# Patient Record
Sex: FEMALE | Race: WHITE | NOT HISPANIC OR LATINO | Employment: OTHER | URBAN - METROPOLITAN AREA
[De-identification: names, ages, dates, MRNs, and addresses within clinical notes are randomized per-mention and may not be internally consistent; named-entity substitution may affect disease eponyms.]

---

## 2017-03-27 ENCOUNTER — OFFICE VISIT (OUTPATIENT)
Dept: FAMILY MEDICINE | Facility: CLINIC | Age: 82
End: 2017-03-27
Payer: MEDICARE

## 2017-03-27 VITALS
DIASTOLIC BLOOD PRESSURE: 72 MMHG | TEMPERATURE: 98 F | HEIGHT: 59 IN | BODY MASS INDEX: 25.6 KG/M2 | HEART RATE: 67 BPM | SYSTOLIC BLOOD PRESSURE: 138 MMHG | RESPIRATION RATE: 16 BRPM | WEIGHT: 127 LBS

## 2017-03-27 DIAGNOSIS — T14.8XXA SPLINTER: Primary | ICD-10-CM

## 2017-03-27 PROCEDURE — 99213 OFFICE O/P EST LOW 20 MIN: CPT | Mod: 25 | Performed by: NURSE PRACTITIONER

## 2017-03-27 PROCEDURE — 10120 INC&RMVL FB SUBQ TISS SMPL: CPT | Performed by: NURSE PRACTITIONER

## 2017-03-27 RX ORDER — CEPHALEXIN 500 MG/1
500 CAPSULE ORAL 3 TIMES DAILY
Qty: 21 CAPSULE | Refills: 0 | Status: SHIPPED | OUTPATIENT
Start: 2017-03-27 | End: 2017-04-03

## 2017-03-27 NOTE — MR AVS SNAPSHOT
After Visit Summary   3/27/2017    Pam Pierce    MRN: [id number]           Patient Information     Date Of Birth          1/6/1930        Visit Information        Provider Department      3/27/2017 2:00 PM Merissa Miller APRN CNP [facility]        Today's Diagnoses     Splinter    -  1      Care Instructions    Take some Tylenol or ibuprofen today for the pain.  Ice might help as well.  Keep bandage on for 24 hours and then may leave open to air, band aid if going out of the house.  Watch for signs of infection, swelling, redness, unusual pain or discharge.  Take the antibiotic as prescribed.  Follow up if symptoms persist or worsen and as needed.        Thank you for choosing [facility].  You may be receiving a survey in the mail from [employer] regarding your visit today.  Please take a few minutes to complete and return the survey to let us know how we are doing.      Our Clinic hours are:  Mondays    7:20 am - 7 pm  Tues -  Fri  7:20 am - 5 pm    Clinic Phone: [phone]    The clinic lab opens at 7:30 am Mon - Fri and appointments are required.    [facility]  Ph. [phone]  Monday-Thursday 8 am - 7pm  Tues/Wed/Fri 8 am - 5:30 pm               Follow-ups after your visit        Who to contact     If you have questions or need follow up information about today's clinic visit or your schedule please contact [facility] directly at [phone].  Normal or non-critical lab and imaging results will be communicated to you by MyChart, letter or phone within 4 business days after the clinic has received the results. If you do not hear from us within 7 days, please contact the clinic through MyChart or phone. If you have a critical or abnormal lab result, we will notify you by phone as soon as possible.  Submit refill requests through [employer] or call your pharmacy and they will forward the refill request to us. Please allow 3  "business days for your refill to be completed.          Additional Information About Your Visit        MyChart Information     [employer] lets you send messages to your doctor, view your test results, renew your prescriptions, schedule appointments and more. To sign up, go to [email or web address] . Click on \"Log in\" on the left side of the screen, which will take you to the Welcome page. Then click on \"Sign up Now\" on the right side of the page.     You will be asked to enter the access code listed below, as well as some personal information. Please follow the directions to create your username and password.     Your access code is: [email or web address]  Expires: 2017  2:50 PM     Your access code will  in 90 days. If you need help or a new code, please call your [place] clinic or [phone].        Care EveryWhere ID     This is your Care EveryWhere ID. This could be used by other organizations to access your [place] medical records  [id number]        Your Vitals Were     Pulse Temperature Respirations Height BMI (Body Mass Index)       67 98  F (36.7  C) (Oral) 16 4' 11\" (1.499 m) 25.65 kg/m2        Blood Pressure from Last 3 Encounters:   17 138/72   16 121/65   16 101/56    Weight from Last 3 Encounters:   17 127 lb (57.6 kg)   16 135 lb (61.2 kg)   16 134 lb (60.8 kg)              We Performed the Following     REMOVAL ARM/ELBOW F.B.,SUPERFICIAL          Today's Medication Changes          These changes are accurate as of: 3/27/17  2:50 PM.  If you have any questions, ask your nurse or doctor.               Start taking these medicines.        Dose/Directions    cephALEXin 500 MG capsule   Commonly known as:  KEFLEX   Used for:  Splinter   Started by:  Merissa Miller APRN CNP        Dose:  500 mg   Take 1 capsule (500 mg) by mouth 3 times daily for 7 days   Quantity:  21 capsule   Refills:  0            Where to get your medicines      These medications were " sent to [facility] - [place], [place] - [address]  [address], [facility] [address]     Phone:  [phone]     cephALEXin 500 MG capsule                Primary Care Provider Office Phone # Fax #    Reagan Thakkar [phone]       [facility] [address] [address]        Thank you!     Thank you for choosing [facility]  for your care. Our goal is always to provide you with excellent care. Hearing back from our patients is one way we can continue to improve our services. Please take a few minutes to complete the written survey that you may receive in the mail after your visit with us. Thank you!             Your Updated Medication List - Protect others around you: Learn how to safely use, store and throw away your medicines at www.disposemymeds.org.          This list is accurate as of: 3/27/17  2:50 PM.  Always use your most recent med list.                   Brand Name Dispense Instructions for use    albuterol 108 (90 BASE) MCG/ACT Inhaler    albuterol    1 Inhaler    Inhale 2 puffs into the lungs every 4 hours as needed       atenolol-chlorthalidone 50-25 MG per tablet    TENORETIC    90 tablet    Take 1 tablet by mouth daily       CALCIUM + D PO      1 TABLET DAILY       cephALEXin 500 MG capsule    KEFLEX    21 capsule    Take 1 capsule (500 mg) by mouth 3 times daily for 7 days       enalapril 20 MG tablet    VASOTEC    180 tablet    Take 2 tablets (40 mg) by mouth daily       fluticasone 110 MCG/ACT Inhaler    FLOVENT HFA    3 Inhaler    Inhale 2 puffs into the lungs 2 times daily 2 puffs       MULTI-DAY VITAMINS PO      1 tablet daily       simvastatin 40 MG tablet    ZOCOR    90 tablet    Take 1 tablet (40 mg) by mouth At Bedtime

## 2017-03-27 NOTE — NURSING NOTE
"Chief Complaint   Patient presents with     Foreign Body in Skin       Initial /72 (BP Location: Right arm, Patient Position: Chair, Cuff Size: Adult Regular)  Pulse 67  Temp 98  F (36.7  C) (Oral)  Resp 16  Ht 4' 11\" (1.499 m)  Wt 127 lb (57.6 kg)  BMI 25.65 kg/m2 Estimated body mass index is 25.65 kg/(m^2) as calculated from the following:    Height as of this encounter: 4' 11\" (1.499 m).    Weight as of this encounter: 127 lb (57.6 kg).  Medication Reconciliation: complete  "

## 2017-03-27 NOTE — PROGRESS NOTES
"  SUBJECTIVE:                                                    Pam Pierce is a 87 year old female who presents to clinic today for the following health issues:      Pt is here for a sliver in her left hand that she got helping her  stack wood for the fireplace 9 days ago.         Problem list and histories reviewed & adjusted, as indicated.  Additional history: palmar base of left hand with pain. She states 9 days ago she got a sliver in it while helping to load up firewood. She reports removing a \"large chunk\" but since then it's been painful, some drainage and she feels there's still something in there.  Tetanus booster in 2012.      Patient Active Problem List   Diagnosis     Urethral stricture due to infection     Hyperlipidemia     Essential hypertension     IMPAIRED FASTING GLUCOSE     HYPERLIPIDEMIA LDL GOAL <100     CKD (chronic kidney disease) stage 3, GFR 30-59 ml/min     Advanced directives, counseling/discussion     GERD (gastroesophageal reflux disease)     Past Surgical History:   Procedure Laterality Date     SURGICAL HISTORY OF -       open reduction of second metatarsal neck fx with internal fixation  cna d closed treatment of metatarsal to 2 and 4 fx     SURGICAL HISTORY OF -       hysterectomy and oophorectomy     SURGICAL HISTORY OF -       lithotripsy       Social History   Substance Use Topics     Smoking status: Never Smoker     Smokeless tobacco: Never Used     Alcohol use Yes      Comment: wine ocass     Family History   Problem Relation Age of Onset     Cancer - colorectal Mother      Breast Cancer Mother      C.A.D. Father      Cardiovascular Father      Alcohol/Drug Father      Chronic Obstructive Pulmonary Disease Brother      CANCER Son      chest cancer/myesthenia gravis     Musculoskeletal Disorder Son      myesthenia gravis     CEREBROVASCULAR DISEASE Sister      Chronic Obstructive Pulmonary Disease Brother          Current Outpatient Prescriptions   Medication Sig " "Dispense Refill     cephALEXin (KEFLEX) 500 MG capsule Take 1 capsule (500 mg) by mouth 3 times daily for 7 days 21 capsule 0     atenolol-chlorthalidone (TENORETIC) 50-25 MG per tablet Take 1 tablet by mouth daily 90 tablet 3     enalapril (VASOTEC) 20 MG tablet Take 2 tablets (40 mg) by mouth daily 180 tablet 3     fluticasone (FLOVENT HFA) 110 MCG/ACT inhaler Inhale 2 puffs into the lungs 2 times daily 2 puffs 3 Inhaler 3     albuterol (ALBUTEROL) 108 (90 BASE) MCG/ACT inhaler Inhale 2 puffs into the lungs every 4 hours as needed 1 Inhaler 11     simvastatin (ZOCOR) 40 MG tablet Take 1 tablet (40 mg) by mouth At Bedtime 90 tablet 3     CALCIUM + D OR 1 TABLET DAILY       MULTI-DAY VITAMINS OR 1 tablet daily       Allergies   Allergen Reactions     Flu Virus Vaccine      Gabapentin Nausea and Vomiting       Reviewed and updated as needed this visit by clinical staff  Tobacco  Allergies  Med Hx  Surg Hx  Fam Hx  Soc Hx      Reviewed and updated as needed this visit by Provider          ROS: 10 point ROS neg other than the symptoms noted above in the HPI.    OBJECTIVE:                                                    /72 (BP Location: Right arm, Patient Position: Chair, Cuff Size: Adult Regular)  Pulse 67  Temp 98  F (36.7  C) (Oral)  Resp 16  Ht 4' 11\" (1.499 m)  Wt 127 lb (57.6 kg)  BMI 25.65 kg/m2  Body mass index is 25.65 kg/(m^2).  GENERAL: healthy, alert and no distress  NECK: no adenopathy, no asymmetry  RESP: lungs clear to auscultation   CV: regular rate and rhythm  MS: no gross musculoskeletal defects noted of left hand, fingers or wrist, palmar base of left thumb with two small incisions, the larger one with scant purulent drainage, area was cleansed with alcohol and anesthestized with 1 cc of lidocaine. Splinter forceps used to remove a larger splinter and then a small piece, irrigated and I could not feel or see any other foreign bodies, topical antibiotic ointment and bandage applied " to left hand      Diagnostic Test Results:  none      ASSESSMENT/PLAN:                                                            1. Splinter    - REMOVAL ARM/ELBOW F.B.,SUPERFICIAL  - cephALEXin (KEFLEX) 500 MG capsule; Take 1 capsule (500 mg) by mouth 3 times daily for 7 days  Dispense: 21 capsule; Refill: 0  Discussed how to take the medication(s), expected outcomes, potential side effects.      See Patient Instructions  Patient Instructions   Take some Tylenol or ibuprofen today for the pain.  Ice might help as well.  Keep bandage on for 24 hours and then may leave open to air, band aid if going out of the house.  Watch for signs of infection, swelling, redness, unusual pain or discharge.  Take the antibiotic as prescribed.  Follow up if symptoms persist or worsen and as needed.        Thank you for choosing [facility].  You may be receiving a survey in the mail from [employer] regarding your visit today.  Please take a few minutes to complete and return the survey to let us know how we are doing.      Our Clinic hours are:  Mondays    7:20 am - 7 pm  Tues -  Fri  7:20 am - 5 pm    Clinic Phone: [phone]    The clinic lab opens at 7:30 am Mon - Fri and appointments are required.    [facility]  Ph. [phone]  Monday-Thursday 8 am - 7pm  Tues/Wed/Fri 8 am - 5:30 pm             WILEY Kc CNP  [facility]

## 2017-03-27 NOTE — PATIENT INSTRUCTIONS
Take some Tylenol or ibuprofen today for the pain.  Ice might help as well.  Keep bandage on for 24 hours and then may leave open to air, band aid if going out of the house.  Watch for signs of infection, swelling, redness, unusual pain or discharge.  Take the antibiotic as prescribed.  Follow up if symptoms persist or worsen and as needed.        Thank you for choosing [facility].  You may be receiving a survey in the mail from [employer] regarding your visit today.  Please take a few minutes to complete and return the survey to let us know how we are doing.      Our Clinic hours are:  Mondays    7:20 am - 7 pm  Tues -  Fri  7:20 am - 5 pm    Clinic Phone: [phone]    The clinic lab opens at 7:30 am Mon - Fri and appointments are required.    [facility]. [phone]  Monday-Thursday 8 am - 7pm  Tues/Wed/Fri 8 am - 5:30 pm

## 2017-04-14 DIAGNOSIS — J45.20 MILD INTERMITTENT ASTHMA WITHOUT COMPLICATION: ICD-10-CM

## 2017-04-14 RX ORDER — FLUTICASONE PROPIONATE 110 UG/1
2 AEROSOL, METERED RESPIRATORY (INHALATION) 2 TIMES DAILY
Qty: 1 INHALER | Refills: 0 | Status: SHIPPED | OUTPATIENT
Start: 2017-04-14 | End: 2017-05-09

## 2017-04-14 NOTE — TELEPHONE ENCOUNTER
Flovent       Last Written Prescription Date: 03/21/2016  Last Fill Quantity: 3 inhalers, # refills: 3    Last Office Visit with G, P or [facility] prescribing provider:  03/21/2016   Future Office Visit:       Date of Last Asthma Action Plan Letter:   There are no preventive care reminders to display for this patient.   Asthma Control Test:   ACT Total Scores 3/21/2016   ACT TOTAL SCORE (Goal Greater than or Equal to 20) 20   In the past 12 months, how many times did you visit the emergency room for your asthma without being admitted to the hospital? 0   In the past 12 months, how many times were you hospitalized overnight because of your asthma? 0       Date of Last Spirometry Test:   No results found for this or any previous visit.

## 2017-05-09 ENCOUNTER — OFFICE VISIT (OUTPATIENT)
Dept: FAMILY MEDICINE | Facility: CLINIC | Age: 82
End: 2017-05-09
Payer: MEDICARE

## 2017-05-09 VITALS
DIASTOLIC BLOOD PRESSURE: 71 MMHG | SYSTOLIC BLOOD PRESSURE: 129 MMHG | HEART RATE: 68 BPM | TEMPERATURE: 97.1 F | OXYGEN SATURATION: 97 % | BODY MASS INDEX: 25.4 KG/M2 | WEIGHT: 126 LBS | HEIGHT: 59 IN

## 2017-05-09 DIAGNOSIS — Z23 ENCOUNTER FOR IMMUNIZATION: ICD-10-CM

## 2017-05-09 DIAGNOSIS — E78.5 HYPERLIPIDEMIA LDL GOAL <100: Primary | ICD-10-CM

## 2017-05-09 DIAGNOSIS — J45.20 MILD INTERMITTENT ASTHMA WITHOUT COMPLICATION: ICD-10-CM

## 2017-05-09 DIAGNOSIS — I10 BENIGN ESSENTIAL HYPERTENSION: ICD-10-CM

## 2017-05-09 PROCEDURE — 90670 PCV13 VACCINE IM: CPT | Performed by: FAMILY MEDICINE

## 2017-05-09 PROCEDURE — 99214 OFFICE O/P EST MOD 30 MIN: CPT | Mod: 25 | Performed by: FAMILY MEDICINE

## 2017-05-09 PROCEDURE — G0009 ADMIN PNEUMOCOCCAL VACCINE: HCPCS | Performed by: FAMILY MEDICINE

## 2017-05-09 RX ORDER — ALBUTEROL SULFATE 90 UG/1
2 AEROSOL, METERED RESPIRATORY (INHALATION) EVERY 4 HOURS PRN
Qty: 1 INHALER | Refills: 11 | Status: SHIPPED | OUTPATIENT
Start: 2017-05-09 | End: 2019-07-15

## 2017-05-09 RX ORDER — ATENOLOL AND CHLORTHALIDONE TABLET 50; 25 MG/1; MG/1
1 TABLET ORAL DAILY
Qty: 90 TABLET | Refills: 3 | Status: SHIPPED | OUTPATIENT
Start: 2017-05-09 | End: 2018-04-12

## 2017-05-09 RX ORDER — SIMVASTATIN 40 MG
40 TABLET ORAL AT BEDTIME
Qty: 90 TABLET | Refills: 3 | Status: SHIPPED | OUTPATIENT
Start: 2017-05-09 | End: 2018-04-12

## 2017-05-09 RX ORDER — ENALAPRIL MALEATE 20 MG/1
40 TABLET ORAL DAILY
Qty: 180 TABLET | Refills: 3 | Status: SHIPPED | OUTPATIENT
Start: 2017-05-09 | End: 2018-04-12

## 2017-05-09 RX ORDER — FLUTICASONE PROPIONATE 110 UG/1
2 AEROSOL, METERED RESPIRATORY (INHALATION) 2 TIMES DAILY
Qty: 1 INHALER | Refills: 11 | Status: SHIPPED | OUTPATIENT
Start: 2017-05-09 | End: 2018-05-23

## 2017-05-09 NOTE — NURSING NOTE
"Chief Complaint   Patient presents with     Lipids     Hypertension     Asthma       Initial /71 (BP Location: Left arm, Patient Position: Chair, Cuff Size: Adult Small)  Pulse 68  Temp 97.1  F (36.2  C) (Tympanic)  Ht 4' 11\" (1.499 m)  Wt 126 lb (57.2 kg)  SpO2 97%  BMI 25.45 kg/m2 Estimated body mass index is 25.45 kg/(m^2) as calculated from the following:    Height as of this encounter: 4' 11\" (1.499 m).    Weight as of this encounter: 126 lb (57.2 kg).  Medication Reconciliation: complete    "

## 2017-05-09 NOTE — PATIENT INSTRUCTIONS
Thank you for choosing [facility].  You may be receiving a survey in the mail from [employer] regarding your visit today.  Please take a few minutes to complete and return the survey to let us know how we are doing.      If you have questions or concerns, please contact us via [employer] or you can contact your care team at [phone].    Our Clinic hours are:  Monday 6:40 am  to 7:00 pm  Tuesday -Friday 6:40 am to 5:00 pm    The [place] outpatient lab hours are:  Monday - Friday 6:10 am to 4:45 pm  Saturdays 7:00 am to 11:00 am  Appointments are required, call [phone]    If you have clinical questions after hours or would like to schedule an appointment,  call the clinic at [phone].

## 2017-05-09 NOTE — NURSING NOTE
Screening Questionnaire for Adult Immunization    Are you sick today?   No   Do you have allergies to medications, food, a vaccine component or latex?   No   Have you ever had a serious reaction after receiving a vaccination?   No   Do you have a long-term health problem with heart disease, lung disease, asthma, kidney disease, metabolic disease (e.g. diabetes), anemia, or other blood disorder?   No   Do you have cancer, leukemia, HIV/AIDS, or any other immune system problem?   No   In the past 3 months, have you taken medications that affect  your immune system, such as prednisone, other steroids, or anticancer drugs; drugs for the treatment of rheumatoid arthritis, Crohn s disease, or psoriasis; or have you had radiation treatments?   No   Have you had a seizure, or a brain or other nervous system problem?   No   During the past year, have you received a transfusion of blood or blood     products, or been given immune (gamma) globulin or antiviral drug?   No   For women: Are you pregnant or is there a chance you could become        pregnant during the next month?   No   Have you received any vaccinations in the past 4 weeks?   No     Immunization questionnaire answers were all negative.      MNVFC doesn't apply on this patient    Per orders of Dr. Thakkar, injection of Prevnar 13 given by Tiara Field. Patient instructed to remain in clinic for 20 minutes afterwards, and to report any adverse reaction to me immediately.       Screening performed by Tiara Field on 5/9/2017 at 10:52 AM.

## 2017-05-09 NOTE — PROGRESS NOTES
SUBJECTIVE:                                                    Pam Pierce is a 87 year old female who presents to clinic today for the following health issues:    Asthma Follow-Up    Was ACT completed today?    Yes    ACT Total Scores 3/21/2016   ACT TOTAL SCORE (Goal Greater than or Equal to 20) 20   In the past 12 months, how many times did you visit the emergency room for your asthma without being admitted to the hospital? 0   In the past 12 months, how many times were you hospitalized overnight because of your asthma? 0       Recent asthma triggers that patient is dealing with: smoke, dust mites and pollens          Hyperlipidemia Follow-Up      Rate your low fat/cholesterol diet?: poor    Taking statin?  Yes, no muscle aches from statin    Other lipid medications/supplements?:  none     Hypertension Follow-up      Outpatient blood pressures are not being checked.    Low Salt Diet: not monitoring salt       Amount of exercise or physical activity: 1 day/week for an average of less than 15 minutes    Problems taking medications regularly: No    Medication side effects: none    Diet: regular (no restrictions)      Patient here for medication refills on her hyperlipidemia, hypertension, and asthma. She has been doing well from these stand points. Patient reports no acute symptoms. Medication appears to be helping.     Problem list and histories reviewed & adjusted, as indicated.  Additional history: as documented    Patient Active Problem List   Diagnosis     Urethral stricture due to infection     Hyperlipidemia     Essential hypertension     IMPAIRED FASTING GLUCOSE     HYPERLIPIDEMIA LDL GOAL <100     CKD (chronic kidney disease) stage 3, GFR 30-59 ml/min     Advanced directives, counseling/discussion     GERD (gastroesophageal reflux disease)     Past Surgical History:   Procedure Laterality Date     SURGICAL HISTORY OF -       open reduction of second metatarsal neck fx with internal fixation  cna d  closed treatment of metatarsal to 2 and 4 fx     SURGICAL HISTORY OF -       hysterectomy and oophorectomy     SURGICAL HISTORY OF -       lithotripsy       Social History   Substance Use Topics     Smoking status: Never Smoker     Smokeless tobacco: Never Used     Alcohol use Yes      Comment: wine ocass     Family History   Problem Relation Age of Onset     Cancer - colorectal Mother      Breast Cancer Mother      C.A.D. Father      Cardiovascular Father      Alcohol/Drug Father      Chronic Obstructive Pulmonary Disease Brother      CANCER Son      chest cancer/myesthenia gravis     Musculoskeletal Disorder Son      myesthenia gravis     CEREBROVASCULAR DISEASE Sister      Chronic Obstructive Pulmonary Disease Brother          Current Outpatient Prescriptions   Medication Sig Dispense Refill     fluticasone (FLOVENT HFA) 110 MCG/ACT Inhaler Inhale 2 puffs into the lungs 2 times daily 1 Inhaler 11     atenolol-chlorthalidone (TENORETIC) 50-25 MG per tablet Take 1 tablet by mouth daily 90 tablet 3     enalapril (VASOTEC) 20 MG tablet Take 2 tablets (40 mg) by mouth daily 180 tablet 3     albuterol (ALBUTEROL) 108 (90 BASE) MCG/ACT Inhaler Inhale 2 puffs into the lungs every 4 hours as needed 1 Inhaler 11     simvastatin (ZOCOR) 40 MG tablet Take 1 tablet (40 mg) by mouth At Bedtime 90 tablet 3     CALCIUM + D OR 1 TABLET DAILY       MULTI-DAY VITAMINS OR 1 tablet daily       Allergies   Allergen Reactions     Flu Virus Vaccine      Gabapentin Nausea and Vomiting     BP Readings from Last 3 Encounters:   05/09/17 129/71   03/27/17 138/72   03/21/16 121/65    Wt Readings from Last 3 Encounters:   05/09/17 126 lb (57.2 kg)   03/27/17 127 lb (57.6 kg)   03/21/16 135 lb (61.2 kg)                  Labs reviewed in EPIC    Reviewed and updated as needed this visit by clinical staff       Reviewed and updated as needed this visit by Provider         ROS:  Constitutional, HEENT, cardiovascular, pulmonary, gi and gu systems  "are negative, except as otherwise noted.    OBJECTIVE:                                                    /71 (BP Location: Left arm, Patient Position: Chair, Cuff Size: Adult Small)  Pulse 68  Temp 97.1  F (36.2  C) (Tympanic)  Ht 4' 11\" (1.499 m)  Wt 126 lb (57.2 kg)  SpO2 97%  BMI 25.45 kg/m2  Body mass index is 25.45 kg/(m^2).  GENERAL: healthy, alert and no distress  NECK: no adenopathy, no asymmetry, masses, or scars and thyroid normal to palpation  RESP: lungs clear to auscultation - no rales, rhonchi or wheezes  CV: regular rate and rhythm, normal S1 S2, no S3 or S4, no murmur, click or rub, no peripheral edema and peripheral pulses strong  ABDOMEN: soft, nontender, no hepatosplenomegaly, no masses and bowel sounds normal  MS: no gross musculoskeletal defects noted, no edema  SKIN: no suspicious lesions or rashes  PSYCH: mentation appears normal, affect normal/bright    Diagnostic Test Results:  none      ASSESSMENT/PLAN:                                                    (E78.5) Hyperlipidemia LDL goal <100  (primary encounter diagnosis)  Comment: medication refilled.  Plan: simvastatin (ZOCOR) 40 MG tablet            (J45.20) Mild intermittent asthma without complication  Comment: Medication refilled  Plan: fluticasone (FLOVENT HFA) 110 MCG/ACT Inhaler,         albuterol (ALBUTEROL) 108 (90 BASE) MCG/ACT         Inhaler            (I10) Benign essential hypertension  Comment: BP is within normal limits   Plan: atenolol-chlorthalidone (TENORETIC) 50-25 MG         per tablet, enalapril (VASOTEC) 20 MG tablet            (Z23) Encounter for immunization  Comment: Pneumonia vaccine given  Plan: PNEUMOCOCCAL CONJ VACCINE 13 VALENT IM              FUTURE APPOINTMENTS:       - Follow-up visit as needed.    eRagan Thakkar MD  [facility]    "

## 2017-05-09 NOTE — MR AVS SNAPSHOT
After Visit Summary   5/9/2017    Pam Pierce    MRN: [id number]           Patient Information     Date Of Birth          1/6/1930        Visit Information        Provider Department      5/9/2017 10:20 AM Reagan Thakkar MD [facility]        Today's Diagnoses     Hyperlipidemia LDL goal <100    -  1    Mild intermittent asthma without complication        Benign essential hypertension          Care Instructions          Thank you for choosing [facility].  You may be receiving a survey in the mail from [facility]"[employer] regarding your visit today.  Please take a few minutes to complete and return the survey to let us know how we are doing.      If you have questions or concerns, please contact us via Sentisis or you can contact your care team at [phone].    Our Clinic hours are:  Monday 6:40 am  to 7:00 pm  Tuesday -Friday 6:40 am to 5:00 pm    The [place] outpatient lab hours are:  Monday - Friday 6:10 am to 4:45 pm  Saturdays 7:00 am to 11:00 am  Appointments are required, call [phone]    If you have clinical questions after hours or would like to schedule an appointment,  call the clinic at [phone].          Follow-ups after your visit        Who to contact     If you have questions or need follow up information about today's clinic visit or your schedule please contact [facility] directly at [phone].  Normal or non-critical lab and imaging results will be communicated to you by MyChart, letter or phone within 4 business days after the clinic has received the results. If you do not hear from us within 7 days, please contact the clinic through Profilepassert or phone. If you have a critical or abnormal lab result, we will notify you by phone as soon as possible.  Submit refill requests through Sentisis or call your pharmacy and they will forward the refill request to us. Please allow 3 business days for your refill to be completed.           "Additional Information About Your Visit        MyChart Information     [employer] lets you send messages to your doctor, view your test results, renew your prescriptions, schedule appointments and more. To sign up, go to [email or web address] . Click on \"Log in\" on the left side of the screen, which will take you to the Welcome page. Then click on \"Sign up Now\" on the right side of the page.     You will be asked to enter the access code listed below, as well as some personal information. Please follow the directions to create your username and password.     Your access code is: [email or web address]  Expires: 2017  2:50 PM     Your access code will  in 90 days. If you need help or a new code, please call your [place] clinic or [phone].        Care EveryWhere ID     This is your Care EveryWhere ID. This could be used by other organizations to access your [place] medical records  [id number]        Your Vitals Were     Pulse Temperature Height Pulse Oximetry BMI (Body Mass Index)       68 97.1  F (36.2  C) (Tympanic) 4' 11\" (1.499 m) 97% 25.45 kg/m2        Blood Pressure from Last 3 Encounters:   17 129/71   17 138/72   16 121/65    Weight from Last 3 Encounters:   17 126 lb (57.2 kg)   17 127 lb (57.6 kg)   16 135 lb (61.2 kg)              Today, you had the following     No orders found for display         Today's Medication Changes          These changes are accurate as of: 17 10:46 AM.  If you have any questions, ask your nurse or doctor.               These medicines have changed or have updated prescriptions.        Dose/Directions    fluticasone 110 MCG/ACT Inhaler   Commonly known as:  FLOVENT HFA   This may have changed:  additional instructions   Used for:  Mild intermittent asthma without complication        Dose:  2 puff   Inhale 2 puffs into the lungs 2 times daily   Quantity:  1 Inhaler   Refills:  11            Where to get your medicines      These " medications were sent to [facility] - [place], [place] - [address]  [address], [facility] [address]     Phone:  [phone]     albuterol 108 (90 BASE) MCG/ACT Inhaler    atenolol-chlorthalidone 50-25 MG per tablet    enalapril 20 MG tablet    fluticasone 110 MCG/ACT Inhaler    simvastatin 40 MG tablet                Primary Care Provider Office Phone # Fax #    Reagan Thakkar [phone]       [facility] [address] [facility] [address]        Thank you!     Thank you for choosing [facility]  for your care. Our goal is always to provide you with excellent care. Hearing back from our patients is one way we can continue to improve our services. Please take a few minutes to complete the written survey that you may receive in the mail after your visit with us. Thank you!             Your Updated Medication List - Protect others around you: Learn how to safely use, store and throw away your medicines at www.disposemymeds.org.          This list is accurate as of: 5/9/17 10:46 AM.  Always use your most recent med list.                   Brand Name Dispense Instructions for use    albuterol 108 (90 BASE) MCG/ACT Inhaler    albuterol    1 Inhaler    Inhale 2 puffs into the lungs every 4 hours as needed       atenolol-chlorthalidone 50-25 MG per tablet    TENORETIC    90 tablet    Take 1 tablet by mouth daily       CALCIUM + D PO      1 TABLET DAILY       enalapril 20 MG tablet    VASOTEC    180 tablet    Take 2 tablets (40 mg) by mouth daily       fluticasone 110 MCG/ACT Inhaler    FLOVENT HFA    1 Inhaler    Inhale 2 puffs into the lungs 2 times daily       MULTI-DAY VITAMINS PO      1 tablet daily       simvastatin 40 MG tablet    ZOCOR    90 tablet    Take 1 tablet (40 mg) by mouth At Bedtime

## 2017-05-10 ASSESSMENT — ASTHMA QUESTIONNAIRES: ACT_TOTALSCORE: 25

## 2018-04-12 DIAGNOSIS — E78.5 HYPERLIPIDEMIA LDL GOAL <100: ICD-10-CM

## 2018-04-12 DIAGNOSIS — I10 BENIGN ESSENTIAL HYPERTENSION: ICD-10-CM

## 2018-04-12 NOTE — TELEPHONE ENCOUNTER
"Requested Prescriptions   Pending Prescriptions Disp Refills     atenolol-chlorthalidone (TENORETIC) 50-25 MG per tablet  Last Written Prescription Date:  05/09/2017  Last Fill Quantity: 90,  # refills: 3   Last office visit:05/09/2017 with prescribing provider:  Ariane Ch Office Visit:     90 tablet 3     Sig: Take 1 tablet by mouth daily    Beta-Blockers Protocol Passed    4/12/2018  3:34 PM       Passed - Blood pressure under 140/90 in past 12 months    BP Readings from Last 3 Encounters:   05/09/17 129/71   03/27/17 138/72   03/21/16 121/65                Passed - Patient is age 6 or older       Passed - Recent (12 mo) or future (30 days) visit within the authorizing provider's specialty    Patient had office visit in the last 12 months or has a visit in the next 30 days with authorizing provider or within the authorizing provider's specialty.  See \"Patient Info\" tab in inbasket, or \"Choose Columns\" in Meds & Orders section of the refill encounter.            enalapril (VASOTEC) 20 MG tablet  Last Written Prescription Date:  05/09/2017  Last Fill Quantity: 180,  # refills: 3   Last office visit: 05/09/2017 with prescribing provider:  Ariane Ch Office Visit:     180 tablet 3     Sig: Take 2 tablets (40 mg) by mouth daily    ACE Inhibitors (Including Combos) Protocol Failed    4/12/2018  3:34 PM       Failed - Normal serum creatinine on file in past 12 months    Recent Labs   Lab Test  03/21/16   1104   CR  0.90            Failed - Normal serum potassium on file in past 12 months    Recent Labs   Lab Test  03/21/16   1104   POTASSIUM  3.8            Passed - Blood pressure under 140/90 in past 12 months    BP Readings from Last 3 Encounters:   05/09/17 129/71   03/27/17 138/72   03/21/16 121/65                Passed - Recent (12 mo) or future (30 days) visit within the authorizing provider's specialty    Patient had office visit in the last 12 months or has a visit in the next 30 days with " "authorizing provider or within the authorizing provider's specialty.  See \"Patient Info\" tab in inbasket, or \"Choose Columns\" in Meds & Orders section of the refill encounter.           Passed - Patient is age 18 or older       Passed - No active pregnancy on record       Passed - No positive pregnancy test in past 12 months        simvastatin (ZOCOR) 40 MG tablet  Last Written Prescription Date:  05/09/2017  Last Fill Quantity: 90,  # refills: 3   Last office visit:05/09/2017 with prescribing provider:  Ariane   Future Office Visit:     90 tablet 3     Sig: Take 1 tablet (40 mg) by mouth At Bedtime    Statins Protocol Failed    4/12/2018  3:34 PM       Failed - LDL on file in past 12 months    Recent Labs   Lab Test  03/21/16   1104   LDL  77            Passed - No abnormal creatine kinase in past 12 months    No lab results found.            Passed - Recent (12 mo) or future (30 days) visit within the authorizing provider's specialty    Patient had office visit in the last 12 months or has a visit in the next 30 days with authorizing provider or within the authorizing provider's specialty.  See \"Patient Info\" tab in inbasket, or \"Choose Columns\" in Meds & Orders section of the refill encounter.           Passed - Patient is age 18 or older       Passed - No active pregnancy on record       Passed - No positive pregnancy test in past 12 months        Torres Coppola RT (R)    "

## 2018-04-13 RX ORDER — ATENOLOL AND CHLORTHALIDONE TABLET 50; 25 MG/1; MG/1
1 TABLET ORAL DAILY
Qty: 90 TABLET | Refills: 0 | Status: SHIPPED | OUTPATIENT
Start: 2018-04-13 | End: 2018-07-18

## 2018-04-13 RX ORDER — ENALAPRIL MALEATE 20 MG/1
40 TABLET ORAL DAILY
Qty: 180 TABLET | Refills: 0 | Status: SHIPPED | OUTPATIENT
Start: 2018-04-13 | End: 2018-07-18 | Stop reason: SINTOL

## 2018-04-13 RX ORDER — SIMVASTATIN 40 MG
40 TABLET ORAL AT BEDTIME
Qty: 90 TABLET | Refills: 0 | Status: SHIPPED | OUTPATIENT
Start: 2018-04-13 | End: 2018-07-18

## 2018-05-23 DIAGNOSIS — J45.20 MILD INTERMITTENT ASTHMA WITHOUT COMPLICATION: ICD-10-CM

## 2018-05-23 RX ORDER — FLUTICASONE PROPIONATE 110 UG/1
2 AEROSOL, METERED RESPIRATORY (INHALATION) 2 TIMES DAILY
Qty: 1 INHALER | Refills: 0 | Status: SHIPPED | OUTPATIENT
Start: 2018-05-23 | End: 2018-07-18

## 2018-05-23 NOTE — TELEPHONE ENCOUNTER
Medication is being filled for 1 time refill only due to:  Patient needs to be seen because it has been more than one year since last visit.   Maryann August RNC

## 2018-05-23 NOTE — TELEPHONE ENCOUNTER
"Requested Prescriptions   Pending Prescriptions Disp Refills     fluticasone (FLOVENT HFA) 110 MCG/ACT Inhaler  Last Written Prescription Date:  05/09/2017  Last Fill Quantity: 1,  # refills: 11   Last office visit: 5/9/2017 with prescribing provider:  rocky   Future Office Visit:     1 Inhaler 11     Sig: Inhale 2 puffs into the lungs 2 times daily    Inhaled Steroids Protocol Failed    5/23/2018  8:15 AM       Failed - Asthma control assessment score within normal limits in last 6 months    Please review ACT score.          Failed - Recent (6 mo) or future (30 days) visit within the authorizing provider's specialty    Patient had office visit in the last 6 months or has a visit in the next 30 days with authorizing provider or within the authorizing provider's specialty.  See \"Patient Info\" tab in inbasket, or \"Choose Columns\" in Meds & Orders section of the refill encounter.           Passed - Patient is age 12 or older        Torres Coppola RT (R)    "

## 2018-07-18 ENCOUNTER — OFFICE VISIT (OUTPATIENT)
Dept: FAMILY MEDICINE | Facility: CLINIC | Age: 83
End: 2018-07-18
Payer: MEDICARE

## 2018-07-18 VITALS
OXYGEN SATURATION: 99 % | TEMPERATURE: 97 F | SYSTOLIC BLOOD PRESSURE: 96 MMHG | BODY MASS INDEX: 20.03 KG/M2 | HEART RATE: 56 BPM | HEIGHT: 60 IN | DIASTOLIC BLOOD PRESSURE: 54 MMHG | WEIGHT: 102 LBS

## 2018-07-18 DIAGNOSIS — E78.5 HYPERLIPIDEMIA LDL GOAL <100: ICD-10-CM

## 2018-07-18 DIAGNOSIS — J45.20 MILD INTERMITTENT ASTHMA WITHOUT COMPLICATION: ICD-10-CM

## 2018-07-18 DIAGNOSIS — I10 BENIGN ESSENTIAL HYPERTENSION: ICD-10-CM

## 2018-07-18 LAB
ANION GAP SERPL CALCULATED.3IONS-SCNC: 7 MMOL/L (ref 3–14)
BUN SERPL-MCNC: 20 MG/DL (ref 7–30)
CALCIUM SERPL-MCNC: 9.3 MG/DL (ref 8.5–10.1)
CHLORIDE SERPL-SCNC: 101 MMOL/L (ref 94–109)
CHOLEST SERPL-MCNC: 135 MG/DL
CO2 SERPL-SCNC: 31 MMOL/L (ref 20–32)
CREAT SERPL-MCNC: 0.88 MG/DL (ref 0.52–1.04)
GFR SERPL CREATININE-BSD FRML MDRD: 61 ML/MIN/1.7M2
GLUCOSE SERPL-MCNC: 86 MG/DL (ref 70–99)
HDLC SERPL-MCNC: 50 MG/DL
LDLC SERPL CALC-MCNC: 62 MG/DL
NONHDLC SERPL-MCNC: 85 MG/DL
POTASSIUM SERPL-SCNC: 3.6 MMOL/L (ref 3.4–5.3)
SODIUM SERPL-SCNC: 139 MMOL/L (ref 133–144)
TRIGL SERPL-MCNC: 114 MG/DL

## 2018-07-18 PROCEDURE — 80048 BASIC METABOLIC PNL TOTAL CA: CPT | Performed by: FAMILY MEDICINE

## 2018-07-18 PROCEDURE — 99214 OFFICE O/P EST MOD 30 MIN: CPT | Performed by: FAMILY MEDICINE

## 2018-07-18 PROCEDURE — 80061 LIPID PANEL: CPT | Performed by: FAMILY MEDICINE

## 2018-07-18 PROCEDURE — 36415 COLL VENOUS BLD VENIPUNCTURE: CPT | Performed by: FAMILY MEDICINE

## 2018-07-18 RX ORDER — ATENOLOL AND CHLORTHALIDONE TABLET 50; 25 MG/1; MG/1
1 TABLET ORAL DAILY
Qty: 90 TABLET | Refills: 3 | Status: SHIPPED | OUTPATIENT
Start: 2018-07-18 | End: 2019-11-19

## 2018-07-18 RX ORDER — FLUTICASONE PROPIONATE 110 UG/1
2 AEROSOL, METERED RESPIRATORY (INHALATION) 2 TIMES DAILY
Qty: 1 INHALER | Refills: 11 | Status: SHIPPED | OUTPATIENT
Start: 2018-07-18 | End: 2019-07-15

## 2018-07-18 RX ORDER — ENALAPRIL MALEATE 20 MG/1
40 TABLET ORAL DAILY
Qty: 180 TABLET | Refills: 3 | Status: CANCELLED | OUTPATIENT
Start: 2018-07-18

## 2018-07-18 RX ORDER — SIMVASTATIN 40 MG
40 TABLET ORAL AT BEDTIME
Qty: 90 TABLET | Refills: 3 | Status: SHIPPED | OUTPATIENT
Start: 2018-07-18 | End: 2019-07-15

## 2018-07-18 NOTE — PROGRESS NOTES
SUBJECTIVE:   Pam Pierce is a 88 year old female who presents to clinic today for the following health issues:      Hyperlipidemia Follow-Up      Rate your low fat/cholesterol diet?: poor    Taking statin?  Yes, no muscle aches from statin    Other lipid medications/supplements?:  none    Hypertension Follow-up      Outpatient blood pressures are not being checked.    Low Salt Diet: no added salt    Asthma Follow-Up    Was ACT completed today?    Yes    ACT Total Scores 7/18/2018   ACT TOTAL SCORE (Goal Greater than or Equal to 20) 22   In the past 12 months, how many times did you visit the emergency room for your asthma without being admitted to the hospital? 0   In the past 12 months, how many times were you hospitalized overnight because of your asthma? 0       Recent asthma triggers that patient is dealing with: Patient is unaware of triggers        Amount of exercise or physical activity: 2-3 days/week for an average of less than 15 minutes    Problems taking medications regularly: No    Medication side effects: none    Diet: low salt      Medication Followup of refill a medication listed in      Taking Medication as prescribed: yes    Side Effects:  None    Medication Helping Symptoms:  yes       Patient is an 88 yr old female with hypertension and asthma. She comes in for refills. Says her asthma has been under good control. Her blood pressure is a bit on the low end . She has lost a lot of weight.She has been taking care of her  that was recently diagnosed with dementia. This has taken a toll on her health. We talked about tweaking her blood pressure medication. She is opened to this.    Problem list and histories reviewed & adjusted, as indicated.  Additional history: as documented    Patient Active Problem List   Diagnosis     Urethral stricture due to infection     Hyperlipidemia     Essential hypertension     IMPAIRED FASTING GLUCOSE     HYPERLIPIDEMIA LDL GOAL <100     CKD  (chronic kidney disease) stage 3, GFR 30-59 ml/min     Advanced directives, counseling/discussion     GERD (gastroesophageal reflux disease)     Past Surgical History:   Procedure Laterality Date     SURGICAL HISTORY OF -       open reduction of second metatarsal neck fx with internal fixation  cna d closed treatment of metatarsal to 2 and 4 fx     SURGICAL HISTORY OF -       hysterectomy and oophorectomy     SURGICAL HISTORY OF -       lithotripsy       Social History   Substance Use Topics     Smoking status: Never Smoker     Smokeless tobacco: Never Used     Alcohol use Yes      Comment: wine ocass     Family History   Problem Relation Age of Onset     Cancer - colorectal Mother      Breast Cancer Mother      C.A.D. Father      Cardiovascular Father      Alcohol/Drug Father      Chronic Obstructive Pulmonary Disease Brother      Cancer Son      chest cancer/myesthenia gravis     Musculoskeletal Disorder Son      myesthenia gravis     Cerebrovascular Disease Sister      Chronic Obstructive Pulmonary Disease Brother          Current Outpatient Prescriptions   Medication Sig Dispense Refill     atenolol-chlorthalidone (TENORETIC) 50-25 MG per tablet Take 1 tablet by mouth daily 90 tablet 3     CALCIUM + D OR 1 TABLET DAILY       fluticasone (FLOVENT HFA) 110 MCG/ACT Inhaler Inhale 2 puffs into the lungs 2 times daily 1 Inhaler 11     MULTI-DAY VITAMINS OR 1 tablet daily       simvastatin (ZOCOR) 40 MG tablet Take 1 tablet (40 mg) by mouth At Bedtime 90 tablet 3     albuterol (ALBUTEROL) 108 (90 BASE) MCG/ACT Inhaler Inhale 2 puffs into the lungs every 4 hours as needed 1 Inhaler 11     [DISCONTINUED] atenolol-chlorthalidone (TENORETIC) 50-25 MG per tablet Take 1 tablet by mouth daily Needs labs 90 tablet 0     [DISCONTINUED] fluticasone (FLOVENT HFA) 110 MCG/ACT Inhaler Inhale 2 puffs into the lungs 2 times daily Needs recheck, please advise to call and schedule an appt. [phone]. 8 Inhaler 0     [DISCONTINUED]  "simvastatin (ZOCOR) 40 MG tablet Take 1 tablet (40 mg) by mouth At Bedtime 90 tablet 0     Allergies   Allergen Reactions     Flu Virus Vaccine      Gabapentin Nausea and Vomiting     BP Readings from Last 3 Encounters:   07/18/18 96/54   05/09/17 129/71   03/27/17 138/72    Wt Readings from Last 3 Encounters:   07/18/18 102 lb (46.3 kg)   05/09/17 126 lb (57.2 kg)   03/27/17 127 lb (57.6 kg)                  Labs reviewed in EPIC    Reviewed and updated as needed this visit by clinical staff  Tobacco  Allergies  Meds  Med Hx  Surg Hx  Fam Hx  Soc Hx      Reviewed and updated as needed this visit by Provider         ROS:  Constitutional, HEENT, cardiovascular, pulmonary, gi and gu systems are negative, except as otherwise noted.    OBJECTIVE:     BP 96/54 (BP Location: Right arm, Cuff Size: Adult Regular)  Pulse 56  Temp 97  F (36.1  C) (Tympanic)  Ht 4' 11.75\" (1.518 m)  Wt 102 lb (46.3 kg)  SpO2 99%  BMI 20.09 kg/m2  Body mass index is 20.09 kg/(m^2).  GENERAL: healthy, alert and no distress  EYES: Eyes grossly normal to inspection, PERRL and conjunctivae and sclerae normal  HENT: ear canals and TM's normal, nose and mouth without ulcers or lesions  NECK: no adenopathy, no asymmetry, masses, or scars and thyroid normal to palpation  RESP: lungs clear to auscultation - no rales, rhonchi or wheezes  CV: regular rate and rhythm, normal S1 S2, no S3 or S4, no murmur, click or rub, no peripheral edema and peripheral pulses strong  MS: no gross musculoskeletal defects noted, no edema    Diagnostic Test Results:  none     ASSESSMENT/PLAN:   (I10) Benign essential hypertension  Comment: d/c the lisinopril  Plan: atenolol-chlorthalidone (TENORETIC) 50-25 MG         per tablet, Lipid panel reflex to direct LDL         Fasting, Basic metabolic panel      (J45.20) Mild intermittent asthma without complication  Comment: medication refilled   Plan: fluticasone (FLOVENT HFA) 110 MCG/ACT Inhaler,         CANCELED: " ASTHMA EDUCATION REFERRAL            (E78.5) Hyperlipidemia LDL goal <100  Comment: Labs ordered   Plan: simvastatin (ZOCOR) 40 MG tablet              FUTURE APPOINTMENTS:       - Follow-up visit as needed    Reagan Thakkar MD  [facility]

## 2018-07-18 NOTE — MR AVS SNAPSHOT
After Visit Summary   7/18/2018    Pam Pierce    MRN: [id number]           Patient Information     Date Of Birth          1/6/1930        Visit Information        Provider Department      7/18/2018 11:00 AM Reagan Thakkar MD [facility]        Today's Diagnoses     Benign essential hypertension        Mild intermittent asthma without complication        Hyperlipidemia LDL goal <100          Care Instructions          Thank you for choosing [facility].  You may be receiving a survey in the mail from [facility] regarding your visit today.  Please take a few minutes to complete and return the survey to let us know how we are doing.      If you have questions or concerns, please contact us via eduPad or you can contact your care team at [phone].    Our Clinic hours are:  Monday 6:40 am  to 7:00 pm  Tuesday -Friday 6:40 am to 5:00 pm    The [place] outpatient lab hours are:  Monday - Friday 6:10 am to 4:45 pm  Saturdays 7:00 am to 11:00 am  Appointments are required, call [phone]    If you have clinical questions after hours or would like to schedule an appointment,  call the clinic at [phone].          Follow-ups after your visit        Additional Services     ASTHMA EDUCATION REFERRAL         Please be aware that coverage of these services is subject to the terms and limitations of your health insurance plan.  Call member services at your health plan with any benefit or coverage questions.      Please bring the following to your appointment:     >>   List of current medications  >>   This referral request   >>   Any documents/labs given to you for this referral  Your spacer device and/or peak meter if you have one                  Who to contact     If you have questions or need follow up information about today's clinic visit or your schedule please contact [facility] directly at [phone].  Normal or non-critical lab and imaging results  "will be communicated to you by MyChart, letter or phone within 4 business days after the clinic has received the results. If you do not hear from us within 7 days, please contact the clinic through MyChart or phone. If you have a critical or abnormal lab result, we will notify you by phone as soon as possible.  Submit refill requests through [email or web address] or call your pharmacy and they will forward the refill request to us. Please allow 3 business days for your refill to be completed.          Additional Information About Your Visit        Care EveryWhere ID     This is your Care EveryWhere ID. This could be used by other organizations to access your [place] medical records  [id number]        Your Vitals Were     Pulse Temperature Height Pulse Oximetry BMI (Body Mass Index)       56 97  F (36.1  C) (Tympanic) 4' 11.75\" (1.518 m) 99% 20.09 kg/m2        Blood Pressure from Last 3 Encounters:   07/18/18 96/54   05/09/17 129/71   03/27/17 138/72    Weight from Last 3 Encounters:   07/18/18 102 lb (46.3 kg)   05/09/17 126 lb (57.2 kg)   03/27/17 127 lb (57.6 kg)              We Performed the Following     ASTHMA EDUCATION REFERRAL     Basic metabolic panel     Lipid panel reflex to direct LDL Fasting          Today's Medication Changes          These changes are accurate as of 7/18/18 11:20 AM.  If you have any questions, ask your nurse or doctor.               These medicines have changed or have updated prescriptions.        Dose/Directions    atenolol-chlorthalidone 50-25 MG per tablet   Commonly known as:  TENORETIC   This may have changed:  additional instructions   Used for:  Benign essential hypertension   Changed by:  Reagan Thakkar MD        Dose:  1 tablet   Take 1 tablet by mouth daily   Quantity:  90 tablet   Refills:  3       fluticasone 110 MCG/ACT Inhaler   Commonly known as:  FLOVENT HFA   This may have changed:  additional instructions   Used for:  Mild intermittent asthma without complication "   Changed by:  Reagan Thakkar MD        Dose:  2 puff   Inhale 2 puffs into the lungs 2 times daily   Quantity:  1 Inhaler   Refills:  11         Stop taking these medicines if you haven't already. Please contact your care team if you have questions.     enalapril 20 MG tablet   Commonly known as:  VASOTEC   Stopped by:  Reagan Thakkar MD                Where to get your medicines      These medications were sent to [facility] [address]  [address] [facility] [address]     Phone:  [phone]     atenolol-chlorthalidone 50-25 MG per tablet    fluticasone 110 MCG/ACT Inhaler    simvastatin 40 MG tablet                Primary Care Provider Office Phone # Fax #    Reagan Thakkar [phone] [address] [facility] [address]        Equal Access to Services     DIEGO HINSON AH: Hadii aad ku hadasho Soomaali, waaxda luqadaha, qaybta kaalmada adeegyada, waxay idiin haymollyn berenice gray . So [facility] [phone].    ATENCIÓN: Si habla español, tiene a blackwell disposición servicios gratuitos de asistencia lingüística. [facility] [phone].    We comply with applicable federal civil rights laws and [place] laws. We do not discriminate on the basis of race, color, national origin, age, disability, sex, sexual orientation, or gender identity.            Thank you!     Thank you for choosing [facility]  for your care. Our goal is always to provide you with excellent care. Hearing back from our patients is one way we can continue to improve our services. Please take a few minutes to complete the written survey that you may receive in the mail after your visit with us. Thank you!             Your Updated Medication List - Protect others around you: Learn how to safely use, store and throw away your medicines at www.disposemymeds.org.          This list is accurate as of 7/18/18 11:20  AM.  Always use your most recent med list.                   Brand Name Dispense Instructions for use Diagnosis    albuterol 108 (90 Base) MCG/ACT Inhaler    PROAIR HFA    1 Inhaler    Inhale 2 puffs into the lungs every 4 hours as needed    Mild intermittent asthma without complication       atenolol-chlorthalidone 50-25 MG per tablet    TENORETIC    90 tablet    Take 1 tablet by mouth daily    Benign essential hypertension       CALCIUM + D PO      1 TABLET DAILY        fluticasone 110 MCG/ACT Inhaler    FLOVENT HFA    1 Inhaler    Inhale 2 puffs into the lungs 2 times daily    Mild intermittent asthma without complication       MULTI-DAY VITAMINS PO      1 tablet daily        simvastatin 40 MG tablet    ZOCOR    90 tablet    Take 1 tablet (40 mg) by mouth At Bedtime    Hyperlipidemia LDL goal <100

## 2018-07-18 NOTE — PATIENT INSTRUCTIONS
Thank you for choosing [facility].  You may be receiving a survey in the mail from Shima Read regarding your visit today.  Please take a few minutes to complete and return the survey to let us know how we are doing.      If you have questions or concerns, please contact us via [employer] or you can contact your care team at [phone].    Our Clinic hours are:  Monday 6:40 am  to 7:00 pm  Tuesday -Friday 6:40 am to 5:00 pm    The [place] outpatient lab hours are:  Monday - Friday 6:10 am to 4:45 pm  Saturdays 7:00 am to 11:00 am  Appointments are required, call [phone]    If you have clinical questions after hours or would like to schedule an appointment,  call the clinic at [phone].

## 2018-07-18 NOTE — LETTER
[facility]  [address] [place] [address]  Phone: [phone]    07/20/18    Pam Pierce  [address] [place] [address] [place] [address]      Pam,        We are writing to inform you of the results from your recent lab work, included is a copy of those results.    Component      Latest Ref Rng & Units 7/18/2018   Sodium      133 - 144 mmol/L 139   Potassium      3.4 - 5.3 mmol/L 3.6   Chloride      94 - 109 mmol/L 101   Carbon Dioxide      20 - 32 mmol/L 31   Anion Gap      3 - 14 mmol/L 7   Glucose      70 - 99 mg/dL 86   Urea Nitrogen      7 - 30 mg/dL 20   Creatinine      0.52 - 1.04 mg/dL 0.88   GFR Estimate      >60 mL/min/1.7m2 61   GFR Estimate If Black      >60 mL/min/1.7m2 73   Calcium      8.5 - 10.1 mg/dL 9.3   Cholesterol      <200 mg/dL 135   Triglycerides      <150 mg/dL 114   HDL Cholesterol      >49 mg/dL 50   LDL Cholesterol Calculated      <100 mg/dL 62   Non HDL Cholesterol      <130 mg/dL 85     These test results are within normal parameters.     If you have any questions, please do not hesitate to call our office.      Sincerely,      Reagan Thakkar MD

## 2018-07-19 ENCOUNTER — TELEPHONE (OUTPATIENT)
Dept: FAMILY MEDICINE | Facility: CLINIC | Age: 83
End: 2018-07-19

## 2018-07-19 ASSESSMENT — ASTHMA QUESTIONNAIRES: ACT_TOTALSCORE: 22

## 2018-07-20 ASSESSMENT — ASTHMA QUESTIONNAIRES: ACT_TOTALSCORE: 24

## 2018-07-20 NOTE — PROGRESS NOTES
Please inform patient that test result was within normal parameters.   Thank you.     Reagan Thakkar M.D.

## 2018-09-13 ENCOUNTER — ALLIED HEALTH/NURSE VISIT (OUTPATIENT)
Dept: FAMILY MEDICINE | Facility: CLINIC | Age: 83
End: 2018-09-13
Payer: MEDICARE

## 2018-09-13 DIAGNOSIS — Z23 NEED FOR PROPHYLACTIC VACCINATION AND INOCULATION AGAINST INFLUENZA: Primary | ICD-10-CM

## 2018-09-13 PROCEDURE — 90682 RIV4 VACC RECOMBINANT DNA IM: CPT

## 2018-09-13 PROCEDURE — G0008 ADMIN INFLUENZA VIRUS VAC: HCPCS

## 2018-09-13 NOTE — MR AVS SNAPSHOT
After Visit Summary   9/13/2018    Pam Pierce    MRN: [id number]           Patient Information     Date Of Birth          1/6/1930        Visit Information        Provider Department      9/13/2018 4:10 PM Critical access hospital [facility]        Today's Diagnoses     Need for prophylactic vaccination and inoculation against influenza    -  1       Follow-ups after your visit        Who to contact     If you have questions or need follow up information about today's clinic visit or your schedule please contact [facility] directly at [phone].  Normal or non-critical lab and imaging results will be communicated to you by MyChart, letter or phone within 4 business days after the clinic has received the results. If you do not hear from us within 7 days, please contact the clinic through MyChart or phone. If you have a critical or abnormal lab result, we will notify you by phone as soon as possible.  Submit refill requests through [employer] or call your pharmacy and they will forward the refill request to us. Please allow 3 business days for your refill to be completed.          Additional Information About Your Visit        Care EveryWhere ID     This is your Care EveryWhere ID. This could be used by other organizations to access your [place] medical records  [id number]         Blood Pressure from Last 3 Encounters:   07/18/18 96/54   05/09/17 129/71   03/27/17 138/72    Weight from Last 3 Encounters:   07/18/18 102 lb (46.3 kg)   05/09/17 126 lb (57.2 kg)   03/27/17 127 lb (57.6 kg)              We Performed the Following     FLU VAC, QUADRIVALENT (RIV4) RECOMBINANT DNA, IM     Vaccine Administration, Initial [[id number]]        Primary Care Provider Office Phone # Fax #    Reagan Thakkar [phone] [address] [facility] [address] [address]        Equal Access to Services     DIEGO HINSON : kathi Wilkerson qaybta  geo alexanderjamilah gary ah. [facility] [phone].    ATENCIÓN: Si shane valles, tiene a blackwell disposición servicios gratuitos de asistencia lingüística. Ekaterina al [phone].    We comply with applicable federal civil rights laws and [place] laws. We do not discriminate on the basis of race, color, national origin, age, disability, sex, sexual orientation, or gender identity.            Thank you!     Thank you for choosing [facility]  for your care. Our goal is always to provide you with excellent care. Hearing back from our patients is one way we can continue to improve our services. Please take a few minutes to complete the written survey that you may receive in the mail after your visit with us. Thank you!             Your Updated Medication List - Protect others around you: Learn how to safely use, store and throw away your medicines at www.disposemymeds.org.          This list is accurate as of 9/13/18  4:29 PM.  Always use your most recent med list.                   Brand Name Dispense Instructions for use Diagnosis    albuterol 108 (90 Base) MCG/ACT inhaler    PROAIR HFA    1 Inhaler    Inhale 2 puffs into the lungs every 4 hours as needed    Mild intermittent asthma without complication       atenolol-chlorthalidone 50-25 MG per tablet    TENORETIC    90 tablet    Take 1 tablet by mouth daily    Benign essential hypertension       CALCIUM + D PO      1 TABLET DAILY        fluticasone 110 MCG/ACT Inhaler    FLOVENT HFA    1 Inhaler    Inhale 2 puffs into the lungs 2 times daily    Mild intermittent asthma without complication       MULTI-DAY VITAMINS PO      1 tablet daily        simvastatin 40 MG tablet    ZOCOR    90 tablet    Take 1 tablet (40 mg) by mouth At Bedtime    Hyperlipidemia LDL goal <100

## 2018-09-13 NOTE — PROGRESS NOTES
Injectable Influenza Immunization Documentation    1.  Is the person to be vaccinated sick today?   No    2. Does the person to be vaccinated have an allergy to a component   of the vaccine?   No  Egg Allergy Algorithm Link    3. Has the person to be vaccinated ever had a serious reaction   to influenza vaccine in the past?   No    4. Has the person to be vaccinated ever had Guillain-Barré syndrome?   No    Due to injection administration, patient instructed to remain in clinic for 15 minutes  afterwards, and to report any adverse reaction to me immediately. FLUBLOCK ok per Dr. Wong pervious to administration. Patient states she had regular flu shot and fainted in the past.     Form completed by Cynthia Andrade MA

## 2019-07-15 ENCOUNTER — OFFICE VISIT (OUTPATIENT)
Dept: FAMILY MEDICINE | Facility: CLINIC | Age: 84
End: 2019-07-15
Payer: MEDICARE

## 2019-07-15 VITALS
RESPIRATION RATE: 14 BRPM | OXYGEN SATURATION: 98 % | TEMPERATURE: 97.7 F | BODY MASS INDEX: 20.03 KG/M2 | DIASTOLIC BLOOD PRESSURE: 56 MMHG | WEIGHT: 102 LBS | HEIGHT: 60 IN | SYSTOLIC BLOOD PRESSURE: 92 MMHG | HEART RATE: 57 BPM

## 2019-07-15 DIAGNOSIS — E78.5 HYPERLIPIDEMIA LDL GOAL <100: ICD-10-CM

## 2019-07-15 DIAGNOSIS — J45.20 MILD INTERMITTENT ASTHMA WITHOUT COMPLICATION: ICD-10-CM

## 2019-07-15 DIAGNOSIS — I10 BENIGN ESSENTIAL HYPERTENSION: ICD-10-CM

## 2019-07-15 LAB
ANION GAP SERPL CALCULATED.3IONS-SCNC: 6 MMOL/L (ref 3–14)
BUN SERPL-MCNC: 19 MG/DL (ref 7–30)
CALCIUM SERPL-MCNC: 9.2 MG/DL (ref 8.5–10.1)
CHLORIDE SERPL-SCNC: 101 MMOL/L (ref 94–109)
CHOLEST SERPL-MCNC: 152 MG/DL
CO2 SERPL-SCNC: 32 MMOL/L (ref 20–32)
CREAT SERPL-MCNC: 0.78 MG/DL (ref 0.52–1.04)
GFR SERPL CREATININE-BSD FRML MDRD: 67 ML/MIN/{1.73_M2}
GLUCOSE SERPL-MCNC: 94 MG/DL (ref 70–99)
HDLC SERPL-MCNC: 59 MG/DL
LDLC SERPL CALC-MCNC: 73 MG/DL
NONHDLC SERPL-MCNC: 93 MG/DL
POTASSIUM SERPL-SCNC: 3.1 MMOL/L (ref 3.4–5.3)
SODIUM SERPL-SCNC: 139 MMOL/L (ref 133–144)
TRIGL SERPL-MCNC: 98 MG/DL

## 2019-07-15 PROCEDURE — 36415 COLL VENOUS BLD VENIPUNCTURE: CPT | Performed by: FAMILY MEDICINE

## 2019-07-15 PROCEDURE — 80048 BASIC METABOLIC PNL TOTAL CA: CPT | Performed by: FAMILY MEDICINE

## 2019-07-15 PROCEDURE — 99214 OFFICE O/P EST MOD 30 MIN: CPT | Performed by: FAMILY MEDICINE

## 2019-07-15 PROCEDURE — 80061 LIPID PANEL: CPT | Performed by: FAMILY MEDICINE

## 2019-07-15 RX ORDER — ALBUTEROL SULFATE 90 UG/1
2 AEROSOL, METERED RESPIRATORY (INHALATION) EVERY 4 HOURS PRN
Qty: 1 INHALER | Refills: 11 | Status: SHIPPED | OUTPATIENT
Start: 2019-07-15

## 2019-07-15 RX ORDER — ATENOLOL AND CHLORTHALIDONE TABLET 50; 25 MG/1; MG/1
1 TABLET ORAL DAILY
Qty: 90 TABLET | Refills: 3 | Status: CANCELLED | OUTPATIENT
Start: 2019-07-15

## 2019-07-15 RX ORDER — FLUTICASONE PROPIONATE 110 UG/1
2 AEROSOL, METERED RESPIRATORY (INHALATION) 2 TIMES DAILY
Qty: 1 INHALER | Refills: 11 | Status: SHIPPED | OUTPATIENT
Start: 2019-07-15 | End: 2020-08-21

## 2019-07-15 RX ORDER — ATENOLOL 25 MG/1
25 TABLET ORAL DAILY
Qty: 90 TABLET | Refills: 3 | Status: SHIPPED | OUTPATIENT
Start: 2019-07-15 | End: 2020-10-14

## 2019-07-15 RX ORDER — SIMVASTATIN 40 MG
40 TABLET ORAL AT BEDTIME
Qty: 90 TABLET | Refills: 3 | Status: SHIPPED | OUTPATIENT
Start: 2019-07-15 | End: 2020-08-14

## 2019-07-15 RX ORDER — CHLORTHALIDONE 25 MG/1
25 TABLET ORAL DAILY
Qty: 90 TABLET | Refills: 3 | Status: SHIPPED | OUTPATIENT
Start: 2019-07-15 | End: 2020-08-11

## 2019-07-15 ASSESSMENT — MIFFLIN-ST. JEOR: SCORE: 805.2

## 2019-07-15 NOTE — LETTER
July 16, 2019      Pam Pierce  [address] [place] [id number]        Dear ,    We are writing to inform you of your test results.    Test results are within normal parameters except potassium was a bit low. Please increase potassium rich foods and recheck in a few weeks ( 1 month ).    Resulted Orders   Lipid panel reflex to direct LDL Fasting   Result Value Ref Range    Cholesterol 152 <200 mg/dL    Triglycerides 98 <150 mg/dL      Comment:      Fasting specimen    HDL Cholesterol 59 >49 mg/dL    LDL Cholesterol Calculated 73 <100 mg/dL      Comment:      Desirable:       <100 mg/dl    Non HDL Cholesterol 93 <130 mg/dL   Basic metabolic panel   Result Value Ref Range    Sodium 139 133 - 144 mmol/L    Potassium 3.1 (L) 3.4 - 5.3 mmol/L    Chloride 101 94 - 109 mmol/L    Carbon Dioxide 32 20 - 32 mmol/L    Anion Gap 6 3 - 14 mmol/L    Glucose 94 70 - 99 mg/dL      Comment:      Fasting specimen    Urea Nitrogen 19 7 - 30 mg/dL    Creatinine 0.78 0.52 - 1.04 mg/dL    GFR Estimate 67 >60 mL/min/[1.73_m2]      Comment:      Non  GFR Calc  Starting 12/18/2018, serum creatinine based estimated GFR (eGFR) will be   calculated using the Chronic Kidney Disease Epidemiology Collaboration   (CKD-EPI) equation.      GFR Estimate If Black 77 >60 mL/min/[1.73_m2]      Comment:       GFR Calc  Starting 12/18/2018, serum creatinine based estimated GFR (eGFR) will be   calculated using the Chronic Kidney Disease Epidemiology Collaboration   (CKD-EPI) equation.      Calcium 9.2 8.5 - 10.1 mg/dL       If you have any questions or concerns, please call the clinic at the number listed above.       Sincerely,        Reagan Thakkar MD

## 2019-07-16 ASSESSMENT — ASTHMA QUESTIONNAIRES: ACT_TOTALSCORE: 22

## 2019-07-16 NOTE — RESULT ENCOUNTER NOTE
Please inform patient that test result was within normal parameters except potassium was a bit low. Please increase potassium rich foods and recheck in a few weeks ( 1 month )   Thank you.     Reagan Thakkar M.D.

## 2019-10-14 ENCOUNTER — IMMUNIZATION (OUTPATIENT)
Dept: FAMILY MEDICINE | Facility: CLINIC | Age: 84
End: 2019-10-14
Payer: MEDICARE

## 2019-10-14 PROCEDURE — 90682 RIV4 VACC RECOMBINANT DNA IM: CPT

## 2019-10-14 PROCEDURE — G0008 ADMIN INFLUENZA VIRUS VAC: HCPCS

## 2019-11-19 ENCOUNTER — APPOINTMENT (OUTPATIENT)
Dept: CT IMAGING | Facility: CLINIC | Age: 84
End: 2019-11-19
Attending: FAMILY MEDICINE
Payer: MEDICARE

## 2019-11-19 ENCOUNTER — HOSPITAL ENCOUNTER (EMERGENCY)
Facility: CLINIC | Age: 84
Discharge: ANOTHER HOSPITAL | End: 2019-11-19
Attending: FAMILY MEDICINE | Admitting: FAMILY MEDICINE
Payer: MEDICARE

## 2019-11-19 ENCOUNTER — TRANSFERRED RECORDS (OUTPATIENT)
Dept: HEALTH INFORMATION MANAGEMENT | Facility: CLINIC | Age: 84
End: 2019-11-19

## 2019-11-19 VITALS
BODY MASS INDEX: 19.63 KG/M2 | DIASTOLIC BLOOD PRESSURE: 81 MMHG | OXYGEN SATURATION: 98 % | TEMPERATURE: 97.3 F | WEIGHT: 100 LBS | RESPIRATION RATE: 20 BRPM | HEIGHT: 60 IN | SYSTOLIC BLOOD PRESSURE: 133 MMHG | HEART RATE: 76 BPM

## 2019-11-19 DIAGNOSIS — K62.89 RECTAL MASS: ICD-10-CM

## 2019-11-19 DIAGNOSIS — K56.609 COLONIC OBSTRUCTION (H): ICD-10-CM

## 2019-11-19 LAB
ALBUMIN SERPL-MCNC: 3 G/DL (ref 3.4–5)
ALP SERPL-CCNC: 84 U/L (ref 40–150)
ALT SERPL W P-5'-P-CCNC: 25 U/L (ref 0–50)
ANION GAP SERPL CALCULATED.3IONS-SCNC: 5 MMOL/L (ref 3–14)
AST SERPL W P-5'-P-CCNC: 21 U/L (ref 0–45)
BASOPHILS # BLD AUTO: 0.1 10E9/L (ref 0–0.2)
BASOPHILS NFR BLD AUTO: 0.8 %
BILIRUB SERPL-MCNC: 0.4 MG/DL (ref 0.2–1.3)
BUN SERPL-MCNC: 22 MG/DL (ref 7–30)
CALCIUM SERPL-MCNC: 9.7 MG/DL (ref 8.5–10.1)
CHLORIDE SERPL-SCNC: 102 MMOL/L (ref 94–109)
CO2 SERPL-SCNC: 32 MMOL/L (ref 20–32)
CREAT SERPL-MCNC: 0.72 MG/DL (ref 0.52–1.04)
DIFFERENTIAL METHOD BLD: NORMAL
EOSINOPHIL # BLD AUTO: 0.7 10E9/L (ref 0–0.7)
EOSINOPHIL NFR BLD AUTO: 7.4 %
ERYTHROCYTE [DISTWIDTH] IN BLOOD BY AUTOMATED COUNT: 12.8 % (ref 10–15)
GFR SERPL CREATININE-BSD FRML MDRD: 74 ML/MIN/{1.73_M2}
GLUCOSE SERPL-MCNC: 119 MG/DL (ref 70–99)
HCT VFR BLD AUTO: 39.2 % (ref 35–47)
HGB BLD-MCNC: 12.9 G/DL (ref 11.7–15.7)
IMM GRANULOCYTES # BLD: 0 10E9/L (ref 0–0.4)
IMM GRANULOCYTES NFR BLD: 0.5 %
LYMPHOCYTES # BLD AUTO: 2.5 10E9/L (ref 0.8–5.3)
LYMPHOCYTES NFR BLD AUTO: 27.8 %
MCH RBC QN AUTO: 31.5 PG (ref 26.5–33)
MCHC RBC AUTO-ENTMCNC: 32.9 G/DL (ref 31.5–36.5)
MCV RBC AUTO: 96 FL (ref 78–100)
MONOCYTES # BLD AUTO: 0.7 10E9/L (ref 0–1.3)
MONOCYTES NFR BLD AUTO: 7.3 %
NEUTROPHILS # BLD AUTO: 5 10E9/L (ref 1.6–8.3)
NEUTROPHILS NFR BLD AUTO: 56.2 %
NRBC # BLD AUTO: 0 10*3/UL
NRBC BLD AUTO-RTO: 0 /100
PLATELET # BLD AUTO: 305 10E9/L (ref 150–450)
POTASSIUM SERPL-SCNC: 2.8 MMOL/L (ref 3.4–5.3)
PROT SERPL-MCNC: 6.8 G/DL (ref 6.8–8.8)
RBC # BLD AUTO: 4.1 10E12/L (ref 3.8–5.2)
SODIUM SERPL-SCNC: 139 MMOL/L (ref 133–144)
WBC # BLD AUTO: 8.9 10E9/L (ref 4–11)

## 2019-11-19 PROCEDURE — 85025 COMPLETE CBC W/AUTO DIFF WBC: CPT | Performed by: FAMILY MEDICINE

## 2019-11-19 PROCEDURE — 99285 EMERGENCY DEPT VISIT HI MDM: CPT | Mod: Z6 | Performed by: FAMILY MEDICINE

## 2019-11-19 PROCEDURE — 25800030 ZZH RX IP 258 OP 636: Performed by: FAMILY MEDICINE

## 2019-11-19 PROCEDURE — 74177 CT ABD & PELVIS W/CONTRAST: CPT

## 2019-11-19 PROCEDURE — 80053 COMPREHEN METABOLIC PANEL: CPT | Performed by: FAMILY MEDICINE

## 2019-11-19 PROCEDURE — 25000128 H RX IP 250 OP 636: Performed by: FAMILY MEDICINE

## 2019-11-19 PROCEDURE — 96374 THER/PROPH/DIAG INJ IV PUSH: CPT | Mod: 59 | Performed by: FAMILY MEDICINE

## 2019-11-19 PROCEDURE — 99285 EMERGENCY DEPT VISIT HI MDM: CPT | Mod: 25 | Performed by: FAMILY MEDICINE

## 2019-11-19 PROCEDURE — 25000132 ZZH RX MED GY IP 250 OP 250 PS 637: Mod: GY | Performed by: FAMILY MEDICINE

## 2019-11-19 PROCEDURE — 25000125 ZZHC RX 250: Performed by: FAMILY MEDICINE

## 2019-11-19 PROCEDURE — 96375 TX/PRO/DX INJ NEW DRUG ADDON: CPT | Performed by: FAMILY MEDICINE

## 2019-11-19 RX ORDER — POTASSIUM CHLORIDE 1500 MG/1
20 TABLET, EXTENDED RELEASE ORAL ONCE
Status: COMPLETED | OUTPATIENT
Start: 2019-11-19 | End: 2019-11-19

## 2019-11-19 RX ORDER — KETOROLAC TROMETHAMINE 15 MG/ML
15 INJECTION, SOLUTION INTRAMUSCULAR; INTRAVENOUS ONCE
Status: COMPLETED | OUTPATIENT
Start: 2019-11-19 | End: 2019-11-19

## 2019-11-19 RX ORDER — ONDANSETRON 2 MG/ML
4 INJECTION INTRAMUSCULAR; INTRAVENOUS EVERY 30 MIN PRN
Status: DISCONTINUED | OUTPATIENT
Start: 2019-11-19 | End: 2019-11-19 | Stop reason: HOSPADM

## 2019-11-19 RX ORDER — POTASSIUM CL/LIDO/0.9 % NACL 10MEQ/0.1L
10 INTRAVENOUS SOLUTION, PIGGYBACK (ML) INTRAVENOUS ONCE
Status: DISCONTINUED | OUTPATIENT
Start: 2019-11-19 | End: 2019-11-19

## 2019-11-19 RX ORDER — IOPAMIDOL 755 MG/ML
49 INJECTION, SOLUTION INTRAVASCULAR ONCE
Status: COMPLETED | OUTPATIENT
Start: 2019-11-19 | End: 2019-11-19

## 2019-11-19 RX ADMIN — SODIUM CHLORIDE 500 ML: 9 INJECTION, SOLUTION INTRAVENOUS at 11:35

## 2019-11-19 RX ADMIN — POTASSIUM CHLORIDE 20 MEQ: 20 TABLET, EXTENDED RELEASE ORAL at 14:22

## 2019-11-19 RX ADMIN — ONDANSETRON 4 MG: 2 INJECTION INTRAMUSCULAR; INTRAVENOUS at 11:00

## 2019-11-19 RX ADMIN — IOPAMIDOL 49 ML: 755 INJECTION, SOLUTION INTRAVENOUS at 12:24

## 2019-11-19 RX ADMIN — SODIUM CHLORIDE 52 ML: 9 INJECTION, SOLUTION INTRAVENOUS at 12:24

## 2019-11-19 RX ADMIN — KETOROLAC TROMETHAMINE 15 MG: 15 INJECTION, SOLUTION INTRAMUSCULAR; INTRAVENOUS at 11:35

## 2019-11-19 ASSESSMENT — MIFFLIN-ST. JEOR
SCORE: 807.79
SCORE: 800.1

## 2019-11-19 NOTE — ED PROVIDER NOTES
"  HPI   The patient is an 89-year-old female presenting with abdominal pain and concern for bowel changes.  She describes a distant history of colonoscopy, but nothing recently.  She tells me she has \"bowel problems\" on a chronic basis, at least over the past 5 years.  She will take Gas-X and Imodium occasionally for abdominal discomfort.  She does report occasional soft, loose stool but this is alternating with constipation.  She reports about a 35 pound weight loss over the past year.    Patient describes recent abdominal pain that is worsened.  This is typically felt in the midline pelvis.  The pain is described as cramping.  It can be severe when present.  It occurred twice while I was in the room and she reports the pain is severe at that moment but then the pain is resolved shortly following.  She reports a normal appetite.  She does have occasional nausea and she threw up last night for the first time.  She denies nausea currently.  She denies obvious bloating or distention of her abdomen.  She does have chronic urinary incontinence.  She denies dysuria or urgency of urine.  No vaginal discharge or bleeding.  No trauma or injury.        Allergies:  Allergies   Allergen Reactions     Flu Virus Vaccine      Gabapentin Nausea and Vomiting     Problem List:    Patient Active Problem List    Diagnosis Date Noted     GERD (gastroesophageal reflux disease) 09/03/2014     Priority: Medium     Advanced directives, counseling/discussion 07/26/2013     Priority: Medium     7/26/2013  Has paper work at home for advanced directives, will bring in copy when complete.  MK Harris CMA         CKD (chronic kidney disease) stage 3, GFR 30-59 ml/min (H) 04/04/2012     Priority: Medium     HYPERLIPIDEMIA LDL GOAL <100 10/31/2010     Priority: Medium     IMPAIRED FASTING GLUCOSE 11/25/2007     Priority: Medium     Urethral stricture due to infection 10/03/2005     Priority: Medium     ureteral stone  Problem list name updated " by automated process. Provider to review       Hyperlipidemia 10/03/2005     Priority: Medium     Problem list name updated by automated process. Provider to review       Essential hypertension 10/03/2005     Priority: Medium     Problem list name updated by automated process. Provider to review        Past Medical History:    Past Medical History:   Diagnosis Date     Actinic keratosis      Basal cell carcinoma      Other and unspecified hyperlipidemia      Unspecified asthma(493.90)      Unspecified essential hypertension      Urethral stricture due to unspecified infection      Past Surgical History:    Past Surgical History:   Procedure Laterality Date     SURGICAL HISTORY OF -       open reduction of second metatarsal neck fx with internal fixation  cna d closed treatment of metatarsal to 2 and 4 fx     SURGICAL HISTORY OF -       hysterectomy and oophorectomy     SURGICAL HISTORY OF -       lithotripsy     Family History:    Family History   Problem Relation Age of Onset     Cancer - colorectal Mother      Breast Cancer Mother      C.A.D. Father      Cardiovascular Father      Alcohol/Drug Father      Chronic Obstructive Pulmonary Disease Brother      Cancer Son         chest cancer/myesthenia gravis     Musculoskeletal Disorder Son         myesthenia gravis     Cerebrovascular Disease Sister      Chronic Obstructive Pulmonary Disease Brother      Social History:  Marital Status:   [2]  Social History     Tobacco Use     Smoking status: Never Smoker     Smokeless tobacco: Never Used   Substance Use Topics     Alcohol use: Yes     Comment: wine ocass     Drug use: No      Medications:    albuterol (PROAIR HFA) 108 (90 Base) MCG/ACT inhaler  atenolol (TENORMIN) 25 MG tablet  atenolol-chlorthalidone (TENORETIC) 50-25 MG per tablet  CALCIUM + D OR  chlorthalidone (HYGROTON) 25 MG tablet  fluticasone (FLOVENT HFA) 110 MCG/ACT inhaler  MULTI-DAY VITAMINS OR  Probiotic Product (PROBIOTIC DAILY PO)  Simethicone  (GAS-X PO)  simvastatin (ZOCOR) 40 MG tablet      Review of Systems   All other systems reviewed and are negative.      PE   BP: (!) 143/75  Pulse: 58  Heart Rate: 76  Temp: 97.3  F (36.3  C)  Resp: 18  Height: 152.4 cm (5')  Weight: 45.4 kg (100 lb)  SpO2: 99 %  Physical Exam  Vitals signs reviewed.   Constitutional:       General: She is in acute distress.      Comments: She is pleasant and cooperative.  She is thin.  She has 2 episodes of cramping pain while I am in the room.  She looks very uncomfortable during this time and is grimacing.  It quickly passes and she is able to converse normally again following.   HENT:      Head: Normocephalic and atraumatic.   Eyes:      Conjunctiva/sclera: Conjunctivae normal.   Neck:      Musculoskeletal: Normal range of motion.   Cardiovascular:      Rate and Rhythm: Normal rate and regular rhythm.   Pulmonary:      Effort: Pulmonary effort is normal.   Abdominal:      Palpations: Abdomen is soft.      Tenderness: There is abdominal tenderness.      Comments: She is thin.  Her abdomen is soft throughout.  No distention.  She is tender in the midline pelvis.  No guarding.   Musculoskeletal: Normal range of motion.   Skin:     General: Skin is warm and dry.   Neurological:      Mental Status: She is alert and oriented to person, place, and time.   Psychiatric:         Behavior: Behavior normal.         ED COURSE and MDM   1132.  The patient presents with abdominal pain and bowel changes.  She has no nausea with vomiting, as above.  CT scan pending.  Lab values pending.  Toradol ordered.  Fluid bolus ordered.  She is not currently nauseous, no Zofran ordered.    1412.  The patient's pain is improved with Toradol given.  CT scan shows a rectal mass that is causing obstruction.  This has been obvious clinically as her pain and nausea have progressively worsened.  She has very minimal stool output.  She threw up last night for the first time.  She has not thrown up here.  I will  provide potassium by mouth.  She will be admitted to [facility].  Dr. Vargas is accepting.  The patient agrees with plan and would like to travel by private car.  Her son is here and will drive.    LABS  Labs Ordered and Resulted from Time of ED Arrival Up to the Time of Departure from the ED   COMPREHENSIVE METABOLIC PANEL - Abnormal; Notable for the following components:       Result Value    Potassium 2.8 (*)     Glucose 119 (*)     Albumin 3.0 (*)     All other components within normal limits   CBC WITH PLATELETS DIFFERENTIAL   PERIPHERAL IV CATHETER       IMAGING  Images reviewed by me.  Radiology report also reviewed.  Abd/pelvis CT, IV contrast only TRAUMA  / AAA   Final Result   IMPRESSION:   1. Circumferential rectosigmoid mass causing relative colonic   obstruction with the more proximal colon distended with fecal debris.   Follow-up with Colorectal Surgery recommended as soon as possible.   2. Colorectal mass extends beyond the serosa of the bowel wall with   multiple small probable metastatic mesorectal lymph nodes. No enlarged   pelvic sidewall or retroperitoneal lymph nodes. No evidence of   metastatic liver disease on the current exam. Follow-up PET/CT   recommended for further assessment.   3. Cholelithiasis.   4. Subcentimeter low-attenuation splenic lesion likely a cyst.      STU REYNOLDS MD          Procedures    Medications   ondansetron (ZOFRAN) injection 4 mg (4 mg Intravenous Given 11/19/19 1100)   potassium chloride ER (K-DUR/KLOR-CON M) CR tablet 20 mEq (has no administration in time range)   ketorolac (TORADOL) injection 15 mg (15 mg Intravenous Given 11/19/19 1135)   0.9% sodium chloride BOLUS (500 mLs Intravenous New Bag 11/19/19 1135)   iopamidol (ISOVUE-370) solution 49 mL (49 mLs Intravenous Given 11/19/19 1224)   sodium chloride 0.9 % bag 500mL for CT scan flush use (52 mLs Intravenous Given 11/19/19 1224)         IMPRESSION       ICD-10-CM    1. Rectal mass K62.89    2.  Colonic obstruction (H) K56.609             Medication List      There are no discharge medications for this visit.                       Fernie Adler MD  11/19/19 0120

## 2019-11-19 NOTE — ED NOTES
Patient is having abdominal pain. Patient feels constipated. Patient says these symptoms started two weeks ago. Patient says today the abdominal cramping became worse. Patient says I have had bowel problems my whole life. Patient's pain is in the lower abdomen. Patient had some nausea and vomiting last night.

## 2019-11-19 NOTE — ED NOTES
Patient is going to [facility] by private vehicle. Patient has IV still in place. Records sent with patient and PCS form.

## 2019-11-20 ENCOUNTER — TRANSFERRED RECORDS (OUTPATIENT)
Dept: HEALTH INFORMATION MANAGEMENT | Facility: CLINIC | Age: 84
End: 2019-11-20

## 2019-11-20 ENCOUNTER — ANESTHESIA - HEALTHEAST (OUTPATIENT)
Dept: SURGERY | Facility: HOSPITAL | Age: 84
End: 2019-11-20

## 2019-11-20 ENCOUNTER — SURGERY - HEALTHEAST (OUTPATIENT)
Dept: SURGERY | Facility: HOSPITAL | Age: 84
End: 2019-11-20

## 2019-11-21 ENCOUNTER — TRANSFERRED RECORDS (OUTPATIENT)
Dept: HEALTH INFORMATION MANAGEMENT | Facility: CLINIC | Age: 84
End: 2019-11-21

## 2019-11-22 ASSESSMENT — MIFFLIN-ST. JEOR: SCORE: 820.5

## 2019-11-23 ASSESSMENT — MIFFLIN-ST. JEOR: SCORE: 810.06

## 2019-11-24 ASSESSMENT — MIFFLIN-ST. JEOR: SCORE: 811.42

## 2019-11-25 ENCOUNTER — HOME CARE/HOSPICE - HEALTHEAST (OUTPATIENT)
Dept: HOME HEALTH SERVICES | Facility: HOME HEALTH | Age: 84
End: 2019-11-25

## 2019-11-27 ASSESSMENT — MIFFLIN-ST. JEOR: SCORE: 814.14

## 2019-11-28 ASSESSMENT — MIFFLIN-ST. JEOR: SCORE: 808.24

## 2019-11-29 ASSESSMENT — MIFFLIN-ST. JEOR: SCORE: 809.6

## 2019-11-30 ASSESSMENT — MIFFLIN-ST. JEOR: SCORE: 806.43

## 2019-12-03 ASSESSMENT — MIFFLIN-ST. JEOR: SCORE: 800.08

## 2019-12-05 ENCOUNTER — RECORDS - HEALTHEAST (OUTPATIENT)
Dept: LAB | Facility: CLINIC | Age: 84
End: 2019-12-05

## 2019-12-05 ENCOUNTER — AMBULATORY - HEALTHEAST (OUTPATIENT)
Dept: SCHEDULING | Facility: CLINIC | Age: 84
End: 2019-12-05

## 2019-12-05 DIAGNOSIS — Z71.89 OSTOMY NURSE CONSULTATION: ICD-10-CM

## 2019-12-05 LAB
ANION GAP SERPL CALCULATED.3IONS-SCNC: 5 MMOL/L (ref 5–18)
BUN SERPL-MCNC: 23 MG/DL (ref 8–28)
CALCIUM SERPL-MCNC: 8.6 MG/DL (ref 8.5–10.5)
CHLORIDE BLD-SCNC: 106 MMOL/L (ref 98–107)
CO2 SERPL-SCNC: 29 MMOL/L (ref 22–31)
CREAT SERPL-MCNC: 0.67 MG/DL (ref 0.6–1.1)
CREAT SERPL-MCNC: 0.67 MG/DL (ref 0.6–1.1)
GFR SERPL CREATININE-BSD FRML MDRD: >60 ML/MIN/1.73M2
GFR SERPL CREATININE-BSD FRML MDRD: >60 ML/MIN/1.73M2
GLUCOSE BLD-MCNC: 91 MG/DL (ref 70–125)
GLUCOSE SERPL-MCNC: 91 MG/DL (ref 70–125)
MAGNESIUM SERPL-MCNC: 1.9 MG/DL (ref 1.8–2.6)
POTASSIUM BLD-SCNC: 4.1 MMOL/L (ref 3.5–5)
POTASSIUM SERPL-SCNC: 4.1 MMOL/L (ref 3.5–5)
SODIUM SERPL-SCNC: 140 MMOL/L (ref 136–145)

## 2019-12-06 ENCOUNTER — AMBULATORY - HEALTHEAST (OUTPATIENT)
Dept: OTHER | Facility: CLINIC | Age: 84
End: 2019-12-06

## 2019-12-06 ENCOUNTER — DOCUMENTATION ONLY (OUTPATIENT)
Dept: OTHER | Facility: CLINIC | Age: 84
End: 2019-12-06

## 2019-12-06 NOTE — TELEPHONE ENCOUNTER
ONCOLOGY INTAKE: Records Information      APPT INFORMATION: 1/9/20 - [place]  Referring provider:  self referred  Referring provider s clinic:  none  Reason for visit/diagnosis:  rectal cancer  Has patient been notified of appointment date and time?: Yes    RECORDS INFORMATION:  Were the records received with the referral (via [employer])? In [facility]/    Has patient been seen for any external appt for this diagnosis? Yes    If yes, where? [facility]    Has patient had any imaging or procedures outside of [place] for this condition? Yes       If Yes, where? [facility]    ADDITIONAL INFORMATION:  Scheduled via call from pt's daughter  Patient chose date (waitlist not needed)

## 2019-12-12 ENCOUNTER — OFFICE VISIT - HEALTHEAST (OUTPATIENT)
Dept: WOUND CARE | Facility: HOSPITAL | Age: 84
End: 2019-12-12

## 2019-12-12 DIAGNOSIS — Z93.3 S/P COLOSTOMY (H): ICD-10-CM

## 2019-12-16 ENCOUNTER — TRANSFERRED RECORDS (OUTPATIENT)
Dept: HEALTH INFORMATION MANAGEMENT | Facility: CLINIC | Age: 84
End: 2019-12-16

## 2019-12-18 ENCOUNTER — OFFICE VISIT (OUTPATIENT)
Dept: FAMILY MEDICINE | Facility: CLINIC | Age: 84
End: 2019-12-18
Payer: MEDICARE

## 2019-12-18 ENCOUNTER — TELEPHONE (OUTPATIENT)
Dept: FAMILY MEDICINE | Facility: CLINIC | Age: 84
End: 2019-12-18

## 2019-12-18 VITALS
BODY MASS INDEX: 20.73 KG/M2 | OXYGEN SATURATION: 99 % | DIASTOLIC BLOOD PRESSURE: 62 MMHG | WEIGHT: 105.6 LBS | HEIGHT: 60 IN | RESPIRATION RATE: 20 BRPM | TEMPERATURE: 98.9 F | HEART RATE: 91 BPM | SYSTOLIC BLOOD PRESSURE: 90 MMHG

## 2019-12-18 DIAGNOSIS — J45.20 MILD INTERMITTENT ASTHMA IN ADULT WITHOUT COMPLICATION: ICD-10-CM

## 2019-12-18 DIAGNOSIS — I10 ESSENTIAL HYPERTENSION: ICD-10-CM

## 2019-12-18 DIAGNOSIS — C19: ICD-10-CM

## 2019-12-18 DIAGNOSIS — Z09 HOSPITAL DISCHARGE FOLLOW-UP: Primary | ICD-10-CM

## 2019-12-18 PROCEDURE — 00000346 ZZHCL STATISTIC REVIEW OUTSIDE SLIDES TC 88321: Performed by: INTERNAL MEDICINE

## 2019-12-18 PROCEDURE — 99214 OFFICE O/P EST MOD 30 MIN: CPT | Performed by: FAMILY MEDICINE

## 2019-12-18 RX ORDER — OXYCODONE HYDROCHLORIDE 5 MG/1
2.5 TABLET ORAL DAILY PRN
Status: ON HOLD | COMMUNITY
End: 2020-02-18

## 2019-12-18 RX ORDER — ASPIRIN 81 MG/1
81 TABLET ORAL DAILY
COMMUNITY

## 2019-12-18 RX ORDER — POLYETHYLENE GLYCOL 3350 17 G/17G
1 POWDER, FOR SOLUTION ORAL 2 TIMES DAILY
Status: ON HOLD | COMMUNITY
End: 2020-11-11

## 2019-12-18 RX ORDER — ACETAMINOPHEN 500 MG
500-1000 TABLET ORAL DAILY PRN
Status: ON HOLD | COMMUNITY
End: 2020-09-14

## 2019-12-18 ASSESSMENT — MIFFLIN-ST. JEOR: SCORE: 825.5

## 2019-12-18 NOTE — PROGRESS NOTES
Subjective     Pam Pierce is a 89 year old female who presents to clinic today for the following health issues:  P patient is an 89-year-old female who comes in today for hospital follow-up.  She was seen in the emergency room on 11/19/2019 was found to have have an abdominal mass, she was taken to the OR on 11/20 and had a diverting loop transverse colostomy, this was complicated with an intraoperative cardiac arrest likely from vagal asystole.  She was resuscitated successfully.    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  Presently patient is considering radiation and chemotherapy.  She said she has not felt this good in the last 2 years and she is free of pain she is seriously considering not doing radiation and chemotherapy.    She is accompanied today with her daughter Karine.  They had some questions about restarting her atenolol which was discontinued in the hospital because she had hypotension.  Her blood pressure is noted to still be quite low at this visit so I recommended that the medication be held.  Other concerns were lower extremity pain and swelling.  Her daughter said they had restarted her chlorthalidone and this seems to be helping with the swelling.  They are wondering about the nighttime leg cramps.  I recommended that she takes 2 Tylenol before bedtime that this may help alleviate the pain.    Overall patient's mood and affect appears to be normal she seems to be taking diagnosis of the cancer pretty well. She is  scheduled to see oncology the beginning part of next year to decide whether or not she is going to do chemo/radiotherapy.  Chief Complaint   Patient presents with     Transitional Care Discharge Follow Up     Follow up from [facility] 12/3-12/11     Hospital F/U     Follow up from [facility]19-12/3 [facility] [place] Ivonne Rectal Mass     Medication Question     patient is wondering if she should re start the  Atenolol, was not taking this in Hospital or Transitional care due to low BP , Would also like to know if she should continue the Chlorothiadone              Hospital Follow-up Visit:    Hospital/Nursing Home/ Rehab Facility: [facility] and after that was in [facility]   Date of Admission: [place] 11/19, Transitional Care 12/3  Date of Discharge: [place] 12/3, [facility] Care 12/11   Reason(s) for Admission: Ivonne Rectal mass Surgery, patient now has a loop colostomy              Problems taking medications regularly:  None       Medication changes since discharge: None       Problems adhering to non-medication therapy:  None    Medication Question     patient is wondering if she should re start the Atenolol, was not taking this in Hospital or Transitional care due to low BP , Would also like to know if she should continue the Chlorothiadone        Summary of hospitalization:  [facility] discharge summary reviewed  Diagnostic Tests/Treatments reviewed.  Follow up needed: none  Other Healthcare Providers Involved in Patient s Care:         None  Update since discharge: improved.     Post Discharge Medication Reconciliation: discharge medications reconciled, continue medications without change.  Plan of care communicated with patient and family     Coding guidelines for this visit:  Type of Medical   Decision Making Face-to-Face Visit       within 7 Days of discharge Face-to-Face Visit        within 14 days of discharge   Moderate Complexity 93395 [id number]   High Complexity 98191 [id number]              Patient Active Problem List   Diagnosis     Urethral stricture due to infection     Hyperlipidemia     Essential hypertension     IMPAIRED FASTING GLUCOSE     HYPERLIPIDEMIA LDL GOAL <100     CKD (chronic kidney disease) stage 3, GFR 30-59 ml/min (H)     GERD (gastroesophageal reflux disease)     Mild intermittent asthma in adult without complication     Past Surgical History:    Procedure Laterality Date     SURGICAL HISTORY OF -       open reduction of second metatarsal neck fx with internal fixation  cna d closed treatment of metatarsal to 2 and 4 fx     SURGICAL HISTORY OF -       hysterectomy and oophorectomy     SURGICAL HISTORY OF -       lithotripsy       Social History     Tobacco Use     Smoking status: Never Smoker     Smokeless tobacco: Never Used   Substance Use Topics     Alcohol use: Yes     Comment: wine ocass     Family History   Problem Relation Age of Onset     Cancer - colorectal Mother      Breast Cancer Mother      C.A.D. Father      Cardiovascular Father      Alcohol/Drug Father      Chronic Obstructive Pulmonary Disease Brother      Cancer Son         chest cancer/myesthenia gravis     Musculoskeletal Disorder Son         myesthenia gravis     Cerebrovascular Disease Sister      Chronic Obstructive Pulmonary Disease Brother          Current Outpatient Medications   Medication Sig Dispense Refill     acetaminophen (TYLENOL) 500 MG tablet Take 500-1,000 mg by mouth daily as needed for mild pain       albuterol (PROAIR HFA) 108 (90 Base) MCG/ACT inhaler Inhale 2 puffs into the lungs every 4 hours as needed 1 Inhaler 11     aspirin 81 MG EC tablet Take 81 mg by mouth daily       CALCIUM + D OR Take 1 tablet by mouth daily        chlorthalidone (HYGROTON) 25 MG tablet Take 1 tablet (25 mg) by mouth daily 90 tablet 3     enoxaparin ANTICOAGULANT (LOVENOX) 30 MG/0.3ML syringe Inject 1 mg/kg Subcutaneous daily Daily until 12/25/19       fluticasone (FLOVENT HFA) 110 MCG/ACT inhaler Inhale 2 puffs into the lungs 2 times daily 1 Inhaler 11     omeprazole (PRILOSEC) 20 MG DR capsule Take 20 mg by mouth daily       oxyCODONE (ROXICODONE) 5 MG tablet Take 2.5 mg by mouth daily as needed for severe pain       polyethylene glycol (MIRALAX) powder Take by mouth daily 8.5 grams daily in the morning       simvastatin (ZOCOR) 40 MG tablet Take 1 tablet (40 mg) by mouth At Bedtime 90  tablet 3     atenolol (TENORMIN) 25 MG tablet Take 1 tablet (25 mg) by mouth daily (Patient not taking: Reported on 12/18/2019) 90 tablet 3     MULTI-DAY VITAMINS OR 1 tablet daily       Probiotic Product (PROBIOTIC DAILY PO) Take 1 capsule by mouth daily        Allergies   Allergen Reactions     Flu Virus Vaccine      Gabapentin Nausea and Vomiting     BP Readings from Last 3 Encounters:   12/18/19 90/62   11/19/19 133/81   07/15/19 92/56    Wt Readings from Last 3 Encounters:   12/18/19 47.9 kg (105 lb 9.6 oz)   11/19/19 45.4 kg (100 lb)   07/15/19 46.3 kg (102 lb)                        Reviewed and updated as needed this visit by Provider  Tobacco  Allergies  Meds  Problems  Med Hx  Surg Hx  Fam Hx         Review of Systems   ROS COMP: Constitutional, HEENT, cardiovascular, pulmonary, gi and gu systems are negative, except as otherwise noted.      Objective    BP 90/62 (BP Location: Right arm, Patient Position: Chair, Cuff Size: Adult Regular)   Pulse 91   Temp 98.9  F (37.2  C) (Tympanic)   Resp 20   Ht 1.524 m (5')   Wt 47.9 kg (105 lb 9.6 oz)   SpO2 99%   Breastfeeding No   BMI 20.62 kg/m    Body mass index is 20.62 kg/m .  Physical Exam   GENERAL: healthy, alert and no distress  NECK: no adenopathy, no asymmetry, masses, or scars and thyroid normal to palpation  RESP: lungs clear to auscultation - no rales, rhonchi or wheezes  CV: regular rate and rhythm, normal S1 S2, no S3 or S4, no murmur, click or rub, no peripheral edema and peripheral pulses strong  ABDOMEN: soft, nontender, without hepatosplenomegaly or masses, bowel sounds normal, well-healed incision colostomy bag with stool in the bag observed.   MS: no gross musculoskeletal defects noted, no edema    Diagnostic Test Results:  Labs reviewed in Epic  none         Assessment & Plan     1. Hospital discharge follow-up  Patient is a 89-year-old female who comes in today for a hospital follow-up where she was diagnosed with invasive  carcinoma of the rectosigmoid.  Appears to be doing better had no acute complaints today.  Pain appears to be much controlled.    2. Carcinoma of rectosigmoid (colon) (H)  Patient considering chemo/radiation or palliative care .    3. Mild intermittent asthma in adult without complication  Stable no acute symptoms.    4. Essential hypertension   Blood pressure still low- recommend holding the atenolol for now. I asked that she continue to monitor her blood pressure.          FUTURE APPOINTMENTS:       - Follow-up visit in one month or sooner as needed.  Patient Instructions         Thank you for choosing [facility].  You may be receiving an email and/or telephone survey request from [facility] [employer] regarding your visit today.  Please take a few minutes to respond to the survey to let us know how we are doing.      If you have questions or concerns, please contact us via [employer] or you can contact your care team at [phone].    Our Clinic hours are:  Monday 6:40 am  to 7:00 pm  Tuesday -Friday 6:40 am to 5:00 pm    The [place] outpatient lab hours are:  Monday - Friday 6:10 am to 4:45 pm  Saturdays 7:00 am to 11:00 am  Appointments are required, call [phone]    If you have clinical questions after hours or would like to schedule an appointment,  call the clinic at [phone].        Return in about 4 weeks (around 1/15/2020).    Reagan Thakkar MD  [facility]

## 2019-12-18 NOTE — NURSING NOTE
Patient has signed authorization given to daughter for health information.  Authorization was given to Karine in forms

## 2019-12-18 NOTE — TELEPHONE ENCOUNTER
[facility] now requests orders and shares plan of care/discharge summaries for some patients through [employer].  Please REPLY TO THIS MESSAGE OR ROUTE BACK TO THE AUTHOR in order to give authorization for orders when needed.  This is considered a verbal order, you will still receive a faxed copy of orders for signature.  Thank you for your assistance in improving collaboration for our patients.    ORDER    Skilled nursing 2wk1, 1wk4, 3 as needed visits for assessment and evaluation r/t nutrition, new colostomy, home safety    PT eval for strengthening and home exercise program    Declined OT and HHA.    Sincerely  Shannon Hwang, RN Case Manager  [phone]

## 2019-12-18 NOTE — PATIENT INSTRUCTIONS
Thank you for choosing [facility].  You may be receiving an email and/or telephone survey request from [facility] [employer] regarding your visit today.  Please take a few minutes to respond to the survey to let us know how we are doing.      If you have questions or concerns, please contact us via [employer] or you can contact your care team at [phone].    Our Clinic hours are:  Monday 6:40 am  to 7:00 pm  Tuesday -Friday 6:40 am to 5:00 pm    The [place] outpatient lab hours are:  Monday - Friday 6:10 am to 4:45 pm  Saturdays 7:00 am to 11:00 am  Appointments are required, call [phone]    If you have clinical questions after hours or would like to schedule an appointment,  call the clinic at [phone].

## 2019-12-19 ENCOUNTER — TELEPHONE (OUTPATIENT)
Dept: FAMILY MEDICINE | Facility: CLINIC | Age: 84
End: 2019-12-19

## 2019-12-19 LAB — COPATH REPORT: NORMAL

## 2019-12-19 ASSESSMENT — ASTHMA QUESTIONNAIRES: ACT_TOTALSCORE: 25

## 2019-12-19 NOTE — TELEPHONE ENCOUNTER
Please tell patient's daughter that if her mom is in pain colostomy bag may be plugged , she needs to be seen in UC /ER

## 2019-12-19 NOTE — TELEPHONE ENCOUNTER
Reason for Call:  Other call back    Detailed comments: Daughter states patient's stool is too thick to go through the colostomy and her mom is in pain.    Phone Number Patient can be reached at: Cell number on file:  [phone]  No relevant phone numbers on file.       Best Time: any    Can we leave a detailed message on this number? YES    Call taken on 12/19/2019 at 9:29 AM by Crystal Oconnor

## 2019-12-19 NOTE — TELEPHONE ENCOUNTER
Gave miralax yesterday and today.  Patient is feeling better.  New colostomy bag has been replaced.  Patient is sleeping - did not sleep well last night so daughter states that good sign sleeping now.  Daughter will evaluate further and bring to ER if concerns.  Marysol EWING RN

## 2019-12-20 ENCOUNTER — TELEPHONE (OUTPATIENT)
Dept: FAMILY MEDICINE | Facility: CLINIC | Age: 84
End: 2019-12-20

## 2019-12-20 NOTE — TELEPHONE ENCOUNTER
[facility] now requests orders and shares plan of care/discharge summaries for some patients through [employer].  Please REPLY TO THIS MESSAGE OR ROUTE BACK TO THE AUTHOR in order to give authorization for orders when needed.  This is considered a verbal order, you will still receive a faxed copy of orders for signature.  Thank you for your assistance in improving collaboration for our patients.    ORDER  PT 1x/wk then 2x/wk x2    Strength, balance, endurance and mobility

## 2019-12-24 ENCOUNTER — TELEPHONE (OUTPATIENT)
Dept: CARE COORDINATION | Facility: CLINIC | Age: 84
End: 2019-12-24

## 2019-12-24 NOTE — TELEPHONE ENCOUNTER
[facility] now requests orders and shares plan of care/discharge summaries for some patients through [employer].  Please REPLY TO THIS MESSAGE OR ROUTE BACK TO THE AUTHOR in order to give authorization for orders when needed.  This is considered a verbal order, you will still receive a faxed copy of orders for signature.  Thank you for your assistance in improving collaboration for our patients.    Tamy Ortiz has been having swelling in BLE after her recent hospitalization. Today it appears decreased 2+ in ankles. She's started wearing compression socks. She's waking up during the night because of leg discomfort.    Her daughter brought her diphenydramine HCl 25 mg to see if it would help with sleep. She took it last night but stated it didn't help.    Do you have other recommendations for sleeping? Melantonin?    Thank you,  Addis Duran RN, CWOCN  [facility] Certified Wound, Ostomy, Continence Nurse  [phone]  [email or web address]

## 2019-12-24 NOTE — TELEPHONE ENCOUNTER
[facility] now requests orders and shares plan of care/discharge summaries for some patients through [email or web address].  Please REPLY TO THIS MESSAGE OR ROUTE BACK TO THE AUTHOR in order to give authorization for orders when needed.  This is considered a verbal order, you will still receive a faxed copy of orders for signature.  Thank you for your assistance in improving collaboration for our patients.    Thank you!   What dosage do you recommend that she start with for the melatonin and magnesium?    Addis Duran RN, CWOCN  [facility] Certified Wound, Ostomy, Continence Nurse  [phone]  [email or web address]

## 2019-12-26 NOTE — TELEPHONE ENCOUNTER
Patients daughter Karine is now calling to see what dose of Melatonin or Magnesium to give her Mother for her sleeping. She is not sleeping at all, and states her legs hurt.  Can we get an answer today?  Opal Hallman  [facility] [place] [facility]

## 2020-01-09 ENCOUNTER — PRE VISIT (OUTPATIENT)
Dept: ONCOLOGY | Facility: CLINIC | Age: 85
End: 2020-01-09

## 2020-01-09 ENCOUNTER — ONCOLOGY VISIT (OUTPATIENT)
Dept: ONCOLOGY | Facility: CLINIC | Age: 85
End: 2020-01-09
Attending: INTERNAL MEDICINE
Payer: MEDICARE

## 2020-01-09 VITALS
DIASTOLIC BLOOD PRESSURE: 79 MMHG | RESPIRATION RATE: 20 BRPM | OXYGEN SATURATION: 96 % | SYSTOLIC BLOOD PRESSURE: 144 MMHG | HEIGHT: 60 IN | WEIGHT: 105 LBS | HEART RATE: 98 BPM | TEMPERATURE: 98.9 F | BODY MASS INDEX: 20.62 KG/M2

## 2020-01-09 DIAGNOSIS — K21.9 GASTROESOPHAGEAL REFLUX DISEASE WITHOUT ESOPHAGITIS: Primary | ICD-10-CM

## 2020-01-09 DIAGNOSIS — C20 RECTAL CANCER (H): Primary | ICD-10-CM

## 2020-01-09 PROCEDURE — 99204 OFFICE O/P NEW MOD 45 MIN: CPT | Performed by: INTERNAL MEDICINE

## 2020-01-09 PROCEDURE — G0463 HOSPITAL OUTPT CLINIC VISIT: HCPCS

## 2020-01-09 RX ORDER — MAGNESIUM OXIDE 400 MG/1
400 TABLET ORAL 2 TIMES DAILY
COMMUNITY

## 2020-01-09 RX ORDER — LANOLIN ALCOHOL/MO/W.PET/CERES
3 CREAM (GRAM) TOPICAL AT BEDTIME
COMMUNITY

## 2020-01-09 ASSESSMENT — MIFFLIN-ST. JEOR: SCORE: 809.84

## 2020-01-09 ASSESSMENT — PAIN SCALES - GENERAL: PAINLEVEL: NO PAIN (0)

## 2020-01-09 NOTE — TELEPHONE ENCOUNTER
Routing refill request to provider for review/approval because:  Medication is reported/historical  Was previously in transitional care.    Marysol EWING RN

## 2020-01-09 NOTE — PROGRESS NOTES
NEW PATIENT HEMATOLOGY/ONCOLOGY CONSULT    REQUESTING PROVIDER/SERVICE: Dr. Moreno, from colon rectal surgical group.    PATIENT NAME: Pam Pierce   MRN# [id number]     Date of Visit: Jan 9, 2020   YOB: 1930       REASON FOR CONSULTATION/CC:    At age 89, dx in 11/2019 rectal cancer        HISTORY OF ONCOLOGY ILLNESS:    At age 89, she presented with years duration of abdominal pain.  To the point she is not able to handle 10 out of 10 significantly intensified November 2019 she presented to the emergency room.    CT 11/2019 found Circumferential rectosigmoid mass causing relative colonic obstruction with the more proximal colon distended with fecal debris. Colorectal mass extends beyond the serosa of the bowel wall with  multiple small probable metastatic mesorectal lymph nodes. No enlarged  pelvic sidewall or retroperitoneal lymph nodes. No evidence of  metastatic liver disease on the current exam.    Was transferred to [facility], had emergency diverting colostomy bag, and biopsy of the colorectal mass 11/2019.    Rectal biopsy pathology found Adenocarcinoma arising in a tubular adenoma, focal signet ring cell features identified.    Today is the first time she is seen oncology.      INTERVAL HISTORY        PAST MEDICAL HISTORY  Intermittent asthma  Hyperlipidemia, hypertension  GERD  Chronic kidney disease, history of urethral stricture due to infection      MEDICATIONS/ALLERY:  Reviewed in Epic system.        SOCIAL HISTORY:    She lives independently even at age 89 fully functional her kids are close by.  Smoking denies alcohol abuse.    FAMILY HISTORY:    FH + for breast, colon, ovarian, thymic, melanoma.       REVIEW OF SYSTEMS:   A 10-point review of systems was performed. Pertinent findings are noted in the HPI.    GENERAL: pt is in usual state of health.  No B symptoms  NEURO:   No headache, double vision, or focal weakness.  No neuropathy.   SKIN:  No chronic skin rash or  "skin infection.   CARDIOVASCULAR:  + High blood pressure, + hyperlipidemia. NO RAMIREZ  PULMONARY:  No shortness of breath, no pleurisy, no cough, no hemoptysis.   GI:   The above history of present illness.  :  Chronic kidney disease, history of urethral stricture due to infection  RHEUMATOLOGY/MUSCULOSKELETAL SYSTEM:  no arthritic pain, or muscle pain. No muscle ache.   ENDOCRINE:  No history of diabetes or thyroid problem.  No complaints of hot flashes.   HEMATOLOGY:  No history of bleeding or thrombosis episode.   Oncology: no hx of cancers  IMMUNOLOGY:  No recurrent fever or chills episode.  No recurrent infectious episode.   BREASTS/GYN: No breast complains or vaginal dryness  PSYCHIATRY:  No anxiety or depression.          PHYSICAL EXAMINATION:   VITAL SIGNS: Blood pressure (!) 144/79, pulse 98, temperature 98.9  F (37.2  C), temperature source Tympanic, resp. rate 20, height 1.511 m (4' 11.5\"), weight 47.6 kg (105 lb), SpO2 96 %, not currently breastfeeding.       GENERAL APPEARANCE:  looks like stated age, very pleasant, not in acute distress.     ECO    ENT, MOUTH: Pupils are equally reactive to light.  Sclerae are anicteric.  Moist oral mucosa without lesion or ulcer.   Negative pharynx.  No oral thrush.   NECK:  Supple.  No jugular venous distention.  Thyroid is not palpable.   LYMPH NODES:  Superficial lymphadenopathy is not appreciable in the bilateral cervical, supraclavicular, axillary or inguinal areas.   CARDIOVASCULAR:  S1, S2 regular with no murmurs or gallops.  No carotid or abdominal bruits.   PULMONARY:  Lungs are clear to auscultation and percussion bilaterally.  There is no wheezing or rhonchi.   GASTROINTESTINAL:  Abdomen is soft, nontender.  Plus mid back probably in placed in the left lower quadrant. o hepatosplenomegaly.  No signs of ascites.  No mass appreciable.   MUSCULOSKELETAL/EXTREMITIES:  No edema.  No cyanotic changes.  No signs of joint deformity.  No lymphedema.   NEUROLOGIC: "  Cranial nerves II-XII are grossly intact.  Sensation intact.  Muscle strength and muscle tone symmetrical, 5/5 throughout.   BACK  No spinal or paraspinal tenderness.  No CVA tenderness.   SKIN:  No petechiae.  No rash.  No signs of cellulitis.   PSYCHIATRIC: Oriented to time, person, and places. Normal mood and affect. Good memory. Proper insight and judgement.       CURRENT LAB DATA REVIEWED TODAY  11/2019 baseline nl LFT, nl Cr, and cbc diff    CEA at 4.6 in 11/2019          CURRENT IMAGING REVIEWED TODAY  CT 11/2019 found Circumferential rectosigmoid mass causing relative colonic obstruction with the more proximal colon distended with fecal debris. Colorectal mass extends beyond the serosa of the bowel wall with  multiple small probable metastatic mesorectal lymph nodes. No enlarged  pelvic sidewall or retroperitoneal lymph nodes. No evidence of  metastatic liver disease on the current exam.         OLD DATA REVIEWED TODAY WITH SUMMARY  Baseline CT in the system.  X-ray of the pelvic 2014 found no fracture or pathology.        ASSESSMENT AND PLAN:    Newly diagnosed rectal cancer November 2019 at age 89, with presenting symptoms of years duration of deep abdominal pain worsening to the point near obstruction.  Had emergent: colostomy bag put in, Rectal biopsy pathology found Adenocarcinoma arising in a tubular adenoma, focal signet ring cell features identified.    Due to her advanced age, patient expressed the wishes of not being sick if she has to be treated.    Recommend her to consider further staging work-up with pelvic MRI and PET scan.    Depends on the above work-up result, different scenarios could happen, occluding not limiting to: Palliative oral Xeloda, palliative pelvic RT only, reduced intensity pelvic RT with oral Xeloda, etc.    Patient finally at the end of the consultation with encouragement of family members willing to proceed with the work-up.    Addendum  PET and MRI found advanced rectal  cancer. She has decided to do radiation therapy alone at this time, no xeloda.

## 2020-01-09 NOTE — LETTER
"    1/9/2020         RE: Pam Pierce  [address] [place] [id number]        Dear Colleague,    Thank you for referring your patient, Pam Pierce, to the [facility]. Please see a copy of my visit note below.    Oncology Rooming Note    January 9, 2020 1:13 PM   Pam Pierce is a 89 year old female who presents for:    Chief Complaint   Patient presents with     Oncology Clinic Visit     New Patient - Rectal CA coming from [facility]     Initial Vitals: BP (!) 144/79 (BP Location: Right arm, Patient Position: Sitting, Cuff Size: Adult Regular)   Pulse 98   Temp 98.9  F (37.2  C) (Tympanic)   Resp 20   Ht 1.511 m (4' 11.5\")   Wt 47.6 kg (105 lb)   SpO2 96%   BMI 20.85 kg/m    Estimated body mass index is 20.85 kg/m  as calculated from the following:    Height as of this encounter: 1.511 m (4' 11.5\").    Weight as of this encounter: 47.6 kg (105 lb). Body surface area is 1.41 meters squared.  No Pain (0) Comment: Data Unavailable   No LMP recorded. Patient has had a hysterectomy.  Allergies reviewed: Yes  Medications reviewed: Yes    Medications: Medication refills not needed today.  Pharmacy name entered into [employer]:       [facility] [place], [place] - [address]    Clinical concerns: New Patient - Rectal CA coming from [facility].       Johanny Hunter CMA                NEW PATIENT HEMATOLOGY/ONCOLOGY CONSULT    REQUESTING PROVIDER/SERVICE: Dr. Moreno, from [facility].    PATIENT NAME: Pam Pierce   MRN# [id number]     Date of Visit: Jan 9, 2020   YOB: 1930       REASON FOR CONSULTATION/CC:    At age 89, dx in 11/2019 rectal cancer        HISTORY OF ONCOLOGY ILLNESS:    At age 89, she presented with years duration of abdominal pain.  To the point she is not able to handle 10 out of 10 significantly intensified November 2019 she presented to the emergency room.    CT 11/2019 found Circumferential rectosigmoid mass causing " relative colonic obstruction with the more proximal colon distended with fecal debris. Colorectal mass extends beyond the serosa of the bowel wall with  multiple small probable metastatic mesorectal lymph nodes. No enlarged  pelvic sidewall or retroperitoneal lymph nodes. No evidence of  metastatic liver disease on the current exam.    Was transferred to [facility], had emergency diverting colostomy bag, and biopsy of the colorectal mass 11/2019.    Rectal biopsy pathology found Adenocarcinoma arising in a tubular adenoma, focal signet ring cell features identified.    Today is the first time she is seen oncology.      INTERVAL HISTORY        PAST MEDICAL HISTORY  Intermittent asthma  Hyperlipidemia, hypertension  GERD  Chronic kidney disease, history of urethral stricture due to infection      MEDICATIONS/ALLERY:  Reviewed in Epic system.        SOCIAL HISTORY:    She lives independently even at age 89 fully functional her kids are close by.  Smoking denies alcohol abuse.    FAMILY HISTORY:    FH + for breast, colon, ovarian, thymic, melanoma.       REVIEW OF SYSTEMS:   A 10-point review of systems was performed. Pertinent findings are noted in the HPI.    GENERAL: pt is in usual state of health.  No B symptoms  NEURO:   No headache, double vision, or focal weakness.  No neuropathy.   SKIN:  No chronic skin rash or skin infection.   CARDIOVASCULAR:   + High blood pressure, + hyperlipidemia. NO RAMIREZ  PULMONARY:  No shortness of breath, no pleurisy, no cough, no hemoptysis.   GI:    The above history of present illness.  :   Chronic kidney disease, history of urethral stricture due to infection  RHEUMATOLOGY/MUSCULOSKELETAL SYSTEM:  no arthritic pain, or muscle pain. No muscle ache.   ENDOCRINE:  No history of diabetes or thyroid problem.  No complaints of hot flashes.   HEMATOLOGY:  No history of bleeding or thrombosis episode.   Oncology: no hx of cancers  IMMUNOLOGY:  No recurrent fever or  "chills episode.  No recurrent infectious episode.   BREASTS/GYN: No breast complains or vaginal dryness  PSYCHIATRY:  No anxiety or depression.          PHYSICAL EXAMINATION:   VITAL SIGNS: Blood pressure (!) 144/79, pulse 98, temperature 98.9  F (37.2  C), temperature source Tympanic, resp. rate 20, height 1.511 m (4' 11.5\"), weight 47.6 kg (105 lb), SpO2 96 %, not currently breastfeeding.       GENERAL APPEARANCE:  looks like stated age, very pleasant, not in acute distress.     ECO    ENT, MOUTH: Pupils are equally reactive to light.  Sclerae are anicteric.  Moist oral mucosa without lesion or ulcer.   Negative pharynx.  No oral thrush.   NECK:  Supple.  No jugular venous distention.  Thyroid is not palpable.   LYMPH NODES:  Superficial lymphadenopathy is not appreciable in the bilateral cervical, supraclavicular, axillary or inguinal areas.   CARDIOVASCULAR:  S1, S2 regular with no murmurs or gallops.  No carotid or abdominal bruits.   PULMONARY:  Lungs are clear to auscultation and percussion bilaterally.  There is no wheezing or rhonchi.   GASTROINTESTINAL:  Abdomen is soft, nontender.  Plus mid back probably in placed in the left lower quadrant. o hepatosplenomegaly.  No signs of ascites.  No mass appreciable.   MUSCULOSKELETAL/EXTREMITIES:  No edema.  No cyanotic changes.  No signs of joint deformity.  No lymphedema.   NEUROLOGIC:  Cranial nerves II-XII are grossly intact.  Sensation intact.  Muscle strength and muscle tone symmetrical, 5/5 throughout.   BACK  No spinal or paraspinal tenderness.  No CVA tenderness.   SKIN:  No petechiae.  No rash.  No signs of cellulitis.   PSYCHIATRIC: Oriented to time, person, and places. Normal mood and affect. Good memory. Proper insight and judgement.       CURRENT LAB DATA REVIEWED TODAY  2019 baseline nl LFT, nl Cr, and cbc diff    CEA at 4.6 in 2019          CURRENT IMAGING REVIEWED TODAY  CT 2019 found Circumferential rectosigmoid mass causing relative " colonic obstruction with the more proximal colon distended with fecal debris. Colorectal mass extends beyond the serosa of the bowel wall with  multiple small probable metastatic mesorectal lymph nodes. No enlarged  pelvic sidewall or retroperitoneal lymph nodes. No evidence of  metastatic liver disease on the current exam.         OLD DATA REVIEWED TODAY WITH SUMMARY  Baseline CT in the system.  X-ray of the pelvic 2014 found no fracture or pathology.        ASSESSMENT AND PLAN:    Newly diagnosed rectal cancer November 2019 at age 89, with presenting symptoms of years duration of deep abdominal pain worsening to the point near obstruction.  Had emergent: colostomy bag put in, Rectal biopsy pathology found Adenocarcinoma arising in a tubular adenoma, focal signet ring cell features identified.    Due to her advanced age, patient expressed the wishes of not being sick if she has to be treated.    Recommend her to consider further staging work-up with pelvic MRI and PET scan.    Depends on the above work-up result, different scenarios could happen, occluding not limiting to: Palliative oral Xeloda, palliative pelvic RT only, reduced intensity pelvic RT with oral Xeloda, etc.    Patient finally at the end of the consultation with encouragement of family members willing to proceed with the work-up.        Again, thank you for allowing me to participate in the care of your patient.        Sincerely,        Tomasa Diggs MD, MD

## 2020-01-09 NOTE — PROGRESS NOTES
"Oncology Rooming Note    January 9, 2020 1:13 PM   Pam Pierce is a 89 year old female who presents for:    Chief Complaint   Patient presents with     Oncology Clinic Visit     New Patient - Rectal CA coming from [facility]     Initial Vitals: BP (!) 144/79 (BP Location: Right arm, Patient Position: Sitting, Cuff Size: Adult Regular)   Pulse 98   Temp 98.9  F (37.2  C) (Tympanic)   Resp 20   Ht 1.511 m (4' 11.5\")   Wt 47.6 kg (105 lb)   SpO2 96%   BMI 20.85 kg/m   Estimated body mass index is 20.85 kg/m  as calculated from the following:    Height as of this encounter: 1.511 m (4' 11.5\").    Weight as of this encounter: 47.6 kg (105 lb). Body surface area is 1.41 meters squared.  No Pain (0) Comment: Data Unavailable   No LMP recorded. Patient has had a hysterectomy.  Allergies reviewed: Yes  Medications reviewed: Yes    Medications: Medication refills not needed today.  Pharmacy name entered into [employer]:       [facility] [place], [place] - [address]    Clinical concerns: New Patient - Rectal CA coming from [facility].       Johanny Hunter CMA              "

## 2020-01-09 NOTE — TELEPHONE ENCOUNTER
Reason for Call:  Medication or medication refill:    Do you use a [facility]?  Name of the pharmacy and phone number for the current request:  [facility] [phone]    Name of the medication requested: Omeprazole -   Pt's daughter calling for refill - Pt only has 2 pills left.  Please send Rx for 90-day supply to pharmacy.   No need to call patient back, unless there are questions or problems.      Other request:     Can we leave a detailed message on this number? YES    Phone number patient can be reached at: Home number on file [phone] (home)    Best Time: any    Call taken on 1/9/2020 at 4:27 PM by Lashay Morocho       - - -

## 2020-01-10 NOTE — TELEPHONE ENCOUNTER
Patient contacted [facility] pharmacy and will be out of medication this weekend. Wants refill today, if possible. Opal Jeffers on 1/10/2020 at 11:03 AM

## 2020-01-14 ENCOUNTER — TELEPHONE (OUTPATIENT)
Dept: CARE COORDINATION | Facility: CLINIC | Age: 85
End: 2020-01-14

## 2020-01-14 ENCOUNTER — PATIENT OUTREACH (OUTPATIENT)
Dept: ONCOLOGY | Facility: CLINIC | Age: 85
End: 2020-01-14

## 2020-01-14 DIAGNOSIS — C20 RECTAL CANCER (H): Primary | ICD-10-CM

## 2020-01-14 NOTE — PROGRESS NOTES
Daughter called back she will call to schedule.       Doris Ballesteros RN on 1/14/2020 at 2:10 PM

## 2020-01-14 NOTE — PROGRESS NOTES
"Daughter Karine called to report pt has been having \"vision problems\" , starting last night while reading with one eye and she is inquiring if we are scanning her head.     We were not planning to with PET and Pelvic MRI but reviewed with Dr. Diggs and ok to proceed with this. Called to update daughter and LM for a return call.     Doris Ballesteros RN on 1/14/2020 at 1:15 PM    "

## 2020-01-15 ENCOUNTER — MEDICAL CORRESPONDENCE (OUTPATIENT)
Dept: HEALTH INFORMATION MANAGEMENT | Facility: CLINIC | Age: 85
End: 2020-01-15

## 2020-01-16 ENCOUNTER — HOSPITAL ENCOUNTER (OUTPATIENT)
Dept: MRI IMAGING | Facility: CLINIC | Age: 85
Discharge: HOME | End: 2020-01-16
Attending: INTERNAL MEDICINE | Admitting: INTERNAL MEDICINE
Payer: MEDICARE

## 2020-01-16 ENCOUNTER — HOSPITAL ENCOUNTER (OUTPATIENT)
Dept: PET IMAGING | Facility: CLINIC | Age: 85
End: 2020-01-16
Attending: INTERNAL MEDICINE
Payer: MEDICARE

## 2020-01-16 DIAGNOSIS — C20 RECTAL CANCER (H): ICD-10-CM

## 2020-01-16 LAB
CREAT BLD-MCNC: 0.6 MG/DL (ref 0.52–1.04)
GFR SERPL CREATININE-BSD FRML MDRD: >90 ML/MIN/{1.73_M2}

## 2020-01-16 PROCEDURE — 25500064 ZZH RX 255 OP 636: Performed by: INTERNAL MEDICINE

## 2020-01-16 PROCEDURE — 72197 MRI PELVIS W/O & W/DYE: CPT

## 2020-01-16 PROCEDURE — A9552 F18 FDG: HCPCS | Performed by: INTERNAL MEDICINE

## 2020-01-16 PROCEDURE — 82565 ASSAY OF CREATININE: CPT

## 2020-01-16 PROCEDURE — 78816 PET IMAGE W/CT FULL BODY: CPT | Mod: PI

## 2020-01-16 PROCEDURE — 34300033 ZZH RX 343: Performed by: INTERNAL MEDICINE

## 2020-01-16 PROCEDURE — A9585 GADOBUTROL INJECTION: HCPCS | Performed by: INTERNAL MEDICINE

## 2020-01-16 RX ORDER — GADOBUTROL 604.72 MG/ML
4 INJECTION INTRAVENOUS ONCE
Status: COMPLETED | OUTPATIENT
Start: 2020-01-16 | End: 2020-01-16

## 2020-01-16 RX ADMIN — GADOBUTROL 4 ML: 604.72 INJECTION INTRAVENOUS at 15:26

## 2020-01-16 RX ADMIN — FLUDEOXYGLUCOSE F-18 11.19 MCI.: 500 INJECTION, SOLUTION INTRAVENOUS at 14:18

## 2020-01-17 ENCOUNTER — HOSPITAL ENCOUNTER (OUTPATIENT)
Dept: MRI IMAGING | Facility: CLINIC | Age: 85
Discharge: HOME | End: 2020-01-17
Attending: INTERNAL MEDICINE | Admitting: INTERNAL MEDICINE
Payer: MEDICARE

## 2020-01-17 DIAGNOSIS — C20 RECTAL CANCER (H): ICD-10-CM

## 2020-01-17 PROCEDURE — 25500064 ZZH RX 255 OP 636: Performed by: INTERNAL MEDICINE

## 2020-01-17 PROCEDURE — A9585 GADOBUTROL INJECTION: HCPCS | Performed by: INTERNAL MEDICINE

## 2020-01-17 PROCEDURE — 70553 MRI BRAIN STEM W/O & W/DYE: CPT

## 2020-01-17 RX ORDER — GADOBUTROL 604.72 MG/ML
4.5 INJECTION INTRAVENOUS ONCE
Status: COMPLETED | OUTPATIENT
Start: 2020-01-17 | End: 2020-01-17

## 2020-01-17 RX ADMIN — GADOBUTROL 4.5 ML: 604.72 INJECTION INTRAVENOUS at 17:15

## 2020-01-20 DIAGNOSIS — C20 RECTAL CANCER (H): Primary | ICD-10-CM

## 2020-01-20 NOTE — RESULT ENCOUNTER NOTE
Called and reviewed results with the patient per Dr. Diggs. Patient had no further questions or concerns at this time and also verbalized understanding. Direct line provided if any further questions/concerns arise.    Referral placed.    Doris Ballesteros RN on 1/20/2020 at 4:33 PM

## 2020-01-27 ENCOUNTER — OFFICE VISIT (OUTPATIENT)
Dept: RADIATION THERAPY | Facility: OUTPATIENT CENTER | Age: 85
End: 2020-01-27
Payer: MEDICARE

## 2020-01-27 VITALS
RESPIRATION RATE: 16 BRPM | HEART RATE: 85 BPM | SYSTOLIC BLOOD PRESSURE: 131 MMHG | OXYGEN SATURATION: 97 % | BODY MASS INDEX: 21.13 KG/M2 | DIASTOLIC BLOOD PRESSURE: 77 MMHG | WEIGHT: 106.4 LBS

## 2020-01-27 DIAGNOSIS — C20 RECTAL CANCER (H): Primary | ICD-10-CM

## 2020-01-27 NOTE — NURSING NOTE
REASON FOR APPOINTMENT   Newly diagnosed rectal cancer, already had staging and seen by Dr. Diggs.   Here to discuss role of radiation therapy. Has had diverting colostomy and biopsy.    PERSONAL HISTORY OF CANCER   Previous Cancer ? no   Prior Radiation ? no   Prior Chemotherapy ? no   Prior Hormonal Therapy ? no     REFERRALS NEEDED  Not at this time    VITALS  /77 (Patient Position: Left side, Cuff Size: Adult Regular)   Pulse 85   Resp 16   Wt 48.3 kg (106 lb 6.4 oz)   SpO2 97%   BMI 21.13 kg/m      PACEMAKER/IMPLANTED CARDIAC DEVICE : No    PAIN  Not currently having any pain    PSYCHOSOCIAL  Marital Status:   Patient lives in [place], [place] with her .  Number of children: 3  Working status: retired X-ray tech  Do you feel safe in your home? Yes    REVIEW OF SYSTEMS  Skin: negative  Eyes: glasses  Ears/Nose/Throat: negative  Respiratory: No shortness of breath, dyspnea on exertion, cough, or hemoptysis  Cardiovascular: negative  Gastrointestinal: has diverting colostomy, some rectal bleeding  Genitourinary: nocturia, incontinence and daytime frequency  Musculoskeletal: joint pain, muscular weakness  Neurologic: negative  Psychiatric: negative  Hematologic/Lymphatic/Immunologic: negative  Endocrine: negative    WOMEN ONLY  Any chance you may be pregnant: No  Radiation Oncology Patient Teaching    Person involved with teaching: Patient and 3 children  Patient asked Questions: Yes  Patient was cooperative: Yes  Patient was receptive (willing to accept information given): Yes    Education Assessment  Comprehension ability: High  Knowledge level: Medium  Factors affecting teaching: None    Education Materials Given  Caring for Your Skin During Radiation ...  Disease Specific Booklet  Eating Hints  Diet and Nutrition Information    Educational Topics Discussed  Side effects, Medications, Pain management, Activity, Nutrition and Community resources    Response To Teaching  Verbalizes  understanding    GYN Only  Vaginal Dilator-given and educated: N/A    Do you have an advanced directive or living will? yes  Are you DNR/DNI? no

## 2020-01-27 NOTE — LETTER
2020      RE: Pam Pierce  [address] [place] [id number]          Department of Radiation Oncology  Radiation Therapy Center  [facility]  [address], [address]  [place], [place] [address] [phone]       Consultation Note    Name: Pam Pierce MRN: [id number]   : 1930   Date of Service: 2020  Referring: Dr. Diggs     Reason for consultation: Locally advanced rectal adenocarcinoma, clinical T4N2. Evaluate potential role of radiation therapy.    History of Present Illness   Ms. Pierce is a 89 year old female a diagnosis of locally advanced rectal adenocarcinoma, clinical T4N2.  She presents today to discuss potential role of radiation therapy as a part of her treatment strategy.     The patient initially presented with symptoms of abdominal pain in 2019, prompting further evaluation.  The patient underwent a CT scan of the abdomen pelvis at the time which demonstrated a circumferential rectal mass, causing relative colonic obstruction.  The patient subsequently underwent emergent diverting colostomy.  On 2019 she underwent biopsy of the rectal mass, which confirmed adenocarcinoma arising in a tubular adenoma, focal signet ring cell features identified. On 2020 the patient met with Dr. Diggs of medical oncology who discussed different treatment options.  Overall the patient described her goals of treatment to be largely palliative in nature, focusing on quality of life.  Different treatment options were discussed  including palliative radiation therapy to the pelvis +/-  systemic therapy or even systemic therapy alone.  The patient underwent a staging PET scan on 2020.  Imaging demonstrated a hypermetabolic rectal mass that appeared to directly invade the vagina.  There was evidence of small perirectal lymph nodes.  No evidence of distant disease was noted.  On 2020 the patient underwent MRI of the pelvis.  Imaging demonstrated a 7.8  cm mass in the low and mid rectum, 4.4 cm above the anal verge.  The mass was noted to invade through the superior aspect of the vagina, and possible posterior margin of the urinary bladder.  There were also noted at least 4 abnormal perirectal lymph nodes.  The patient subsequent presents today to discuss potential role of radiation therapy as a part of her treatment strategy.    Patient states that she is overall feeling fairly well.  She does have intermittent bouts of hematochezia.  No pelvic pain.  No diarrhea.  No constipation.  No hematuria.  No vaginal bleeding.  No prior radiation.  No pacemaker.    Past Medical History:   Past Medical History:   Diagnosis Date     Actinic keratosis      Basal cell carcinoma      Other and unspecified hyperlipidemia      Unspecified asthma(493.90)      Unspecified essential hypertension      Urethral stricture due to unspecified infection     ureteral stone       Past Surgical History:   Past Surgical History:   Procedure Laterality Date     SURGICAL HISTORY OF -       open reduction of second metatarsal neck fx with internal fixation  cna d closed treatment of metatarsal to 2 and 4 fx     SURGICAL HISTORY OF -       hysterectomy and oophorectomy     SURGICAL HISTORY OF -       lithotripsy       Chemotherapy History:  None    Radiation History:  None    Pregnant: Not Applicable  Implanted Cardiac Devices: No    Medications:  Current Outpatient Medications   Medication     acetaminophen (TYLENOL) 500 MG tablet     albuterol (PROAIR HFA) 108 (90 Base) MCG/ACT inhaler     aspirin 81 MG EC tablet     atenolol (TENORMIN) 25 MG tablet     CALCIUM + D OR     chlorthalidone (HYGROTON) 25 MG tablet     fluticasone (FLOVENT HFA) 110 MCG/ACT inhaler     magnesium oxide (MAG-OX) 400 MG tablet     melatonin 3 MG tablet     MULTI-DAY VITAMINS OR     omeprazole (PRILOSEC) 20 MG DR capsule     oxyCODONE (ROXICODONE) 5 MG tablet     polyethylene glycol (MIRALAX) powder     Probiotic Product  (PROBIOTIC DAILY PO)     simvastatin (ZOCOR) 40 MG tablet     No current facility-administered medications for this visit.          Allergies:     Allergies   Allergen Reactions     Flu Virus Vaccine      Gabapentin Nausea and Vomiting         Family History:  Family History   Problem Relation Age of Onset     Cancer - colorectal Mother      Breast Cancer Mother      C.A.D. Father      Cardiovascular Father      Alcohol/Drug Father      Chronic Obstructive Pulmonary Disease Brother      Cancer Son         chest cancer/myesthenia gravis     Musculoskeletal Disorder Son         myesthenia gravis     Cerebrovascular Disease Sister      Chronic Obstructive Pulmonary Disease Brother        Review of Systems   A 10-point review of systems was performed. Pertinent findings are noted in the HPI.    Physical Exam   ECOG Status: 1    Vitals:  /77 (Patient Position: Left side, Cuff Size: Adult Regular)   Pulse 85   Resp 16   Wt 48.3 kg (106 lb 6.4 oz)   SpO2 97%   BMI 21.13 kg/m       Gen: Alert, in NAD  Head: NC/AT  Eyes: PERRL, EOMI, sclera anicteric  Ears: No external auricular lesions  Nose/sinus: No rhinorrhea or epistaxis  Oral cavity/oropharynx: MMM, no visible oral cavity lesions, FOM and BOT are soft to palpation  Neck: Full ROM, supple, no palpable adenopathy  Pulm: No wheezing, stridor or respiratory distress  CV: Extremities are warm and well-perfused, no cyanosis, no pedal edema  Abdominal: Normal bowel sounds, soft, nontender, no masses. S/p colostomy.  Musculoskeletal: Normal bulk and tone  Skin: Normal color and turgor  Neuro: A/Ox3, CN II-XII intact, normal gait    Imaging/Path/Labs   Imagin20  MRI Pelvis  FINDINGS: There is a large, circumferential rectal mass that begins  approximately 4.4 cm above the anal verge and extends over a length of  7.8 cm (series 11, image 19). This tumor involves the entirety of the  low and mid rectum. This mass invades the superior portion of the  vagina  and in the axial plane, the conglomerate mass measures up to  5.8 x 6.1 cm (series 10, image 14). There are at least 4 abnormal  perirectal lymph nodes, largest of which measures 8 mm (series 10,  image 10). The anterior margin of the tumor also appears to invade the  posterior aspect of the urinary bladder (series 10, image 11 and  series 11, image 20). There is an abnormal-appearing lymph node that  appears to be outside of the mesorectal fascia on the right (series  10, image 5). No obstruction demonstrated. There is sigmoid  diverticulosis. No ascites.                                                                      IMPRESSION: T4b-N2 tumor of the low and mid rectum with invasion  through the superior vagina and to the posterior margin of the urinary  bladder.            1/16/20  PET    IMPRESSION:  1. Hypermetabolic rectal mass that appears to directly invade into the  vagina which is diffusely hypermetabolic. Small perirectal lymph nodes  are noted.  2. No evidence of metastatic disease in the head, neck, chest, or  abdomen. No evidence of metastatic disease in the extremities.  3. Significant coronary and aortic atherosclerosis. Ascending thoracic  aortic caliber is ectatic at 4.1 cm.  4. Small area of hypermetabolism associated with a nodule in the  isthmus of the thyroid. Consider further evaluation with ultrasound            Path:   11/20/19  FINAL DIAGNOSIS:   RECTUM, BIOPSY:  - ADENOCARCINOMA ARISING IN A TUBULAR ADENOMA, FOCAL SIGNET RING CELL FEATURES IDENTIFIED     See scanned Pathology report (case #[id number]).      Addendum COMMENT:    Immunohistochemical stains have been performed on paraffin sections of adenocarcinoma involving the rectum for mismatch repair protein analysis. The results are as follows:      Immunohistochemistry (IHC) Testing for Mismatch Repair (MMR) Proteins:   MLH1:     - Intact nuclear expression     MSH2:     - Intact nuclear expression     MSH6:     - Intact nuclear  expression     PMS2:     - Intact nuclear expression     Background non-neoplastic tissue/internal control with intact nuclear expression      IHC Interpretation:   - No loss of nuclear expression of MMR proteins: low probability of microsatellite instability-high (MSI-H)             Assessment    Ms. Pierce is a 89 year old female a diagnosis of locally advanced rectal adenocarcinoma, clinical T4N2.  She presents today to discuss potential role of radiation therapy as a part of her treatment strategy.     Plan     1. Today, I discussed the natural history of locally advanced rectal cancer.      2.  I discussed with the patient hat the standard of care for locally advanced rectal cancer is trimodality therapy consisting of pre-operative chemotherapy and radiation followed by surgery approximately 4-8 weeksafter completion of radiation.  Radiation therapy is utilized in this setting to optimize local control and downstage the tumor prior to surgery. The diagnosis, stage, prognosis, rationale for treatment, and details and expectations of radiation treatment were reviewed.      3. The patient, however, wishes to prioritize quality of life and wishes to avoid any aggressive surgical intervention or even extended course of radiation therapy. I, therefore, also discussed consideration of various palliative RT regimens with goal of local control including but not limited to the 5 Gy x 5 fraction regimen, which has been describe in the literature as a potential treatment regimen for symptomatic non-surgical patients (Irene, et al. IJROBP 2016). At the end our our discussion, the patient has elected to proceed with palliative RT.     4. I plan to treat to a total dose of 25 Gy in 5 fractions targeting the primary tumor and regional nodes. I discussed consideration of holding current xeloda during RT and for one week after RT to minimize increase in toxicity. I think that maintenance xeloda thereafter would be a very  reasonable treatment option, but would defer to our medical oncology colleagues.      5. I discussed expected adverse effects with the patient. During radiation therapy there can be loose stool or diarrhea that is treatable with dietary modifications or Imodium. Lower urinary tract symptoms such as urinary frequency or dysuria are possible. Some patients experience skin reaction such as redness or dryness that is typically mild. There will be some degree of fatigue. After radiation therapy there is an uncommon risk of chronic late damage to bowel or bladder that could lead to a change in quality of life or require surgical intervention. There is a rare of damage to the hip joints.       6. I will schedule the patient for CT simulation with plan to begin RT approximately 7-10 days thereafter. Consent obtained today.    7. Patient agrees with current plan in place.     Filippo Robbins MD  Department of Radiation Oncology  [facility]

## 2020-01-27 NOTE — PROGRESS NOTES
Department of Radiation Oncology  Radiation Therapy Center  [facility]  [address], [address]  [place] [address]  [phone]       Consultation Note    Name: Pam Pierce MRN: [id number]   : 1930   Date of Service: 2020  Referring: Dr. Diggs     Reason for consultation: Locally advanced rectal adenocarcinoma, clinical T4N2. Evaluate potential role of radiation therapy.    History of Present Illness   Ms. Pierce is a 89 year old female a diagnosis of locally advanced rectal adenocarcinoma, clinical T4N2.  She presents today to discuss potential role of radiation therapy as a part of her treatment strategy.     The patient initially presented with symptoms of abdominal pain in 2019, prompting further evaluation.  The patient underwent a CT scan of the abdomen pelvis at the time which demonstrated a circumferential rectal mass, causing relative colonic obstruction.  The patient subsequently underwent emergent diverting colostomy.  On 2019 she underwent biopsy of the rectal mass, which confirmed adenocarcinoma arising in a tubular adenoma, focal signet ring cell features identified. On 2020 the patient met with Dr. Diggs of medical oncology who discussed different treatment options.  Overall the patient described her goals of treatment to be largely palliative in nature, focusing on quality of life.  Different treatment options were discussed  including palliative radiation therapy to the pelvis +/-  systemic therapy or even systemic therapy alone.  The patient underwent a staging PET scan on 2020.  Imaging demonstrated a hypermetabolic rectal mass that appeared to directly invade the vagina.  There was evidence of small perirectal lymph nodes.  No evidence of distant disease was noted.  On 2020 the patient underwent MRI of the pelvis.  Imaging demonstrated a 7.8 cm mass in the low and mid rectum, 4.4 cm above the anal verge.  The mass was noted  to invade through the superior aspect of the vagina, and possible posterior margin of the urinary bladder.  There were also noted at least 4 abnormal perirectal lymph nodes.  The patient subsequent presents today to discuss potential role of radiation therapy as a part of her treatment strategy.    Patient states that she is overall feeling fairly well.  She does have intermittent bouts of hematochezia.  No pelvic pain.  No diarrhea.  No constipation.  No hematuria.  No vaginal bleeding.  No prior radiation.  No pacemaker.    Past Medical History:   Past Medical History:   Diagnosis Date     Actinic keratosis      Basal cell carcinoma      Other and unspecified hyperlipidemia      Unspecified asthma(493.90)      Unspecified essential hypertension      Urethral stricture due to unspecified infection     ureteral stone       Past Surgical History:   Past Surgical History:   Procedure Laterality Date     SURGICAL HISTORY OF -       open reduction of second metatarsal neck fx with internal fixation  cna d closed treatment of metatarsal to 2 and 4 fx     SURGICAL HISTORY OF -       hysterectomy and oophorectomy     SURGICAL HISTORY OF -       lithotripsy       Chemotherapy History:  None    Radiation History:  None    Pregnant: Not Applicable  Implanted Cardiac Devices: No    Medications:  Current Outpatient Medications   Medication     acetaminophen (TYLENOL) 500 MG tablet     albuterol (PROAIR HFA) 108 (90 Base) MCG/ACT inhaler     aspirin 81 MG EC tablet     atenolol (TENORMIN) 25 MG tablet     CALCIUM + D OR     chlorthalidone (HYGROTON) 25 MG tablet     fluticasone (FLOVENT HFA) 110 MCG/ACT inhaler     magnesium oxide (MAG-OX) 400 MG tablet     melatonin 3 MG tablet     MULTI-DAY VITAMINS OR     omeprazole (PRILOSEC) 20 MG DR capsule     oxyCODONE (ROXICODONE) 5 MG tablet     polyethylene glycol (MIRALAX) powder     Probiotic Product (PROBIOTIC DAILY PO)     simvastatin (ZOCOR) 40 MG tablet     No current  facility-administered medications for this visit.          Allergies:     Allergies   Allergen Reactions     Flu Virus Vaccine      Gabapentin Nausea and Vomiting         Family History:  Family History   Problem Relation Age of Onset     Cancer - colorectal Mother      Breast Cancer Mother      C.A.D. Father      Cardiovascular Father      Alcohol/Drug Father      Chronic Obstructive Pulmonary Disease Brother      Cancer Son         chest cancer/myesthenia gravis     Musculoskeletal Disorder Son         myesthenia gravis     Cerebrovascular Disease Sister      Chronic Obstructive Pulmonary Disease Brother        Review of Systems   A 10-point review of systems was performed. Pertinent findings are noted in the HPI.    Physical Exam   ECOG Status: 1    Vitals:  /77 (Patient Position: Left side, Cuff Size: Adult Regular)   Pulse 85   Resp 16   Wt 48.3 kg (106 lb 6.4 oz)   SpO2 97%   BMI 21.13 kg/m      Gen: Alert, in NAD  Head: NC/AT  Eyes: PERRL, EOMI, sclera anicteric  Ears: No external auricular lesions  Nose/sinus: No rhinorrhea or epistaxis  Oral cavity/oropharynx: MMM, no visible oral cavity lesions, FOM and BOT are soft to palpation  Neck: Full ROM, supple, no palpable adenopathy  Pulm: No wheezing, stridor or respiratory distress  CV: Extremities are warm and well-perfused, no cyanosis, no pedal edema  Abdominal: Normal bowel sounds, soft, nontender, no masses. S/p colostomy.  Musculoskeletal: Normal bulk and tone  Skin: Normal color and turgor  Neuro: A/Ox3, CN II-XII intact, normal gait    Imaging/Path/Labs   Imagin20  MRI Pelvis  FINDINGS: There is a large, circumferential rectal mass that begins  approximately 4.4 cm above the anal verge and extends over a length of  7.8 cm (series 11, image 19). This tumor involves the entirety of the  low and mid rectum. This mass invades the superior portion of the  vagina and in the axial plane, the conglomerate mass measures up to  5.8 x 6.1 cm  (series 10, image 14). There are at least 4 abnormal  perirectal lymph nodes, largest of which measures 8 mm (series 10,  image 10). The anterior margin of the tumor also appears to invade the  posterior aspect of the urinary bladder (series 10, image 11 and  series 11, image 20). There is an abnormal-appearing lymph node that  appears to be outside of the mesorectal fascia on the right (series  10, image 5). No obstruction demonstrated. There is sigmoid  diverticulosis. No ascites.                                                                      IMPRESSION: T4b-N2 tumor of the low and mid rectum with invasion  through the superior vagina and to the posterior margin of the urinary  bladder.            1/16/20  PET    IMPRESSION:  1. Hypermetabolic rectal mass that appears to directly invade into the  vagina which is diffusely hypermetabolic. Small perirectal lymph nodes  are noted.  2. No evidence of metastatic disease in the head, neck, chest, or  abdomen. No evidence of metastatic disease in the extremities.  3. Significant coronary and aortic atherosclerosis. Ascending thoracic  aortic caliber is ectatic at 4.1 cm.  4. Small area of hypermetabolism associated with a nodule in the  isthmus of the thyroid. Consider further evaluation with ultrasound            Path:   11/20/19  FINAL DIAGNOSIS:   RECTUM, BIOPSY:  - ADENOCARCINOMA ARISING IN A TUBULAR ADENOMA, FOCAL SIGNET RING CELL FEATURES IDENTIFIED     See scanned Pathology report (case #[id number]).      Addendum COMMENT:    Immunohistochemical stains have been performed on paraffin sections of adenocarcinoma involving the rectum for mismatch repair protein analysis. The results are as follows:      Immunohistochemistry (IHC) Testing for Mismatch Repair (MMR) Proteins:   MLH1:     - Intact nuclear expression     MSH2:     - Intact nuclear expression     MSH6:     - Intact nuclear expression     PMS2:     - Intact nuclear expression     Background  non-neoplastic tissue/internal control with intact nuclear expression      IHC Interpretation:   - No loss of nuclear expression of MMR proteins: low probability of microsatellite instability-high (MSI-H)             Assessment    Ms. Pierce is a 89 year old female a diagnosis of locally advanced rectal adenocarcinoma, clinical T4N2.  She presents today to discuss potential role of radiation therapy as a part of her treatment strategy.     Plan     1. Today, I discussed the natural history of locally advanced rectal cancer.      2.  I discussed with the patient hat the standard of care for locally advanced rectal cancer is trimodality therapy consisting of pre-operative chemotherapy and radiation followed by surgery approximately 4-8 weeksafter completion of radiation.  Radiation therapy is utilized in this setting to optimize local control and downstage the tumor prior to surgery. The diagnosis, stage, prognosis, rationale for treatment, and details and expectations of radiation treatment were reviewed.      3. The patient, however, wishes to prioritize quality of life and wishes to avoid any aggressive surgical intervention or even extended course of radiation therapy. I, therefore, also discussed consideration of various palliative RT regimens with goal of local control including but not limited to the 5 Gy x 5 fraction regimen, which has been describe in the literature as a potential treatment regimen for symptomatic non-surgical patients (Irene, et al. IJROBP 2016). At the end our our discussion, the patient has elected to proceed with palliative RT.     4. I plan to treat to a total dose of 25 Gy in 5 fractions targeting the primary tumor and regional nodes. I discussed consideration of holding current xeloda during RT and for one week after RT to minimize increase in toxicity. I think that maintenance xeloda thereafter would be a very reasonable treatment option, but would defer to our medical oncology  colleagues.      5. I discussed expected adverse effects with the patient. During radiation therapy there can be loose stool or diarrhea that is treatable with dietary modifications or Imodium. Lower urinary tract symptoms such as urinary frequency or dysuria are possible. Some patients experience skin reaction such as redness or dryness that is typically mild. There will be some degree of fatigue. After radiation therapy there is an uncommon risk of chronic late damage to bowel or bladder that could lead to a change in quality of life or require surgical intervention. There is a rare of damage to the hip joints.       6. I will schedule the patient for CT simulation with plan to begin RT approximately 7-10 days thereafter. Consent obtained today.    7. Patient agrees with current plan in place.     Filippo Robbins MD  Department of Radiation Oncology  [facility]

## 2020-01-28 ENCOUNTER — OFFICE VISIT (OUTPATIENT)
Dept: RADIATION THERAPY | Facility: OUTPATIENT CENTER | Age: 85
End: 2020-01-28
Payer: MEDICARE

## 2020-01-28 DIAGNOSIS — C20 RECTAL CANCER (H): ICD-10-CM

## 2020-01-28 NOTE — LETTER
1/28/2020    RE: Pam Pierce  [address] [place] [id number]       Patient underwent CT simulation.     Filippo Robbins M.D.  Department of Radiation Oncology  [facility]       Filippo Robbins MD

## 2020-01-28 NOTE — PROGRESS NOTES
Patient underwent CT simulation.     Filippo Robbins M.D.  Department of Radiation Oncology  [facility]

## 2020-02-03 ENCOUNTER — HOSPITAL ENCOUNTER (OUTPATIENT)
Dept: WOUND CARE | Facility: CLINIC | Age: 85
Discharge: HOME | End: 2020-02-03
Attending: FAMILY MEDICINE | Admitting: FAMILY MEDICINE
Payer: MEDICARE

## 2020-02-03 ENCOUNTER — APPOINTMENT (OUTPATIENT)
Dept: RADIATION THERAPY | Facility: OUTPATIENT CENTER | Age: 85
End: 2020-02-03
Payer: MEDICARE

## 2020-02-03 DIAGNOSIS — Z53.9 DIAGNOSIS NOT YET DEFINED: Primary | ICD-10-CM

## 2020-02-03 DIAGNOSIS — Z43.3 COLOSTOMY CARE (H): Primary | ICD-10-CM

## 2020-02-03 PROCEDURE — G0180 MD CERTIFICATION HHA PATIENT: HCPCS | Performed by: FAMILY MEDICINE

## 2020-02-03 PROCEDURE — G0463 HOSPITAL OUTPT CLINIC VISIT: HCPCS

## 2020-02-04 ENCOUNTER — APPOINTMENT (OUTPATIENT)
Dept: RADIATION THERAPY | Facility: OUTPATIENT CENTER | Age: 85
End: 2020-02-04
Payer: MEDICARE

## 2020-02-05 ENCOUNTER — APPOINTMENT (OUTPATIENT)
Dept: RADIATION THERAPY | Facility: OUTPATIENT CENTER | Age: 85
End: 2020-02-05
Payer: MEDICARE

## 2020-02-06 ENCOUNTER — OFFICE VISIT (OUTPATIENT)
Dept: RADIATION THERAPY | Facility: OUTPATIENT CENTER | Age: 85
End: 2020-02-06
Payer: MEDICARE

## 2020-02-06 ENCOUNTER — APPOINTMENT (OUTPATIENT)
Dept: RADIATION THERAPY | Facility: OUTPATIENT CENTER | Age: 85
End: 2020-02-06
Payer: MEDICARE

## 2020-02-06 VITALS
SYSTOLIC BLOOD PRESSURE: 127 MMHG | DIASTOLIC BLOOD PRESSURE: 61 MMHG | RESPIRATION RATE: 18 BRPM | OXYGEN SATURATION: 98 % | BODY MASS INDEX: 21.13 KG/M2 | HEART RATE: 84 BPM | WEIGHT: 106.4 LBS

## 2020-02-06 DIAGNOSIS — C20 RECTAL CANCER (H): Primary | ICD-10-CM

## 2020-02-06 NOTE — LETTER
Date:February 7, 2020      Provider requested that no letter be sent. Do not send.       [facility] Health Information

## 2020-02-06 NOTE — PROGRESS NOTES
"[facility]  SPECIALIZING IN BREAKTHROUGHS  Radiation Oncology    On Treatment Visit Note      Pam Pierce      Date: 2020   MRN: [id number]   : 1930  Diagnosis: Rectal Ca      Reason for Visit:  On Radiation Treatment Visit     Treatment Summary to Date  Treatment Site: pelvis/rectal Current Dose: 2000/2500 cGy Fractions: 4/5      Chemotherapy  Chemo concurrent with radx?: No    Subjective:   No acute trouble. No diarrhea. Minimal bleeding per rectum, ? Possibly improved since RT start. No  bother.    Nursing ROS:   Nutrition Alteration  Diet Type: Patient's Preference  Nutrition Note: pt reports great appetitie  Skin  Skin Reaction: 0 - No changes  Skin Note: pt states \"i'm keeping my skin clean\". - reviewed creams and  tips for skin care        Cardiovascular  Respiratory effort: 1 - Normal - without distress  Gastrointestinal  GI Note: taking miralax twice daily to keep stools soft/loose        Pain Assessment  0-10 Pain Scale: 0  Pain Note: \"I had pain before, but it's been better\"      Objective:   /61   Pulse 84   Resp 18   Wt 48.3 kg (106 lb 6.4 oz)   SpO2 98%   BMI 21.13 kg/m    NAD.     Labs:  CBC RESULTS:   Recent Labs   Lab Test 19  1057   WBC 8.9   RBC 4.10   HGB 12.9   HCT 39.2   MCV 96   MCH 31.5   MCHC 32.9   RDW 12.8        ELECTROLYTES:  Recent Labs   Lab Test 19  1057      POTASSIUM 2.8*   CHLORIDE 102   DANIEL 9.7   CO2 32   BUN 22   CR 0.72   *       Assessment:  Ms. Pierce is a 89 year old female a diagnosis of locally advanced rectal adenocarcinoma, clinical T4N2.  The patient is undergoing palliative hypofractionated RT as she wishes to prioritize quality of life and wishes to avoid any aggressive surgical intervention or even extended course of radiation therapy    Tolerating radiation therapy well.  All questions and concerns addressed.    Plan:   1. Continue current therapy.    2. EOT tomorrow. RTC in 1 month. "     Mosaiq chart and setup information reviewed  Ports checked    Medication Review  Med list reviewed with patient?: Yes    Educational Topic Discussed  Education Instructions: reviewed skin care and follow up plan      Filippo Robbins MD

## 2020-02-06 NOTE — LETTER
"2020      RE: Pam Pierce  [address] [place] [address]       [facility]  SPECIALIZING IN BREAKTHROUGHS  Radiation Oncology    On Treatment Visit Note      Pam Pierce      Date: 2020   MRN: [id number]   : 1930  Diagnosis: Rectal Ca      Reason for Visit:  On Radiation Treatment Visit     Treatment Summary to Date  Treatment Site: pelvis/rectal Current Dose: 2000/2500 cGy Fractions: 4/5      Chemotherapy  Chemo concurrent with radx?: No    Subjective:   No acute trouble. No diarrhea. Minimal bleeding per rectum, ? Possibly improved since RT start. No  bother.    Nursing ROS:   Nutrition Alteration  Diet Type: Patient's Preference  Nutrition Note: pt reports great appetitie  Skin  Skin Reaction: 0 - No changes  Skin Note: pt states \"i'm keeping my skin clean\". - reviewed creams and  tips for skin care        Cardiovascular  Respiratory effort: 1 - Normal - without distress  Gastrointestinal  GI Note: taking miralax twice daily to keep stools soft/loose        Pain Assessment  0-10 Pain Scale: 0  Pain Note: \"I had pain before, but it's been better\"      Objective:   /61   Pulse 84   Resp 18   Wt 48.3 kg (106 lb 6.4 oz)   SpO2 98%   BMI 21.13 kg/m     NAD.     Labs:  CBC RESULTS:   Recent Labs   Lab Test 19  1057   WBC 8.9   RBC 4.10   HGB 12.9   HCT 39.2   MCV 96   MCH 31.5   MCHC 32.9   RDW 12.8        ELECTROLYTES:  Recent Labs   Lab Test 19  1057      POTASSIUM 2.8*   CHLORIDE 102   DANIEL 9.7   CO2 32   BUN 22   CR 0.72   *       Assessment:  Ms. Pierce is a 89 year old female a diagnosis of locally advanced rectal adenocarcinoma, clinical T4N2.   The patient is undergoing palliative hypofractionated RT as she wishes to prioritize quality of life and wishes to avoid any aggressive surgical intervention or even extended course of radiation therapy    Tolerating radiation therapy well.  All questions and concerns " addressed.    Plan:   1. Continue current therapy.    2. EOT tomorrow. RTC in 1 month.     Mosaiq chart and setup information reviewed  Ports checked    Medication Review  Med list reviewed with patient?: Yes    Educational Topic Discussed  Education Instructions: reviewed skin care and follow up plan      MD Filippo Sheriff MD

## 2020-02-07 ENCOUNTER — APPOINTMENT (OUTPATIENT)
Dept: RADIATION THERAPY | Facility: OUTPATIENT CENTER | Age: 85
End: 2020-02-07
Payer: MEDICARE

## 2020-02-07 NOTE — PROGRESS NOTES
Subjective: Pam Pierce, 89 year old female, presents to the clinic today for evaluation and treatment of colostomy. Patient was referred by [facility] at discontinuation of homecare services. Daughter is also present with patient today.  Patient is A&Ox4, pleasant upon interaction.     Patient reports she changes the pouch herself.  Reports daughter has been a great source of support for her.  Also has 2 sons. States she just started a 5 day course of palliative radiation today. She will also be starting a chemo pill.  Thinks her current pouching system is working well.  Changes her pouch on Tuesdays and Fridays, no leaks.  Needs to know how to order supplies.        Pertinent information from chart review:  HPI:  Pt had surgery for creation of a transverse loop colostomy on 11/20/19 with Dr. Moreno for rectal CA which is stage T4N2 with invasion into vagina and possibly posterior margin of bladder.  Colostomy is permanent due to location of cancer and cares are palliative.      Objective: Patient's colostomt is located in the mid abd. Patient is wearing a Coloplast Sensura Ari flat one piece pouch #[id number] with [place]en oval convex barrier ring #[id number] and is also using lubricating deoderant #[id number]. With removal this has been in place 3 days and there is a small amount of undermining of stool along superior most edge.  Output: soft non-formed stool Color: brown.  She does take laxative to keep loose so easier to empty pouch/no caking  Stoma color: red/pink  Viability: good  Os/Lumen: both at apex side to side  Tone: good  Profile: good while laying/sitting/standing  Shape: oval  MCJ: attached  Stoma Size: 1 1/4 x 1 3/4  Peristomal Skin: intact but abd protrudes some around stoma primarily over superior aspect, ?hernia - is soft and reduced when laying.  This does create some slightly concave area over superior aspect of peristomal skin   Pain: no  Wound/Incisions: no        Vision/Hearing/Dexterity:    Patient  participated in:  Teaching done:      Assessment:  New colostomy, pt has adjusted to cares and is able to be independent    And protrusion, (hernia?) over superior side of stoma may be causing some undermining of stool here but states has used a flat ring which didn't work as well    Plan:  Continue same plan of care.  Discussed cares including when to see ostomy nurse, (leaking, skin breakdown,) and when hernia is a concern, (not passing stool, pain, increasing and size and pouch not fitting well).  Discussed ordering supplies.      Discussed plan of care with patient and daughter.  The following discharge instructions were reviewed with and sent home with the patient:  No new    Patient to follow up PRN issues    The following supplies were sent home with the patient:   None - will send supply order to [facility] and pt's daughter will assist with monthly supply ordering      Verbal, written education provided.    Face to face time approximately 45 minutes.    Lily Carranza RN, CWOCN     [phone]

## 2020-02-09 NOTE — PROGRESS NOTES
NEW PATIENT ONCOLOGY FOLLOW UP    REQUESTING PROVIDER/SERVICE: Dr. Moreno, from [facility].    PATIENT NAME: Pam Pierce   MRN# [id number]     Date of Visit: Feb 11, 2020     YOB: 1930   Due to unforeseeable special situation (Corona Virus Infectious Precaution), direct face to face patient care is not able to be carrier out for this visit.   All the other elements (history, data review, medical decision making process) are intact.       REASON FOR VISIT/CC:    At age 89, dx in 11/2019 rectal cancer  Palliative RT        HISTORY OF ONCOLOGY ILLNESS:    At age 89, she presented with years duration of abdominal pain.      CT 11/2019 found Circumferential rectosigmoid mass causing relative colonic obstruction with the more proximal colon distended with fecal debris. Colorectal mass extends beyond the serosa of the bowel wall with multiple small probable metastatic mesorectal lymph nodes. No enlarged pelvic sidewall or retroperitoneal lymph nodes. No evidence of metastatic liver disease on the current exam.    She was transferred to [facility], had emergency diverting colostomy bag, and biopsy of the colorectal mass 11/2019.    Rectal biopsy pathology found Adenocarcinoma arising in a tubular adenoma, focal signet ring cell features identified.      INTERVAL HISTORY  Further staging with PET, MRI found advanced T4b-N2 tumor of the low and mid rectum with invasion through the superior vagina and to the posterior margin of the urinary bladder.  Likely enhancing meningioma versus mets on brain MRI.   She made informed decision to proceed with palliative RT for palliation done in 2/2020.         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        SOCIAL HISTORY:    She lives independently even at age 89 fully functional  "her kids are close by.  Smoking denies alcohol abuse.    FAMILY HISTORY:    FH + for breast, colon, ovarian, thymic, melanoma.       REVIEW OF SYSTEMS:   She tolerated palliative RT fine.  She has baseline 3 times empty of her colostomy bag on Metamucil.       PHYSICAL EXAMINATION:   VITAL SIGNS: Blood pressure 128/68, pulse 64, resp. rate 16, height 1.511 m (4' 11.49\"), weight 47.6 kg (105 lb), SpO2 96 %, not currently breastfeeding.       CURRENT LAB DATA REVIEWED TODAY  11/2019 baseline nl LFT, nl Cr, and cbc diff    CEA at 4.6 in 11/2019          CURRENT IMAGING REVIEWED TODAY  Pelvic MRI 1/2020 - T4b-N2 tumor of the low and mid rectum with invasion through the superior vagina and to the posterior margin of the urinary bladder.      PET 1/2020  1. Hypermetabolic rectal mass that appears to directly invade into the vagina which is diffusely hypermetabolic. Small perirectal lymph nodes are noted.  2. No evidence of metastatic disease in the head, neck, chest, or  abdomen. No evidence of metastatic disease in the extremities.  3. Significant coronary and aortic atherosclerosis. Ascending thoracic  aortic caliber is ectatic at 4.1 cm.  4. Small area of hypermetabolism associated with a nodule in the  isthmus of the thyroid. Consider further evaluation with ultrasound.    Brain MRI 1/2020 - 1.8 cm uniformly enhancing extra-axial mass positioned along the olfactory groove, most likely representing a meningioma. Metastatic disease is thought to be less likely         OLD DATA REVIEWED TODAY WITH SUMMARY  CT 11/2019 found Circumferential rectosigmoid mass causing relative colonic obstruction with the more proximal colon distended with fecal debris. Colorectal mass extends beyond the serosa of the bowel wall with multiple small probable metastatic mesorectal lymph nodes. No enlarged pelvic sidewall or retroperitoneal lymph nodes. No evidence of metastatic liver disease on the current exam.        ASSESSMENT AND PLAN:  "   Diagnosed rectal cancer November 2019 at age 89, with presenting symptoms of years duration of deep abdominal pain worsening to the point near obstruction.  Had emergent colostomy bag put in, Rectal biopsy pathology found Adenocarcinoma arising in a tubular adenoma, focal signet ring cell features identified.    Further staging with PET, MRI found advanced T4b-N2 tumor of the low and mid rectum with invasion through the superior vagina and to the posterior margin of the urinary bladder.  Likely enhancing meningioma versus mets on brain MRI.    Due to her advanced age, patient expressed the wishes of not being sick if she has to be treated.    She made informed decision to proceed with palliative RT for palliation done in 2/2020.     We discussed the option of systemic chemotherapy IV or oral etc. she is only open for oral palliative chemotherapy.    Recommend her to consider oral Xeloda.  We discussed the side effect profile of oral Xeloda.  She made informed decision to proceed oral Xeloda for palliation purposes.    Her BSA is around 1.4, her target dose about 1.5 g p.o. twice daily.  We will start out with 1 g p.o. twice daily 7 days on 7 days off.    I recommend patient to creat a Health Care Directive/advanced Directive.    Total time is about 25 minutes all spent in counseling regarding her disease status, further treatment options in terms of chemotherapy, side effect, treatment goal, follow-up plan.

## 2020-02-11 ENCOUNTER — DOCUMENTATION ONLY (OUTPATIENT)
Dept: RADIATION THERAPY | Facility: OUTPATIENT CENTER | Age: 85
End: 2020-02-11

## 2020-02-11 ENCOUNTER — HOSPITAL ENCOUNTER (OUTPATIENT)
Dept: LAB | Facility: CLINIC | Age: 85
Discharge: HOME | End: 2020-02-11
Attending: PHYSICIAN ASSISTANT | Admitting: INTERNAL MEDICINE
Payer: MEDICARE

## 2020-02-11 ENCOUNTER — ONCOLOGY VISIT (OUTPATIENT)
Dept: ONCOLOGY | Facility: CLINIC | Age: 85
End: 2020-02-11
Attending: INTERNAL MEDICINE
Payer: MEDICARE

## 2020-02-11 VITALS
BODY MASS INDEX: 21.17 KG/M2 | HEIGHT: 59 IN | HEART RATE: 64 BPM | OXYGEN SATURATION: 96 % | DIASTOLIC BLOOD PRESSURE: 68 MMHG | SYSTOLIC BLOOD PRESSURE: 128 MMHG | RESPIRATION RATE: 16 BRPM | WEIGHT: 105 LBS

## 2020-02-11 DIAGNOSIS — C20 RECTAL CANCER (H): Primary | ICD-10-CM

## 2020-02-11 LAB
ALBUMIN SERPL-MCNC: 2.8 G/DL (ref 3.4–5)
ALP SERPL-CCNC: 81 U/L (ref 40–150)
ALT SERPL W P-5'-P-CCNC: 24 U/L (ref 0–50)
ANION GAP SERPL CALCULATED.3IONS-SCNC: 3 MMOL/L (ref 3–14)
AST SERPL W P-5'-P-CCNC: 20 U/L (ref 0–45)
BASOPHILS # BLD AUTO: 0 10E9/L (ref 0–0.2)
BASOPHILS NFR BLD AUTO: 0.3 %
BILIRUB SERPL-MCNC: 0.3 MG/DL (ref 0.2–1.3)
BUN SERPL-MCNC: 20 MG/DL (ref 7–30)
CALCIUM SERPL-MCNC: 9.3 MG/DL (ref 8.5–10.1)
CEA SERPL-MCNC: 3.5 UG/L (ref 0–2.5)
CHLORIDE SERPL-SCNC: 104 MMOL/L (ref 94–109)
CO2 SERPL-SCNC: 35 MMOL/L (ref 20–32)
CREAT SERPL-MCNC: 0.73 MG/DL (ref 0.52–1.04)
DIFFERENTIAL METHOD BLD: NORMAL
EOSINOPHIL # BLD AUTO: 0.6 10E9/L (ref 0–0.7)
EOSINOPHIL NFR BLD AUTO: 9.5 %
ERYTHROCYTE [DISTWIDTH] IN BLOOD BY AUTOMATED COUNT: 12.9 % (ref 10–15)
GFR SERPL CREATININE-BSD FRML MDRD: 72 ML/MIN/{1.73_M2}
GLUCOSE SERPL-MCNC: 91 MG/DL (ref 70–99)
HCT VFR BLD AUTO: 41 % (ref 35–47)
HGB BLD-MCNC: 13.1 G/DL (ref 11.7–15.7)
IMM GRANULOCYTES # BLD: 0 10E9/L (ref 0–0.4)
IMM GRANULOCYTES NFR BLD: 0.7 %
LYMPHOCYTES # BLD AUTO: 1 10E9/L (ref 0.8–5.3)
LYMPHOCYTES NFR BLD AUTO: 16.4 %
MCH RBC QN AUTO: 30.7 PG (ref 26.5–33)
MCHC RBC AUTO-ENTMCNC: 32 G/DL (ref 31.5–36.5)
MCV RBC AUTO: 96 FL (ref 78–100)
MONOCYTES # BLD AUTO: 0.5 10E9/L (ref 0–1.3)
MONOCYTES NFR BLD AUTO: 9 %
NEUTROPHILS # BLD AUTO: 3.7 10E9/L (ref 1.6–8.3)
NEUTROPHILS NFR BLD AUTO: 64.1 %
NRBC # BLD AUTO: 0 10*3/UL
NRBC BLD AUTO-RTO: 0 /100
PLATELET # BLD AUTO: 210 10E9/L (ref 150–450)
POTASSIUM SERPL-SCNC: 3.1 MMOL/L (ref 3.4–5.3)
PROT SERPL-MCNC: 6.3 G/DL (ref 6.8–8.8)
RBC # BLD AUTO: 4.27 10E12/L (ref 3.8–5.2)
SODIUM SERPL-SCNC: 142 MMOL/L (ref 133–144)
WBC # BLD AUTO: 5.8 10E9/L (ref 4–11)

## 2020-02-11 PROCEDURE — G0463 HOSPITAL OUTPT CLINIC VISIT: HCPCS

## 2020-02-11 PROCEDURE — 99213 OFFICE O/P EST LOW 20 MIN: CPT | Performed by: INTERNAL MEDICINE

## 2020-02-11 PROCEDURE — 80053 COMPREHEN METABOLIC PANEL: CPT | Performed by: INTERNAL MEDICINE

## 2020-02-11 PROCEDURE — 36415 COLL VENOUS BLD VENIPUNCTURE: CPT | Performed by: INTERNAL MEDICINE

## 2020-02-11 PROCEDURE — 82378 CARCINOEMBRYONIC ANTIGEN: CPT | Performed by: INTERNAL MEDICINE

## 2020-02-11 PROCEDURE — 85025 COMPLETE CBC W/AUTO DIFF WBC: CPT | Performed by: INTERNAL MEDICINE

## 2020-02-11 RX ORDER — CAPECITABINE 500 MG/1
TABLET, FILM COATED ORAL
Qty: 84 TABLET | Refills: 0 | Status: SHIPPED | OUTPATIENT
Start: 2020-02-11 | End: 2020-02-14

## 2020-02-11 ASSESSMENT — MIFFLIN-ST. JEOR: SCORE: 809.65

## 2020-02-11 NOTE — RESULT ENCOUNTER NOTE
Attempted to call pt daughter with results, left a VM and direct line to return call.     Doris Ballesteros RN on 2/11/2020 at 2:13 PM

## 2020-02-11 NOTE — PROGRESS NOTES
Department of Radiation Oncology  Radiation Therapy Center  [facility]  [place] [address]  [phone]       Radiotherapy Treatment Summary          Treatment dates: 20-20    PATIENT: Pam Pierce  MEDICAL RECORD NO: [id number]   : 1930    DIAGNOSIS:  locally advanced rectal adenocarcinoma  PATHOLOGY:  adenocarcinoma arising in a tubular adenoma, focal signet ring cell features identified          STAGE: clinical T4N2  INTENT OF RADIOTHERAPY: palliative hypofractionated RT as she wishes to prioritize quality of life and wishes to avoid any aggressive surgical intervention or even extended course of radiation therapy  CONCURRENT SYSTEMIC THERAPY:  No    ONCOLOGIC HISTORY:    Ms. Pierce is a 89 year old female a diagnosis of locally advanced rectal adenocarcinoma, clinical T4N2.        The patient initially presented with symptoms of abdominal pain in 2019, prompting further evaluation.  The patient underwent a CT scan of the abdomen pelvis at the time which demonstrated a circumferential rectal mass, causing relative colonic obstruction.  The patient subsequently underwent emergent diverting colostomy.  On 2019 she underwent biopsy of the rectal mass, which confirmed adenocarcinoma arising in a tubular adenoma, focal signet ring cell features identified. On 2020 the patient met with Dr. Diggs of medical oncology who discussed different treatment options.  Overall the patient described her goals of treatment to be largely palliative in nature, focusing on quality of life.  Different treatment options were discussed  including palliative radiation therapy to the pelvis +/-  systemic therapy or even systemic therapy alone.  The patient underwent a staging PET scan on 2020.  Imaging demonstrated a hypermetabolic rectal mass that appeared to directly invade the vagina.  There was evidence of small perirectal lymph nodes.  No evidence of distant disease  was noted.  On 1/16/2020 the patient underwent MRI of the pelvis.  Imaging demonstrated a 7.8 cm mass in the low and mid rectum, 4.4 cm above the anal verge.  The mass was noted to invade through the superior aspect of the vagina, and possible posterior margin of the urinary bladder.  There were also noted at least 4 abnormal perirectal lymph nodes.  The patient subsequently presented in radiation oncology 1/27/2020 to discuss potential role of radiation therapy as a part of her treatment strategy.       SITE OF TREATMENT: pelvis/rectal    DATES  OF TREATMENT: 2/03/20-2/07/20    TOTAL DOSE OF TREATMENT: 2500 cGy    DOSE PER FRACTION OF TREATMENT: 500 cGy x 5 fractions       COMMENT/TOXICITY:   No diarrhea. Minimal bleeding per rectum, ? Possibly improved since RT start. No  bother.            PAIN MANAGEMENT:   Pain Assessment  0-10 Pain Scale: 0    Per medication list:  tylenol prn for mild pain and oxycodone for severe pain.           FOLLOW UP PLAN:  1.   RTC in 1 month.     Radiation Oncologist: Filippo Robbins M.D.  Department of Radiation Oncology  [facility]

## 2020-02-11 NOTE — PATIENT INSTRUCTIONS
Start Vitamin B6 100mg daily, Labs today. Chemo teach today.  Start oral xeloda 1 wk on, 1 wk off. C1D1 2/17/2020.  Follow-up in 1st wk of March.

## 2020-02-11 NOTE — PROGRESS NOTES
"Oncology Rooming Note    February 11, 2020 11:21 AM   Pam Pierce is a 89 year old female who presents for:    Chief Complaint   Patient presents with     Oncology Clinic Visit     Recheck Rectal cancer, review PET & MRI     Initial Vitals: /68 (BP Location: Right arm, Patient Position: Sitting, Cuff Size: Adult Small)   Pulse 64   Resp 16   Ht 1.511 m (4' 11.49\")   Wt 47.6 kg (105 lb)   SpO2 96%   BMI 20.86 kg/m   Estimated body mass index is 20.86 kg/m  as calculated from the following:    Height as of this encounter: 1.511 m (4' 11.49\").    Weight as of this encounter: 47.6 kg (105 lb). Body surface area is 1.41 meters squared.  Data Unavailable Comment: Data Unavailable   No LMP recorded. Patient has had a hysterectomy.  Allergies reviewed: Yes  Medications reviewed: Yes    Medications: Medication refills not needed today.  Pharmacy name entered into [employer]:      [place], [place] - [address]    Clinical concerns: Recheck Rectal cancer, review PET & MRI.   Questions about starting an oral Chemo ?  Concerned about rectal discharge bloody mucus at times.       Johanny Hunter CMA              "

## 2020-02-11 NOTE — LETTER
2/11/2020         RE: Pam Pierce  [address] [place] [id number]        Dear Colleague,    Thank you for referring your patient, Pam Pierce, to the [facility]. Please see a copy of my visit note below.    NEW PATIENT ONCOLOGY FOLLOW UP    REQUESTING PROVIDER/SERVICE: Dr. Moreno, from [facility].    PATIENT NAME: Pam Pierce   MRN# [id number]     Date of Visit: Feb 11, 2020     YOB: 1930   Due to unforeseeable special situation (Corona Virus Infectious Precaution), direct face to face patient care is not able to be carrier out for this visit.   All the other elements (history, data review, medical decision making process) are intact.       REASON FOR VISIT/CC:    At age 89, dx in 11/2019 rectal cancer  Palliative RT        HISTORY OF ONCOLOGY ILLNESS:    At age 89, she presented with years duration of abdominal pain.      CT 11/2019 found Circumferential rectosigmoid mass causing relative colonic obstruction with the more proximal colon distended with fecal debris. Colorectal mass extends beyond the serosa of the bowel wall with multiple small probable metastatic mesorectal lymph nodes. No enlarged pelvic sidewall or retroperitoneal lymph nodes. No evidence of metastatic liver disease on the current exam.    She was transferred to [facility], had emergency diverting colostomy bag, and biopsy of the colorectal mass 11/2019.    Rectal biopsy pathology found Adenocarcinoma arising in a tubular adenoma, focal signet ring cell features identified.      INTERVAL HISTORY  Further staging with PET, MRI found advanced T4b-N2 tumor of the low and mid rectum with invasion through the superior vagina and to the posterior margin of the urinary bladder.  Likely enhancing meningioma versus mets on brain MRI.   She made informed decision to proceed with palliative RT for palliation done in 2/2020.         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        SOCIAL HISTORY:    She lives independently even at age 89 fully functional her kids are close by.  Smoking denies alcohol abuse.    FAMILY HISTORY:    FH + for breast, colon, ovarian, thymic, melanoma.       REVIEW OF SYSTEMS:   She tolerated palliative RT fine.  She has baseline 3 times empty of her colostomy bag on Metamucil.       PHYSICAL EXAMINATION:   VITAL SIGNS: Blood pressure 128/68, pulse 64, resp. rate 16, height 1.511 m (4' 11.49\"), weight 47.6 kg (105 lb), SpO2 96 %, not currently breastfeeding.       CURRENT LAB DATA REVIEWED TODAY  11/2019 baseline nl LFT, nl Cr, and cbc diff    CEA at 4.6 in 11/2019          CURRENT IMAGING REVIEWED TODAY  Pelvic MRI 1/2020 - T4b-N2 tumor of the low and mid rectum with invasion through the superior vagina and to the posterior margin of the urinary bladder.      PET 1/2020  1. Hypermetabolic rectal mass that appears to directly invade into the vagina which is diffusely hypermetabolic. Small perirectal lymph nodes are noted.  2. No evidence of metastatic disease in the head, neck, chest, or  abdomen. No evidence of metastatic disease in the extremities.  3. Significant coronary and aortic atherosclerosis. Ascending thoracic  aortic caliber is ectatic at 4.1 cm.  4. Small area of hypermetabolism associated with a nodule in the  isthmus of the thyroid. Consider further evaluation with ultrasound.    Brain MRI 1/2020 - 1.8 cm uniformly enhancing extra-axial mass positioned along the olfactory groove, most likely representing a meningioma. Metastatic disease is thought to be less likely         OLD DATA REVIEWED TODAY WITH SUMMARY  CT 11/2019 found Circumferential rectosigmoid mass causing relative colonic obstruction with the more proximal colon distended with fecal debris. Colorectal mass extends " beyond the serosa of the bowel wall with multiple small probable metastatic mesorectal lymph nodes. No enlarged pelvic sidewall or retroperitoneal lymph nodes. No evidence of metastatic liver disease on the current exam.        ASSESSMENT AND PLAN:    Diagnosed rectal cancer November 2019 at age 89, with presenting symptoms of years duration of deep abdominal pain worsening to the point near obstruction.  Had emergent colostomy bag put in, Rectal biopsy pathology found Adenocarcinoma arising in a tubular adenoma, focal signet ring cell features identified.    Further staging with PET, MRI found advanced T4b-N2 tumor of the low and mid rectum with invasion through the superior vagina and to the posterior margin of the urinary bladder.  Likely enhancing meningioma versus mets on brain MRI.    Due to her advanced age, patient expressed the wishes of not being sick if she has to be treated.    She made informed decision to proceed with palliative RT for palliation done in 2/2020.     We discussed the option of systemic chemotherapy IV or oral etc. she is only open for oral palliative chemotherapy.    Recommend her to consider oral Xeloda.  We discussed the side effect profile of oral Xeloda.  She made informed decision to proceed oral Xeloda for palliation purposes.    Her BSA is around 1.4, her target dose about 1.5 g p.o. twice daily.  We will start out with 1 g p.o. twice daily 7 days on 7 days off.    I recommend patient to creat a Health Care Directive/advanced Directive.    Total time is about 25 minutes all spent in counseling regarding her disease status, further treatment options in terms of chemotherapy, side effect, treatment goal, follow-up plan.          Oncology Rooming Note    February 11, 2020 11:21 AM   Pam Pierce is a 89 year old female who presents for:    Chief Complaint   Patient presents with     Oncology Clinic Visit     Recheck Rectal cancer, review PET & MRI     Initial Vitals: /68 (BP  "Location: Right arm, Patient Position: Sitting, Cuff Size: Adult Small)   Pulse 64   Resp 16   Ht 1.511 m (4' 11.49\")   Wt 47.6 kg (105 lb)   SpO2 96%   BMI 20.86 kg/m    Estimated body mass index is 20.86 kg/m  as calculated from the following:    Height as of this encounter: 1.511 m (4' 11.49\").    Weight as of this encounter: 47.6 kg (105 lb). Body surface area is 1.41 meters squared.  Data Unavailable Comment: Data Unavailable   No LMP recorded. Patient has had a hysterectomy.  Allergies reviewed: Yes  Medications reviewed: Yes    Medications: Medication refills not needed today.  Pharmacy name entered into [facility]:      [facility] [address]    Clinical concerns: Recheck Rectal cancer, review PET & MRI.   Questions about starting an oral Chemo ?  Concerned about rectal discharge bloody mucus at times.       Johanny Hunter [facility]                Again, thank you for allowing me to participate in the care of your patient.        Sincerely,        Tomasa Diggs MD, MD    "

## 2020-02-12 DIAGNOSIS — C20 RECTAL CANCER (H): Primary | ICD-10-CM

## 2020-02-12 NOTE — RESULT ENCOUNTER NOTE
Called and reviewed results with the patient per Dr. Diggs. Patient had no further questions or concerns at this time and also verbalized understanding. Direct line provided if any further questions/concerns arise.    Doris Ballesteros RN on 2/12/2020 at 11:47 AM

## 2020-02-14 ENCOUNTER — PATIENT OUTREACH (OUTPATIENT)
Dept: ONCOLOGY | Facility: CLINIC | Age: 85
End: 2020-02-14

## 2020-02-14 RX ORDER — CAPECITABINE 500 MG/1
TABLET, FILM COATED ORAL
Qty: 70 TABLET | Refills: 0 | Status: SHIPPED | OUTPATIENT
Start: 2020-02-14 | End: 2020-04-06

## 2020-02-14 NOTE — PROGRESS NOTES
Patients daughter Karine called on behalf of patient and states she just completed radiation 1 week ago and is now having very watery stools in her colostomy. Gave patient instructions on immodium protocol to take 2 initially and if after a few hours watery stool continues to take another one and to do this until her watery stools slow down or become formed- up to 8 tablets immodium a day, however to start off slow. Also encouraged patient to push oral fluids such as clear liquids and gatorade. Karine verbalized understanding and agreement to plan. Asked her to call if symptoms worsen or do not resolve.

## 2020-02-16 ENCOUNTER — HOSPITAL ENCOUNTER (INPATIENT)
Facility: CLINIC | Age: 85
LOS: 5 days | Discharge: HOME | DRG: 394 | End: 2020-02-21
Attending: FAMILY MEDICINE
Payer: MEDICARE

## 2020-02-16 ENCOUNTER — NURSE TRIAGE (OUTPATIENT)
Dept: NURSING | Facility: CLINIC | Age: 85
End: 2020-02-16

## 2020-02-16 ENCOUNTER — APPOINTMENT (OUTPATIENT)
Dept: CT IMAGING | Facility: CLINIC | Age: 85
DRG: 394 | End: 2020-02-16
Attending: FAMILY MEDICINE
Payer: MEDICARE

## 2020-02-16 DIAGNOSIS — R11.0 NAUSEA: Primary | ICD-10-CM

## 2020-02-16 DIAGNOSIS — E87.6 HYPOKALEMIA: ICD-10-CM

## 2020-02-16 DIAGNOSIS — R10.30 LOWER ABDOMINAL PAIN: ICD-10-CM

## 2020-02-16 DIAGNOSIS — C20 RECTAL CANCER (H): ICD-10-CM

## 2020-02-16 DIAGNOSIS — R10.13 EPIGASTRIC PAIN: ICD-10-CM

## 2020-02-16 DIAGNOSIS — C18.9 ADENOCARCINOMA, COLON (H): ICD-10-CM

## 2020-02-16 PROBLEM — R10.9 ABDOMINAL PAIN: Status: ACTIVE | Noted: 2020-02-16

## 2020-02-16 PROBLEM — R82.71 ASYMPTOMATIC BACTERIURIA: Status: ACTIVE | Noted: 2020-02-16

## 2020-02-16 PROBLEM — J45.20 MILD INTERMITTENT ASTHMA IN ADULT WITHOUT COMPLICATION: Status: ACTIVE | Noted: 2019-12-18

## 2020-02-16 LAB
ALBUMIN SERPL-MCNC: 2.6 G/DL (ref 3.4–5)
ALBUMIN UR-MCNC: 100 MG/DL
ALP SERPL-CCNC: 94 U/L (ref 40–150)
ALT SERPL W P-5'-P-CCNC: 26 U/L (ref 0–50)
ANION GAP SERPL CALCULATED.3IONS-SCNC: 7 MMOL/L (ref 3–14)
APPEARANCE UR: ABNORMAL
AST SERPL W P-5'-P-CCNC: 27 U/L (ref 0–45)
BACTERIA #/AREA URNS HPF: ABNORMAL /HPF
BASOPHILS # BLD AUTO: 0 10E9/L (ref 0–0.2)
BASOPHILS NFR BLD AUTO: 0.5 %
BILIRUB SERPL-MCNC: 0.4 MG/DL (ref 0.2–1.3)
BILIRUB UR QL STRIP: NEGATIVE
BUN SERPL-MCNC: 18 MG/DL (ref 7–30)
CALCIUM SERPL-MCNC: 9.1 MG/DL (ref 8.5–10.1)
CHLORIDE SERPL-SCNC: 102 MMOL/L (ref 94–109)
CO2 SERPL-SCNC: 27 MMOL/L (ref 20–32)
COLOR UR AUTO: ABNORMAL
CREAT SERPL-MCNC: 0.67 MG/DL (ref 0.52–1.04)
DIFFERENTIAL METHOD BLD: NORMAL
EOSINOPHIL # BLD AUTO: 0.3 10E9/L (ref 0–0.7)
EOSINOPHIL NFR BLD AUTO: 5 %
ERYTHROCYTE [DISTWIDTH] IN BLOOD BY AUTOMATED COUNT: 12.8 % (ref 10–15)
GFR SERPL CREATININE-BSD FRML MDRD: 77 ML/MIN/{1.73_M2}
GLUCOSE SERPL-MCNC: 98 MG/DL (ref 70–99)
GLUCOSE UR STRIP-MCNC: NEGATIVE MG/DL
HCT VFR BLD AUTO: 44.2 % (ref 35–47)
HGB BLD-MCNC: 14.6 G/DL (ref 11.7–15.7)
HGB UR QL STRIP: ABNORMAL
IMM GRANULOCYTES # BLD: 0 10E9/L (ref 0–0.4)
IMM GRANULOCYTES NFR BLD: 0.3 %
KETONES UR STRIP-MCNC: NEGATIVE MG/DL
LEUKOCYTE ESTERASE UR QL STRIP: ABNORMAL
LYMPHOCYTES # BLD AUTO: 1.2 10E9/L (ref 0.8–5.3)
LYMPHOCYTES NFR BLD AUTO: 20.4 %
MCH RBC QN AUTO: 31.3 PG (ref 26.5–33)
MCHC RBC AUTO-ENTMCNC: 33 G/DL (ref 31.5–36.5)
MCV RBC AUTO: 95 FL (ref 78–100)
MONOCYTES # BLD AUTO: 0.8 10E9/L (ref 0–1.3)
MONOCYTES NFR BLD AUTO: 13.7 %
NEUTROPHILS # BLD AUTO: 3.5 10E9/L (ref 1.6–8.3)
NEUTROPHILS NFR BLD AUTO: 60.1 %
NITRATE UR QL: NEGATIVE
NRBC # BLD AUTO: 0 10*3/UL
NRBC BLD AUTO-RTO: 0 /100
PH UR STRIP: 5 PH (ref 5–7)
PLATELET # BLD AUTO: 191 10E9/L (ref 150–450)
POTASSIUM SERPL-SCNC: 3.3 MMOL/L (ref 3.4–5.3)
PROT SERPL-MCNC: 6.6 G/DL (ref 6.8–8.8)
RBC # BLD AUTO: 4.66 10E12/L (ref 3.8–5.2)
RBC #/AREA URNS AUTO: >182 /HPF (ref 0–2)
SODIUM SERPL-SCNC: 136 MMOL/L (ref 133–144)
SOURCE: ABNORMAL
SP GR UR STRIP: 1.02 (ref 1–1.03)
SQUAMOUS #/AREA URNS AUTO: 6 /HPF (ref 0–1)
UROBILINOGEN UR STRIP-MCNC: 0 MG/DL (ref 0–2)
WBC # BLD AUTO: 5.8 10E9/L (ref 4–11)
WBC #/AREA URNS AUTO: >182 /HPF (ref 0–5)
WBC CLUMPS #/AREA URNS HPF: PRESENT /HPF

## 2020-02-16 PROCEDURE — 99223 1ST HOSP IP/OBS HIGH 75: CPT | Mod: AI | Performed by: PHYSICIAN ASSISTANT

## 2020-02-16 PROCEDURE — 87506 IADNA-DNA/RNA PROBE TQ 6-11: CPT | Performed by: PHYSICIAN ASSISTANT

## 2020-02-16 PROCEDURE — 99285 EMERGENCY DEPT VISIT HI MDM: CPT | Mod: Z6 | Performed by: FAMILY MEDICINE

## 2020-02-16 PROCEDURE — 74177 CT ABD & PELVIS W/CONTRAST: CPT

## 2020-02-16 PROCEDURE — 87086 URINE CULTURE/COLONY COUNT: CPT | Performed by: FAMILY MEDICINE

## 2020-02-16 PROCEDURE — 85025 COMPLETE CBC W/AUTO DIFF WBC: CPT | Performed by: FAMILY MEDICINE

## 2020-02-16 PROCEDURE — 12000000 ZZH R&B MED SURG/OB

## 2020-02-16 PROCEDURE — 0DB58ZX EXCISION OF ESOPHAGUS, VIA NATURAL OR ARTIFICIAL OPENING ENDOSCOPIC, DIAGNOSTIC: ICD-10-PCS | Performed by: SURGERY

## 2020-02-16 PROCEDURE — 0DB68ZX EXCISION OF STOMACH, VIA NATURAL OR ARTIFICIAL OPENING ENDOSCOPIC, DIAGNOSTIC: ICD-10-PCS | Performed by: SURGERY

## 2020-02-16 PROCEDURE — 81001 URINALYSIS AUTO W/SCOPE: CPT | Performed by: FAMILY MEDICINE

## 2020-02-16 PROCEDURE — 25000125 ZZHC RX 250: Performed by: FAMILY MEDICINE

## 2020-02-16 PROCEDURE — 25000132 ZZH RX MED GY IP 250 OP 250 PS 637: Mod: GY | Performed by: PHYSICIAN ASSISTANT

## 2020-02-16 PROCEDURE — 87493 C DIFF AMPLIFIED PROBE: CPT | Performed by: PHYSICIAN ASSISTANT

## 2020-02-16 PROCEDURE — 96365 THER/PROPH/DIAG IV INF INIT: CPT | Mod: 59 | Performed by: FAMILY MEDICINE

## 2020-02-16 PROCEDURE — 99285 EMERGENCY DEPT VISIT HI MDM: CPT | Mod: 25 | Performed by: FAMILY MEDICINE

## 2020-02-16 PROCEDURE — 96361 HYDRATE IV INFUSION ADD-ON: CPT | Performed by: FAMILY MEDICINE

## 2020-02-16 PROCEDURE — 25000128 H RX IP 250 OP 636: Performed by: FAMILY MEDICINE

## 2020-02-16 PROCEDURE — 80053 COMPREHEN METABOLIC PANEL: CPT | Performed by: FAMILY MEDICINE

## 2020-02-16 PROCEDURE — 25800030 ZZH RX IP 258 OP 636: Performed by: FAMILY MEDICINE

## 2020-02-16 PROCEDURE — 96375 TX/PRO/DX INJ NEW DRUG ADDON: CPT | Performed by: FAMILY MEDICINE

## 2020-02-16 PROCEDURE — 25000128 H RX IP 250 OP 636: Performed by: PHYSICIAN ASSISTANT

## 2020-02-16 PROCEDURE — 25800030 ZZH RX IP 258 OP 636: Performed by: PHYSICIAN ASSISTANT

## 2020-02-16 RX ORDER — POTASSIUM CHLORIDE 1.5 G/1.58G
20-40 POWDER, FOR SOLUTION ORAL
Status: DISCONTINUED | OUTPATIENT
Start: 2020-02-16 | End: 2020-02-21 | Stop reason: HOSPADM

## 2020-02-16 RX ORDER — KETOROLAC TROMETHAMINE 15 MG/ML
15 INJECTION, SOLUTION INTRAMUSCULAR; INTRAVENOUS ONCE
Status: COMPLETED | OUTPATIENT
Start: 2020-02-16 | End: 2020-02-16

## 2020-02-16 RX ORDER — ACETAMINOPHEN 500 MG
500-1000 TABLET ORAL DAILY PRN
Status: DISCONTINUED | OUTPATIENT
Start: 2020-02-16 | End: 2020-02-21 | Stop reason: HOSPADM

## 2020-02-16 RX ORDER — ATENOLOL 25 MG/1
25 TABLET ORAL DAILY
Status: DISCONTINUED | OUTPATIENT
Start: 2020-02-17 | End: 2020-02-21 | Stop reason: HOSPADM

## 2020-02-16 RX ORDER — ACETAMINOPHEN 325 MG/1
650 TABLET ORAL EVERY 4 HOURS PRN
Status: DISCONTINUED | OUTPATIENT
Start: 2020-02-16 | End: 2020-02-21 | Stop reason: HOSPADM

## 2020-02-16 RX ORDER — ONDANSETRON 4 MG/1
4 TABLET, ORALLY DISINTEGRATING ORAL EVERY 6 HOURS PRN
Status: DISCONTINUED | OUTPATIENT
Start: 2020-02-16 | End: 2020-02-21 | Stop reason: HOSPADM

## 2020-02-16 RX ORDER — ASPIRIN 81 MG/1
81 TABLET ORAL DAILY
Status: DISCONTINUED | OUTPATIENT
Start: 2020-02-17 | End: 2020-02-21 | Stop reason: HOSPADM

## 2020-02-16 RX ORDER — LIDOCAINE 40 MG/G
CREAM TOPICAL
Status: DISCONTINUED | OUTPATIENT
Start: 2020-02-16 | End: 2020-02-21 | Stop reason: HOSPADM

## 2020-02-16 RX ORDER — PROCHLORPERAZINE MALEATE 5 MG
5 TABLET ORAL EVERY 6 HOURS PRN
Status: DISCONTINUED | OUTPATIENT
Start: 2020-02-16 | End: 2020-02-21 | Stop reason: HOSPADM

## 2020-02-16 RX ORDER — ACETAMINOPHEN 650 MG/1
650 SUPPOSITORY RECTAL EVERY 4 HOURS PRN
Status: DISCONTINUED | OUTPATIENT
Start: 2020-02-16 | End: 2020-02-21 | Stop reason: HOSPADM

## 2020-02-16 RX ORDER — CHLORTHALIDONE 25 MG/1
25 TABLET ORAL DAILY
Status: DISCONTINUED | OUTPATIENT
Start: 2020-02-17 | End: 2020-02-21 | Stop reason: HOSPADM

## 2020-02-16 RX ORDER — ONDANSETRON 2 MG/ML
4 INJECTION INTRAMUSCULAR; INTRAVENOUS
Status: DISCONTINUED | OUTPATIENT
Start: 2020-02-16 | End: 2020-02-16

## 2020-02-16 RX ORDER — HYDROMORPHONE HYDROCHLORIDE 1 MG/ML
0.2 INJECTION, SOLUTION INTRAMUSCULAR; INTRAVENOUS; SUBCUTANEOUS
Status: DISCONTINUED | OUTPATIENT
Start: 2020-02-16 | End: 2020-02-16

## 2020-02-16 RX ORDER — SODIUM CHLORIDE 9 MG/ML
INJECTION, SOLUTION INTRAVENOUS CONTINUOUS
Status: DISCONTINUED | OUTPATIENT
Start: 2020-02-16 | End: 2020-02-18

## 2020-02-16 RX ORDER — OXYCODONE HYDROCHLORIDE 5 MG/1
5-10 TABLET ORAL
Status: DISCONTINUED | OUTPATIENT
Start: 2020-02-16 | End: 2020-02-20

## 2020-02-16 RX ORDER — MAGNESIUM OXIDE 400 MG/1
400 TABLET ORAL EVERY EVENING
Status: DISCONTINUED | OUTPATIENT
Start: 2020-02-16 | End: 2020-02-21 | Stop reason: HOSPADM

## 2020-02-16 RX ORDER — POLYETHYLENE GLYCOL 3350 17 G/17G
8.5 POWDER, FOR SOLUTION ORAL DAILY PRN
Status: DISCONTINUED | OUTPATIENT
Start: 2020-02-16 | End: 2020-02-21 | Stop reason: HOSPADM

## 2020-02-16 RX ORDER — POTASSIUM CHLORIDE 1500 MG/1
20-40 TABLET, EXTENDED RELEASE ORAL
Status: DISCONTINUED | OUTPATIENT
Start: 2020-02-16 | End: 2020-02-21 | Stop reason: HOSPADM

## 2020-02-16 RX ORDER — POTASSIUM CHLORIDE 7.45 MG/ML
10 INJECTION INTRAVENOUS
Status: DISCONTINUED | OUTPATIENT
Start: 2020-02-16 | End: 2020-02-21 | Stop reason: HOSPADM

## 2020-02-16 RX ORDER — POTASSIUM CHLORIDE 29.8 MG/ML
20 INJECTION INTRAVENOUS
Status: DISCONTINUED | OUTPATIENT
Start: 2020-02-16 | End: 2020-02-21 | Stop reason: HOSPADM

## 2020-02-16 RX ORDER — IOPAMIDOL 755 MG/ML
49 INJECTION, SOLUTION INTRAVASCULAR ONCE
Status: COMPLETED | OUTPATIENT
Start: 2020-02-16 | End: 2020-02-16

## 2020-02-16 RX ORDER — PROCHLORPERAZINE 25 MG
12.5 SUPPOSITORY, RECTAL RECTAL EVERY 12 HOURS PRN
Status: DISCONTINUED | OUTPATIENT
Start: 2020-02-16 | End: 2020-02-21 | Stop reason: HOSPADM

## 2020-02-16 RX ORDER — POTASSIUM CL/LIDO/0.9 % NACL 10MEQ/0.1L
10 INTRAVENOUS SOLUTION, PIGGYBACK (ML) INTRAVENOUS
Status: DISCONTINUED | OUTPATIENT
Start: 2020-02-16 | End: 2020-02-21 | Stop reason: HOSPADM

## 2020-02-16 RX ORDER — HYDROMORPHONE HYDROCHLORIDE 1 MG/ML
.3-.5 INJECTION, SOLUTION INTRAMUSCULAR; INTRAVENOUS; SUBCUTANEOUS
Status: DISCONTINUED | OUTPATIENT
Start: 2020-02-16 | End: 2020-02-18

## 2020-02-16 RX ORDER — AMPICILLIN AND SULBACTAM 2; 1 G/1; G/1
3 INJECTION, POWDER, FOR SOLUTION INTRAMUSCULAR; INTRAVENOUS ONCE
Status: COMPLETED | OUTPATIENT
Start: 2020-02-16 | End: 2020-02-16

## 2020-02-16 RX ORDER — SIMVASTATIN 40 MG
40 TABLET ORAL AT BEDTIME
Status: DISCONTINUED | OUTPATIENT
Start: 2020-02-16 | End: 2020-02-21 | Stop reason: HOSPADM

## 2020-02-16 RX ORDER — CAPECITABINE 500 MG/1
1000 TABLET, FILM COATED ORAL 2 TIMES DAILY
Status: DISCONTINUED | OUTPATIENT
Start: 2020-02-16 | End: 2020-02-17 | Stop reason: DRUGHIGH

## 2020-02-16 RX ORDER — ONDANSETRON 2 MG/ML
4 INJECTION INTRAMUSCULAR; INTRAVENOUS EVERY 6 HOURS PRN
Status: DISCONTINUED | OUTPATIENT
Start: 2020-02-16 | End: 2020-02-21 | Stop reason: HOSPADM

## 2020-02-16 RX ORDER — AMPICILLIN AND SULBACTAM 2; 1 G/1; G/1
3 INJECTION, POWDER, FOR SOLUTION INTRAMUSCULAR; INTRAVENOUS EVERY 6 HOURS
Status: DISCONTINUED | OUTPATIENT
Start: 2020-02-16 | End: 2020-02-18

## 2020-02-16 RX ORDER — NALOXONE HYDROCHLORIDE 0.4 MG/ML
.1-.4 INJECTION, SOLUTION INTRAMUSCULAR; INTRAVENOUS; SUBCUTANEOUS
Status: DISCONTINUED | OUTPATIENT
Start: 2020-02-16 | End: 2020-02-21 | Stop reason: HOSPADM

## 2020-02-16 RX ORDER — NALOXONE HYDROCHLORIDE 0.4 MG/ML
.1-.4 INJECTION, SOLUTION INTRAMUSCULAR; INTRAVENOUS; SUBCUTANEOUS
Status: DISCONTINUED | OUTPATIENT
Start: 2020-02-16 | End: 2020-02-16

## 2020-02-16 RX ADMIN — POTASSIUM CHLORIDE 20 MEQ: 20 TABLET, EXTENDED RELEASE ORAL at 21:15

## 2020-02-16 RX ADMIN — SODIUM CHLORIDE 1000 ML: 9 INJECTION, SOLUTION INTRAVENOUS at 11:49

## 2020-02-16 RX ADMIN — AMPICILLIN SODIUM AND SULBACTAM SODIUM 3 G: 2; 1 INJECTION, POWDER, FOR SOLUTION INTRAMUSCULAR; INTRAVENOUS at 13:45

## 2020-02-16 RX ADMIN — HYDROMORPHONE HYDROCHLORIDE 0.5 MG: 1 INJECTION, SOLUTION INTRAMUSCULAR; INTRAVENOUS; SUBCUTANEOUS at 17:51

## 2020-02-16 RX ADMIN — SIMVASTATIN 40 MG: 40 TABLET, FILM COATED ORAL at 21:15

## 2020-02-16 RX ADMIN — IOPAMIDOL 49 ML: 755 INJECTION, SOLUTION INTRAVENOUS at 12:05

## 2020-02-16 RX ADMIN — Medication 1 MG: at 23:39

## 2020-02-16 RX ADMIN — ONDANSETRON 4 MG: 2 INJECTION INTRAMUSCULAR; INTRAVENOUS at 17:45

## 2020-02-16 RX ADMIN — HYDROMORPHONE HYDROCHLORIDE 0.5 MG: 1 INJECTION, SOLUTION INTRAMUSCULAR; INTRAVENOUS; SUBCUTANEOUS at 23:46

## 2020-02-16 RX ADMIN — HYDROMORPHONE HYDROCHLORIDE 0.2 MG: 1 INJECTION, SOLUTION INTRAMUSCULAR; INTRAVENOUS; SUBCUTANEOUS at 11:52

## 2020-02-16 RX ADMIN — ONDANSETRON 4 MG: 2 INJECTION INTRAMUSCULAR; INTRAVENOUS at 11:50

## 2020-02-16 RX ADMIN — AMPICILLIN SODIUM AND SULBACTAM SODIUM 3 G: 2; 1 INJECTION, POWDER, FOR SOLUTION INTRAMUSCULAR; INTRAVENOUS at 18:58

## 2020-02-16 RX ADMIN — Medication 400 MG: at 19:06

## 2020-02-16 RX ADMIN — POTASSIUM CHLORIDE 40 MEQ: 20 TABLET, EXTENDED RELEASE ORAL at 18:58

## 2020-02-16 RX ADMIN — KETOROLAC TROMETHAMINE 15 MG: 15 INJECTION, SOLUTION INTRAMUSCULAR; INTRAVENOUS at 11:52

## 2020-02-16 RX ADMIN — OXYCODONE HYDROCHLORIDE 5 MG: 5 TABLET ORAL at 20:58

## 2020-02-16 RX ADMIN — SODIUM CHLORIDE: 900 INJECTION INTRAVENOUS at 17:10

## 2020-02-16 RX ADMIN — HYDROMORPHONE HYDROCHLORIDE 0.2 MG: 1 INJECTION, SOLUTION INTRAMUSCULAR; INTRAVENOUS; SUBCUTANEOUS at 15:57

## 2020-02-16 RX ADMIN — SODIUM CHLORIDE 92 ML: 9 INJECTION, SOLUTION INTRAVENOUS at 12:05

## 2020-02-16 ASSESSMENT — ACTIVITIES OF DAILY LIVING (ADL)
DRESS: 0-->INDEPENDENT
AMBULATION: 0-->INDEPENDENT
RETIRED_EATING: 0-->INDEPENDENT
TOILETING: 0-->INDEPENDENT
ADLS_ACUITY_SCORE: 13
FALL_HISTORY_WITHIN_LAST_SIX_MONTHS: NO
TRANSFERRING: 0-->INDEPENDENT
SWALLOWING: 0-->SWALLOWS FOODS/LIQUIDS WITHOUT DIFFICULTY
ADLS_ACUITY_SCORE: 17
RETIRED_COMMUNICATION: 0-->UNDERSTANDS/COMMUNICATES WITHOUT DIFFICULTY
BATHING: 0-->INDEPENDENT
COGNITION: 0 - NO COGNITION ISSUES REPORTED

## 2020-02-16 ASSESSMENT — MIFFLIN-ST. JEOR
SCORE: 793.63
SCORE: 779.23

## 2020-02-16 NOTE — TELEPHONE ENCOUNTER
"Daughter Karine calling stating patient completed radiation 1 week ago and has been having watery stools, patient started oral chemo today. Karine is concerned that patient is getting dehydrated. Call placed to patient directly to complete triage. Patient stating, \"I have had cramps and dizziness.\" Symptoms starting in past 2 days. Reporting she continues to have \"thin stool\" watery in colostomy bag \"not much.\" Decreased intake \"I get full.\" Patient is taking Gatorade. Reporting voiding x 2 this morning since 8 a.m.. Denies fever. Reporting emesis is \"just phlegmy.\" x 1 vomiting episode this morning. Stating she is not getting out of bed this morning \"not feeling well.\"   Rating pain with abdominal cramping a \"5.\" Patient reporting symptoms started prior to new chemo pill this morning.   With patient's permission conferenced daughter Karine into call to notify of Emergency Room recommendation. Karine agrees to contact patient's son to transport to ED.  Reviewed 911 option if unable to transport by car.      Frannie Loomis RN  [facility]            Reason for Disposition    [1] MODERATE vomiting (e.g., 3 - 5 times/day) AND [2] age > 60    Additional Information    Negative: Shock suspected (e.g., cold/pale/clammy skin, too weak to stand, low BP, rapid pulse)    Negative: Difficult to awaken or acting confused (e.g., disoriented, slurred speech)    Negative: Sounds like a life-threatening emergency to the triager    Negative: [1] Vomiting AND [2] contains red blood or black (\"coffee ground\") material  (Exception: few red streaks in vomit that only happened once)    Negative: [1] Vomiting AND [2] recent head injury (within last 3 days)    Negative: [1] Vomiting AND [2] recent abdominal injury (within last 3 days)    Negative: [1] Vomiting AND [2] insulin-dependent diabetes (Type I) AND [3] glucose > 400 mg/dl (22 mmol/l)    Negative: [1] Neutropenia known or suspected (e.g., recent cancer chemotherapy) AND [2] " fever > 100.4 F (38.0 C)    Negative: [1] Neutropenia known or suspected (e.g., recent cancer chemotherapy) AND [2] vomiting    Negative: SEVERE vomiting (e.g., 6 or more times / day)    Negative: Chest pain    Negative: Vomiting occurs only while coughing    Negative: Constipation is main symptom    Negative: Diarrhea is main symptom    Protocols used: CANCER - NAUSEA AND VOMITING-A-AH

## 2020-02-16 NOTE — PROGRESS NOTES
"[facility] ADMISSION NOTE    Patient admitted to room 2402 at approximately 1505 via ambulation from emergency room. Patient was accompanied by transport tech.     Verbal SBAR report received from Nancy   prior to patient arrival.     Patient ambulated to bed independently. Patient alert and oriented X 3. The patient is not having any pain.  . Admission vital signs: Blood pressure 123/60, pulse 75, temperature 98.5  F (36.9  C), temperature source Temporal, resp. rate 16, height 1.499 m (4' 11\"), weight 45.4 kg (100 lb), SpO2 96 %, not currently breastfeeding. Patient was oriented to plan of care, call light, bed controls, tv, telephone, bathroom and visiting hours.     Risk Assessment    The following safety risks were identified during admission: none. Yellow risk band applied: YES.     Skin Initial Assessment    This writer admitted this patient and completed a full skin assessment and Desean score in the Adult PCS flowsheet. Appropriate interventions initiated as needed.     Secondary skin check completed by MABLE Lane RN.         Education    Patient has a [place] to Observation order: No  Observation education completed and documented: N/A      Lashay Friedman RN    "

## 2020-02-16 NOTE — ED NOTES
N/v/d x 3-4 days. Hx rectal ca. Finished radiation 10 days ago. Started chemo pill this am. Taking immodium for diarrhea. Daughter feels that pt is dehydrated. Pt has abdominal cramping. When pt has cramps feels as though she will pass out. Not eating much as gets full quickly

## 2020-02-16 NOTE — ED PROVIDER NOTES
"  HPI   The patient is a 89-year-old female presenting with pelvic/abdominal pain.  She has a known history of colon cancer with near obstruction which required partial colectomy.  She has an existing colostomy.  She had radiation therapy for palliation starting in November, 2019 and ending recently.  She was told to use Xeloda for chemotherapy palliation.  She took her first 2 pills this morning.  She takes Tylenol for pain control.    The patient has occasional cramping felt in the pelvis on a regular basis.  However, over the past 2 to 3 days she has had severely worsened pain.  The severity of her pain is much higher with the cramping and the frequency of her cramping has also increased.  She tells me the cramping pain is \"almost constant.\"  She currently rates the pain as moderate to severe.  When the cramping is present it is severe.  She has nausea that is constant with retching.  She tells me her stool in the colostomy bag is \"a little more loose than usual.\"  No reported fever.  No chest pain, coughing, congestion, or shortness of breath.  No back or flank pain.  No dysuria, urgency, frequency of urination.  No trauma or injury.  No reported hematochezia or melena.        Allergies:  Allergies   Allergen Reactions     Flu Virus Vaccine      Gabapentin Nausea and Vomiting     Problem List:    Patient Active Problem List    Diagnosis Date Noted     Rectal cancer (H) 01/28/2020     Priority: Medium     Mild intermittent asthma in adult without complication 12/18/2019     Priority: Medium     GERD (gastroesophageal reflux disease) 09/03/2014     Priority: Medium     CKD (chronic kidney disease) stage 3, GFR 30-59 ml/min (H) 04/04/2012     Priority: Medium     HYPERLIPIDEMIA LDL GOAL <100 10/31/2010     Priority: Medium     IMPAIRED FASTING GLUCOSE 11/25/2007     Priority: Medium     Urethral stricture due to infection 10/03/2005     Priority: Medium     ureteral stone  Problem list name updated by automated " process. Provider to review       Hyperlipidemia 10/03/2005     Priority: Medium     Problem list name updated by automated process. Provider to review       Essential hypertension 10/03/2005     Priority: Medium     Problem list name updated by automated process. Provider to review        Past Medical History:    Past Medical History:   Diagnosis Date     Actinic keratosis      Basal cell carcinoma      Other and unspecified hyperlipidemia      Unspecified asthma(493.90)      Unspecified essential hypertension      Urethral stricture due to unspecified infection      Past Surgical History:    Past Surgical History:   Procedure Laterality Date     SURGICAL HISTORY OF -       open reduction of second metatarsal neck fx with internal fixation  cna d closed treatment of metatarsal to 2 and 4 fx     SURGICAL HISTORY OF -       hysterectomy and oophorectomy     SURGICAL HISTORY OF -       lithotripsy     Family History:    Family History   Problem Relation Age of Onset     Cancer - colorectal Mother      Breast Cancer Mother      C.A.D. Father      Cardiovascular Father      Alcohol/Drug Father      Chronic Obstructive Pulmonary Disease Brother      Cancer Son         chest cancer/myesthenia gravis     Musculoskeletal Disorder Son         myesthenia gravis     Cerebrovascular Disease Sister      Chronic Obstructive Pulmonary Disease Brother      Social History:  Marital Status:   [2]  Social History     Tobacco Use     Smoking status: Never Smoker     Smokeless tobacco: Never Used   Substance Use Topics     Alcohol use: Yes     Comment: wine ocass     Drug use: No      Medications:    acetaminophen (TYLENOL) 500 MG tablet  albuterol (PROAIR HFA) 108 (90 Base) MCG/ACT inhaler  aspirin 81 MG EC tablet  atenolol (TENORMIN) 25 MG tablet  CALCIUM + D OR  capecitabine (XELODA) 500 MG tablet  chlorthalidone (HYGROTON) 25 MG tablet  fluticasone (FLOVENT HFA) 110 MCG/ACT inhaler  magnesium oxide (MAG-OX) 400 MG  "tablet  melatonin 3 MG tablet  MULTI-DAY VITAMINS OR  omeprazole (PRILOSEC) 20 MG DR capsule  oxyCODONE (ROXICODONE) 5 MG tablet  polyethylene glycol (MIRALAX) powder  Probiotic Product (PROBIOTIC DAILY PO)  simvastatin (ZOCOR) 40 MG tablet      Review of Systems   All other systems reviewed and are negative.      PE   BP: 128/74  Pulse: 79  Heart Rate: 99  Temp: 98.5  F (36.9  C)  Resp: 16  Height: 149.9 cm (4' 11\")  Weight: 45.4 kg (100 lb)  SpO2: 98 %  Physical Exam  Vitals signs reviewed.   Constitutional:       General: She is in acute distress.      Appearance: She is well-developed.      Comments: The patient is uncomfortable when I am talking to her and then she experiences episodes of cramping pain or she will grimace and have worsened pain.   HENT:      Head: Normocephalic and atraumatic.      Nose: Nose normal.      Mouth/Throat:      Mouth: Mucous membranes are dry.   Eyes:      Extraocular Movements: Extraocular movements intact.      Conjunctiva/sclera: Conjunctivae normal.      Pupils: Pupils are equal, round, and reactive to light.   Neck:      Musculoskeletal: Normal range of motion.   Cardiovascular:      Rate and Rhythm: Normal rate and regular rhythm.   Pulmonary:      Effort: Pulmonary effort is normal.      Breath sounds: Normal breath sounds.   Abdominal:      Palpations: Abdomen is soft.      Tenderness: There is abdominal tenderness.      Comments: She has tenderness especially in the lower leg.  No distention.   Musculoskeletal: Normal range of motion.   Skin:     General: Skin is warm and dry.   Neurological:      Mental Status: She is alert and oriented to person, place, and time.   Psychiatric:         Behavior: Behavior normal.         ED COURSE and MDM   1146.  The patient has abdominal/pelvic pain and cramping that is associated with nausea and retching.  Her stool may be more loose than usual.  Lab values pending.  Urine analysis ordered.  CT scan ordered.  Toradol, Dilaudid, Zofran, " fluid bolus.    1318.  The CT scan shows evidence of nonspecific inflammation of the colon wall just proximal to where her known mass is.  The radiologist is concerned about cancer extension versus an infectious etiology.  Given the multiple symptoms she presents with an acutely worsened pain and I suspect this is infectious in etiology.  I will provide Unasyn for possible diverticulitis.  The patient will be hospitalized for further cares.    1401.  Dr. Macias accepting.  No additional orders requested.  All results return.  Holding orders placed by me.    LABS  Labs Ordered and Resulted from Time of ED Arrival Up to the Time of Departure from the ED   COMPREHENSIVE METABOLIC PANEL - Abnormal; Notable for the following components:       Result Value    Potassium 3.3 (*)     Albumin 2.6 (*)     Protein Total 6.6 (*)     All other components within normal limits   CBC WITH PLATELETS DIFFERENTIAL   ROUTINE UA WITH MICROSCOPIC REFLEX TO CULTURE       IMAGING  Images reviewed by me.  Radiology report also reviewed.  Abd/pelvis CT, IV contrast only TRAUMA  / AAA   Final Result   IMPRESSION:    1.  Above the known low rectal mass with invasion into the vagina,   there is new abnormal mucosal thickening of the distal sigmoid colon.   This could be related to tumor extension, infectious/inflammatory   colitis, or acute diverticulitis. No evidence of perforation or   abscess.   2.  Cholelithiasis. Intra- and extrahepatic biliary dilation similar   to prior studies.      PATRICK FENG MD          Procedures    Medications   HYDROmorphone (PF) (DILAUDID) injection 0.2 mg (0.2 mg Intravenous Given 2/16/20 1152)   ondansetron (ZOFRAN) injection 4 mg (4 mg Intravenous Given 2/16/20 1150)   ampicillin-sulbactam (UNASYN) 3 g vial to attach to  mL bag (3 g Intravenous New Bag 2/16/20 1345)   ketorolac (TORADOL) injection 15 mg (15 mg Intravenous Given 2/16/20 1152)   0.9% sodium chloride BOLUS (0 mLs Intravenous Stopped  2/16/20 1345)   iopamidol (ISOVUE-370) solution 49 mL (49 mLs Intravenous Given 2/16/20 1205)   sodium chloride 0.9 % bag 500mL for CT scan flush use (92 mLs As instructed Given 2/16/20 1205)         IMPRESSION       ICD-10-CM    1. Lower abdominal pain R10.30    2. Adenocarcinoma, colon (H) C18.9             Medication List      There are no discharge medications for this visit.                       Fernie Adler MD  02/16/20 0486

## 2020-02-17 LAB
ANION GAP SERPL CALCULATED.3IONS-SCNC: 5 MMOL/L (ref 3–14)
BACTERIA SPEC CULT: NORMAL
BUN SERPL-MCNC: 13 MG/DL (ref 7–30)
C COLI+JEJUNI+LARI FUSA STL QL NAA+PROBE: NOT DETECTED
C DIFF TOX B STL QL: NEGATIVE
CALCIUM SERPL-MCNC: 8 MG/DL (ref 8.5–10.1)
CHLORIDE SERPL-SCNC: 107 MMOL/L (ref 94–109)
CO2 SERPL-SCNC: 29 MMOL/L (ref 20–32)
CREAT SERPL-MCNC: 0.64 MG/DL (ref 0.52–1.04)
EC STX1 GENE STL QL NAA+PROBE: NOT DETECTED
EC STX2 GENE STL QL NAA+PROBE: NOT DETECTED
ENTERIC PATHOGEN COMMENT: NORMAL
ERYTHROCYTE [DISTWIDTH] IN BLOOD BY AUTOMATED COUNT: 13.2 % (ref 10–15)
GFR SERPL CREATININE-BSD FRML MDRD: 78 ML/MIN/{1.73_M2}
GLUCOSE SERPL-MCNC: 97 MG/DL (ref 70–99)
HCT VFR BLD AUTO: 35.4 % (ref 35–47)
HGB BLD-MCNC: 11.7 G/DL (ref 11.7–15.7)
Lab: NORMAL
MCH RBC QN AUTO: 31.4 PG (ref 26.5–33)
MCHC RBC AUTO-ENTMCNC: 33.1 G/DL (ref 31.5–36.5)
MCV RBC AUTO: 95 FL (ref 78–100)
NOROV GI+II ORF1-ORF2 JNC STL QL NAA+PR: NOT DETECTED
PLATELET # BLD AUTO: 163 10E9/L (ref 150–450)
POTASSIUM SERPL-SCNC: 3.2 MMOL/L (ref 3.4–5.3)
POTASSIUM SERPL-SCNC: 3.5 MMOL/L (ref 3.4–5.3)
RBC # BLD AUTO: 3.73 10E12/L (ref 3.8–5.2)
RVA NSP5 STL QL NAA+PROBE: NOT DETECTED
SALMONELLA SP RPOD STL QL NAA+PROBE: NOT DETECTED
SHIGELLA SP+EIEC IPAH STL QL NAA+PROBE: NOT DETECTED
SODIUM SERPL-SCNC: 141 MMOL/L (ref 133–144)
SPECIMEN SOURCE: NORMAL
SPECIMEN SOURCE: NORMAL
V CHOL+PARA RFBL+TRKH+TNAA STL QL NAA+PR: NOT DETECTED
WBC # BLD AUTO: 4.3 10E9/L (ref 4–11)
Y ENTERO RECN STL QL NAA+PROBE: NOT DETECTED

## 2020-02-17 PROCEDURE — 25800030 ZZH RX IP 258 OP 636: Performed by: PHYSICIAN ASSISTANT

## 2020-02-17 PROCEDURE — 12000000 ZZH R&B MED SURG/OB

## 2020-02-17 PROCEDURE — 99232 SBSQ HOSP IP/OBS MODERATE 35: CPT | Performed by: INTERNAL MEDICINE

## 2020-02-17 PROCEDURE — 36415 COLL VENOUS BLD VENIPUNCTURE: CPT | Performed by: FAMILY MEDICINE

## 2020-02-17 PROCEDURE — 80048 BASIC METABOLIC PNL TOTAL CA: CPT | Performed by: PHYSICIAN ASSISTANT

## 2020-02-17 PROCEDURE — 25000128 H RX IP 250 OP 636: Performed by: INTERNAL MEDICINE

## 2020-02-17 PROCEDURE — 85027 COMPLETE CBC AUTOMATED: CPT | Performed by: PHYSICIAN ASSISTANT

## 2020-02-17 PROCEDURE — 25000132 ZZH RX MED GY IP 250 OP 250 PS 637: Mod: GY | Performed by: FAMILY MEDICINE

## 2020-02-17 PROCEDURE — 25000128 H RX IP 250 OP 636: Performed by: PHYSICIAN ASSISTANT

## 2020-02-17 PROCEDURE — 25000132 ZZH RX MED GY IP 250 OP 250 PS 637: Mod: GY | Performed by: PHYSICIAN ASSISTANT

## 2020-02-17 PROCEDURE — 40000274 ZZH STATISTIC RCP CONSULT EA 30 MIN

## 2020-02-17 PROCEDURE — 36415 COLL VENOUS BLD VENIPUNCTURE: CPT | Performed by: PHYSICIAN ASSISTANT

## 2020-02-17 PROCEDURE — 84132 ASSAY OF SERUM POTASSIUM: CPT | Performed by: FAMILY MEDICINE

## 2020-02-17 RX ORDER — FLUTICASONE PROPIONATE 110 UG/1
2 AEROSOL, METERED RESPIRATORY (INHALATION) 2 TIMES DAILY
Status: DISCONTINUED | OUTPATIENT
Start: 2020-02-17 | End: 2020-02-17 | Stop reason: CLARIF

## 2020-02-17 RX ORDER — ALBUTEROL SULFATE 0.83 MG/ML
2.5 SOLUTION RESPIRATORY (INHALATION)
Status: DISCONTINUED | OUTPATIENT
Start: 2020-02-17 | End: 2020-02-21 | Stop reason: HOSPADM

## 2020-02-17 RX ORDER — CAPECITABINE 500 MG/1
1000 TABLET, FILM COATED ORAL
Status: DISCONTINUED | OUTPATIENT
Start: 2020-02-17 | End: 2020-02-21 | Stop reason: HOSPADM

## 2020-02-17 RX ORDER — DEXAMETHASONE SODIUM PHOSPHATE 4 MG/ML
4 INJECTION, SOLUTION INTRA-ARTICULAR; INTRALESIONAL; INTRAMUSCULAR; INTRAVENOUS; SOFT TISSUE EVERY 12 HOURS
Status: DISCONTINUED | OUTPATIENT
Start: 2020-02-17 | End: 2020-02-18

## 2020-02-17 RX ADMIN — SODIUM CHLORIDE: 900 INJECTION INTRAVENOUS at 09:55

## 2020-02-17 RX ADMIN — HYDROMORPHONE HYDROCHLORIDE 0.5 MG: 1 INJECTION, SOLUTION INTRAMUSCULAR; INTRAVENOUS; SUBCUTANEOUS at 06:34

## 2020-02-17 RX ADMIN — AMPICILLIN SODIUM AND SULBACTAM SODIUM 3 G: 2; 1 INJECTION, POWDER, FOR SOLUTION INTRAMUSCULAR; INTRAVENOUS at 06:35

## 2020-02-17 RX ADMIN — ACETAMINOPHEN 1000 MG: 500 TABLET, FILM COATED ORAL at 08:02

## 2020-02-17 RX ADMIN — CHLORTHALIDONE 25 MG: 25 TABLET ORAL at 07:41

## 2020-02-17 RX ADMIN — ACETAMINOPHEN 650 MG: 325 TABLET, FILM COATED ORAL at 16:37

## 2020-02-17 RX ADMIN — SODIUM CHLORIDE: 900 INJECTION INTRAVENOUS at 18:36

## 2020-02-17 RX ADMIN — HYDROMORPHONE HYDROCHLORIDE 0.3 MG: 1 INJECTION, SOLUTION INTRAMUSCULAR; INTRAVENOUS; SUBCUTANEOUS at 14:01

## 2020-02-17 RX ADMIN — AMPICILLIN SODIUM AND SULBACTAM SODIUM 3 G: 2; 1 INJECTION, POWDER, FOR SOLUTION INTRAMUSCULAR; INTRAVENOUS at 13:28

## 2020-02-17 RX ADMIN — OMEPRAZOLE 20 MG: 20 CAPSULE, DELAYED RELEASE ORAL at 07:41

## 2020-02-17 RX ADMIN — FLUTICASONE FUROATE 1 PUFF: 100 POWDER RESPIRATORY (INHALATION) at 07:40

## 2020-02-17 RX ADMIN — OXYCODONE HYDROCHLORIDE 5 MG: 5 TABLET ORAL at 16:38

## 2020-02-17 RX ADMIN — DEXAMETHASONE SODIUM PHOSPHATE 4 MG: 4 INJECTION, SOLUTION INTRAMUSCULAR; INTRAVENOUS at 15:01

## 2020-02-17 RX ADMIN — OXYCODONE HYDROCHLORIDE 10 MG: 5 TABLET ORAL at 02:25

## 2020-02-17 RX ADMIN — AMPICILLIN SODIUM AND SULBACTAM SODIUM 3 G: 2; 1 INJECTION, POWDER, FOR SOLUTION INTRAMUSCULAR; INTRAVENOUS at 18:35

## 2020-02-17 RX ADMIN — OXYCODONE HYDROCHLORIDE 5 MG: 5 TABLET ORAL at 09:54

## 2020-02-17 RX ADMIN — PROCHLORPERAZINE EDISYLATE 5 MG: 5 INJECTION INTRAMUSCULAR; INTRAVENOUS at 14:07

## 2020-02-17 RX ADMIN — OXYCODONE HYDROCHLORIDE 5 MG: 5 TABLET ORAL at 22:22

## 2020-02-17 RX ADMIN — POTASSIUM CHLORIDE 40 MEQ: 20 TABLET, EXTENDED RELEASE ORAL at 03:26

## 2020-02-17 RX ADMIN — Medication 400 MG: at 16:36

## 2020-02-17 RX ADMIN — ATENOLOL 25 MG: 25 TABLET ORAL at 07:41

## 2020-02-17 RX ADMIN — ONDANSETRON 4 MG: 2 INJECTION INTRAMUSCULAR; INTRAVENOUS at 22:11

## 2020-02-17 RX ADMIN — SIMVASTATIN 40 MG: 40 TABLET, FILM COATED ORAL at 22:22

## 2020-02-17 RX ADMIN — OXYCODONE HYDROCHLORIDE 5 MG: 5 TABLET ORAL at 13:40

## 2020-02-17 RX ADMIN — POTASSIUM CHLORIDE 20 MEQ: 20 TABLET, EXTENDED RELEASE ORAL at 06:34

## 2020-02-17 RX ADMIN — ONDANSETRON 4 MG: 4 TABLET, ORALLY DISINTEGRATING ORAL at 09:54

## 2020-02-17 RX ADMIN — ONDANSETRON 4 MG: 4 TABLET, ORALLY DISINTEGRATING ORAL at 16:24

## 2020-02-17 RX ADMIN — ASPIRIN 81 MG: 81 TABLET, COATED ORAL at 07:41

## 2020-02-17 RX ADMIN — AMPICILLIN SODIUM AND SULBACTAM SODIUM 3 G: 2; 1 INJECTION, POWDER, FOR SOLUTION INTRAMUSCULAR; INTRAVENOUS at 01:03

## 2020-02-17 ASSESSMENT — ACTIVITIES OF DAILY LIVING (ADL)
ADLS_ACUITY_SCORE: 13

## 2020-02-17 ASSESSMENT — MIFFLIN-ST. JEOR: SCORE: 792.63

## 2020-02-17 NOTE — PLAN OF CARE
"A&O. Up with SBA to bathroom. IV infusing continuous NS @ 125 ml/hr. IV Dilaudid PRN x 2 and Oxy 5 mg PO PRN x 1 this evening for abdominal pain control. Pt described it as more of a cramping feeling later on in the evening, RN then provided Pt with heating packs to try for comfort as well. IV Zofran PRN x 1 this evening for mild nausea, no vomiting. Potassium replaced this evening for low result of 3.3, recheck scheduled for 0115. Urine and stool specimens collected for UA/UC, C-diff, and enteric testing. Clear liquids tolerated well, can advance as tolerated. Only advanced to pudding this evening. Pt on scheduled Unasyn and Mag-ox. Pt's son will have to bring in Xeloda chemo medication for the patient tomorrow. Pt cares for and empties colostomy independently. /62   Pulse 75   Temp 99.8  F (37.7  C) (Oral)   Resp 16   Ht 1.499 m (4' 11\")   Wt 46.8 kg (103 lb 2.8 oz)   SpO2 96%   BMI 20.84 kg/m  .       Idalia Burleson RN on 2/16/2020 at 11:23 PM    "

## 2020-02-17 NOTE — PLAN OF CARE
Patient is alert and oriented x4. Vss on room air. Lower abdominal pain, received 10mg oxycodone PRN and iv dilaudid with some relief. Up with sba of 1 to bathroom. Continent of urine and manages own ostomy bag. Stool samples still pending. Slept well between cares. Ivf infused as ordered    Started replacing K at 0400 with 40mEq, 0500 cbc lab had K at 3.5, will give additional 20mEq as protocol calls for and wait for a redraw til AM.

## 2020-02-17 NOTE — PLAN OF CARE
Continues with off and on severe pain in lower left abdomen.  Oxycodone 5 mg is effective, but she reported this afternoon of waves of increased pain.  She reports she is able to get about three hours of relief, but then the pain returns with severe intensity.  Was tearful this afternoon, requiring a dose of IV dilaudid. Gets nauseated with narcotics, required zofran for nausea this am and compazine for nausea after administration of IV 0.3 mg of dilaudid.    Wanted regular food, dietician discussed with daughter and patient.  Had some toast and pudding for lunch and tolerated few bites ok.  Food provided as tolerated.  VSS, afebrile.  Daughter asked several times when her mother is starting the steroid that was discussed this am.  Did update Dr. Flannery of caregivers concern/question.  Bed alarm on for safety.

## 2020-02-17 NOTE — PROGRESS NOTES
[facility] Internal Medicine Progress Note     Date of Service (when I saw the patient): 02/17/2020    REASON FOR ADMISSION / INTERVAL HISTORY:  Pam Pierce is a 89 year old female admitted on 2/16/2020. She presented to the emergency department for evaluation of abdominal pain, nausea, vomiting, and diarrhea.    CT ABD-IMPRESSION:   1.  Above the known low rectal mass with invasion into the vagina,  there is new abnormal mucosal thickening of the distal sigmoid colon.  This could be related to tumor extension, infectious/inflammatory  colitis, or acute diverticulitis. No evidence of perforation or  abscess.  2.  Cholelithiasis. Intra- and extrahepatic biliary dilation similar  to prior studies.    ASSESSMENT/PLAN:   Abdominal pain with nausea, vomiting, and diarrhea  Symptoms present for past few months, significantly more severe in past few days. Occurs in the setting of known rectal cancer/rectal mass s/p colostomy in Nov 2019. CT abdomen & pelvis as above. Afebrile, no leukocytosis on admission. Was started on Unasyn in the emergency department. Unclear cause at this time - infectious, radiation colitis, or diverticulitis are all on the differential. enteric stool panel and c diff-negative.   -Continue Unasyn today-. Add decadron iv.      Asymptomatic bacteriuria  Admit UA positive for large leukocyte esterase with pyuria (WBC > 182). Afebrile, no leukocytosis on admission. Patient is asymptomatic.  - Urine culture pending  - Continue Unasyn as above     Rectal cancer / adenocarcinoma  Diagnosed Nov 2019, is s/p colectomy/colostomy. Follows with Dr. Diggs. Underwent 5 treatments of palliative radiation, last on 2/6/20. Started palliative Xeloda chemo on day of admission (2/16/2020).  - stop Xeloda 1g bid, as symptoms persistent and worse now. Will discuss with Dr Diggs  - palliative care     CKD (chronic kidney disease) stage 3, GFR 30-59 ml/min  Admit creatinine 0.67 (baseline 0.6 - 0.7), GFR 77 (baseline 67  "- > 90).   Stable.     Essential hypertension  Managed prior to admission with atenolol 25 mg and chlorthalidone 25 mg daily.  - Continue prior to admission atenolol and chlorthalidone     Hyperlipidemia LDL goal <100  Managed prior to admission with Zocor 40 mg q hs, continue.     Mild intermittent asthma in adult without complication  Stable respiratory status. Managed prior to admission with Flovent bid and prn albuterol.  - Hold Flovent  - Prn albuterol nebs available      GERD (gastroesophageal reflux disease)  Managed prior to admission with omeprazole 20 mg daily, continue.     DISPO  Pt FTT. Having progressive ongoing sx and requesting pain control and comfort. Will have palliative care consult in AM. Discussed with daughter       SUSAN FRYE MD   Pg [phone]    DVT Prhylaxis: Pneumatic Compression Devices  Code Status: DNR/DNI    ROS:  As described in A/P and Exam.  Otherwise ALL are  negative.    PHYSICAL EXAM:  All vitals have been reviewed    Blood pressure 111/50, pulse 68, temperature 98.4  F (36.9  C), temperature source Oral, resp. rate 18, height 1.499 m (4' 11\"), weight 46.7 kg (102 lb 15.3 oz), SpO2 95 %, not currently breastfeeding.    I/O this shift:  In: 878 [P.O.:480; I.V.:398]  Out: -     GENERAL APPEARANCE: weak-ill appearing, alert and in distress from pain  EYES: conjunctiva clear, eyes grossly normal  HENT: external ears and nose normal   NECK: supple, no masses or adenopathy  RESP: lungs clear to auscultation - no rales, rhonchi or wheezes  CV: regular rate and rhythm, normal S1 S2, no S3 or S4 and no murmur, click or rub   ABDOMEN: soft, mod tenderness diffuse lower abdomen, no HSM or masses and bowel sounds normal  MS: no clubbing, cyanosis; no edema  SKIN: clear without significant rashes or lesions  NEURO: -non-focal moves all 4 extr    ROUTINE  LABS (Last four results)  CMP  Recent Labs   Lab 02/17/20  0500 02/17/20  0118 02/16/20  1128 02/11/20  1201     --  136 142 "   POTASSIUM 3.5 3.2* 3.3* 3.1*   CHLORIDE 107  --  102 104   CO2 29  --  27 35*   ANIONGAP 5  --  7 3   GLC 97  --  98 91   BUN 13  --  18 20   CR 0.64  --  0.67 0.73   GFRESTIMATED 78  --  77 72   GFRESTBLACK >90  --  90 83   DANIEL 8.0*  --  9.1 9.3   PROTTOTAL  --   --  6.6* 6.3*   ALBUMIN  --   --  2.6* 2.8*   BILITOTAL  --   --  0.4 0.3   ALKPHOS  --   --  94 81   AST  --   --  27 20   ALT  --   --  26 24     CBC  Recent Labs   Lab 02/17/20  0500 02/16/20  1128 02/11/20  1201   WBC 4.3 5.8 5.8   RBC 3.73* 4.66 4.27   HGB 11.7 14.6 13.1   HCT 35.4 44.2 41.0   MCV 95 95 96   MCH 31.4 31.3 30.7   MCHC 33.1 33.0 32.0   RDW 13.2 12.8 12.9    191 210     INRNo lab results found in last 7 days.  Arterial Blood GasNo lab results found in last 7 days.    No results found for this or any previous visit (from the past 24 hour(s)).

## 2020-02-17 NOTE — PROGRESS NOTES
Skin affirmation note    Admitting nurse completed full skin assessment, Desean score and Desean interventions. This writer agrees with the initial skin assessment findings.

## 2020-02-17 NOTE — H&P
"[facility]    History and Physical - Hospitalist Service       Date of Admission:  2/16/2020    Assessment & Plan   Pam Pierce is a 89 year old female admitted on 2/16/2020. She presented to the emergency department for evaluation of abdominal pain, nausea, vomiting, and diarrhea, found to have an abnormal abdominal CT which requires admission for further evaluation and treatment.    Abdominal pain with nausea, vomiting, and diarrhea  Symptoms present for past few months, significantly more severe in past few days. Occurs in the setting of known rectal cancer/rectal mass s/p colostomy in Nov 2019. CT abdomen & pelvis with \"new abnormal mucosal thickening of distal sigmoid colon. This could be related to tumor extension, infectious/inflammatory colitis, or acute diverticulitis. No evidence of perforation or abscess.\" Afebrile, no leukocytosis on admission. Was started on Unasyn in the emergency department. Unclear cause at this time - infectious, radiation colitis, or diverticulitis are all on the differential.  - Continue Unasyn 3 g q 6 hours  - Enteric stool pending  - C.diff pending  - Will need to re-image if worsening due to risk for perforation  - Antiemetics and analgesia prn    Asymptomatic bacteriuria  Admit UA positive for large leukocyte esterase with pyuria (WBC > 182). Afebrile, no leukocytosis on admission. Patient is asymptomatic.  - Urine culture pending  - Continue Unasyn as above    Rectal cancer / adenocarcinoma  Diagnosed Nov 2019, is s/p colectomy/colostomy. Follows with Dr. Diggs. Underwent 5 treatments of palliative radiation, last on 2/6/20. Started palliative Xeloda chemo on day of admission (2/16/2020).  - Continue Xeloda 1g bid, as symptoms were present prior to initiation of the drug  - Continue with outpatient oncology    CKD (chronic kidney disease) stage 3, GFR 30-59 ml/min  Admit creatinine 0.67 (baseline 0.6 - 0.7), GFR 77 (baseline 67 - > 90). Stable.  - " Monitor    Essential hypertension  Managed prior to admission with atenolol 25 mg and chlorthalidone 25 mg daily.  - Continue prior to admission atenolol and chlorthalidone    Hyperlipidemia LDL goal <100  Managed prior to admission with Zocor 40 mg q hs, continue.    Mild intermittent asthma in adult without complication  Stable respiratory status. Managed prior to admission with Flovent bid and prn albuterol.  - Hold Flovent  - Prn albuterol nebs available     GERD (gastroesophageal reflux disease)  Managed prior to admission with omeprazole 20 mg daily, continue.         Diet: Advance Diet as Tolerated: Clear Liquid Diet    DVT Prophylaxis: Pneumatic Compression Devices  Clayton Catheter: not present  Code Status: DNR/DNI  - confirmed with patient on admission    Disposition Plan   Expected discharge: 2 - 3 days, recommended to prior living arrangement once work-up completed, tolerating oral intake, pain well controlled on oral pain medications, and antibiotic plan in place (if indicated). .  Entered: Marcella Milton PA-C 02/16/2020, 6:21 PM     The patient's care was discussed with the Attending Physician, Dr. Ravindra Vicente, Patient and Patient's Family.    Marcella Milton PA-C  [facility]    ______________________________________________________________________    Chief Complaint   Abdominal pain, nausea, vomiting, and diarrhea     History is obtained from the patient and her son, review of EMR, and emergency department sign out from Dr. Felix Gaspar.    History of Present Illness   Pam Pierce is a 89 year old female who presented to the emergency department for evaluation of acute on chronic abdominal cramping, as well as nausea, vomiting, and diarrhea.    Patient has a history of known rectal cancer that was diagnosed in November 2019.  She is S/P colectomy and colostomy.  She follows with Dr. Diggs, has undergone 5 rounds of palliative radiation, last round was on  "2/6/2020.  She was started on oral palliative chemotherapy and took her first dose this morning (Xeloda 1 g).      Patient presented to the emergency department today due to intractable abdominal pain.  She notes that the pain has been present since her colectomy, although it had been tolerable until today.  Pain is located in the bilateral lower quadrants, described as a constant dull ache that waxes and wanes with severe cramping.  At its worst pain is rated 8/10, at its best rated 2/10.  She is not entirely sure, but feels that the pain may be exacerbated after she eats.  She otherwise cannot identify any aggravating or alleviating factors.    The pain is associated with nausea, vomiting, and diarrhea.  She states emesis is mostly \"mucus,\" and is clear without blood.  The diarrhea occurs 4-5 times a day.  When she is nauseated she can feel lightheaded and somewhat faint.  She denies any syncopal episodes.    Denies any dysuria or urinary symptoms changes.  However her UA returned abnormal with large leukocyte esterase and pyuria.    Her abdominal symptoms were pre-existing prior to the start of her Xeloda.  She denies any known fevers or chills.  No known ill contacts or recent travel.  She has a decreased appetite and oral intake recently.  The pain has been preventing her from sleeping well.  She gets frequent headaches, although feels they have been more frequent and severe over the past 2 weeks.  She is feeling generally weak, but no focal deficits.  She notes about a 35 pound weight loss since her diagnosis.  Few nights ago she woke with extreme epigastric pressure, but this has resolved and not returned.    Review of Systems    The 10 point Review of Systems is negative other than noted in the HPI or here.     Past Medical History    I have reviewed this patient's medical history and updated it with pertinent information if needed.   Past Medical History:   Diagnosis Date     Actinic keratosis      Basal " cell carcinoma      Other and unspecified hyperlipidemia      Unspecified asthma(493.90)      Unspecified essential hypertension      Urethral stricture due to unspecified infection     ureteral stone       Past Surgical History   I have reviewed this patient's surgical history and updated it with pertinent information if needed.  Past Surgical History:   Procedure Laterality Date     SURGICAL HISTORY OF -       open reduction of second metatarsal neck fx with internal fixation  cna d closed treatment of metatarsal to 2 and 4 fx     SURGICAL HISTORY OF -       hysterectomy and oophorectomy     SURGICAL HISTORY OF -       lithotripsy       Social History   I have reviewed this patient's social history and updated it with pertinent information if needed.  Social History     Tobacco Use     Smoking status: Never Smoker     Smokeless tobacco: Never Used   Substance Use Topics     Alcohol use: Yes     Comment: wine ocass     Drug use: No       Family History   I have reviewed this patient's family history and updated it with pertinent information if needed.   Family History   Problem Relation Age of Onset     Cancer - colorectal Mother      Breast Cancer Mother      C.A.D. Father      Cardiovascular Father      Alcohol/Drug Father      Chronic Obstructive Pulmonary Disease Brother      Cancer Son         chest cancer/myesthenia gravis     Musculoskeletal Disorder Son         myesthenia gravis     Cerebrovascular Disease Sister      Chronic Obstructive Pulmonary Disease Brother        Prior to Admission Medications   Prior to Admission Medications   Prescriptions Last Dose Informant Patient Reported? Taking?   CALCIUM + D OR  Self Yes No   Sig: Take 1 tablet by mouth daily    MULTI-DAY VITAMINS OR  Self Yes No   Si tablet daily   Probiotic Product (PROBIOTIC DAILY PO)  Self Yes No   Sig: Take 1 capsule by mouth daily    acetaminophen (TYLENOL) 500 MG tablet   Yes No   Sig: Take 500-1,000 mg by mouth daily as needed  for mild pain   albuterol (PROAIR HFA) 108 (90 Base) MCG/ACT inhaler  Self No No   Sig: Inhale 2 puffs into the lungs every 4 hours as needed   aspirin 81 MG EC tablet   Yes No   Sig: Take 81 mg by mouth daily   atenolol (TENORMIN) 25 MG tablet  Self No No   Sig: Take 1 tablet (25 mg) by mouth daily   capecitabine (XELODA) 500 MG tablet   No No   Sig: Start out 1 g po bid, 7 days on, 7 days off. [employer] is 1.5 g po bid, 7 days on and off   chlorthalidone (HYGROTON) 25 MG tablet  Self No No   Sig: Take 1 tablet (25 mg) by mouth daily   fluticasone (FLOVENT HFA) 110 MCG/ACT inhaler  Self No No   Sig: Inhale 2 puffs into the lungs 2 times daily   magnesium oxide (MAG-OX) 400 MG tablet   Yes No   Sig: Take 400 mg by mouth every evening   melatonin 3 MG tablet   Yes No   Sig: Take 3 mg by mouth nightly as needed for sleep   omeprazole (PRILOSEC) 20 MG DR capsule   No No   Sig: Take 1 capsule (20 mg) by mouth daily   oxyCODONE (ROXICODONE) 5 MG tablet   Yes No   Sig: Take 2.5 mg by mouth daily as needed for severe pain   polyethylene glycol (MIRALAX) powder   Yes No   Sig: Take by mouth daily 8.5 grams daily in the morning   simvastatin (ZOCOR) 40 MG tablet  Self No No   Sig: Take 1 tablet (40 mg) by mouth At Bedtime      Facility-Administered Medications: None     Allergies   Allergies   Allergen Reactions     Flu Virus Vaccine      Gabapentin Nausea and Vomiting       Physical Exam   Vital Signs: Temp: 97.7  F (36.5  C) Temp src: Oral BP: 128/61 Pulse: 75 Heart Rate: 84 Resp: 14 SpO2: 97 % O2 Device: None (Room air)    Weight: 103 lbs 2.8 oz    Constitutional: Alert, oriented, cooperative, no apparent distress, appears nontoxic, speaking in full sentences.     Eyes: Eyes are clear, pupils are reactive. No scleral icterus. Extroccular movements intact.     HEENT: Oropharynx is clear but dry, no lesions. Normocephalic, no evidence of cranial trauma.      Cardiovascular: Regular rhythm and rate, normal S1 and S2. Known  systolic 4/6 murmur present, no rubs or gallops. Peripheral pulses intact bilaterally. No lower extremity edema.    Respiratory: Lung sounds are clear to auscultation bilaterally without wheezes, rhonchi, or crackles.    GI: Soft, non-distended. Tender to palpation in bilateral lower quadrants > right upper quadrant > left upper quadrant. No rebound or guarding. No hepatosplenomegaly or masses appreciated. Normal bowel sounds. Colostomy bag in place with dark green stool.    Musculoskeletal: Without obvious deformity, normal range of motion. Normal muscle bulk and tone. Distal CMS intact.      Skin: Warm and dry, no rashes or ecchymoses. No mottling of skin.      Neurologic: Patient moves all extremities. Cranial nerves are grossly intact.  is symmetric. Gross strength and sensation are equal bilaterally.    Genitourinary: Deferred      Data   Data reviewed today: I reviewed all medications, new labs and imaging results over the last 24 hours. I personally reviewed the abdominal CT image(s) showing rectal mass.    Recent Labs   Lab 02/16/20  1128 02/11/20  1201   WBC 5.8 5.8   HGB 14.6 13.1   MCV 95 96    210    142   POTASSIUM 3.3* 3.1*   CHLORIDE 102 104   CO2 27 35*   BUN 18 20   CR 0.67 0.73   ANIONGAP 7 3   DANIEL 9.1 9.3   GLC 98 91   ALBUMIN 2.6* 2.8*   PROTTOTAL 6.6* 6.3*   BILITOTAL 0.4 0.3   ALKPHOS 94 81   ALT 26 24   AST 27 20     Recent Results (from the past 24 hour(s))   Abd/pelvis CT, IV contrast only TRAUMA  / AAA    Narrative    CT ABDOMEN AND PELVIS WITH CONTRAST 2/16/2020 12:20 PM    CLINICAL HISTORY: Known colon cancer history with partial colectomy  and existing colostomy. New severe cramping pain felt in the pelvis,  nausea with vomiting.    TECHNIQUE: CT scan of the abdomen and pelvis was performed following  injection of IV contrast. Multiplanar reformats were obtained. Dose  reduction techniques were used.    CONTRAST: 49 mL Isovue.    COMPARISON: PET/CT 1/16/2020, rectal MRI  1/16/2020, CT abdomen and  pelvis 11/19/2019.    FINDINGS:   LOWER CHEST: Normal.    HEPATOBILIARY: Cholelithiasis. There is intra- and extrahepatic  biliary ductal dilation. Common bile duct measures 11 mm. No focal  liver lesions.    PANCREAS: Normal.    SPLEEN: Subcentimeter hypodensity in the spleen is likely a small cyst  but ultimately too small to characterize. Unchanged from prior.    ADRENAL GLANDS: Normal.    KIDNEYS/BLADDER: Normal.    BOWEL: Diverting colostomy in the upper left abdomen. Colonic  diverticulosis. Marked abnormal mucosal thickening in the sigmoid  colon. Rectal mass with invasion into the vagina again noted. No  pericolonic abscess or evidence of perforation.    PELVIC ORGANS: The vagina is abnormal. Abnormal perirectal lymph nodes  better demonstrated on prior rectal MRI.    ADDITIONAL FINDINGS: Nonaneurysmal aortic atherosclerosis.    MUSCULOSKELETAL: Normal.      Impression    IMPRESSION:   1.  Above the known low rectal mass with invasion into the vagina,  there is new abnormal mucosal thickening of the distal sigmoid colon.  This could be related to tumor extension, infectious/inflammatory  colitis, or acute diverticulitis. No evidence of perforation or  abscess.  2.  Cholelithiasis. Intra- and extrahepatic biliary dilation similar  to prior studies.    PATRICK FENG MD

## 2020-02-17 NOTE — PROGRESS NOTES
"SPIRITUAL HEALTH SERVICES  SPIRITUAL ASSESSMENT Progress Note  [facility] - Med/Surg    Referral Source: Noted to be dealing with significant distress during ID rounds    I shared a reflective conversation with daughter, Karine, which incorporated elements of illness narrative.    - Pam was sleeping so I spoke with her daughter, Karine.  She stated that Pam is waiting to talk with her oncologist, Dr Diggs, about this onset of symptoms.    - She said they are wondering if this is a side effect of her radiation treatments.  Karine said, \"She sailed through them - and said 'that was a piece of cake'\" and has been doing stairs, very active and then this.    - She expressed appreciation for the visit.    Plan: I will plan to visit tomorrow when patient is awake for ongoing spiritual support.      Jake Levy M.A., [facility]  Staff Chaplain [facility]  Office: [phone]  Cell: [phone]  Pager [phone]    "

## 2020-02-17 NOTE — CONSULTS
"CLINICAL NUTRITION SERVICES  -  ASSESSMENT NOTE      RECOMMENDATIONS FOR MD/PROVIDER TO ORDER:   None   Recommendations Ordered by Registered Dietitian (RD):   Boost Plus chocolate at 2pm    Future/Additional Recommendations:   Continue to monitor and encourage oral intake   Malnutrition: Patient does not meet two of the above criteria necessary for diagnosing malnutrition.        REASON FOR ASSESSMENT  Pam Pierce is a 89 year old female seen by Registered Dietitian for Admission Nutrition Risk Screen for \"no indicators present (has had overall weight loss over the last year though)\" and Provider Order - \"30# weight loss\"      NUTRITION HISTORY  Information obtained from EMR:  -presented w/ abdominal pain associated w/ N/V/D. Diarrhea 4-5 x per day. Symptoms started a few months ago but have been more severe in the past few days. Abdominal pain present since colectomy but had been tolerable until day of admission 2/16/20  -\"CT abdomen & pelvis with \"new abnormal mucosal thickening of distal sigmoid colon. This could be related to tumor extension, infectious/inflammatory colitis, or acute diverticulitis. No evidence of perforation or abscess.\"\" Per H&P  -PMH: rectal ca/adenocarcinoma dx November 2019 s/p colectomy/colostomy. Had 5 treatments of palliative radiation last on 2/6/20. Started palliative Xeloda 2/16/20. CKD stage 3, hyperlipidemia, GERD  -\"notes about a 35 pound weight loss since her diagnosis\" per H&P  -per rounds this morning 2/17: needs to be seen by Ovidio MIRZA from palliative caret; hold oral chemo for now      Information obtained from pt and daughter Karine present at bedside:  -at baseline pt follows a regular diet w/o restrictions, 2 meals/day (\"big meal is at noon\"), always had a good appetite. 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 They have Meals on Wheels delivered for 3 meals/week. Adds 5-6 packets of sugar to tea, and loves salt.   -abdominal pain started to become \"really bad\" on " "Wednesday or Thursday. Friday 2/14 she had pasta and broccoli. Saturday she had nothing d/t pain. Sunday had 1 cup of broth, and today had 2 cups.  -reports diarrhea and abdominal pain on and off since surgery, but not as bad as now.      CURRENT NUTRITION ORDERS  Diet Order:     Orders Placed This Encounter      Advance Diet as Tolerated: Regular Diet Adult      Current Intake/Tolerance:  -tolerated clear liquids well, ADAT. Only advanced to pudding evening of 2/16 per RN note  -50% of breakfast this morning 2/17  -doesn't like cold cereal, oatmeal, apple juice, or milk; likes cranberry juice, toast or bagels with strawberry jelly, and cheese. Willing to try Boost Plus chocolate at 2pm snack. During our visit pt was looking forward to her diet advancing beyond full liquids. I spoke w/ RN after visit and she advanced to regular. Informed pt.       NUTRITION FOCUSED PHYSICAL ASSESSMENT FOR DIAGNOSING MALNUTRITION)  Yes             Observed:    No nutrition-related physical findings observed    Obtained from Chart/Interdisciplinary Team:  None    ANTHROPOMETRICS  Height: 4' 11\"  Weight: 102 lbs 15.28 oz  Body mass index is 20.79 kg/m .  Weight Status:  Normal BMI  IBW: 95 lbs  % IBW: 107%  Weight History:   Wt Readings from Last 15 Encounters:   02/17/20 46.7 kg (102 lb 15.3 oz)   02/11/20 47.6 kg (105 lb)   02/06/20 48.3 kg (106 lb 6.4 oz)   01/27/20 48.3 kg (106 lb 6.4 oz)   01/09/20 47.6 kg (105 lb)   12/18/19 47.9 kg (105 lb 9.6 oz)   11/19/19 45.4 kg (100 lb)   07/15/19 46.3 kg (102 lb)   07/18/18 46.3 kg (102 lb)   05/09/17 57.2 kg (126 lb)   03/27/17 57.6 kg (127 lb)   03/21/16 61.2 kg (135 lb)   01/04/16 60.8 kg (134 lb)   12/08/14 60.3 kg (133 lb)   12/04/14 62.1 kg (137 lb)   -per care everywhere:   103 lbs 11/30/19  -based on above information, pt lost 35 lbs over a time period of 4 years. Wt has been stable since. However, based on current wt of 102 lbs, pt has lost 3 lbs (2.9%) in the last week, which is " "significant. This makes sense in light of diarrhea 4-5 x per day.  -pt reports -103 lbs. She lost 35 lbs over the past 2 years but has been stable since surgery. She weighs herself often.     Vitals:    02/16/20 1111 02/16/20 1500 02/17/20 0536   Weight: 45.4 kg (100 lb) 46.8 kg (103 lb 2.8 oz) 46.7 kg (102 lb 15.3 oz)   -pt reports weighing 100 lbs on her scale at home. She reported this to staff upon admission.  -net I/O since admit: +909 so far today    LABS  Labs reviewed    MEDICATIONS  Xeloda  Magnesium oxide  Omeprazole  Simvastatin  IVF: NS @ 125 mL/hr      ASSESSED NUTRITION NEEDS PER APPROVED PRACTICE GUIDELINES:    Dosing Weight 47 kg (current wt)  Estimated Energy Needs: 6461-9865 kcals (30-35 Kcal/Kg)  Justification: increased needs w/ ca undergoing treatment  Estimated Protein Needs: 56-71 grams protein (1.2-1.5 g pro/Kg)  Justification: hypercatabolism w/ ca undergoing treatment  Estimated Fluid Needs: (1 mL/Kcal)  Justification: per provider pending fluid status    MALNUTRITION:  % Weight Loss:  > 2% in 1 week (severe malnutrition)  % Intake:  Decreased intake does not meet criteria for malnutrition - poor intake for the past 3 days  Subcutaneous Fat Loss:  None observed  Muscle Loss:  None observed  Fluid Retention:  None noted    Malnutrition Diagnosis: Patient does not meet two of the above criteria necessary for diagnosing malnutrition.    NUTRITION DIAGNOSIS:  Inadequate oral intake related to N/V/D, and abdominal pain as evidenced by pt report, poor oral intake for the past 3 days, and wt loss of 3 lbs (2.9%) in the last week.      NUTRITION INTERVENTIONS  Recommendations / Nutrition Prescription  Regular diet as ordered  Boost Plus chocolate at 2pm daily  Continue to monitor and encourage oral intake        Implementation  Nutrition education: Provided education on role of RD in POC. Provided \"colostomy nutrition therapy\" handout from adult NCM and reviewed options for dinner " tonight.  Composition of Meals and Snacks, General/healthful diet, Medical Food Supplement: see above  Collaboration and Referral of Nutrition care: Pt POC discussed during IDT rounds this morning.      Nutrition Goals  Pt to consume >/= 75% of meals TID      MONITORING AND EVALUATION:  Progress towards goals will be monitored and evaluated per protocol and Practice Guidelines              Tiara Thomas RDN, LD  Clinical Dietitian  [phone]

## 2020-02-18 LAB
POTASSIUM SERPL-SCNC: 2.9 MMOL/L (ref 3.4–5.3)
POTASSIUM SERPL-SCNC: 4.3 MMOL/L (ref 3.4–5.3)

## 2020-02-18 PROCEDURE — 25000125 ZZHC RX 250: Performed by: PHYSICIAN ASSISTANT

## 2020-02-18 PROCEDURE — 84132 ASSAY OF SERUM POTASSIUM: CPT | Performed by: INTERNAL MEDICINE

## 2020-02-18 PROCEDURE — 25000132 ZZH RX MED GY IP 250 OP 250 PS 637: Mod: GY | Performed by: PHYSICIAN ASSISTANT

## 2020-02-18 PROCEDURE — 99222 1ST HOSP IP/OBS MODERATE 55: CPT | Performed by: NURSE PRACTITIONER

## 2020-02-18 PROCEDURE — 99233 SBSQ HOSP IP/OBS HIGH 50: CPT | Performed by: INTERNAL MEDICINE

## 2020-02-18 PROCEDURE — 25800030 ZZH RX IP 258 OP 636: Performed by: INTERNAL MEDICINE

## 2020-02-18 PROCEDURE — 25800030 ZZH RX IP 258 OP 636: Performed by: PHYSICIAN ASSISTANT

## 2020-02-18 PROCEDURE — 25000132 ZZH RX MED GY IP 250 OP 250 PS 637: Mod: GY | Performed by: NURSE PRACTITIONER

## 2020-02-18 PROCEDURE — 36415 COLL VENOUS BLD VENIPUNCTURE: CPT | Performed by: INTERNAL MEDICINE

## 2020-02-18 PROCEDURE — 25000128 H RX IP 250 OP 636: Performed by: PHYSICIAN ASSISTANT

## 2020-02-18 PROCEDURE — 25000128 H RX IP 250 OP 636: Performed by: INTERNAL MEDICINE

## 2020-02-18 PROCEDURE — 94640 AIRWAY INHALATION TREATMENT: CPT

## 2020-02-18 PROCEDURE — 25000132 ZZH RX MED GY IP 250 OP 250 PS 637: Mod: GY | Performed by: INTERNAL MEDICINE

## 2020-02-18 PROCEDURE — 12000000 ZZH R&B MED SURG/OB

## 2020-02-18 RX ORDER — CIPROFLOXACIN 500 MG/1
500 TABLET, FILM COATED ORAL EVERY 12 HOURS SCHEDULED
Status: DISCONTINUED | OUTPATIENT
Start: 2020-02-18 | End: 2020-02-20 | Stop reason: CLARIF

## 2020-02-18 RX ORDER — ONDANSETRON 4 MG/1
4 TABLET, ORALLY DISINTEGRATING ORAL EVERY 6 HOURS PRN
Qty: 30 TABLET | Refills: 1 | Status: SHIPPED | OUTPATIENT
Start: 2020-02-18 | End: 2020-12-10

## 2020-02-18 RX ORDER — SODIUM CHLORIDE 9 MG/ML
INJECTION, SOLUTION INTRAVENOUS CONTINUOUS
Status: DISCONTINUED | OUTPATIENT
Start: 2020-02-18 | End: 2020-02-21 | Stop reason: HOSPADM

## 2020-02-18 RX ORDER — OXYCODONE HYDROCHLORIDE 5 MG/1
5-10 TABLET ORAL
Qty: 30 TABLET | Refills: 0 | Status: SHIPPED | OUTPATIENT
Start: 2020-02-18 | End: 2020-02-20

## 2020-02-18 RX ADMIN — OMEPRAZOLE 20 MG: 20 CAPSULE, DELAYED RELEASE ORAL at 20:15

## 2020-02-18 RX ADMIN — AMPICILLIN SODIUM AND SULBACTAM SODIUM 3 G: 2; 1 INJECTION, POWDER, FOR SOLUTION INTRAMUSCULAR; INTRAVENOUS at 07:51

## 2020-02-18 RX ADMIN — POTASSIUM CHLORIDE 40 MEQ: 20 TABLET, EXTENDED RELEASE ORAL at 11:29

## 2020-02-18 RX ADMIN — SODIUM CHLORIDE: 9 INJECTION, SOLUTION INTRAVENOUS at 16:24

## 2020-02-18 RX ADMIN — DEXAMETHASONE SODIUM PHOSPHATE 4 MG: 4 INJECTION, SOLUTION INTRAMUSCULAR; INTRAVENOUS at 01:57

## 2020-02-18 RX ADMIN — ALBUTEROL SULFATE 2.5 MG: 2.5 SOLUTION RESPIRATORY (INHALATION) at 03:50

## 2020-02-18 RX ADMIN — POTASSIUM CHLORIDE 40 MEQ: 20 TABLET, EXTENDED RELEASE ORAL at 13:58

## 2020-02-18 RX ADMIN — ASPIRIN 81 MG: 81 TABLET, COATED ORAL at 08:05

## 2020-02-18 RX ADMIN — OMEPRAZOLE 20 MG: 20 CAPSULE, DELAYED RELEASE ORAL at 08:05

## 2020-02-18 RX ADMIN — CHLORTHALIDONE 25 MG: 25 TABLET ORAL at 08:05

## 2020-02-18 RX ADMIN — OXYCODONE HYDROCHLORIDE 5 MG: 5 TABLET ORAL at 20:16

## 2020-02-18 RX ADMIN — SODIUM CHLORIDE: 900 INJECTION INTRAVENOUS at 04:41

## 2020-02-18 RX ADMIN — CIPROFLOXACIN HYDROCHLORIDE 500 MG: 500 TABLET, FILM COATED ORAL at 20:15

## 2020-02-18 RX ADMIN — Medication 400 MG: at 18:22

## 2020-02-18 RX ADMIN — AMPICILLIN SODIUM AND SULBACTAM SODIUM 3 G: 2; 1 INJECTION, POWDER, FOR SOLUTION INTRAMUSCULAR; INTRAVENOUS at 01:23

## 2020-02-18 RX ADMIN — SODIUM CHLORIDE: 900 INJECTION INTRAVENOUS at 07:51

## 2020-02-18 RX ADMIN — SIMVASTATIN 40 MG: 40 TABLET, FILM COATED ORAL at 20:16

## 2020-02-18 RX ADMIN — FLUTICASONE FUROATE 1 PUFF: 100 POWDER RESPIRATORY (INHALATION) at 07:52

## 2020-02-18 RX ADMIN — DEXAMETHASONE SODIUM PHOSPHATE 4 MG: 4 INJECTION, SOLUTION INTRAMUSCULAR; INTRAVENOUS at 13:58

## 2020-02-18 RX ADMIN — ATENOLOL 25 MG: 25 TABLET ORAL at 08:04

## 2020-02-18 RX ADMIN — OXYCODONE HYDROCHLORIDE 5 MG: 5 TABLET ORAL at 11:29

## 2020-02-18 SDOH — HEALTH STABILITY: MENTAL HEALTH: HOW OFTEN DO YOU HAVE A DRINK CONTAINING ALCOHOL?: MONTHLY OR LESS

## 2020-02-18 ASSESSMENT — ACTIVITIES OF DAILY LIVING (ADL)
ADLS_ACUITY_SCORE: 13

## 2020-02-18 ASSESSMENT — MIFFLIN-ST. JEOR: SCORE: 840.63

## 2020-02-18 NOTE — CONSULTS
[facility]    Palliative Care Consultation--Inpatient  Admission Date: 2/16/2020   Visit Date: February 18, 2020  PCP: Reagan Thakkar   Requested by: Kiara Flannery MD      HISTORY of PRESENT ILLNESS:  Pam Pierce is a 89 year old year old female with a multi-year history of abdominal pain presented to the [facility] ED in mid November with abdominal pain, a change in bowel habits and a 35-lb weight loss; CT demonstrated an invasive rectosigmoid mass.  She was transferred to 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  During that hospitalization she was also found to  have aortic stenosis.  Furthermore, during the abdominal surgery, she arrested (thought due to a vagal response to bowel manipulation), underwent CPR, and again became bradycardic while intubated; it was determined that she probably was NOT in need of a permanent pacemaker in light of the circumstances under which these events occurred.  She met with Dr Diggs in early January; she wished to pursue a palliative approach with oral agents only, and agreed to XRT--she did not want to suffer a lot of symptoms.  She completed 5 fractions/2500 cGy radiation on 2/7/20.  Approximately 9-10 days later, she presented again to [facility] ED with pelvic/abdominal pain/cramping, the day on which she started oral Xeloda; she was taking nothing stronger than Tylenol for her pain at that time.  She rated her pain as moderate to severe and nearly constant and occurring in the presence of nausea and vomiting, loose watery stools   Since then, she has been ordered both Oxycodone and Dilaudid IV PRN and her pain is improved.  She remains on empiric antibiotics for the possibility of post-radiation colitis.  She was referred to Palliative Care for assistance with symptom management.      ASSESSMENT & PLAN:    -Rectal carcinoma with invasion of the vaginal and  bladder wall; s/p palliative XRT; oral Xeloda on hold while hospitalized  -Abdominal and pelvic pain, severe on admission, improved considerably on PRN opioids and BID Decadron  - increased Omeprazole to BID dosing only while she remains on Decadron  - use of opioids has dropped off significantly in past 24 hours; is not currently a candidate for a long-acting opioid.  Will re-evaluate tomorrow    Nausea +/- vomiting.  Appears to be related to opioid administration  - monitor; would like to have a prescription for Zofran ODT post discharge    Advance Care Planning:  - Understanding of condition:  Understand that it cannot be cured.  - Decisional capacity:  intact  - Code status:  DNR / DNI  - Health Care Directive: Yes, If Yes, is there a copy in the EMR?   Yes  - POLST:  No    Goals of Care:  - Suspects, now, that her  may outlive her; wants to continue sufficiently comfortable so that she can continue as his primary caregiver (he has dementia)  - repeatedly expresses her gratitude for all her daughter, an RN, does for her  - would like Rx's for Zofran and Oxycodone sent to her pharmacy  - wishes to continue getting some community services, ie, Meals on Wheels 3 days/week      Thank you for the opportunity to be of service to this patient and family.      Ovidio Turner (Ann) CNP  Palliative Care  [facility] cell:  [phone]     Face to face:   1210 - 1310, 60 min, > 50% spent discussing history, symptoms, goals of care.      Non face to face:   [id number] and 1600 - 1705, 80 min, > 50% spent reviewing chart for history, coordinating care with attending, nursing, Care Transitions; calculating daily oral morphine equivalents for the sum of all PO/IV opioids administered in order to determine if she is a candidate for a LA opioid (she is not).       = = = = = = = = = = = = = = = =      PROBLEM LIST  Patient Active Problem List   Diagnosis     Urethral stricture due to infection     Hyperlipidemia     Essential  "hypertension     IMPAIRED FASTING GLUCOSE     Hyperlipidemia LDL goal <100     CKD (chronic kidney disease) stage 3, GFR 30-59 ml/min (H)     GERD (gastroesophageal reflux disease)     Mild intermittent asthma in adult without complication     Rectal cancer (H)     Abdominal pain     Asymptomatic bacteriuria       PAST MEDICAL HISTORY  Past Medical History:   Diagnosis Date     Actinic keratosis      Basal cell carcinoma      Other and unspecified hyperlipidemia      Unspecified asthma(493.90)      Unspecified essential hypertension      Urethral stricture due to unspecified infection     ureteral stone       PAST SURGICAL HISTORY  Past Surgical History:   Procedure Laterality Date     SURGICAL HISTORY OF -       open reduction of second metatarsal neck fx with internal fixation  cna d closed treatment of metatarsal to 2 and 4 fx     SURGICAL HISTORY OF -       hysterectomy and oophorectomy     SURGICAL HISTORY OF -       lithotripsy       FAMILY MEDICAL HISTORY  Family History   Problem Relation Age of Onset     Cancer - colorectal Mother      Breast Cancer Mother      C.A.D. Father      Cardiovascular Father      Alcohol/Drug Father      Chronic Obstructive Pulmonary Disease Brother      Cancer Son         chest cancer (Thymus)/myesthenia gravis     Musculoskeletal Disorder Son         myesthenia gravis     Cerebrovascular Disease Sister      Chronic Obstructive Pulmonary Disease Brother        SOCIAL HISTORY  History   Smoking Status     Never Smoker   Smokeless Tobacco     Never Used     Social History    Substance and Sexual Activity      Alcohol use: Yes        Frequency: Monthly or less        Comment: wine ocass    History   Drug Use No     Social History     Social History Narrative    February 18, 2020:   to [place] for 68 years and with whom she had 3 sons and  1 daughter.  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  She " now lives in [place] with her .  She never smoked and rarely has a glass of wine.  [place].  Still drives, locally only.      Ovidio Turner (Ann), CNP    [facility] Palliative Care       SPIRITUAL HISTORY  As above    ALLERGIES  Allergies   Allergen Reactions     Flu Virus Vaccine Other (See Comments)     With egg base     Gabapentin Nausea and Vomiting       MEDICATIONS  Medications Prior to Admission  No current facility-administered medications on file prior to encounter.   acetaminophen (TYLENOL) 500 MG tablet, Take 500-1,000 mg by mouth daily as needed for mild pain  albuterol (PROAIR HFA) 108 (90 Base) MCG/ACT inhaler, Inhale 2 puffs into the lungs every 4 hours as needed  aspirin 81 MG EC tablet, Take 81 mg by mouth daily  atenolol (TENORMIN) 25 MG tablet, Take 1 tablet (25 mg) by mouth daily  CALCIUM + D OR, Take 1 tablet by mouth daily   capecitabine (XELODA) 500 MG tablet, Start out 1 g po bid, 7 days on, 7 days off. Target is 1.5 g po bid, 7 days on and off  chlorthalidone (HYGROTON) 25 MG tablet, Take 1 tablet (25 mg) by mouth daily  fluticasone (FLOVENT HFA) 110 MCG/ACT inhaler, Inhale 2 puffs into the lungs 2 times daily  magnesium oxide (MAG-OX) 400 MG tablet, Take 400 mg by mouth every evening   melatonin 3 MG tablet, Take 3 mg by mouth At Bedtime Also 1 tab overnight as needed  MULTI-DAY VITAMINS OR, Take 1 tablet by mouth daily   omeprazole (PRILOSEC) 20 MG DR capsule, Take 1 capsule (20 mg) by mouth daily  polyethylene glycol (MIRALAX) powder, Take 0.5 capfuls by mouth daily 8.5 grams daily in the morning   simvastatin (ZOCOR) 40 MG tablet, Take 1 tablet (40 mg) by mouth At Bedtime  Probiotic Product (PROBIOTIC DAILY PO), Take 1 capsule by mouth daily         Current Medications    aspirin  81 mg Oral Daily     atenolol  25 mg Oral Daily     chlorthalidone  25 mg Oral Daily     ciprofloxacin  500 mg Oral Q12H HIMANSHU     fluticasone  1 puff Inhalation Daily     magnesium oxide  400  mg Oral QPM     omeprazole  20 mg Oral BID     simvastatin  40 mg Oral At Bedtime     sodium chloride (PF)  3 mL Intracatheter Q8H       PRN Medications  acetaminophen, acetaminophen, acetaminophen, albuterol, capecitabine, lidocaine 4%, lidocaine (buffered or not buffered), melatonin, naloxone, ondansetron **OR** ondansetron, oxyCODONE, polyethylene glycol, potassium chloride, potassium chloride with lidocaine, potassium chloride, potassium chloride, potassium chloride, prochlorperazine **OR** prochlorperazine **OR** prochlorperazine, sodium chloride (PF)      REVIEW OF SYSTEMS  Pain negligible; per Nursing, Decadron seems to have helped a great deal.  Was having N/V seemingly related to opioids, but that improved too.  Was given the equivalent of 35 mg oral morphine on her first (partial) hospital day, 60 mg yesterday, but only 7.5 mg today.  Appetite good.  Her stools were quite watery starting sometime around the start of radiation, but they seem to be firming up at present.      Performance Assessment:    Palliative Performance Scale, v.2     40%  Extensive disease. Mainly in bed w/little ambulation. ADLs/activities mainly w/assistance. Normal or reduced intake. Full LOC or drowsy or confused.        PHYSICAL EXAMINATION  Patient Vitals for the past 24 hrs:   BP Temp Temp src Pulse Heart Rate Resp SpO2 Weight   02/18/20 1543 128/61 98.3  F (36.8  C) Oral -- 64 18 98 % --   02/18/20 1300 121/59 98.2  F (36.8  C) Oral 64 -- 18 96 % --   02/18/20 1214 -- -- -- -- -- 18 -- --   02/18/20 0757 133/52 98.2  F (36.8  C) Oral 78 -- 18 98 % --   02/18/20 0532 -- -- -- -- -- -- -- 51.5 kg (113 lb 8.6 oz)   02/18/20 0313 111/55 97.9  F (36.6  C) Oral 63 -- 16 95 % --   02/17/20 2307 -- -- -- -- -- 16 -- --   02/17/20 2256 105/52 98.1  F (36.7  C) Oral -- 67 18 97 % --   02/17/20 1924 97/47 98.2  F (36.8  C) Oral -- 70 18 95 % --   02/17/20 1722 -- -- -- -- -- 18 -- --      Wt Readings from Last 5 Encounters:   02/18/20  51.5 kg (113 lb 8.6 oz)   02/11/20 47.6 kg (105 lb)   02/06/20 48.3 kg (106 lb 6.4 oz)   01/27/20 48.3 kg (106 lb 6.4 oz)   01/09/20 47.6 kg (105 lb)     Constitutional:  Awake, alert, in no apparent distress  Eyes:  Anicteric, PERRL  HEENT:  Neck supple, OP pink, moist  Lymph/Hematologic:  No epitrochlear, axillary, anterior or posterior cervical, or supraclavicular lymphadenopathy is appreciated  Cardiovascular:  RRR w/pronounced murmur heard best to the L of the sternum  Respiratory:  Breathing unlabored, lungs  clear to ausculatation, no cough  GI: Firm, non-tender, normal bowel sounds, no hepatosplenomegaly  Genitourinary:  deferred  Musculoskeletal:  AVINASH x 4; grossly normal muscle mass and tone  Skin:  Warm, dry, no mottling  Neurological:  Nonfocal  Psych:  Bright affect, occ lapses in ST recall,  articulate      DATA  ROUTINE ICU LABS (Last four results)  CMP  Recent Labs   Lab 02/18/20  1026 02/17/20  0500 02/17/20  0118 02/16/20  1128   NA  --  141  --  136   POTASSIUM 2.9* 3.5 3.2* 3.3*   CHLORIDE  --  107  --  102   CO2  --  29  --  27   ANIONGAP  --  5  --  7   GLC  --  97  --  98   BUN  --  13  --  18   CR  --  0.64  --  0.67   GFRESTIMATED  --  78  --  77   GFRESTBLACK  --  >90  --  90   DANIEL  --  8.0*  --  9.1   PROTTOTAL  --   --   --  6.6*   ALBUMIN  --   --   --  2.6*   BILITOTAL  --   --   --  0.4   ALKPHOS  --   --   --  94   AST  --   --   --  27   ALT  --   --   --  26     CBC  Recent Labs   Lab 02/17/20  0500 02/16/20  1128   WBC 4.3 5.8   RBC 3.73* 4.66   HGB 11.7 14.6   HCT 35.4 44.2   MCV 95 95   MCH 31.4 31.3   MCHC 33.1 33.0   RDW 13.2 12.8    191     INRNo lab results found in last 7 days.  Arterial Blood GasNo lab results found in last 7 days.      No results found for this or any previous visit (from the past 48 hour(s)).

## 2020-02-18 NOTE — PROGRESS NOTES
Patient is alert and oriented. Intermittent pain and nausea LLQ abdomen, 5 mg oxycodone and zofran given as well as scheduled decadron, all have been effective. Taking IV unasyn abx.  ml, stopped after wt taken x4, went from 46.7 kg to 51.5 kg. Reassessed LS - clear and for edema, none found. Spoke w/Dr. Serrano. Self manages colostomy bag, stoma red intact. Up SBA, generalized weakness. LS clear RA.

## 2020-02-18 NOTE — PROGRESS NOTES
[facility] Internal Medicine Progress Note     Date of Service (when I saw the patient): 02/18/2020    REASON FOR ADMISSION / INTERVAL HISTORY:  Pam Pierce is a 89 year old female admitted on 2/16/2020. She presented to the emergency department for evaluation of abdominal pain, nausea, vomiting, and diarrhea.    CT ABD-IMPRESSION:   1.  Above the known low rectal mass with invasion into the vagina,  there is new abnormal mucosal thickening of the distal sigmoid colon.  This could be related to tumor extension, infectious/inflammatory  colitis, or acute diverticulitis. No evidence of perforation or  abscess.  2.  Cholelithiasis. Intra- and extrahepatic biliary dilation similar  to prior studies.    ASSESSMENT/PLAN:   Abdominal pain with nausea, vomiting, and diarrhea  Symptoms present for past few months, significantly more severe in past few days. Occurs in the setting of known rectal cancer/rectal mass s/p colostomy in Nov 2019. CT abdomen & pelvis as above. Afebrile, no leukocytosis on admission. Was started on Unasyn in the emergency department. Unclear cause at this time  radiation colitis, or diverticulitis are all on the differential. enteric stool panel and c diff-negative. No fever-neg stool w/u-doubt infectious  Started decadron iv 2/17. Pain much better today-but not gone.  Stop  Unasyn today/ continue  decadron iv. Palliative care to see-likely would start scheduled narcotics.     Asymptomatic bacteriuria  Admit UA positive for large leukocyte esterase with pyuria (WBC > 182). Afebrile, no leukocytosis on admission. Patient is asymptomatic but has lower abd pain.  - Urine culture 50-100k mixed colonies. Will rx with antbx 5 days     Rectal cancer / adenocarcinoma  Diagnosed Nov 2019, is s/p colectomy/colostomy. Follows with Dr. Diggs. Underwent 5 treatments of palliative radiation, last on 2/6/20. Started palliative Xeloda chemo on day of admission (2/16/2020).  - stop Xeloda 1g bid, as symptoms  "persistent and worse now. Will discuss with Dr Diggs  - palliative care     CKD (chronic kidney disease) stage 3, GFR 30-59 ml/min  Admit creatinine 0.67 (baseline 0.6 - 0.7), GFR 77 (baseline 67 - > 90).   Stable.     Essential hypertension  Managed prior to admission with atenolol 25 mg and chlorthalidone 25 mg daily.  - Continue prior to admission atenolol and chlorthalidone     Hyperlipidemia LDL goal <100  Managed prior to admission with Zocor 40 mg q hs, continue.     Mild intermittent asthma in adult without complication  Stable respiratory status. Managed prior to admission with Flovent bid and prn albuterol.  - Hold Flovent  - Prn albuterol nebs available      GERD (gastroesophageal reflux disease)  Managed prior to admission with omeprazole 20 mg daily, continue.     DISPO  Pt FTT. Having progressive ongoing sx and requesting pain control and comfort. Will have palliative care consult today. Discussed with daughter 2/17.       SUSAN FRYE MD   Pg [phone]    DVT Prhylaxis: Pneumatic Compression Devices  Code Status: DNR/DNI    ROS:  As described in A/P and Exam.  Otherwise ALL are  negative.    PHYSICAL EXAM:  All vitals have been reviewed    Blood pressure 133/52, pulse 78, temperature 98.2  F (36.8  C), temperature source Oral, resp. rate 18, height 1.499 m (4' 11\"), weight 51.5 kg (113 lb 8.6 oz), SpO2 98 %, not currently breastfeeding.    I/O this shift:  In: 480 [P.O.:480]  Out: -     GENERAL APPEARANCE: weak-ill appearing, alert and in distress from pain  EYES: conjunctiva clear, eyes grossly normal  HENT: external ears and nose normal   NECK: supple, no masses or adenopathy  RESP: lungs clear to auscultation - no rales, rhonchi or wheezes  CV: regular rate and rhythm, normal S1 S2, no S3 or S4 and no murmur, click or rub   ABDOMEN: soft, mild-mod tenderness diffuse lower abdomen, no HSM or masses and bowel sounds normal  MS: no clubbing, cyanosis; no edema  SKIN: clear without significant rashes or " lesions  NEURO: -non-focal moves all 4 extr    ROUTINE  LABS (Last four results)  CMP  Recent Labs   Lab 02/18/20  1026 02/17/20  0500 02/17/20  0118 02/16/20  1128   NA  --  141  --  136   POTASSIUM 2.9* 3.5 3.2* 3.3*   CHLORIDE  --  107  --  102   CO2  --  29  --  27   ANIONGAP  --  5  --  7   GLC  --  97  --  98   BUN  --  13  --  18   CR  --  0.64  --  0.67   GFRESTIMATED  --  78  --  77   GFRESTBLACK  --  >90  --  90   DANIEL  --  8.0*  --  9.1   PROTTOTAL  --   --   --  6.6*   ALBUMIN  --   --   --  2.6*   BILITOTAL  --   --   --  0.4   ALKPHOS  --   --   --  94   AST  --   --   --  27   ALT  --   --   --  26     CBC  Recent Labs   Lab 02/17/20  0500 02/16/20  1128   WBC 4.3 5.8   RBC 3.73* 4.66   HGB 11.7 14.6   HCT 35.4 44.2   MCV 95 95   MCH 31.4 31.3   MCHC 33.1 33.0   RDW 13.2 12.8    191     INRNo lab results found in last 7 days.  Arterial Blood GasNo lab results found in last 7 days.    No results found for this or any previous visit (from the past 24 hour(s)).

## 2020-02-18 NOTE — PLAN OF CARE
No pain this morning, discussed with patient to alert nurse when pain returned to medicate. Had episode of severe pain around 1130, medicated with oxycodone 5 mg which was effective.  Daughter, Karine wondering if MD will start a long acting narcotic as discussed by MD this morning.  Ovidio Turner consulted today.  Up in chair throughout the day, eating regular diet and has no nausea.  Alarms in place for safety and gait more steady today than yesterday.  Patient self manages colostomy bag.  Potassium partially replaced 2/17 night shift, recheck ordered this am per protocol.  Result was 2.9-oral replacement given and recheck at 1800.

## 2020-02-19 LAB — POTASSIUM SERPL-SCNC: 3.8 MMOL/L (ref 3.4–5.3)

## 2020-02-19 PROCEDURE — 25000132 ZZH RX MED GY IP 250 OP 250 PS 637: Mod: GY | Performed by: INTERNAL MEDICINE

## 2020-02-19 PROCEDURE — 25000132 ZZH RX MED GY IP 250 OP 250 PS 637: Mod: GY | Performed by: NURSE PRACTITIONER

## 2020-02-19 PROCEDURE — 25000132 ZZH RX MED GY IP 250 OP 250 PS 637: Mod: GY | Performed by: PHYSICIAN ASSISTANT

## 2020-02-19 PROCEDURE — 99233 SBSQ HOSP IP/OBS HIGH 50: CPT | Performed by: INTERNAL MEDICINE

## 2020-02-19 PROCEDURE — 36415 COLL VENOUS BLD VENIPUNCTURE: CPT | Performed by: INTERNAL MEDICINE

## 2020-02-19 PROCEDURE — 84132 ASSAY OF SERUM POTASSIUM: CPT | Performed by: INTERNAL MEDICINE

## 2020-02-19 PROCEDURE — 25000128 H RX IP 250 OP 636: Performed by: PHYSICIAN ASSISTANT

## 2020-02-19 PROCEDURE — 40000275 ZZH STATISTIC RCP TIME EA 10 MIN

## 2020-02-19 PROCEDURE — 12000000 ZZH R&B MED SURG/OB

## 2020-02-19 PROCEDURE — 25800030 ZZH RX IP 258 OP 636: Performed by: INTERNAL MEDICINE

## 2020-02-19 RX ORDER — PANTOPRAZOLE SODIUM 20 MG/1
40 TABLET, DELAYED RELEASE ORAL
Status: DISCONTINUED | OUTPATIENT
Start: 2020-02-20 | End: 2020-02-19

## 2020-02-19 RX ORDER — PANTOPRAZOLE SODIUM 20 MG/1
40 TABLET, DELAYED RELEASE ORAL
Status: DISCONTINUED | OUTPATIENT
Start: 2020-02-19 | End: 2020-02-21 | Stop reason: HOSPADM

## 2020-02-19 RX ADMIN — SODIUM CHLORIDE: 9 INJECTION, SOLUTION INTRAVENOUS at 23:39

## 2020-02-19 RX ADMIN — FLUTICASONE FUROATE 1 PUFF: 100 POWDER RESPIRATORY (INHALATION) at 09:13

## 2020-02-19 RX ADMIN — OXYCODONE HYDROCHLORIDE 5 MG: 5 TABLET ORAL at 05:13

## 2020-02-19 RX ADMIN — ASPIRIN 81 MG: 81 TABLET, COATED ORAL at 09:12

## 2020-02-19 RX ADMIN — SIMVASTATIN 40 MG: 40 TABLET, FILM COATED ORAL at 20:41

## 2020-02-19 RX ADMIN — ACETAMINOPHEN 650 MG: 325 TABLET, FILM COATED ORAL at 13:40

## 2020-02-19 RX ADMIN — ATENOLOL 25 MG: 25 TABLET ORAL at 09:13

## 2020-02-19 RX ADMIN — CIPROFLOXACIN HYDROCHLORIDE 500 MG: 500 TABLET, FILM COATED ORAL at 20:41

## 2020-02-19 RX ADMIN — ONDANSETRON 4 MG: 2 INJECTION INTRAMUSCULAR; INTRAVENOUS at 17:57

## 2020-02-19 RX ADMIN — OXYCODONE HYDROCHLORIDE 5 MG: 5 TABLET ORAL at 17:57

## 2020-02-19 RX ADMIN — ACETAMINOPHEN 650 MG: 325 TABLET, FILM COATED ORAL at 09:12

## 2020-02-19 RX ADMIN — Medication 400 MG: at 20:41

## 2020-02-19 RX ADMIN — CIPROFLOXACIN HYDROCHLORIDE 500 MG: 500 TABLET, FILM COATED ORAL at 09:13

## 2020-02-19 RX ADMIN — OMEPRAZOLE 20 MG: 20 CAPSULE, DELAYED RELEASE ORAL at 09:12

## 2020-02-19 RX ADMIN — CHLORTHALIDONE 25 MG: 25 TABLET ORAL at 09:13

## 2020-02-19 RX ADMIN — POLYETHYLENE GLYCOL 3350 8.5 G: 17 POWDER, FOR SOLUTION ORAL at 05:17

## 2020-02-19 RX ADMIN — SODIUM CHLORIDE: 9 INJECTION, SOLUTION INTRAVENOUS at 00:04

## 2020-02-19 RX ADMIN — OXYCODONE HYDROCHLORIDE 5 MG: 5 TABLET ORAL at 23:38

## 2020-02-19 RX ADMIN — SODIUM CHLORIDE: 9 INJECTION, SOLUTION INTRAVENOUS at 10:40

## 2020-02-19 ASSESSMENT — ACTIVITIES OF DAILY LIVING (ADL)
ADLS_ACUITY_SCORE: 13

## 2020-02-19 NOTE — PROGRESS NOTES
[facility] Internal Medicine Progress Note     Date of Service (when I saw the patient): 02/19/2020    REASON FOR ADMISSION / INTERVAL HISTORY:  Pam Pierce is a 89 year old female admitted on 2/16/2020. She presented to the emergency department for evaluation of abdominal pain, nausea, vomiting, and diarrhea. Still has abd pain but tolerating po.     CT ABD-IMPRESSION:   1.  Above the known low rectal mass with invasion into the vagina,  there is new abnormal mucosal thickening of the distal sigmoid colon.  This could be related to tumor extension, infectious/inflammatory  colitis, or acute diverticulitis. No evidence of perforation or  abscess.  2.  Cholelithiasis. Intra- and extrahepatic biliary dilation similar  to prior studies.    ASSESSMENT/PLAN:     Abdominal pain with nausea, vomiting, and diarrhea  Symptoms present for past few months, significantly more severe in past few days. Occurs in the setting of known rectal cancer/rectal mass s/p colostomy in Nov 2019. CT abdomen & pelvis as above. Afebrile, no leukocytosis on admission. Was started on Unasyn in the emergency department. 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 Enteric stool panel and c diff-negative. No fever-neg stool w/u-doubt infectious  Got 3 doses  decadron iv. Discussed with Dr Diggs. Stopped decadron/ antbx.   -Will rx conservatively with iv fluids/ pain meds. Due to location of pain(R paraumbilical/ epigastric--also lower abd), will have EGD scheduled in AM. Change prilosec to protonix. Keep NPO after MN.     Asymptomatic bacteriuria  Admit UA positive for large leukocyte esterase with pyuria (WBC > 182). Afebrile, no leukocytosis on admission. Patient is asymptomatic but has lower abd pain.  - Urine culture 50-100k mixed colonies. Will rx with cipro x 5 days     Rectal cancer / adenocarcinoma  Diagnosed Nov 2019, is s/p colectomy/colostomy. Follows with Dr. Diggs. Underwent 5 treatments of palliative radiation, last on 2/6/20. Started palliative  "Xeloda chemo on day of admission (2/16/2020).  - stop Xeloda 1g bid, as symptoms active and persistent now. Will need to discuss with Dr Diggs about resuming po chemo prior to discharge.  - palliative care     CKD (chronic kidney disease) stage 3, GFR 30-59 ml/min  Admit creatinine 0.67 (baseline 0.6 - 0.7), GFR 77 (baseline 67 - > 90).   Stable.     Essential hypertension  Managed prior to admission with atenolol 25 mg and chlorthalidone 25 mg daily.  - Continue prior to admission atenolol and chlorthalidone     Hyperlipidemia LDL goal <100  Managed prior to admission with Zocor 40 mg q hs, continue.     Mild intermittent asthma in adult without complication  Stable respiratory status. Managed prior to admission with Flovent bid and prn albuterol.  - Hold Flovent  - Prn albuterol nebs available      GERD (gastroesophageal reflux disease)  Managed prior to admission with omeprazole 20 mg daily, continue.     DISPO  Palliative care on case.  Discussed with daughter 2/17. Pt wants to go home to care for demented . Discharge in AM ? After  EGD       SUSAN FRYE MD   Pg [phone]    DVT Prhylaxis: Pneumatic Compression Devices  Code Status: DNR/DNI    ROS:  As described in A/P and Exam.  Otherwise ALL are  negative.    PHYSICAL EXAM:  All vitals have been reviewed    Blood pressure 113/58, pulse 58, temperature 98.6  F (37  C), temperature source Oral, resp. rate 18, height 1.499 m (4' 11\"), weight 51.5 kg (113 lb 8.6 oz), SpO2 97 %, not currently breastfeeding.    No intake/output data recorded.    GENERAL APPEARANCE: weak-ill appearing, alert and in distress from pain  EYES: conjunctiva clear, eyes grossly normal  HENT: external ears and nose normal   NECK: supple, no masses or adenopathy  RESP: lungs clear to auscultation - no rales, rhonchi or wheezes  CV: regular rate and rhythm, normal S1 S2, no S3 or S4 and no murmur, click or rub   ABDOMEN: soft, mild-mod tenderness R paraumbilical, epigastric-mild " diffuse lower abdomen, no HSM or masses and bowel sounds normal  MS: no clubbing, cyanosis; no edema  SKIN: clear without significant rashes or lesions  NEURO: -non-focal moves all 4 extr    ROUTINE  LABS (Last four results)  CMP  Recent Labs   Lab 02/19/20  0508 02/18/20  1836 02/18/20  1026 02/17/20  0500  02/16/20  1128   NA  --   --   --  141  --  136   POTASSIUM 3.8 4.3 2.9* 3.5   < > 3.3*   CHLORIDE  --   --   --  107  --  102   CO2  --   --   --  29  --  27   ANIONGAP  --   --   --  5  --  7   GLC  --   --   --  97  --  98   BUN  --   --   --  13  --  18   CR  --   --   --  0.64  --  0.67   GFRESTIMATED  --   --   --  78  --  77   GFRESTBLACK  --   --   --  >90  --  90   DANIEL  --   --   --  8.0*  --  9.1   PROTTOTAL  --   --   --   --   --  6.6*   ALBUMIN  --   --   --   --   --  2.6*   BILITOTAL  --   --   --   --   --  0.4   ALKPHOS  --   --   --   --   --  94   AST  --   --   --   --   --  27   ALT  --   --   --   --   --  26    < > = values in this interval not displayed.     CBC  Recent Labs   Lab 02/17/20  0500 02/16/20  1128   WBC 4.3 5.8   RBC 3.73* 4.66   HGB 11.7 14.6   HCT 35.4 44.2   MCV 95 95   MCH 31.4 31.3   MCHC 33.1 33.0   RDW 13.2 12.8    191     INRNo lab results found in last 7 days.  Arterial Blood GasNo lab results found in last 7 days.    No results found for this or any previous visit (from the past 24 hour(s)).

## 2020-02-19 NOTE — PROGRESS NOTES
SPIRITUAL HEALTH SERVICES  SPIRITUAL ASSESSMENT Progress Note  [facility] - Med/Surg    Referral Source: Patient request on admission    I shared a reflective conversation with Pam which incorporated elements of illness and family narrative along with  spiritual history.    - This was a follow up visit.  Previously Pam was sleeping so I spoke with her daughter, Karine.  Today patient was standing at the sink washing up and welcomed the visit.    - She shared about her home Buddhism - [facility] in [place], the challenge of providing care for her , Gonzales, who has dementia, and the support she receives from her children during this challenging time.  She talked also about their early lives working at the [employer] and how they have been  for 68 years.    - Pam has good support from family and Buddhism.  She is planning to stay another day to build up her strength.    Plan: I will be available for any ongoing spiritual support requested.      Jake Levy M.A., [facility]  Staff Chaplain [facility]  Office: [phone]  Cell: [phone]  Pager [phone]

## 2020-02-19 NOTE — PROGRESS NOTES
"Pt reports feeling bloated with aching type pain in abdomen, \"not feeling good this afternoon\", states feeling dizzy, lightheaded, no nausea, no appetite. Blood pressure 129/55. HR 53. (no significant change in HR), Instructed pt to use call light, alert oriented.   "

## 2020-02-19 NOTE — PLAN OF CARE
Pt reports minimal abdominal pain today, no nausea, managed with tylenol.  Tolerating regular diet, ate 50% at meals.  Up in room and ambulated in hallway with SBA.  Steady when up, uses call light appropriately.

## 2020-02-19 NOTE — PROGRESS NOTES
Patient is alert and oriented. Up sba to bathroom. Pt does own colostomy cares, stoma red intact. 5 mg oxycodone for abdomen cramping, has been effective. Pt also asked for Miralax, pt felt that stool was thicker/harder, stool appeared soft. LS clear on RA. Regular diet, nausea. Potassium 3.8 this a.m.

## 2020-02-20 ENCOUNTER — ANESTHESIA (OUTPATIENT)
Dept: GASTROENTEROLOGY | Facility: CLINIC | Age: 85
DRG: 394 | End: 2020-02-20
Payer: MEDICARE

## 2020-02-20 ENCOUNTER — ANESTHESIA EVENT (OUTPATIENT)
Dept: GASTROENTEROLOGY | Facility: CLINIC | Age: 85
DRG: 394 | End: 2020-02-20
Payer: MEDICARE

## 2020-02-20 LAB
ANION GAP SERPL CALCULATED.3IONS-SCNC: 2 MMOL/L (ref 3–14)
BUN SERPL-MCNC: 12 MG/DL (ref 7–30)
CALCIUM SERPL-MCNC: 8 MG/DL (ref 8.5–10.1)
CHLORIDE SERPL-SCNC: 109 MMOL/L (ref 94–109)
CO2 SERPL-SCNC: 33 MMOL/L (ref 20–32)
CREAT SERPL-MCNC: 0.85 MG/DL (ref 0.52–1.04)
ERYTHROCYTE [DISTWIDTH] IN BLOOD BY AUTOMATED COUNT: 13.2 % (ref 10–15)
GFR SERPL CREATININE-BSD FRML MDRD: 60 ML/MIN/{1.73_M2}
GLUCOSE SERPL-MCNC: 81 MG/DL (ref 70–99)
HCT VFR BLD AUTO: 37.1 % (ref 35–47)
HGB BLD-MCNC: 11.9 G/DL (ref 11.7–15.7)
MCH RBC QN AUTO: 31 PG (ref 26.5–33)
MCHC RBC AUTO-ENTMCNC: 32.1 G/DL (ref 31.5–36.5)
MCV RBC AUTO: 97 FL (ref 78–100)
PLATELET # BLD AUTO: 158 10E9/L (ref 150–450)
POTASSIUM SERPL-SCNC: 3.7 MMOL/L (ref 3.4–5.3)
RBC # BLD AUTO: 3.84 10E12/L (ref 3.8–5.2)
SODIUM SERPL-SCNC: 144 MMOL/L (ref 133–144)
UPPER GI ENDOSCOPY: NORMAL
WBC # BLD AUTO: 6.4 10E9/L (ref 4–11)

## 2020-02-20 PROCEDURE — 88342 IMHCHEM/IMCYTCHM 1ST ANTB: CPT | Performed by: SURGERY

## 2020-02-20 PROCEDURE — 25000132 ZZH RX MED GY IP 250 OP 250 PS 637: Mod: GY | Performed by: PHYSICIAN ASSISTANT

## 2020-02-20 PROCEDURE — 37000008 ZZH ANESTHESIA TECHNICAL FEE, 1ST 30 MIN: Performed by: SURGERY

## 2020-02-20 PROCEDURE — 25000132 ZZH RX MED GY IP 250 OP 250 PS 637: Mod: GY | Performed by: INTERNAL MEDICINE

## 2020-02-20 PROCEDURE — 25000132 ZZH RX MED GY IP 250 OP 250 PS 637: Mod: GY | Performed by: SURGERY

## 2020-02-20 PROCEDURE — 99231 SBSQ HOSP IP/OBS SF/LOW 25: CPT | Performed by: NURSE PRACTITIONER

## 2020-02-20 PROCEDURE — 25800030 ZZH RX IP 258 OP 636: Performed by: NURSE ANESTHETIST, CERTIFIED REGISTERED

## 2020-02-20 PROCEDURE — 43239 EGD BIOPSY SINGLE/MULTIPLE: CPT | Performed by: SURGERY

## 2020-02-20 PROCEDURE — 25800030 ZZH RX IP 258 OP 636: Performed by: INTERNAL MEDICINE

## 2020-02-20 PROCEDURE — 99232 SBSQ HOSP IP/OBS MODERATE 35: CPT | Performed by: FAMILY MEDICINE

## 2020-02-20 PROCEDURE — 36415 COLL VENOUS BLD VENIPUNCTURE: CPT | Performed by: INTERNAL MEDICINE

## 2020-02-20 PROCEDURE — 85027 COMPLETE CBC AUTOMATED: CPT | Performed by: INTERNAL MEDICINE

## 2020-02-20 PROCEDURE — 25000132 ZZH RX MED GY IP 250 OP 250 PS 637: Mod: GY | Performed by: FAMILY MEDICINE

## 2020-02-20 PROCEDURE — 12000000 ZZH R&B MED SURG/OB

## 2020-02-20 PROCEDURE — 88305 TISSUE EXAM BY PATHOLOGIST: CPT | Performed by: SURGERY

## 2020-02-20 PROCEDURE — 25000125 ZZHC RX 250: Performed by: NURSE ANESTHETIST, CERTIFIED REGISTERED

## 2020-02-20 PROCEDURE — 25000128 H RX IP 250 OP 636: Performed by: NURSE ANESTHETIST, CERTIFIED REGISTERED

## 2020-02-20 PROCEDURE — 25000125 ZZHC RX 250: Performed by: SURGERY

## 2020-02-20 PROCEDURE — 80048 BASIC METABOLIC PNL TOTAL CA: CPT | Performed by: INTERNAL MEDICINE

## 2020-02-20 PROCEDURE — 88342 IMHCHEM/IMCYTCHM 1ST ANTB: CPT | Mod: 26 | Performed by: SURGERY

## 2020-02-20 PROCEDURE — 88305 TISSUE EXAM BY PATHOLOGIST: CPT | Mod: 26 | Performed by: SURGERY

## 2020-02-20 RX ORDER — DICYCLOMINE HYDROCHLORIDE 10 MG/1
10 CAPSULE ORAL 4 TIMES DAILY PRN
Status: DISCONTINUED | OUTPATIENT
Start: 2020-02-20 | End: 2020-02-21 | Stop reason: HOSPADM

## 2020-02-20 RX ORDER — SIMETHICONE 40MG/0.6ML
SUSPENSION, DROPS(FINAL DOSAGE FORM)(ML) ORAL PRN
Status: DISCONTINUED | OUTPATIENT
Start: 2020-02-20 | End: 2020-02-20 | Stop reason: HOSPADM

## 2020-02-20 RX ORDER — PROPOFOL 10 MG/ML
INJECTION, EMULSION INTRAVENOUS PRN
Status: DISCONTINUED | OUTPATIENT
Start: 2020-02-20 | End: 2020-02-20

## 2020-02-20 RX ORDER — SODIUM CHLORIDE, SODIUM LACTATE, POTASSIUM CHLORIDE, CALCIUM CHLORIDE 600; 310; 30; 20 MG/100ML; MG/100ML; MG/100ML; MG/100ML
INJECTION, SOLUTION INTRAVENOUS CONTINUOUS
Status: DISCONTINUED | OUTPATIENT
Start: 2020-02-20 | End: 2020-02-20 | Stop reason: HOSPADM

## 2020-02-20 RX ORDER — LIDOCAINE 40 MG/G
CREAM TOPICAL
Status: DISCONTINUED | OUTPATIENT
Start: 2020-02-20 | End: 2020-02-20 | Stop reason: HOSPADM

## 2020-02-20 RX ORDER — OXYCODONE HYDROCHLORIDE 5 MG/1
2.5 TABLET ORAL
Qty: 60 TABLET | Refills: 0 | Status: SHIPPED | OUTPATIENT
Start: 2020-02-20 | End: 2020-08-13

## 2020-02-20 RX ORDER — OXYCODONE HCL 5 MG/5 ML
3 SOLUTION, ORAL ORAL
Status: DISCONTINUED | OUTPATIENT
Start: 2020-02-20 | End: 2020-02-21 | Stop reason: HOSPADM

## 2020-02-20 RX ORDER — LIDOCAINE HYDROCHLORIDE 10 MG/ML
INJECTION, SOLUTION INFILTRATION; PERINEURAL PRN
Status: DISCONTINUED | OUTPATIENT
Start: 2020-02-20 | End: 2020-02-20

## 2020-02-20 RX ADMIN — DICYCLOMINE HYDROCHLORIDE 10 MG: 10 CAPSULE ORAL at 15:23

## 2020-02-20 RX ADMIN — ASPIRIN 81 MG: 81 TABLET, COATED ORAL at 12:03

## 2020-02-20 RX ADMIN — LIDOCAINE HYDROCHLORIDE 100 MG: 10 INJECTION, SOLUTION INFILTRATION; PERINEURAL at 10:13

## 2020-02-20 RX ADMIN — PANTOPRAZOLE SODIUM 40 MG: 20 TABLET, DELAYED RELEASE ORAL at 06:14

## 2020-02-20 RX ADMIN — SODIUM CHLORIDE, POTASSIUM CHLORIDE, SODIUM LACTATE AND CALCIUM CHLORIDE: 600; 310; 30; 20 INJECTION, SOLUTION INTRAVENOUS at 10:11

## 2020-02-20 RX ADMIN — CHLORTHALIDONE 25 MG: 25 TABLET ORAL at 12:03

## 2020-02-20 RX ADMIN — DICYCLOMINE HYDROCHLORIDE 10 MG: 10 CAPSULE ORAL at 22:04

## 2020-02-20 RX ADMIN — OXYCODONE HYDROCHLORIDE 5 MG: 5 TABLET ORAL at 06:14

## 2020-02-20 RX ADMIN — ATENOLOL 25 MG: 25 TABLET ORAL at 12:04

## 2020-02-20 RX ADMIN — PROPOFOL 50 MG: 10 INJECTION, EMULSION INTRAVENOUS at 10:13

## 2020-02-20 RX ADMIN — SODIUM CHLORIDE: 9 INJECTION, SOLUTION INTRAVENOUS at 15:35

## 2020-02-20 RX ADMIN — SIMVASTATIN 40 MG: 40 TABLET, FILM COATED ORAL at 22:01

## 2020-02-20 RX ADMIN — FLUTICASONE FUROATE 1 PUFF: 100 POWDER RESPIRATORY (INHALATION) at 12:04

## 2020-02-20 RX ADMIN — Medication 400 MG: at 17:28

## 2020-02-20 ASSESSMENT — ACTIVITIES OF DAILY LIVING (ADL)
ADLS_ACUITY_SCORE: 13

## 2020-02-20 NOTE — ANESTHESIA PREPROCEDURE EVALUATION
Anesthesia Pre-Procedure Evaluation    Patient: Pam Pierce   MRN: [id number] : 1930          Preoperative Diagnosis: Epigastric pain [R10.13]    Procedure(s):  ESOPHAGOGASTRODUODENOSCOPY (EGD)    Past Medical History:   Diagnosis Date     Actinic keratosis      Basal cell carcinoma      Other and unspecified hyperlipidemia      Unspecified asthma(493.90)      Unspecified essential hypertension      Urethral stricture due to unspecified infection     ureteral stone     Past Surgical History:   Procedure Laterality Date     SURGICAL HISTORY OF -       open reduction of second metatarsal neck fx with internal fixation  cna d closed treatment of metatarsal to 2 and 4 fx     SURGICAL HISTORY OF -       hysterectomy and oophorectomy     SURGICAL HISTORY OF -       lithotripsy       Anesthesia Evaluation     . Pt has had prior anesthetic. Type: General, MAC and Regional    No history of anesthetic complications          ROS/MED HX    ENT/Pulmonary:     (+)asthma Treatment: Inhaler daily,  , . .    Neurologic:  - neg neurologic ROS     Cardiovascular:     (+) Dyslipidemia, hypertension----. : . . . :. .       METS/Exercise Tolerance:  1 - Eating, dressing   Hematologic:  - neg hematologic  ROS       Musculoskeletal:   (+) arthritis,  -       GI/Hepatic:     (+) GERD       Renal/Genitourinary:     (+) chronic renal disease, type: CRI,       Endo:  - neg endo ROS       Psychiatric:  - neg psychiatric ROS       Infectious Disease:  - neg infectious disease ROS       Malignancy:   (+) Malignancy History of GI  GI CA  Active status post Surgery,         Other:                          Physical Exam  Normal systems: cardiovascular, pulmonary and dental    Airway   Mallampati: II  TM distance: >3 FB  Neck ROM: full    Dental     Cardiovascular   Rhythm and rate: regular and normal      Pulmonary    breath sounds clear to auscultation            Lab Results   Component Value Date    WBC 6.4 2020    HGB 11.9  "02/20/2020    HCT 37.1 02/20/2020     02/20/2020     02/20/2020    POTASSIUM 3.7 02/20/2020    CHLORIDE 109 02/20/2020    CO2 33 (H) 02/20/2020    BUN 12 02/20/2020    CR 0.85 02/20/2020    GLC 81 02/20/2020    DANIEL 8.0 (L) 02/20/2020    ALBUMIN 2.6 (L) 02/16/2020    PROTTOTAL 6.6 (L) 02/16/2020    ALT 26 02/16/2020    AST 27 02/16/2020    ALKPHOS 94 02/16/2020    BILITOTAL 0.4 02/16/2020    TSH 2.55 03/27/2013       Preop Vitals  BP Readings from Last 3 Encounters:   02/20/20 125/59   02/11/20 128/68   02/06/20 127/61    Pulse Readings from Last 3 Encounters:   02/20/20 58   02/11/20 64   02/06/20 84      Resp Readings from Last 3 Encounters:   02/20/20 16   02/11/20 16   02/06/20 18    SpO2 Readings from Last 3 Encounters:   02/20/20 96%   02/11/20 96%   02/06/20 98%      Temp Readings from Last 1 Encounters:   02/20/20 36.5  C (97.7  F) (Oral)    Ht Readings from Last 1 Encounters:   02/16/20 1.499 m (4' 11\")      Wt Readings from Last 1 Encounters:   02/18/20 51.5 kg (113 lb 8.6 oz)    Estimated body mass index is 22.93 kg/m  as calculated from the following:    Height as of this encounter: 1.499 m (4' 11\").    Weight as of this encounter: 51.5 kg (113 lb 8.6 oz).       Anesthesia Plan      History & Physical Review  History and physical reviewed and following examination; no interval change.    ASA Status:  3 .    NPO Status:  > 8 hours    Plan for MAC Reason for MAC:  Deep or markedly invasive procedure (G8) and Procedure to face, neck, head or breast         Postoperative Care      Consents  Anesthetic plan, risks, benefits and alternatives discussed with:  Patient..                 WILEY Lora CRNA  "

## 2020-02-20 NOTE — PROGRESS NOTES
[facility] TRANSPORT NOTE  Data:   Reason for Transport:  endoscopy    Pam Pierce was transported to [facility] via wheel chair at 0900.  Patient was accompanied by Nursing Assistant. Equipment used for transport: None. Family was aware of reason for transport: yes    Action:  Report: given to Lorena    Response:  Patient's condition when transferred off unit was stable.    Addis Roberts RN

## 2020-02-20 NOTE — OP NOTE
EGD - normal esophagus stomach and duodenum.  Biopsies taken from esophagus for histology and stomach for H pylori.

## 2020-02-20 NOTE — PROGRESS NOTES
Pt had episode of mid epigastric discomfort beginning late this afternoon.  Very similar to occurrence yesterday.  She denies nausea.  Dose of Bentyl given at 1530.  Pt tolerated well, able to rest after med given.  She awoke at 1700, pain resolved and able to eat dinner.  Pt reports that Bentyl worked well, she is comfortable at this time and declines need for oxycodone now.  She's hoping to discharge home tomorrow.

## 2020-02-20 NOTE — PROGRESS NOTES
Palliative Care Follow-up Hospital Note  Date of Admission:  2/16/2020   Visit Date:  February 20, 2020        HISTORY of PRESENT ILLNESS:  Pam Pierce is a 89 year old year old female with a multi-year history of abdominal pain presented to the [facility] ED in mid November with abdominal pain, a change in bowel habits and a 35-lb weight loss; CT demonstrated an invasive rectosigmoid mass.  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  During that hospitalization she was also found to  have aortic stenosis.  Furthermore, during the abdominal surgery, she arrested (thought due to a vagal response to bowel manipulation), underwent CPR, and again became bradycardic while intubated; it was determined that she probably was NOT in need of a permanent pacemaker in light of the circumstances under which these events occurred.  She met with Dr Diggs in early January; she wished to pursue a palliative approach with oral agents only, and agreed to XRT--she did not want to suffer a lot of symptoms.  She completed 5 fractions/2500 cGy radiation on 2/7/20.  Approximately 9-10 days later, she presented again to [facility] ED with pelvic/abdominal pain/cramping, the day on which she started oral Xeloda; she was taking nothing stronger than Tylenol for her pain at that time.  She rated her pain as moderate to severe and nearly constant and occurring in the presence of nausea and vomiting, loose watery stools   Since then, she has been ordered both Oxycodone and Dilaudid IV PRN and her pain is improved.  She remains on empiric antibiotics for the possibility of post-radiation colitis.  She was referred to Palliative Care for assistance with symptom management.        ASSESSMENT & PLAN:     -Rectal carcinoma with invasion of the vaginal and bladder wall; s/p palliative XRT; oral Xeloda on hold while hospitalized  -Abdominal  and pelvic pain, severe on admission, improved considerably on PRN opioids and BID Decadron (decadron DC'd after 3 doses in consultation with Dr Diggs)  - 2/20: pain only in the epigastric region; EGD pending  - now on high dose Protonix  - use of opioids has dropped off significantly after her first full hospital day; is not currently a candidate for a long-acting opioid.   - 2/20, due to subjective dizziness and drop in BP after Oxy 5 dosing, will decrease the dose of Oxy to 2.5 mg q3h PRN pain.  I explained to her and her daughter that she is not taking enough of the Oxy for me to safely prescribe OxyContin.       Nausea +/- vomiting.  Appears to be related to opioid administration  - monitor; would like to have a prescription for Zofran ODT post discharge     Advance Care Planning:  - Understanding of condition:  Understand that it cannot be cured.  - Decisional capacity:  intact  - Code status:  DNR / DNI  - Health Care Directive: Yes, If Yes, is there a copy in the EMR?   Yes  - POLST:  Completed today, 2/20     Goals of Care:  - Suspects, now, that her  may outlive her; wants to continue sufficiently comfortable so that she can continue as his primary caregiver (he has dementia)  - repeatedly expresses her gratitude for all her daughter Karine, an RN, does for her  - would like Rx's for Zofran and Oxycodone sent to her pharmacy (done)  - wishes to continue getting some community services, ie, Meals on Wheels 3 days/week  - considering home-based palliative care      Thank you for the opportunity to be of service to this patient and family.      Ovidio Turner CNP ([place])  Palliative Care  Private cell:  [phone]       Face to face:   0840 - 0824, 15 min    Non face to face:  0855 - 0901 and 5935-2604, 40 min, > 50% spent coordinating care with  attending and Care Transitions and discussing home-based palliative care option with daughter Karine outside her mother's  room    ========================    SUBJECTIVE:  Patient reports pain is in the epigastric region, feels like bad heartburn.  Not relieved by protonix or by former bid dosing of omeprazole.  Denies any sense of cramping.  Reports she used to be incontinent of urine, but that resolved after ostomy.  Nursing reports that patient gets dizzy and has difficulty maintaining balance after being given 5mg dose of Oxycodone.  Starting on 2/17, she was given a total of 30, then 10, then 15 mg oxycodone over a 24-hour period.  Was given another dose prior to going down for an EGD; daughter appears to be encouraging her mother to take the oxy on a regular schedule to keep the pain from getting too bad; daughter Karine (an OB RN) prefers her mother remain here another day due to the repeated episodes of dizziness.      ROS:  As described in HPI and Subjective.  Otherwise, ALL are negative.    MEDICATIONS:  Allergies   Allergen Reactions     Flu Virus Vaccine Other (See Comments)     With egg base     Gabapentin Nausea and Vomiting     Scheduled meds:    aspirin  81 mg Oral Daily     atenolol  25 mg Oral Daily     chlorthalidone  25 mg Oral Daily     ciprofloxacin  500 mg Oral Q12H HIMANSHU     fluticasone  1 puff Inhalation Daily     magnesium oxide  400 mg Oral QPM     pantoprazole  40 mg Oral QAM AC     simvastatin  40 mg Oral At Bedtime     sodium chloride (PF)  3 mL Intracatheter Q8H     sodium chloride (PF)  3 mL Intracatheter Q8H     PRN meds:  acetaminophen, acetaminophen, acetaminophen, albuterol, capecitabine, lidocaine 4%, lidocaine 4%, lidocaine (buffered or not buffered), lidocaine (buffered or not buffered), melatonin, naloxone, ondansetron **OR** ondansetron, oxyCODONE, polyethylene glycol, potassium chloride, potassium chloride with lidocaine, potassium chloride, potassium chloride, potassium chloride, prochlorperazine **OR** prochlorperazine **OR** prochlorperazine, sodium chloride (PF), sodium chloride  (PF)      OBJECTIVE:  Patient Vitals for the past 24 hrs:   BP Temp Temp src Pulse Resp SpO2   02/20/20 0739 103/51 98.2  F (36.8  C) Oral 61 16 99 %   02/20/20 0219 125/59 97.7  F (36.5  C) Oral 58 16 96 %   02/20/20 0030 -- -- -- -- 16 --   02/19/20 2321 135/63 97.8  F (36.6  C) Oral 66 16 98 %   02/19/20 1859 (!) 154/69 97.8  F (36.6  C) Oral 56 18 99 %   02/19/20 1835 -- -- -- -- 12 --   02/19/20 1800 -- -- -- -- 14 --   02/19/20 1729 (!) 143/66 97.8  F (36.6  C) Oral 59 18 98 %   02/19/20 1600 -- -- -- -- -- 99 %   02/19/20 1557 129/55 97.5  F (36.4  C) Oral 53 18 98 %   02/19/20 1121 113/58 98.6  F (37  C) Oral 58 18 97 %       Wt Readings from Last 10 Encounters:   02/18/20 51.5 kg (113 lb 8.6 oz)   02/11/20 47.6 kg (105 lb)   02/06/20 48.3 kg (106 lb 6.4 oz)   01/27/20 48.3 kg (106 lb 6.4 oz)   01/09/20 47.6 kg (105 lb)   12/18/19 47.9 kg (105 lb 9.6 oz)   11/19/19 45.4 kg (100 lb)   07/15/19 46.3 kg (102 lb)   07/18/18 46.3 kg (102 lb)   05/09/17 57.2 kg (126 lb)   Awake, alert, in no apparent distress but does appear to sway back and forth with voluntary unassisted attempt to transfer from bed to wheelchair.    Intake/Output Summary (Last 24 hours) at 2/20/2020 0838  Last data filed at 2/19/2020 1700  Gross per 24 hour   Intake 915 ml   Output --   Net 915 ml         ROUTINE ICU LABS (Last four results)  CMP  Recent Labs   Lab 02/20/20  0525 02/19/20  0508 02/18/20  1836 02/18/20  1026 02/17/20  0500  02/16/20  1128     --   --   --  141  --  136   POTASSIUM 3.7 3.8 4.3 2.9* 3.5   < > 3.3*   CHLORIDE 109  --   --   --  107  --  102   CO2 33*  --   --   --  29  --  27   ANIONGAP 2*  --   --   --  5  --  7   GLC 81  --   --   --  97  --  98   BUN 12  --   --   --  13  --  18   CR 0.85  --   --   --  0.64  --  0.67   GFRESTIMATED 60*  --   --   --  78  --  77   GFRESTBLACK 70  --   --   --  >90  --  90   DANIEL 8.0*  --   --   --  8.0*  --  9.1   PROTTOTAL  --   --   --   --   --   --  6.6*   ALBUMIN  --    --   --   --   --   --  2.6*   BILITOTAL  --   --   --   --   --   --  0.4   ALKPHOS  --   --   --   --   --   --  94   AST  --   --   --   --   --   --  27   ALT  --   --   --   --   --   --  26    < > = values in this interval not displayed.     CBC  Recent Labs   Lab 02/20/20  0525 02/17/20  0500 02/16/20  1128   WBC 6.4 4.3 5.8   RBC 3.84 3.73* 4.66   HGB 11.9 11.7 14.6   HCT 37.1 35.4 44.2   MCV 97 95 95   MCH 31.0 31.4 31.3   MCHC 32.1 33.1 33.0   RDW 13.2 13.2 12.8    163 191       EKG 11/23/19 OSH  VENTRICULAR RATE 96 BPM   ATRIAL RATE 96 BPM   P-R INTERVAL 160 ms   QRS DURATION 72 ms   Q-T INTERVAL 328 ms   QTC CALCULATION (BEZET) 414 ms   P Axis 21 degrees   R AXIS 8 degrees   T AXIS 208 degrees   MUSE DIAGNOSIS Sinus rhythm with Premature supraventricular complexes  Nonspecific T wave abnormality  Abnormal ECG  When compared with ECG of 20-NOV-2019 19:28,  Premature supraventricular complexes are now Present  Vent. rate has increased BY  36 BPM  QT has shortened  Confirmed by BONY MACIEL, AUGUST LOC: ([id number]) on 11/23/2019 4:16:08 PM

## 2020-02-20 NOTE — PROGRESS NOTES
Pt continues to feel dizzy, abdomen discomfort increased, slightly nauseated, 5 mg oxycodone given and zofran, continue to monitor. Alarms on for safety

## 2020-02-20 NOTE — PROGRESS NOTES
[facility]    Medicine Progress Note - Hospitalist Service       Date of Admission:  2/16/2020  Date of Service (I saw patient) 2/20/2020     REASON FOR ADMISSION / INTERVAL HISTORY:  Pam Pierce is a 89 year old female admitted on 2/16/2020. She presented to the emergency department for evaluation of abdominal pain, nausea, vomiting, and diarrhea. Still has abd pain but tolerating po.      CT ABD-IMPRESSION:   1.  Above the known low rectal mass with invasion into the vagina,  there is new abnormal mucosal thickening of the distal sigmoid colon.  This could be related to tumor extension, infectious/inflammatory  colitis, or acute diverticulitis. No evidence of perforation or  abscess.  2.  Cholelithiasis. Intra- and extrahepatic biliary dilation similar  to prior studies.    Assessment & Plan      Abdominal pain with nausea, vomiting, diarrhea  More severe in the few days prior to admission.  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  Enteric pathogens and c diff negative.   Did get decadron IV x 3 and this was stopped after discussion with Dr. Diggs.   No empiric antibiotics.     EGD today without mass/ulcer.    Continue protonix  Resume diet, advance as tolerate  Will try dicyclomine for cramping abdominal pain, can try this before giving oxycodone.  If not effective, oxycodone 3 mg is available to patient (5mg seems to make her a bit more off balance).  The pain medication does help resolve the pain.     Asymptomatic bacteriuria  UA positive on admission, but culture 50-100k mixed colonies.   cipro discontinued today    Rectal cancer/adenocarcinoma s/p colostomy 11/2019  Had 5 doses of palliative radiation last 2/6/2020.   Started xeloda chemo (palliative) on 2/16/2020  -xeloda is on hold, will discuss plan with Dr. Diggs prior to discharge  -palliative care home care will be ordered on discharge  Appreciate palliative care help while inpatient.    Stage III CKD  At baseline  "(0.6-0.7)  Stable    Essential hypertension  Continue atenolol and chlorthalidone     Hypokalemia  Resolved, is on oral potassium now    Hyperlipidemia  Continue simvastatin 40 mg daily    Mild intermittent asthma without complication  Stable  flovent continued  Continue albuterol prn    GERD  Continue omeprazole 20 mg daily    Disposition  Plan home tomorrow if tolerating diet  Appreciate palliative care consult        Diet: Advance Diet as Tolerated: Clear Liquid Diet    DVT Prophylaxis: Pneumatic Compression Devices  Clayton Catheter: not present  Code Status: DNR/DNI      Disposition Plan   Expected discharge: Tomorrow, recommended to prior living arrangement once adequate pain management/ tolerating PO medications.  Entered: Ana Lilia Gonzalez MD 02/20/2020, 11:47 AM       The patient's care was discussed with the Bedside Nurse, Patient and Patient's Family.    Ana Lilia Gonzalez MD  Hospitalist Service  [facility]    ______________________________________________________________________    Interval History   EGD unremarkable  Did okay yesterday - went about 12 hours without pain medication.  Seemed a bit \"wobbly\" on her feet after oxycodone 5 mg.      Data reviewed today: I reviewed all medications, new labs and imaging results over the last 24 hours. I personally reviewed no images or EKG's today.    Physical Exam   Vital Signs: Temp: 98.2  F (36.8  C) Temp src: Oral BP: (!) 144/59 Pulse: 58   Resp: 16 SpO2: 98 % O2 Device: None (Room air) Oxygen Delivery: 3 LPM  Weight: 113 lbs 8.59 oz  Constitutional: awake, alert, cooperative, no apparent distress, and appears stated age  ENT: normocepalic, without obvious abnormality  Hematologic / Lymphatic: no cervical lymphadenopathy and no supraclavicular lymphadenopathy  Respiratory: No increased work of breathing, good air exchange, clear to auscultation bilaterally, no crackles or wheezing  Cardiovascular: regular rate and rhythm with   GI: " normal bowel sounds, ostomy is normal with good output  Skin: normal skin color, texture, turgor  Musculoskeletal: no lower extremity pitting edema present    Data   Recent Labs   Lab 02/20/20  0525 02/19/20  0508 02/18/20  1836  02/17/20  0500  02/16/20  1128   WBC 6.4  --   --   --  4.3  --  5.8   HGB 11.9  --   --   --  11.7  --  14.6   MCV 97  --   --   --  95  --  95     --   --   --  163  --  191     --   --   --  141  --  136   POTASSIUM 3.7 3.8 4.3   < > 3.5   < > 3.3*   CHLORIDE 109  --   --   --  107  --  102   CO2 33*  --   --   --  29  --  27   BUN 12  --   --   --  13  --  18   CR 0.85  --   --   --  0.64  --  0.67   ANIONGAP 2*  --   --   --  5  --  7   DANIEL 8.0*  --   --   --  8.0*  --  9.1   GLC 81  --   --   --  97  --  98   ALBUMIN  --   --   --   --   --   --  2.6*   PROTTOTAL  --   --   --   --   --   --  6.6*   BILITOTAL  --   --   --   --   --   --  0.4   ALKPHOS  --   --   --   --   --   --  94   ALT  --   --   --   --   --   --  26   AST  --   --   --   --   --   --  27    < > = values in this interval not displayed.     No results found for this or any previous visit (from the past 24 hour(s)).

## 2020-02-20 NOTE — ANESTHESIA POSTPROCEDURE EVALUATION
Patient: Pam Pierce    Procedure(s):  ESOPHAGOGASTRODUODENOSCOPY, WITH BIOPSY    Diagnosis:Epigastric pain [R10.13]  Diagnosis Additional Information: No value filed.    Anesthesia Type:  MAC    Note:  Anesthesia Post Evaluation    Patient location during evaluation: Phase 2 and Bedside  Patient participation: Able to fully participate in evaluation  Level of consciousness: awake and alert  Pain management: adequate  Airway patency: patent  Cardiovascular status: acceptable  Respiratory status: acceptable  Hydration status: acceptable  PONV: none     Anesthetic complications: None          Last vitals:  Vitals:    02/20/20 1023 02/20/20 1031 02/20/20 1039   BP: 133/66 135/64 135/62   Pulse: 64 61    Resp: 16 16 16   Temp:      SpO2: 98% 99% 99%         Electronically Signed By: Dio Tapia CRNA, APRN CRNA  February 20, 2020  10:42 AM

## 2020-02-20 NOTE — PROGRESS NOTES
Daughter called and was concerned about her mother not getting scheduled oxy and requested that a note be left for physician. Questioning whether she will be ready for discharge tomorrow since she feels so sick this evening. Sticky note left for physician.

## 2020-02-20 NOTE — PLAN OF CARE
Pt was able to rest comfortably overnight. Medicated with 5 mg oxycodone every 3 hours. Pt was denying pain @0200 and this writer woke her up @0600 to administer pain medication. Reported sleeping well. NPO at midnight. IVMF @75/hr. On RA. Afebrile. VSS. Up with SBA.

## 2020-02-21 VITALS
HEART RATE: 68 BPM | DIASTOLIC BLOOD PRESSURE: 65 MMHG | OXYGEN SATURATION: 95 % | WEIGHT: 112.43 LBS | RESPIRATION RATE: 16 BRPM | SYSTOLIC BLOOD PRESSURE: 123 MMHG | HEIGHT: 59 IN | TEMPERATURE: 99 F | BODY MASS INDEX: 22.67 KG/M2

## 2020-02-21 PROCEDURE — 25000132 ZZH RX MED GY IP 250 OP 250 PS 637: Mod: GY | Performed by: FAMILY MEDICINE

## 2020-02-21 PROCEDURE — 25000132 ZZH RX MED GY IP 250 OP 250 PS 637: Mod: GY | Performed by: PHYSICIAN ASSISTANT

## 2020-02-21 PROCEDURE — 99239 HOSP IP/OBS DSCHRG MGMT >30: CPT | Performed by: FAMILY MEDICINE

## 2020-02-21 PROCEDURE — 25000132 ZZH RX MED GY IP 250 OP 250 PS 637: Mod: GY | Performed by: INTERNAL MEDICINE

## 2020-02-21 PROCEDURE — 25800030 ZZH RX IP 258 OP 636: Performed by: INTERNAL MEDICINE

## 2020-02-21 RX ORDER — PANTOPRAZOLE SODIUM 40 MG/1
40 TABLET, DELAYED RELEASE ORAL
Qty: 30 TABLET | Refills: 0 | Status: SHIPPED | OUTPATIENT
Start: 2020-02-22 | End: 2020-02-26

## 2020-02-21 RX ORDER — POTASSIUM CHLORIDE 750 MG/1
10 TABLET, EXTENDED RELEASE ORAL 2 TIMES DAILY
Qty: 60 TABLET | Refills: 0 | Status: SHIPPED | OUTPATIENT
Start: 2020-02-21 | End: 2020-03-03

## 2020-02-21 RX ORDER — DICYCLOMINE HYDROCHLORIDE 10 MG/1
10 CAPSULE ORAL 4 TIMES DAILY PRN
Qty: 120 CAPSULE | Refills: 0 | Status: SHIPPED | OUTPATIENT
Start: 2020-02-21 | End: 2020-02-26

## 2020-02-21 RX ADMIN — ATENOLOL 25 MG: 25 TABLET ORAL at 08:35

## 2020-02-21 RX ADMIN — ASPIRIN 81 MG: 81 TABLET, COATED ORAL at 08:35

## 2020-02-21 RX ADMIN — PANTOPRAZOLE SODIUM 40 MG: 20 TABLET, DELAYED RELEASE ORAL at 06:05

## 2020-02-21 RX ADMIN — DICYCLOMINE HYDROCHLORIDE 10 MG: 10 CAPSULE ORAL at 08:40

## 2020-02-21 RX ADMIN — FLUTICASONE FUROATE 1 PUFF: 100 POWDER RESPIRATORY (INHALATION) at 08:35

## 2020-02-21 RX ADMIN — SODIUM CHLORIDE: 9 INJECTION, SOLUTION INTRAVENOUS at 04:59

## 2020-02-21 RX ADMIN — CHLORTHALIDONE 25 MG: 25 TABLET ORAL at 08:35

## 2020-02-21 RX ADMIN — DICYCLOMINE HYDROCHLORIDE 10 MG: 10 CAPSULE ORAL at 03:23

## 2020-02-21 ASSESSMENT — ACTIVITIES OF DAILY LIVING (ADL)
ADLS_ACUITY_SCORE: 13

## 2020-02-21 ASSESSMENT — MIFFLIN-ST. JEOR: SCORE: 835.63

## 2020-02-21 NOTE — DISCHARGE SUMMARY
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  The following labs/tests are recommended:   potassium.    Follow up with Dr. Diggs as previously planned               Unresulted Labs Ordered in the Past 30 Days of this Admission     Date and Time Order Name Status Description    2/20/2020 1017 Surgical pathology exam In process       These results will be followed up by PCP    Discharge Disposition   Discharged to home  Condition at discharge: Stable    Hospital Course     Abdominal pain with nausea, vomiting, diarrhea  More severe in the few days prior to admission.  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  Enteric pathogens and c diff negative.   Did get decadron IV x 3 and this was stopped after discussion with Dr. Diggs.   No empiric antibiotics.      EGD today without mass/ulcer.    Continue protonix  Resume diet, advance as tolerate  Will try dicyclomine for cramping abdominal pain, can try this before giving oxycodone.  If not effective, oxycodone 3 mg is available to patient (5mg seems to make her a bit more off balance).  The pain medication does help resolve the pain.     Dicyclomine did help with the cramping and pain.  She will use that four times daily prn and then have oxycodone 2.5 mg on hand if needed for more severe pain.      Asymptomatic bacteriuria  UA positive on admission, but culture 50-100k mixed colonies.   cipro  discontinued today     Rectal cancer/adenocarcinoma s/p colostomy 11/2019  Had 5 doses of palliative radiation last 2/6/2020.   Started xeloda chemo (palliative) on 2/16/2020    -resume xeloda on discharge     Stage III CKD  At baseline (0.6-0.7)  Stable     Essential hypertension  Continue atenolol and chlorthalidone      Hypokalemia  Resolved, is on oral potassium now    On discharge will have potassium chloride 10 meq twice daily. Recheck potassium in the next week as outpatient with PCP.     Hyperlipidemia  Continue simvastatin 40 mg daily     Mild intermittent asthma without complication  Stable  flovent continued  Continue albuterol prn     GERD  Omeprazole changed to pantoprazole 40 mg daily    Consultations This Hospital Stay   NUTRITION SERVICES ADULT IP CONSULT  PALLIATIVE CARE ADULT IP CONSULT  PALLIATIVE CARE ADULT IP CONSULT    Code Status   DNR/DNI    Time Spent on this Encounter   I, Ana Lilia Gonzalez MD, personally saw the patient today and spent greater than 30 minutes discharging this patient.       Ana Lilia Gonzalez MD  [facility]  ______________________________________________________________________    Physical Exam   Vital Signs: Temp: 99  F (37.2  C) Temp src: Oral BP: 123/65 Pulse: 68   Resp: 16 SpO2: 95 % O2 Device: None (Room air) Oxygen Delivery: 3 LPM  Weight: 112 lbs 6.95 oz   Constitutional: awake, alert, cooperative, no apparent distress, and appears stated age  ENT: normocepalic, without obvious abnormality  Hematologic / Lymphatic: no cervical lymphadenopathy and no supraclavicular lymphadenopathy  Respiratory: No increased work of breathing, good air exchange, clear to auscultation bilaterally, no crackles or wheezing  Cardiovascular: regular rate and rhythm with   GI: normal bowel sounds, ostomy is normal with good output. Mildly tender to palpation epigastric region  Skin: normal skin color, texture, turgor  Musculoskeletal: no lower extremity pitting  edema present          Primary Care Physician   Reagan Thakkar    Discharge Orders      Reason for your hospital stay    Abdominal pain/cramping thought possibly due to radiation colitis.  Upper endoscopy unremarkable.  Started Dicyclomine (Bentyl) which was well tolerated for abdominal pain.  Can use as needed oxycodone as well.     Follow-up and recommended labs and tests     Follow up with primary care provider, Reagan Thakkar, within 7 days for hospital follow- up.  The following labs/tests are recommended: potassium.    Follow up with Dr. Diggs as previously planned     Activity    Your activity upon discharge: activity as tolerated     DNR/DNI     Diet    Follow this diet upon discharge: Orders Placed This Encounter      Advance Diet as Tolerated: Regular Diet Adult       Significant Results and Procedures   Most Recent 3 CBC's:  Recent Labs   Lab Test 02/20/20  0525 02/17/20  0500 02/16/20  1128   WBC 6.4 4.3 5.8   HGB 11.9 11.7 14.6   MCV 97 95 95    163 191     Most Recent 3 BMP's:  Recent Labs   Lab Test 02/20/20  0525 02/19/20  0508 02/18/20  1836  02/17/20  0500  02/16/20  1128     --   --   --  141  --  136   POTASSIUM 3.7 3.8 4.3   < > 3.5   < > 3.3*   CHLORIDE 109  --   --   --  107  --  102   CO2 33*  --   --   --  29  --  27   BUN 12  --   --   --  13  --  18   CR 0.85  --   --   --  0.64  --  0.67   ANIONGAP 2*  --   --   --  5  --  7   DANIEL 8.0*  --   --   --  8.0*  --  9.1   GLC 81  --   --   --  97  --  98    < > = values in this interval not displayed.     Most Recent 3 INR's:No lab results found.,   Results for orders placed or performed during the hospital encounter of 02/16/20   Abd/pelvis CT, IV contrast only TRAUMA  / AAA    Narrative    CT ABDOMEN AND PELVIS WITH CONTRAST 2/16/2020 12:20 PM    CLINICAL HISTORY: Known colon cancer history with partial colectomy  and existing colostomy. New severe cramping pain felt in the pelvis,  nausea with  vomiting.    TECHNIQUE: CT scan of the abdomen and pelvis was performed following  injection of IV contrast. Multiplanar reformats were obtained. Dose  reduction techniques were used.    CONTRAST: 49 mL Isovue.    COMPARISON: PET/CT 1/16/2020, rectal MRI 1/16/2020, CT abdomen and  pelvis 11/19/2019.    FINDINGS:   LOWER CHEST: Normal.    HEPATOBILIARY: Cholelithiasis. There is intra- and extrahepatic  biliary ductal dilation. Common bile duct measures 11 mm. No focal  liver lesions.    PANCREAS: Normal.    SPLEEN: Subcentimeter hypodensity in the spleen is likely a small cyst  but ultimately too small to characterize. Unchanged from prior.    ADRENAL GLANDS: Normal.    KIDNEYS/BLADDER: Normal.    BOWEL: Diverting colostomy in the upper left abdomen. Colonic  diverticulosis. Marked abnormal mucosal thickening in the sigmoid  colon. Rectal mass with invasion into the vagina again noted. No  pericolonic abscess or evidence of perforation.    PELVIC ORGANS: The vagina is abnormal. Abnormal perirectal lymph nodes  better demonstrated on prior rectal MRI.    ADDITIONAL FINDINGS: Nonaneurysmal aortic atherosclerosis.    MUSCULOSKELETAL: Normal.      Impression    IMPRESSION:   1.  Above the known low rectal mass with invasion into the vagina,  there is new abnormal mucosal thickening of the distal sigmoid colon.  This could be related to tumor extension, infectious/inflammatory  colitis, or acute diverticulitis. No evidence of perforation or  abscess.  2.  Cholelithiasis. Intra- and extrahepatic biliary dilation similar  to prior studies.    PATRICK FENG MD       Discharge Medications   Current Discharge Medication List      START taking these medications    Details   dicyclomine 10 MG PO capsule Take 1 capsule (10 mg) by mouth 4 times daily as needed (abdominal pain/cramping)  Qty: 120 capsule, Refills: 0    Associated Diagnoses: Epigastric pain      ondansetron 4 MG PO ODT tab Take 1 tablet (4 mg) by mouth every 6  hours as needed for nausea or vomiting  Qty: 30 tablet, Refills: 1    Associated Diagnoses: Adenocarcinoma, colon (H); Nausea; Rectal cancer (H)      pantoprazole 40 MG PO EC tablet Take 1 tablet (40 mg) by mouth every morning (before breakfast)  Qty: 30 tablet, Refills: 0    Associated Diagnoses: Epigastric pain         CONTINUE these medications which have CHANGED    Details   oxyCODONE 5 MG PO tablet Take 0.5 tablets (2.5 mg) by mouth every 3 hours as needed for moderate to severe pain Higher doses appear to have caused dizziness.  Qty: 60 tablet, Refills: 0    Comments: This REPLACES the previous prescription specifying 5-10 mg q3h PRN #30.  Associated Diagnoses: Adenocarcinoma, colon (H); Rectal cancer (H)         CONTINUE these medications which have NOT CHANGED    Details   acetaminophen (TYLENOL) 500 MG tablet Take 500-1,000 mg by mouth daily as needed for mild pain      albuterol (PROAIR HFA) 108 (90 Base) MCG/ACT inhaler Inhale 2 puffs into the lungs every 4 hours as needed  Qty: 1 Inhaler, Refills: 11    Associated Diagnoses: Mild intermittent asthma without complication      aspirin 81 MG EC tablet Take 81 mg by mouth daily      atenolol (TENORMIN) 25 MG tablet Take 1 tablet (25 mg) by mouth daily  Qty: 90 tablet, Refills: 3    Associated Diagnoses: Benign essential hypertension      CALCIUM + D OR Take 1 tablet by mouth daily       capecitabine (XELODA) 500 MG tablet Start out 1 g po bid, 7 days on, 7 days off. Target is 1.5 g po bid, 7 days on and off  Qty: 70 tablet, Refills: 0    Associated Diagnoses: Rectal cancer (H)      chlorthalidone (HYGROTON) 25 MG tablet Take 1 tablet (25 mg) by mouth daily  Qty: 90 tablet, Refills: 3    Associated Diagnoses: Benign essential hypertension      fluticasone (FLOVENT HFA) 110 MCG/ACT inhaler Inhale 2 puffs into the lungs 2 times daily  Qty: 1 Inhaler, Refills: 11    Associated Diagnoses: Mild intermittent asthma without complication      magnesium oxide (MAG-OX)  400 MG tablet Take 400 mg by mouth every evening       melatonin 3 MG tablet Take 3 mg by mouth At Bedtime Also 1 tab overnight as needed      MULTI-DAY VITAMINS OR Take 1 tablet by mouth daily       omeprazole (PRILOSEC) 20 MG DR capsule Take 1 capsule (20 mg) by mouth daily  Qty: 90 capsule, Refills: 0    Associated Diagnoses: Gastroesophageal reflux disease without esophagitis      polyethylene glycol (MIRALAX) powder Take 0.5 capfuls by mouth daily 8.5 grams daily in the morning       simvastatin (ZOCOR) 40 MG tablet Take 1 tablet (40 mg) by mouth At Bedtime  Qty: 90 tablet, Refills: 3    Associated Diagnoses: Hyperlipidemia LDL goal <100      Probiotic Product (PROBIOTIC DAILY PO) Take 1 capsule by mouth daily            Allergies   Allergies   Allergen Reactions     Flu Virus Vaccine Other (See Comments)     With egg base     Gabapentin Nausea and Vomiting

## 2020-02-21 NOTE — PLAN OF CARE
[facility] DISCHARGE NOTE    Patient discharged to home at 10:45 AM via wheel chair. Accompanied by daughter and staff. Discharge instructions reviewed with patient and daughter, opportunity offered to ask questions. Prescriptions sent to patients preferred pharmacy. All belongings sent with patient.    Sharon Goss RN

## 2020-02-24 ENCOUNTER — PATIENT OUTREACH (OUTPATIENT)
Dept: CARE COORDINATION | Facility: CLINIC | Age: 85
End: 2020-02-24

## 2020-02-24 DIAGNOSIS — Z71.89 OTHER SPECIFIED COUNSELING: ICD-10-CM

## 2020-02-24 ASSESSMENT — ACTIVITIES OF DAILY LIVING (ADL): DEPENDENT_IADLS:: TRANSPORTATION

## 2020-02-24 NOTE — PROGRESS NOTES
Clinic Care Coordination Contact    Clinic Care Coordination Contact  OUTREACH    Referral Information:  Referral Source: IP Report    Primary Diagnosis: Oncology    Chief Complaint   Patient presents with     Clinic Care Coordination - Post Hospital     RN        [place] Utilization: follows with Dr. Diggs at [facility] for palliative treatment of rectal cancer  Clinic Utilization  Difficulty keeping appointments:: No  Compliance Concerns: No  No-Show Concerns: No  No PCP office visit in Past Year: No  Utilization    Last refreshed: 2/24/2020  9:39 AM:  Hospital Admissions 1           Last refreshed: 2/24/2020  9:39 AM:  ED Visits 1           Last refreshed: 2/24/2020  9:39 AM:  No Show Count (past year) 0              Current as of: 2/24/2020  9:39 AM              Clinical Concerns:  Current Medical Concerns:  RN CC spoke to patient's daughter Karine (consent to communicate on file).  Karine reports patient is doing well since hospital discharge. She is taking medications as prescribed.  Only one question related to hospital discharge plan- patient was started on oral potassium supplement due to hypokalemia. Karine is unsure what the follow up plan is regarding this.  RN CC reviewed discharge summary and noted a lab recheck was advised with in 1 week. Patient's follow up is scheduled for 2/26.  RN CC will send staff message to PCP just for awareness.     Current Behavioral Concerns: No concerns noted      Education Provided to patient: introduced clinic care coordination;  Also reviewed potential community/home resources- Karine wishes patient would be receptive to palliative home care referral but states patient has been ready.  We reviewed general criteria for home care referral as well as touched on hospice referral. Karine appreciative of information    Pain  Pain (GOAL):: Yes  Type: Acute (<3mo)  Location of chronic pain:: abdominal pain  Radiating: No  Progression: Improving  Description of pain:  Aching  Limitation of routine activities due to chronic pain:: No  Alleviating Factors: Rest, Pain Medication  Aggravating Factors: Positioning, Activity  Health Maintenance Reviewed: Due/Overdue  Health Maintenance Due   Topic Date Due     DEXA  01/06/1930     ASTHMA ACTION PLAN  01/06/1930     MEDICARE ANNUAL WELLNESS VISIT  01/06/1995     ZOSTER IMMUNIZATION (2 of 3) 03/17/2010     PHQ-2  01/01/2020       Clinical Pathway: None    Medication Management:  Karine denies any questions regarding current medications. Patient is adherent and no financial concerns reported at this time.     Functional Status:  Dependent ADLs:: Independent  Dependent IADLs:: Transportation  Bed or wheelchair confined:: No  Mobility Status: Independent  Fallen 2 or more times in the past year?: No  Any fall with injury in the past year?: No    Living Situation:  Current living arrangement:: I live in a private home with spouse  Type of residence:: Private home - no stairs    Lifestyle & Psychosocial Needs:  Patient is primary caregiver for her , Gonzales who has dementia. They live in a private home. Their adult children- daughter Karine and son Jake live nearby. Also have neighbors who support patient and Gonzales.    Diet:: Regular  Inadequate nutrition (GOAL):: No  Tube Feeding: No  Inadequate activity/exercise (GOAL):: No  Significant changes in sleep pattern (GOAL): No  Transportation means:: Regular car, Family     Advent or spiritual beliefs that impact treatment:: No  Mental health DX:: No  Mental health management concern (GOAL):: No  Informal Support system:: Family   Socioeconomic History     Marital status:      Spouse name: Not on file     Number of children: Not on file     Years of education: Not on file     Highest education level: Not on file   Occupational History     Employer: RETIRED     Tobacco Use     Smoking status: Never Smoker     Smokeless tobacco: Never Used   Substance and Sexual Activity     Alcohol  use: Yes     Frequency: Monthly or less     Comment: wine ocass     Drug use: No     Sexual activity: Yes     Partners: Male        Resources and Interventions:  Current Resources:   Community Resources: Meals on Wheels- delivers 3 times/week  Supplies used at home:: None  Equipment Currently Used at Home: none    Advance Care Plan/Directive  Advanced Care Plans/Directives on file:: Yes  Type Advanced Care Plans/Directives: Advanced Directive - On File, DNR/DNI  Advanced Care Plan/Directive Status: Not Applicable    Referrals Placed: None     Goals:   Goals        General    #1 Pain Management (pt-stated)     Notes - Note created  2/24/2020  9:48 AM by Lorena Vazquez RN    Goal Statement: I will have suitable pain relief to perform activities of daily living.  Date Goal set: 2/24/20  Date to Achieve By: 6/1/20  Patient expressed understanding of goal: Yes  Action steps to achieve this goal:  1. I will take medications as prescribed to maintain adequate pain control/relief.  2. I will utilize non-pharmaceutical measures to help with pain control/relief.  3. I will maintain routine follow up and contact with my clinic care team.                  Patient/Caregiver understanding: Karine verbalizes good understanding of hospital follow up appts and overall plan of care. She will share RN CC contact information with patient as well.     Outreach Frequency: monthly  Future Appointments              In 2 days Reagan Thakkar MD [facility]  Arrive at: Clinic A    In 1 week [facility]    In 1 week Tomasa Diggs MD [facility]    In 1 week Filippo Robbins MD [facility]          Plan: 1) Patient will maintain PCP follow up as scheduled on 2/26.  2) Patient will take medications as prescribed and contact clinic care team with any questions,  New symptoms or concerns.  3) RN Care Coordinator will remain  available and continue to follow.    MARY PatelN, RN   [facility]  - Clinic Care Coordinator  Phone: [phone]

## 2020-02-24 NOTE — PROGRESS NOTES
Clinic Care Coordination Contact  [facility]/Voicemail    Referral Source: IP Report    Clinical Data: Care Coordinator Outreach    Outreach attempted x 1.  Left message on daughter Karine's voicemail with call back information and requested return call.     Plan: Care Coordinator will try to reach patient again in 1-2 business days.    MARY PatelN, RN   [facility]  - Clinic Care Coordinator  Phone: [phone]

## 2020-02-24 NOTE — LETTER
[facility]  Complex Care Plan  About Me:    Patient Name:  Pam Reeves    YOB: 1930  Age:         89 year old   [place] MRN:    [id number] Telephone Information:  Home Phone [phone]   Mobile [phone]       Address:  [address] [place] [address] Email address:  No e-mail address on record      Emergency Contact(s)    Name Relationship Lgl Grd Work Phone Home Phone Mobile Phone   1. SYLVIA SAN Daughter No [phone]   2. ALYCIA REEVES Spouse   [phone]    3. PAUL REEVES Son No  [phone]           Primary language:  English     needed? No   [facility] [employer]:  [phone] op. 1  Other communication barriers: None  Preferred Method of Communication:     Current living arrangement: I live in a private home with spouse  Mobility Status/ Medical Equipment: Independent    My Access Plan  Medical Emergency 911   Primary Clinic Line [facility] - [phone]   24 Hour Appointment Line [phone] or  [phone]) (toll-free)   24 Hour Nurse Line [phone] (toll-free)   Preferred Urgent Care [facility], [phone]   Preferred Hospital [place] [place]  [phone]   Preferred Pharmacy [facility] - [address]     Behavioral Health Crisis Line The National Suicide Prevention Lifeline at 1-943.221.2418 or 911             My Care Team Members  Patient Care Team       Relationship Specialty Notifications Start End    Reagan Thakkar MD PCP - General Family Practice  1/15/15     Phone: [phone] Fax: [phone] [address] [facility] [address]    Reagan Thakkar MD Assigned PCP   5/14/17     Phone: [phone] Fax: [phone] [address] [facility] [address]    Filippo Robbins MD MD Radiation Oncology  1/28/20     Phone: [phone] Fax:  [phone]         [address] [facility] [address]    Jie Alonso APRN CNP Nurse Practitioner Nurse Practitioner  1/28/20     Phone: [phone] Fax: [phone]         [address] [facility] [address]    Tomasa Diggs MD MD Oncology  1/28/20     Phone: [phone] Pager: [phone] Fax: [phone]        [facility] [address] [facility] [address]    Doris Ballesteros, BRENNA Specialty Care Coordinator Oncology Admissions 2/17/20     Phone: [phone]         Diana Turner APRN CNP Nurse Practitioner Palliative Admissions 2/20/20     Phone: [phone] Fax: [phone]         [address] [facility] [address]    Lorena Vazquez, RN Clinic Care Coordinator Primary Care - CC  2/24/20     Phone: [phone]                 My Care Plans  Self Management and Treatment Plan  Goals and (Comments)  Goals        General    #1 Pain Management (pt-stated)     Notes - Note created  2/24/2020  9:48 AM by Lorena Vazquez, RN    Goal Statement: I will have suitable pain relief to perform activities of daily living.  Date Goal set: 2/24/20  Date to Achieve By: 6/1/20  Patient expressed understanding of goal: Yes  Action steps to achieve this goal:  1. I will take medications as prescribed to maintain adequate pain control/relief.  2. I will utilize non-pharmaceutical measures to help with pain control/relief.  3. I will maintain routine follow up and contact with my clinic care team.                   Action Plans on File:                       Advance Care Plans/Directives Type:   Type Advanced Care Plans/Directives: Advanced Directive - On File, DNR/DNI    My Medical and Care Information  Problem List   Patient Active Problem List   Diagnosis     Urethral stricture due to infection     Hyperlipidemia     Essential hypertension     IMPAIRED FASTING GLUCOSE     Hyperlipidemia LDL goal <100     CKD (chronic kidney disease) stage 3, GFR 30-59 ml/min (H)     GERD (gastroesophageal  reflux disease)     Mild intermittent asthma in adult without complication     Rectal cancer (H)     Abdominal pain     Asymptomatic bacteriuria      Current Medications and Allergies:  See printed Medication Report.    Care Coordination Start Date: No linked episodes   Frequency of Care Coordination: monthly   Form Last Updated: 02/24/2020

## 2020-02-25 LAB — COPATH REPORT: NORMAL

## 2020-02-25 NOTE — PROGRESS NOTES
NEW PATIENT ONCOLOGY FOLLOW UP      REQUESTING PROVIDER/SERVICE: Dr. Moreno, from [facility].    PATIENT NAME: Pam Pierce   MRN# [id number]     Date of Visit: Mar 3, 2020     YOB: 1930       REASON FOR VISIT/CC:    At age 89, dx in 11/2019 rectal cancer  Palliative RT        HISTORY OF ONCOLOGY ILLNESS:    At age 89, she presented with years duration of abdominal pain.      CT 11/2019 found Circumferential rectosigmoid mass causing relative colonic obstruction with the more proximal colon distended with fecal debris. Colorectal mass extends beyond the serosa of the bowel wall with multiple small probable metastatic mesorectal lymph nodes. No enlarged pelvic sidewall or retroperitoneal lymph nodes. No evidence of metastatic liver disease on the current exam.    She was transferred to [facility], had emergency diverting colostomy bag, and biopsy of the colorectal mass 11/2019.    Rectal biopsy pathology found Adenocarcinoma arising in a tubular adenoma, focal signet ring cell features identified.    Further staging with PET, MRI found advanced T4b-N2 tumor of the low and mid rectum with invasion through the superior vagina and to the posterior margin of the urinary bladder.  Likely enhancing meningioma versus mets on brain MRI.   She made informed decision to proceed with palliative RT for palliation done in 2/2020.       INTERVAL HISTORY  She then made informed decision to proceed oral Xeloda in [facility] 2/2020 for palliation purposes.  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 S/p 3 doses of dex helped her symptoms tremendously. She was discharged on Dicyclomine for cramping and pain four times daily, and  prn oxycodone.      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        SOCIAL HISTORY:    She lives  "independently even at age 89 fully functional her kids are close by.  Smoking denies alcohol abuse.    FAMILY HISTORY:    FH + for breast, colon, ovarian, thymic, melanoma.       REVIEW OF SYSTEMS:   She tolerated palliative RT fine.  She has baseline 3 times empty of her colostomy bag on Metamucil.       PHYSICAL EXAMINATION:   VITAL SIGNS: Blood pressure 114/62, pulse 95, temperature 99  F (37.2  C), temperature source Tympanic, resp. rate 16, height 1.499 m (4' 11.02\"), weight 48.4 kg (106 lb 11.2 oz), SpO2 98 %, not currently breastfeeding.    GENERAL APPEARANCE:  looks like stated age, very pleasant, not in acute distress.      ECO     ENT, MOUTH: Pupils are equally reactive to light.  Sclerae are anicteric.  Moist oral mucosa without lesion or ulcer.   Negative pharynx.  No oral thrush.   NECK:  Supple.  No jugular venous distention.  Thyroid is not palpable.   LYMPH NODES:  Superficial lymphadenopathy is not appreciable in the bilateral cervical, supraclavicular, axillary or inguinal areas.   CARDIOVASCULAR:  S1, S2 regular with no murmurs or gallops.  No carotid or abdominal bruits.   PULMONARY:  Lungs are clear to auscultation and percussion bilaterally.  There is no wheezing or rhonchi.   GASTROINTESTINAL:  Abdomen is soft, nontender. waterly out put in her ostomy bag. MUSCULOSKELETAL/EXTREMITIES:  No edema.  No cyanotic changes.  No signs of joint deformity.  No lymphedema.   NEUROLOGIC:  Cranial nerves II-XII are grossly intact.  Sensation intact.  Muscle strength and muscle tone symmetrical, 5/5 throughout.   BACK  No spinal or paraspinal tenderness.  No CVA tenderness.   SKIN:  No petechiae.  No rash.  No signs of cellulitis.       CURRENT LAB DATA REVIEWED TODAY  Cbc is fine, Ca 8.0, albu 2.6        CURRENT IMAGING REVIEWED TODAY  Pelvic MRI 2020 - T4b-N2 tumor of the low and mid rectum with invasion through the superior vagina and to the posterior margin of the urinary bladder.      PET " 1/2020  1. Hypermetabolic rectal mass that appears to directly invade into the vagina which is diffusely hypermetabolic. Small perirectal lymph nodes are noted.  2. No evidence of metastatic disease in the head, neck, chest, or  abdomen. No evidence of metastatic disease in the extremities.  3. Significant coronary and aortic atherosclerosis. Ascending thoracic  aortic caliber is ectatic at 4.1 cm.  4. Small area of hypermetabolism associated with a nodule in the  isthmus of the thyroid. Consider further evaluation with ultrasound.    Brain MRI 1/2020 - 1.8 cm uniformly enhancing extra-axial mass positioned along the olfactory groove, most likely representing a meningioma. Metastatic disease is thought to be less likely         OLD DATA REVIEWED TODAY WITH SUMMARY  CEA at 4.6 in 11/2019      CT 11/2019 found Circumferential rectosigmoid mass causing relative colonic obstruction with the more proximal colon distended with fecal debris. Colorectal mass extends beyond the serosa of the bowel wall with multiple small probable metastatic mesorectal lymph nodes. No enlarged pelvic sidewall or retroperitoneal lymph nodes. No evidence of metastatic liver disease on the current exam.        ASSESSMENT AND PLAN:    1. Diagnosed rectal cancer November 2019 at age 89, with presenting symptoms of years duration of deep abdominal pain worsening to the point near obstruction.  Had emergent colostomy bag put in, Rectal biopsy pathology found Adenocarcinoma arising in a tubular adenoma, focal signet ring cell features identified.    Further staging with PET, MRI found advanced T4b-N2 tumor of the low and mid rectum with invasion through the superior vagina and to the posterior margin of the urinary bladder.  Likely enhancing meningioma versus mets on brain MRI.    She made informed decision to proceed with palliative RT for palliation done in 2/2020.     She then made informed decision to proceed oral Xeloda for palliation  purposes.    Her BSA is around 1.4, her target dose about 1.5 g p.o. twice daily.    Advice to resume 1 g p.o. twice daily 7 days on 7 days off in 2 wks.    I recommend patient to creat a Health Care Directive/advanced Directive.      2. 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   S/p 3 doses of dex helped her symptoms tremendously. She was discharged on Dicyclomine (antispasmodic and anticholinergic agent) for cramping and pain, and four times daily. She is not needing oxycodone.      3. Mild hypokalemia.  Advice to continue oral K supplement.

## 2020-02-26 ENCOUNTER — OFFICE VISIT (OUTPATIENT)
Dept: FAMILY MEDICINE | Facility: CLINIC | Age: 85
End: 2020-02-26
Payer: MEDICARE

## 2020-02-26 VITALS
WEIGHT: 106 LBS | RESPIRATION RATE: 14 BRPM | SYSTOLIC BLOOD PRESSURE: 96 MMHG | HEIGHT: 59 IN | OXYGEN SATURATION: 98 % | HEART RATE: 77 BPM | BODY MASS INDEX: 21.37 KG/M2 | DIASTOLIC BLOOD PRESSURE: 58 MMHG | TEMPERATURE: 97.4 F

## 2020-02-26 DIAGNOSIS — E87.6 HYPOKALEMIA: ICD-10-CM

## 2020-02-26 DIAGNOSIS — R10.13 EPIGASTRIC PAIN: ICD-10-CM

## 2020-02-26 DIAGNOSIS — Z09 HOSPITAL DISCHARGE FOLLOW-UP: Primary | ICD-10-CM

## 2020-02-26 LAB — POTASSIUM SERPL-SCNC: 3.4 MMOL/L (ref 3.4–5.3)

## 2020-02-26 PROCEDURE — 84132 ASSAY OF SERUM POTASSIUM: CPT | Performed by: FAMILY MEDICINE

## 2020-02-26 PROCEDURE — 36415 COLL VENOUS BLD VENIPUNCTURE: CPT | Performed by: FAMILY MEDICINE

## 2020-02-26 PROCEDURE — 99214 OFFICE O/P EST MOD 30 MIN: CPT | Performed by: FAMILY MEDICINE

## 2020-02-26 RX ORDER — PANTOPRAZOLE SODIUM 40 MG/1
40 TABLET, DELAYED RELEASE ORAL
Qty: 90 TABLET | Refills: 1 | Status: SHIPPED | OUTPATIENT
Start: 2020-02-26 | End: 2020-09-10

## 2020-02-26 RX ORDER — DICYCLOMINE HYDROCHLORIDE 10 MG/1
10 CAPSULE ORAL 4 TIMES DAILY PRN
Qty: 120 CAPSULE | Refills: 3 | Status: SHIPPED | OUTPATIENT
Start: 2020-02-26 | End: 2020-03-03

## 2020-02-26 ASSESSMENT — MIFFLIN-ST. JEOR: SCORE: 806.44

## 2020-02-26 NOTE — PATIENT INSTRUCTIONS
Thank you for choosing [facility].  You may be receiving an email and/or telephone survey request from [facility] regarding your visit today.  Please take a few minutes to respond to the survey to let us know how we are doing.      If you have questions or concerns, please contact us via [email or web address] or you can contact your care team at [phone].    Our Clinic hours are:  Monday 6:40 am  to 7:00 pm  Tuesday -Friday 6:40 am to 5:00 pm    The [place] outpatient lab hours are:  Monday - Friday 6:10 am to 4:45 pm  Saturdays 7:00 am to 11:00 am  Appointments are required, call [phone]    If you have clinical questions after hours or would like to schedule an appointment,  call the clinic at [phone].

## 2020-02-26 NOTE — PROGRESS NOTES
Subjective     Pam Pierce is a 89 year old female who presents to clinic today for the following health issues:    Patient is a 89 yr old female who comes in for a hospital follow up. 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 She is still having abdominal pain but not as severe as before. She had an upper endoscopy and had biopsies done which fortunately came back negative for malignancy. She was also found to have hypokalemia and was discharged home on potassium supplements . We will recheck this today.    Hospitals in [place]       Hospital Follow-up Visit:    Hospital/Nursing Home/IP Rehab Facility: [facility]  Date of Admission: 2-16-20  Date of Discharge: 2-21-20  Reason(s) for Admission: abdominal pain             Problems taking medications regularly:  None       Medication changes since discharge: started dicyclomine 10 mg zofran 4mg, pantoprozole 40 mg  And oxycodone was changed to 5mg 0.5tablet every 3 hrs        Problems adhering to non-medication therapy:  None    Summary of hospitalization:  [facility] discharge summary reviewed  Diagnostic Tests/Treatments reviewed.  Follow up needed: none  Other Healthcare Providers Involved in Patient s Care:         None  Update since discharge: stable.     Post Discharge Medication Reconciliation: discharge medications reconciled, continue medications without change.  Plan of care communicated with patient     Coding guidelines for this visit:  Type of Medical   Decision Making Face-to-Face Visit       within 7 Days of discharge Face-to-Face Visit        within 14 days of discharge   Moderate Complexity [id number]   High Complexity [id number]                Patient Active Problem List   Diagnosis     Urethral stricture due to infection     Hyperlipidemia     Essential hypertension     IMPAIRED FASTING GLUCOSE     Hyperlipidemia LDL goal <100     CKD (chronic kidney disease)  stage 3, GFR 30-59 ml/min (H)     GERD (gastroesophageal reflux disease)     Mild intermittent asthma in adult without complication     Rectal cancer (H)     Abdominal pain     Asymptomatic bacteriuria     Past Surgical History:   Procedure Laterality Date     ESOPHAGOSCOPY, GASTROSCOPY, DUODENOSCOPY (EGD), COMBINED N/A 2/20/2020    Procedure: ESOPHAGOGASTRODUODENOSCOPY, WITH BIOPSY;  Surgeon: Harrison Brown MD;  Location: [facility]     SURGICAL HISTORY OF -       open reduction of second metatarsal neck fx with internal fixation  cna d closed treatment of metatarsal to 2 and 4 fx     SURGICAL HISTORY OF -       hysterectomy and oophorectomy     SURGICAL HISTORY OF -       lithotripsy       Social History     Tobacco Use     Smoking status: Never Smoker     Smokeless tobacco: Never Used   Substance Use Topics     Alcohol use: Yes     Frequency: Monthly or less     Comment: wine ocass     Family History   Problem Relation Age of Onset     Cancer - colorectal Mother      Breast Cancer Mother      C.A.D. Father      Cardiovascular Father      Alcohol/Drug Father      Chronic Obstructive Pulmonary Disease Brother      Cancer Son         chest cancer (Thymus)/myesthenia gravis     Musculoskeletal Disorder Son         myesthenia gravis     Cerebrovascular Disease Sister      Chronic Obstructive Pulmonary Disease Brother          Current Outpatient Medications   Medication Sig Dispense Refill     acetaminophen (TYLENOL) 500 MG tablet Take 500-1,000 mg by mouth daily as needed for mild pain       albuterol (PROAIR HFA) 108 (90 Base) MCG/ACT inhaler Inhale 2 puffs into the lungs every 4 hours as needed 1 Inhaler 11     aspirin 81 MG EC tablet Take 81 mg by mouth daily       atenolol (TENORMIN) 25 MG tablet Take 1 tablet (25 mg) by mouth daily 90 tablet 3     CALCIUM + D OR Take 1 tablet by mouth daily        capecitabine (XELODA) 500 MG tablet Start out 1 g po bid, 7 days on, 7 days off. Target is 1.5 g po bid, 7 days on and off  70 tablet 0     chlorthalidone (HYGROTON) 25 MG tablet Take 1 tablet (25 mg) by mouth daily 90 tablet 3     dicyclomine (BENTYL) 10 MG capsule Take 1 capsule (10 mg) by mouth 4 times daily as needed (abdominal pain/cramping) Please fill at patient's request 120 capsule 3     fluticasone (FLOVENT HFA) 110 MCG/ACT inhaler Inhale 2 puffs into the lungs 2 times daily 1 Inhaler 11     magnesium oxide (MAG-OX) 400 MG tablet Take 400 mg by mouth every evening        melatonin 3 MG tablet Take 3 mg by mouth At Bedtime Also 1 tab overnight as needed       MULTI-DAY VITAMINS OR Take 1 tablet by mouth daily        ondansetron 4 MG PO ODT tab Take 1 tablet (4 mg) by mouth every 6 hours as needed for nausea or vomiting 30 tablet 1     pantoprazole (PROTONIX) 40 MG EC tablet Take 1 tablet (40 mg) by mouth every morning (before breakfast) 90 tablet 1     polyethylene glycol (MIRALAX) powder Take 0.5 capfuls by mouth daily 8.5 grams daily in the morning        potassium chloride ER 10 MEQ PO CR tablet Take 1 tablet (10 mEq) by mouth 2 times daily 60 tablet 0     Probiotic Product (PROBIOTIC DAILY PO) Take 1 capsule by mouth daily        simvastatin (ZOCOR) 40 MG tablet Take 1 tablet (40 mg) by mouth At Bedtime 90 tablet 3     oxyCODONE 5 MG PO tablet Take 0.5 tablets (2.5 mg) by mouth every 3 hours as needed for moderate to severe pain Higher doses appear to have caused dizziness. (Patient not taking: Reported on 2/26/2020) 60 tablet 0     Allergies   Allergen Reactions     Flu Virus Vaccine Other (See Comments)     With egg base     Gabapentin Nausea and Vomiting     BP Readings from Last 3 Encounters:   02/26/20 96/58   02/21/20 123/65   02/11/20 128/68    Wt Readings from Last 3 Encounters:   02/26/20 48.1 kg (106 lb)   02/21/20 51 kg (112 lb 7 oz)   02/11/20 47.6 kg (105 lb)                      Reviewed and updated as needed this visit by Provider         Review of Systems   ROS COMP: Constitutional, HEENT, cardiovascular,  "pulmonary, gi and gu systems are negative, except as otherwise noted.      Objective    BP 96/58   Pulse 77   Temp 97.4  F (36.3  C) (Tympanic)   Resp 14   Ht 1.499 m (4' 11\")   Wt 48.1 kg (106 lb)   SpO2 98%   BMI 21.41 kg/m    Body mass index is 21.41 kg/m .  Physical Exam   GENERAL: healthy, alert and no distress  EYES: Eyes grossly normal to inspection, PERRL and conjunctivae and sclerae normal  HENT: ear canals and TM's normal, nose and mouth without ulcers or lesions  NECK: no adenopathy, no asymmetry, masses, or scars and thyroid normal to palpation  RESP: lungs clear to auscultation - no rales, rhonchi or wheezes  CV: regular rate and rhythm, normal S1 S2, no S3 or S4, no murmur, click or rub, no peripheral edema and peripheral pulses strong  ABDOMEN: tenderness RLQ and bowel sounds normal  MS: no gross musculoskeletal defects noted, no edema    Diagnostic Test Results:  Pending         Assessment & Plan     1. Hospital discharge follow-up  Patient is a 89 yr old female with significant history of rectal cancer . Was hospitalized recently for abdominal pain. This was though to be related to colitis from radiotherapy.She is doing much better now.    2. Hypokalemia  Rechecking potassium  - Potassium    3. Epigastric pain  Medication refilled.  - pantoprazole (PROTONIX) 40 MG EC tablet; Take 1 tablet (40 mg) by mouth every morning (before breakfast)  Dispense: 90 tablet; Refill: 1  - dicyclomine (BENTYL) 10 MG capsule; Take 1 capsule (10 mg) by mouth 4 times daily as needed (abdominal pain/cramping) Please fill at patient's request  Dispense: 120 capsule; Refill: 3       FUTURE APPOINTMENTS:       - Follow-up visit in one month or sooner as needed.  Patient Instructions         Thank you for choosing [facility].  You may be receiving an email and/or telephone survey request from [facility] Customer Experience regarding your visit today.  Please take a few minutes to respond to the survey to let us " know how we are doing.      If you have questions or concerns, please contact us via [employer] or you can contact your care team at [phone].    Our Clinic hours are:  Monday 6:40 am  to 7:00 pm  Tuesday -Friday 6:40 am to 5:00 pm    The [place] outpatient lab hours are:  Monday - Friday 6:10 am to 4:45 pm  Saturdays 7:00 am to 11:00 am  Appointments are required, call [phone]    If you have clinical questions after hours or would like to schedule an appointment,  call the clinic at [phone].      No follow-ups on file.    Reagan Thakkar MD  [facility]

## 2020-02-26 NOTE — LETTER
February 27, 2020      Pam Pierce  [address] [place] [id number]        Dear ,    We are writing to inform you of your test results.    Your test results fall within the expected range(s) /normal.    Resulted Orders   Potassium   Result Value Ref Range    Potassium 3.4 3.4 - 5.3 mmol/L       If you have any questions or concerns, please call the clinic at the number listed above.       Sincerely,        Reagan Thakkar MD

## 2020-02-27 ENCOUNTER — TELEPHONE (OUTPATIENT)
Dept: FAMILY MEDICINE | Facility: CLINIC | Age: 85
End: 2020-02-27

## 2020-03-03 ENCOUNTER — OFFICE VISIT (OUTPATIENT)
Dept: RADIATION THERAPY | Facility: OUTPATIENT CENTER | Age: 85
End: 2020-03-03
Payer: MEDICARE

## 2020-03-03 ENCOUNTER — AMBULATORY - HEALTHEAST (OUTPATIENT)
Dept: OTHER | Facility: CLINIC | Age: 85
End: 2020-03-03

## 2020-03-03 ENCOUNTER — ONCOLOGY VISIT (OUTPATIENT)
Dept: ONCOLOGY | Facility: CLINIC | Age: 85
End: 2020-03-03
Attending: INTERNAL MEDICINE
Payer: MEDICARE

## 2020-03-03 ENCOUNTER — HOSPITAL ENCOUNTER (OUTPATIENT)
Dept: LAB | Facility: CLINIC | Age: 85
Discharge: HOME | End: 2020-03-03
Attending: INTERNAL MEDICINE | Admitting: INTERNAL MEDICINE
Payer: MEDICARE

## 2020-03-03 ENCOUNTER — DOCUMENTATION ONLY (OUTPATIENT)
Dept: OTHER | Facility: CLINIC | Age: 85
End: 2020-03-03

## 2020-03-03 VITALS
HEART RATE: 95 BPM | HEIGHT: 59 IN | TEMPERATURE: 99 F | DIASTOLIC BLOOD PRESSURE: 62 MMHG | RESPIRATION RATE: 16 BRPM | BODY MASS INDEX: 21.51 KG/M2 | OXYGEN SATURATION: 98 % | SYSTOLIC BLOOD PRESSURE: 114 MMHG | WEIGHT: 106.7 LBS

## 2020-03-03 VITALS — WEIGHT: 106 LBS | BODY MASS INDEX: 21.4 KG/M2

## 2020-03-03 DIAGNOSIS — C20 RECTAL CANCER (H): Primary | ICD-10-CM

## 2020-03-03 DIAGNOSIS — K52.9 COLITIS: ICD-10-CM

## 2020-03-03 DIAGNOSIS — C20 RECTAL CANCER (H): ICD-10-CM

## 2020-03-03 DIAGNOSIS — E87.6 HYPOKALEMIA: ICD-10-CM

## 2020-03-03 LAB
ALBUMIN SERPL-MCNC: 2.9 G/DL (ref 3.4–5)
ALP SERPL-CCNC: 66 U/L (ref 40–150)
ALT SERPL W P-5'-P-CCNC: 35 U/L (ref 0–50)
ANION GAP SERPL CALCULATED.3IONS-SCNC: 6 MMOL/L (ref 3–14)
AST SERPL W P-5'-P-CCNC: 22 U/L (ref 0–45)
BASOPHILS # BLD AUTO: 0.1 10E9/L (ref 0–0.2)
BASOPHILS NFR BLD AUTO: 0.7 %
BILIRUB SERPL-MCNC: 0.2 MG/DL (ref 0.2–1.3)
BUN SERPL-MCNC: 22 MG/DL (ref 7–30)
CALCIUM SERPL-MCNC: 8.8 MG/DL (ref 8.5–10.1)
CHLORIDE SERPL-SCNC: 107 MMOL/L (ref 94–109)
CO2 SERPL-SCNC: 30 MMOL/L (ref 20–32)
CREAT SERPL-MCNC: 0.78 MG/DL (ref 0.52–1.04)
DIFFERENTIAL METHOD BLD: ABNORMAL
EOSINOPHIL # BLD AUTO: 1.3 10E9/L (ref 0–0.7)
EOSINOPHIL NFR BLD AUTO: 15.5 %
ERYTHROCYTE [DISTWIDTH] IN BLOOD BY AUTOMATED COUNT: 13.9 % (ref 10–15)
GFR SERPL CREATININE-BSD FRML MDRD: 67 ML/MIN/{1.73_M2}
GLUCOSE SERPL-MCNC: 154 MG/DL (ref 70–99)
HCT VFR BLD AUTO: 37.5 % (ref 35–47)
HGB BLD-MCNC: 12.3 G/DL (ref 11.7–15.7)
IMM GRANULOCYTES # BLD: 0 10E9/L (ref 0–0.4)
IMM GRANULOCYTES NFR BLD: 0.4 %
LYMPHOCYTES # BLD AUTO: 1.8 10E9/L (ref 0.8–5.3)
LYMPHOCYTES NFR BLD AUTO: 22.3 %
MCH RBC QN AUTO: 32 PG (ref 26.5–33)
MCHC RBC AUTO-ENTMCNC: 32.8 G/DL (ref 31.5–36.5)
MCV RBC AUTO: 98 FL (ref 78–100)
MONOCYTES # BLD AUTO: 0.7 10E9/L (ref 0–1.3)
MONOCYTES NFR BLD AUTO: 8 %
NEUTROPHILS # BLD AUTO: 4.4 10E9/L (ref 1.6–8.3)
NEUTROPHILS NFR BLD AUTO: 53.1 %
NRBC # BLD AUTO: 0 10*3/UL
NRBC BLD AUTO-RTO: 0 /100
PLATELET # BLD AUTO: 254 10E9/L (ref 150–450)
POTASSIUM SERPL-SCNC: 3.3 MMOL/L (ref 3.4–5.3)
PROT SERPL-MCNC: 5.9 G/DL (ref 6.8–8.8)
RBC # BLD AUTO: 3.84 10E12/L (ref 3.8–5.2)
SODIUM SERPL-SCNC: 143 MMOL/L (ref 133–144)
WBC # BLD AUTO: 8.2 10E9/L (ref 4–11)

## 2020-03-03 PROCEDURE — 85025 COMPLETE CBC W/AUTO DIFF WBC: CPT | Performed by: INTERNAL MEDICINE

## 2020-03-03 PROCEDURE — G0463 HOSPITAL OUTPT CLINIC VISIT: HCPCS

## 2020-03-03 PROCEDURE — 99214 OFFICE O/P EST MOD 30 MIN: CPT | Performed by: INTERNAL MEDICINE

## 2020-03-03 PROCEDURE — 80053 COMPREHEN METABOLIC PANEL: CPT | Performed by: INTERNAL MEDICINE

## 2020-03-03 PROCEDURE — 36415 COLL VENOUS BLD VENIPUNCTURE: CPT | Performed by: INTERNAL MEDICINE

## 2020-03-03 RX ORDER — DICYCLOMINE HYDROCHLORIDE 10 MG/1
10 CAPSULE ORAL 4 TIMES DAILY PRN
Qty: 120 CAPSULE | Refills: 3 | Status: SHIPPED | OUTPATIENT
Start: 2020-03-03 | End: 2020-12-10

## 2020-03-03 RX ORDER — DICYCLOMINE HYDROCHLORIDE 10 MG/1
10 CAPSULE ORAL 4 TIMES DAILY PRN
Qty: 120 CAPSULE | Refills: 3 | Status: CANCELLED | OUTPATIENT
Start: 2020-03-03

## 2020-03-03 RX ORDER — POTASSIUM CHLORIDE 750 MG/1
10 TABLET, EXTENDED RELEASE ORAL 2 TIMES DAILY
Qty: 60 TABLET | Refills: 0 | Status: SHIPPED | OUTPATIENT
Start: 2020-03-03 | End: 2020-04-15

## 2020-03-03 ASSESSMENT — MIFFLIN-ST. JEOR: SCORE: 809.87

## 2020-03-03 ASSESSMENT — PAIN SCALES - GENERAL: PAINLEVEL: NO PAIN (0)

## 2020-03-03 NOTE — NURSING NOTE
FOLLOW-UP VISIT    Patient Name: Pam Pierce      : 1930     Age: 89 year old        ______________________________________________________________________________     Chief Complaint   Patient presents with     Radiation Therapy     follow up     Wt 48.1 kg (106 lb)   BMI 21.40 kg/m       Date Radiation Completed: 20    Pain  Denies  Had pain/cramping and hospital admit 20 ( see notes in Epic) but better now    Meds  Current Med List Reviewed: Yes  Medication Note:     Imaging  None    On Chemo?: will be back on Xeloda in 2 wks  Nausea:no  Bowel: has ostomy and was taking miralax to make stools watery - pt said it was easier to empty watery stool. just met with med onc MD who talked abebe Orozco about wanting stool more soft/formed vs watery as she is probably losing potasium/electrolytes.  Bladder: frequency  Skin: Warm  Dry  Intact  Pale  Energy Level: lower but improving since getting further out from radiation and hospital stay.    Other Appointments:     Date  Oncologist: Dr. Diggs  3/3/20,  20   Surgeon:      Other Notes:

## 2020-03-03 NOTE — PATIENT INSTRUCTIONS
Dr. Diggs would like you to restart the Xeloda in 2 weeks and to call us when you restart to set up a follow up appointment during week 4 with labs prior.       When you are in need of a refill, please call your pharmacy and they will send us a request.      Copy of appointments, and after visit summary (AVS) given to patient.      If you have any questions please call Doris Ballesteros RN, BSN Oncology Hematology  [facility] [phone]. For questions after business hours, or on holidays/weekends, please call our after hours Nurse Triage line [phone]. Thank you.       Wait for 2 wks, can resume xeloda after.   She will call us when she starts it and will do cbc diff, cmp, on C1W4 with follow-up.

## 2020-03-03 NOTE — LETTER
3/3/2020         RE: Pam Pierce  [address] [place] [id number]        Dear Colleague,    Thank you for referring your patient, Pam Pierce, to the [facility]. Please see a copy of my visit note below.    NEW PATIENT ONCOLOGY FOLLOW UP      REQUESTING PROVIDER/SERVICE: Dr. Moreno, from [facility].    PATIENT NAME: Pam Pierce   MRN# [id number]     Date of Visit: Mar 3, 2020     YOB: 1930       REASON FOR VISIT/CC:    At age 89, dx in 11/2019 rectal cancer  Palliative RT        HISTORY OF ONCOLOGY ILLNESS:    At age 89, she presented with years duration of abdominal pain.      CT 11/2019 found Circumferential rectosigmoid mass causing relative colonic obstruction with the more proximal colon distended with fecal debris. Colorectal mass extends beyond the serosa of the bowel wall with multiple small probable metastatic mesorectal lymph nodes. No enlarged pelvic sidewall or retroperitoneal lymph nodes. No evidence of metastatic liver disease on the current exam.    She was transferred to [facility], had emergency diverting colostomy bag, and biopsy of the colorectal mass 11/2019.    Rectal biopsy pathology found Adenocarcinoma arising in a tubular adenoma, focal signet ring cell features identified.    Further staging with PET, MRI found advanced T4b-N2 tumor of the low and mid rectum with invasion through the superior vagina and to the posterior margin of the urinary bladder.  Likely enhancing meningioma versus mets on brain MRI.   She made informed decision to proceed with palliative RT for palliation done in 2/2020.       INTERVAL HISTORY  She then made informed decision to proceed oral Xeloda in [facility] 2/2020 for palliation purposes.  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 S/p 3 doses of dex helped her symptoms tremendously. She was discharged on Dicyclomine for cramping and pain four times daily, and  prn oxycodone.      PAST  "MEDICAL HISTORY  Intermittent asthma  Hyperlipidemia, hypertension  GERD  Chronic kidney disease, history of urethral stricture due to infection      MEDICATIONS/ALLERY:  Reviewed in Epic system.        SOCIAL HISTORY:    She lives independently even at age 89 fully functional her kids are close by.  Smoking denies alcohol abuse.    FAMILY HISTORY:    FH + for breast, colon, ovarian, thymic, melanoma.       REVIEW OF SYSTEMS:   She tolerated palliative RT fine.  She has baseline 3 times empty of her colostomy bag on Metamucil.       PHYSICAL EXAMINATION:   VITAL SIGNS: Blood pressure 114/62, pulse 95, temperature 99  F (37.2  C), temperature source Tympanic, resp. rate 16, height 1.499 m (4' 11.02\"), weight 48.4 kg (106 lb 11.2 oz), SpO2 98 %, not currently breastfeeding.    GENERAL APPEARANCE:  looks like stated age, very pleasant, not in acute distress.      ECO     ENT, MOUTH: Pupils are equally reactive to light.  Sclerae are anicteric.  Moist oral mucosa without lesion or ulcer.   Negative pharynx.  No oral thrush.   NECK:  Supple.  No jugular venous distention.  Thyroid is not palpable.   LYMPH NODES:  Superficial lymphadenopathy is not appreciable in the bilateral cervical, supraclavicular, axillary or inguinal areas.   CARDIOVASCULAR:  S1, S2 regular with no murmurs or gallops.  No carotid or abdominal bruits.   PULMONARY:  Lungs are clear to auscultation and percussion bilaterally.  There is no wheezing or rhonchi.   GASTROINTESTINAL:  Abdomen is soft, nontender. waterly out put in her ostomy bag. MUSCULOSKELETAL/EXTREMITIES:  No edema.  No cyanotic changes.  No signs of joint deformity.  No lymphedema.   NEUROLOGIC:  Cranial nerves II-XII are grossly intact.  Sensation intact.  Muscle strength and muscle tone symmetrical, 5/5 throughout.   BACK  No spinal or paraspinal tenderness.  No CVA tenderness.   SKIN:  No petechiae.  No rash.  No signs of cellulitis.       CURRENT LAB DATA REVIEWED TODAY  Cbc is " fine, Ca 8.0, albu 2.6        CURRENT IMAGING REVIEWED TODAY  Pelvic MRI 1/2020 - T4b-N2 tumor of the low and mid rectum with invasion through the superior vagina and to the posterior margin of the urinary bladder.      PET 1/2020  1. Hypermetabolic rectal mass that appears to directly invade into the vagina which is diffusely hypermetabolic. Small perirectal lymph nodes are noted.  2. No evidence of metastatic disease in the head, neck, chest, or  abdomen. No evidence of metastatic disease in the extremities.  3. Significant coronary and aortic atherosclerosis. Ascending thoracic  aortic caliber is ectatic at 4.1 cm.  4. Small area of hypermetabolism associated with a nodule in the  isthmus of the thyroid. Consider further evaluation with ultrasound.    Brain MRI 1/2020 - 1.8 cm uniformly enhancing extra-axial mass positioned along the olfactory groove, most likely representing a meningioma. Metastatic disease is thought to be less likely         OLD DATA REVIEWED TODAY WITH SUMMARY  CEA at 4.6 in 11/2019      CT 11/2019 found Circumferential rectosigmoid mass causing relative colonic obstruction with the more proximal colon distended with fecal debris. Colorectal mass extends beyond the serosa of the bowel wall with multiple small probable metastatic mesorectal lymph nodes. No enlarged pelvic sidewall or retroperitoneal lymph nodes. No evidence of metastatic liver disease on the current exam.        ASSESSMENT AND PLAN:    1. Diagnosed rectal cancer November 2019 at age 89, with presenting symptoms of years duration of deep abdominal pain worsening to the point near obstruction.  Had emergent colostomy bag put in, Rectal biopsy pathology found Adenocarcinoma arising in a tubular adenoma, focal signet ring cell features identified.    Further staging with PET, MRI found advanced T4b-N2 tumor of the low and mid rectum with invasion through the superior vagina and to the posterior margin of the urinary  "bladder.  Likely enhancing meningioma versus mets on brain MRI.    She made informed decision to proceed with palliative RT for palliation done in 2/2020.     She then made informed decision to proceed oral Xeloda for palliation purposes.    Her BSA is around 1.4, her target dose about 1.5 g p.o. twice daily.    Advice to resume 1 g p.o. twice daily 7 days on 7 days off in 2 wks.    I recommend patient to creat a Health Care Directive/advanced Directive.      2. 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   S/p 3 doses of dex helped her symptoms tremendously. She was discharged on Dicyclomine (antispasmodic and anticholinergic agent) for cramping and pain, and four times daily. She is not needing oxycodone.      3. Mild hypokalemia.  Advice to continue oral K supplement.           Oncology Rooming Note    March 3, 2020 2:20 PM   Pam Pierce is a 89 year old female who presents for:    Chief Complaint   Patient presents with     Oncology Clinic Visit     Recheck Rectal cancer, review labs & Xeloda     Initial Vitals: /62 (BP Location: Right arm, Patient Position: Sitting, Cuff Size: Adult Regular)   Pulse 95   Temp 99  F (37.2  C) (Tympanic)   Resp 16   Ht 1.499 m (4' 11.02\")   Wt 48.4 kg (106 lb 11.2 oz)   SpO2 98%   BMI 21.54 kg/m    Estimated body mass index is 21.54 kg/m  as calculated from the following:    Height as of this encounter: 1.499 m (4' 11.02\").    Weight as of this encounter: 48.4 kg (106 lb 11.2 oz). Body surface area is 1.42 meters squared.  No Pain (0) Comment: Data Unavailable   No LMP recorded. Patient has had a hysterectomy.  Allergies reviewed: Yes  Medications reviewed: Yes    Medications: Medication refills not needed today.  Pharmacy name entered into [employer]:   [facility] - [place], [place] - [address]  [facility] - [place], [place] - [address]    Clinical concerns: Recheck Rectal cancer, review labs & Xeloda.     How long " should she stay on potassium ?    Will you manage her dicyclomine ?  Discuss chemo treatments.           Johanny Hunter CMA                Again, thank you for allowing me to participate in the care of your patient.        Sincerely,        Tomasa Diggs MD, MD

## 2020-03-03 NOTE — LETTER
3/3/2020      RE: Pam Pierce  [address] [place] [id number]          Department of Radiation Oncology  Radiation Therapy Center  [facility] [address]  [phone]       Radiation Oncology Follow-up Visit  March 3, 2020      Pam Pierce  MRN: [id number]   : 1930     DIAGNOSIS:  locally advanced rectal adenocarcinoma  PATHOLOGY:  adenocarcinoma arising in a tubular adenoma, focal signet ring cell features identified          STAGE: clinical T4N2  INTENT OF RADIOTHERAPY: palliative hypofractionated RT as she wishes to prioritize quality of life and wishes to avoid any aggressive surgical intervention or even extended course of radiation therapy  CONCURRENT SYSTEMIC THERAPY:  No     ONCOLOGIC HISTORY:    Ms. Peirce is a 89 year old female a diagnosis of locally advanced rectal adenocarcinoma, clinical T4N2.        The patient initially presented with symptoms of abdominal pain in 2019, prompting further evaluation.  The patient underwent a CT scan of the abdomen pelvis at the time which demonstrated a circumferential rectal mass, causing relative colonic obstruction.  The patient subsequently underwent emergent diverting colostomy.  On 2019 she underwent biopsy of the rectal mass, which confirmed adenocarcinoma arising in a tubular adenoma, focal signet ring cell features identified. On 2020 the patient met with Dr. Diggs of medical oncology who discussed different treatment options.  Overall the patient described her goals of treatment to be largely palliative in nature, focusing on quality of life.  Different treatment options were discussed  including palliative radiation therapy to the pelvis +/-  systemic therapy or even systemic therapy alone.  The patient underwent a staging PET scan on 2020.  Imaging demonstrated a hypermetabolic rectal mass that appeared to directly invade the vagina.  There was evidence of small perirectal lymph  nodes.  No evidence of distant disease was noted.  On 1/16/2020 the patient underwent MRI of the pelvis.  Imaging demonstrated a 7.8 cm mass in the low and mid rectum, 4.4 cm above the anal verge.  The mass was noted to invade through the superior aspect of the vagina, and possible posterior margin of the urinary bladder.  There were also noted at least 4 abnormal perirectal lymph nodes.  The patient subsequently presented in radiation oncology 1/27/2020 to discuss potential role of radiation therapy as a part of her treatment strategy.    INTERVAL SINCE COMPLETION OF RADIATION THERAPY:   1 month    SUBJECTIVE:   The patient returns for follow-up.    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  She is also been using stool softeners aggressively, and has been cautioned to reduce at recommended levels to reduce abdominal cramping and dehydration.     Since treatment, the patient has noticed improvement in her urinary control.  She also noticed improvement in her pelvic pain/pressure.  Denies any hematochezia or vaginal bleeding.     Patient was seen by Dr. Diggs of medical oncology with plan to allow her to recover from her colitis and to subsequently start maintenance Xeloda.    PHYSICAL EXAM:  Wt 48.1 kg (106 lb)   BMI 21.40 kg/m     Gen: Alert, in NAD  Eyes: PERRL, EOMI, sclera anicteric  Neck: Supple, full ROM, no LAD  Pulm: No wheezing, stridor or respiratory distress  CV: Well-perfused, no cyanosis, no pedal edema  Abdominal: Soft, nontender, nondistended, no hepatomegaly  Back: No step-offs or pain to palpation along the thoracolumbar spine, no CVA tenderness    Musculoskeletal: Normal bulk and tone   Skin: Normal color and turgor  Neurologic: A/Ox3, CN II-XII intact  Psychiatric: Appropriate mood and affect    LABS AND IMAGING:  Reviewed.    IMPRESSION:   Ms. Pierce is a 89 year old female a diagnosis of locally advanced  rectal adenocarcinoma, clinical T4N2.  The patient underwent palliative hypofractionated RT (5 Gy x 5 fractions) as she wishes to prioritize quality of life and wishes to avoid any aggressive surgical intervention or even extended course of radiation therapy.    PLAN:   1. 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 Now improving and managed with anticholingeric therapy.     2. She was also using stool softeners too aggressively, which could have contributed to GI symptoms. She has since been cautioned to use at recommend dose.     3. The patient will recover from RT induced colitis and consider xeloda as palliative maintenance therapy down the line under the care of Dr. Diggs.    4. RTC as needed.        Fiilppo Robbins M.D.  Department of Radiation Oncology  [facility]

## 2020-03-03 NOTE — PROGRESS NOTES
Department of Radiation Oncology  Radiation Therapy Center  [facility] [place] [address]  [phone]       Radiation Oncology Follow-up Visit  March 3, 2020      Pam Pierce  MRN: [id number]   : 1930     DIAGNOSIS:  locally advanced rectal adenocarcinoma  PATHOLOGY:  adenocarcinoma arising in a tubular adenoma, focal signet ring cell features identified          STAGE: clinical T4N2  INTENT OF RADIOTHERAPY: palliative hypofractionated RT as she wishes to prioritize quality of life and wishes to avoid any aggressive surgical intervention or even extended course of radiation therapy  CONCURRENT SYSTEMIC THERAPY:  No     ONCOLOGIC HISTORY:    Ms. Pierce is a 89 year old female a diagnosis of locally advanced rectal adenocarcinoma, clinical T4N2.        The patient initially presented with symptoms of abdominal pain in 2019, prompting further evaluation.  The patient underwent a CT scan of the abdomen pelvis at the time which demonstrated a circumferential rectal mass, causing relative colonic obstruction.  The patient subsequently underwent emergent diverting colostomy.  On 2019 she underwent biopsy of the rectal mass, which confirmed adenocarcinoma arising in a tubular adenoma, focal signet ring cell features identified. On 2020 the patient met with Dr. Diggs of medical oncology who discussed different treatment options.  Overall the patient described her goals of treatment to be largely palliative in nature, focusing on quality of life.  Different treatment options were discussed  including palliative radiation therapy to the pelvis +/-  systemic therapy or even systemic therapy alone.  The patient underwent a staging PET scan on 2020.  Imaging demonstrated a hypermetabolic rectal mass that appeared to directly invade the vagina.  There was evidence of small perirectal lymph nodes.  No evidence of distant disease was noted.  On 2020 the patient  underwent MRI of the pelvis.  Imaging demonstrated a 7.8 cm mass in the low and mid rectum, 4.4 cm above the anal verge.  The mass was noted to invade through the superior aspect of the vagina, and possible posterior margin of the urinary bladder.  There were also noted at least 4 abnormal perirectal lymph nodes.  The patient subsequently presented in radiation oncology 1/27/2020 to discuss potential role of radiation therapy as a part of her treatment strategy.    INTERVAL SINCE COMPLETION OF RADIATION THERAPY:   1 month    SUBJECTIVE:   The patient returns for follow-up.    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  She is also been using stool softeners aggressively, and has been cautioned to reduce at recommended levels to reduce abdominal cramping and dehydration.     Since treatment, the patient has noticed improvement in her urinary control.  She also noticed improvement in her pelvic pain/pressure.  Denies any hematochezia or vaginal bleeding.     Patient was seen by Dr. Diggs of medical oncology with plan to allow her to recover from her colitis and to subsequently start maintenance Xeloda.    PHYSICAL EXAM:  Wt 48.1 kg (106 lb)   BMI 21.40 kg/m    Gen: Alert, in NAD  Eyes: PERRL, EOMI, sclera anicteric  Neck: Supple, full ROM, no LAD  Pulm: No wheezing, stridor or respiratory distress  CV: Well-perfused, no cyanosis, no pedal edema  Abdominal: Soft, nontender, nondistended, no hepatomegaly  Back: No step-offs or pain to palpation along the thoracolumbar spine, no CVA tenderness    Musculoskeletal: Normal bulk and tone   Skin: Normal color and turgor  Neurologic: A/Ox3, CN II-XII intact  Psychiatric: Appropriate mood and affect    LABS AND IMAGING:  Reviewed.    IMPRESSION:   Ms. Pierce is a 89 year old female a diagnosis of locally advanced rectal adenocarcinoma, clinical T4N2.  The patient underwent palliative  hypofractionated RT (5 Gy x 5 fractions) as she wishes to prioritize quality of life and wishes to avoid any aggressive surgical intervention or even extended course of radiation therapy.    PLAN:   1. 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 Now improving and managed with anticholingeric therapy.     2. She was also using stool softeners too aggressively, which could have contributed to GI symptoms. She has since been cautioned to use at recommend dose.     3. The patient will recover from RT induced colitis and consider xeloda as palliative maintenance therapy down the line under the care of Dr. Diggs.    4. RTC as needed.        Filippo Robbins M.D.  Department of Radiation Oncology  [facility]

## 2020-03-03 NOTE — PROGRESS NOTES
"Oncology Rooming Note    March 3, 2020 2:20 PM   Pam Pierce is a 89 year old female who presents for:    Chief Complaint   Patient presents with     Oncology Clinic Visit     Recheck Rectal cancer, review labs & Xeloda     Initial Vitals: /62 (BP Location: Right arm, Patient Position: Sitting, Cuff Size: Adult Regular)   Pulse 95   Temp 99  F (37.2  C) (Tympanic)   Resp 16   Ht 1.499 m (4' 11.02\")   Wt 48.4 kg (106 lb 11.2 oz)   SpO2 98%   BMI 21.54 kg/m   Estimated body mass index is 21.54 kg/m  as calculated from the following:    Height as of this encounter: 1.499 m (4' 11.02\").    Weight as of this encounter: 48.4 kg (106 lb 11.2 oz). Body surface area is 1.42 meters squared.  No Pain (0) Comment: Data Unavailable   No LMP recorded. Patient has had a hysterectomy.  Allergies reviewed: Yes  Medications reviewed: Yes    Medications: Medication refills not needed today.  Pharmacy name entered into [employer]:   [facility] - [place], [place] - [address]  [facility] - [place], [place] - [address]    Clinical concerns: Recheck Rectal cancer, review labs & Xeloda.     How long should she stay on potassium ?    Will you manage her dicyclomine ?  Discuss chemo treatments.           Johanny Hunter, GIANA              "

## 2020-03-19 ENCOUNTER — TELEPHONE (OUTPATIENT)
Dept: FAMILY MEDICINE | Facility: CLINIC | Age: 85
End: 2020-03-19

## 2020-03-23 ENCOUNTER — PATIENT OUTREACH (OUTPATIENT)
Dept: CARE COORDINATION | Facility: CLINIC | Age: 85
End: 2020-03-23

## 2020-03-23 NOTE — PROGRESS NOTES
Clinic Care Coordination Contact  [facility]/Voicemail       Clinical Data: Care Coordinator Outreach  Proactive follow up outreach    Outreach attempted x 1.  Left message on daughter Karine's voicemail with call back information and requested return call. Consent to communicate on file.    Plan:  Care Coordinator will try to reach patient/family again within 10 business days.    MARY PatelN, RN   [facility]  - Clinic Care Coordinator  Phone: [phone]

## 2020-03-24 ENCOUNTER — PATIENT OUTREACH (OUTPATIENT)
Dept: ONCOLOGY | Facility: CLINIC | Age: 85
End: 2020-03-24

## 2020-03-24 NOTE — PROGRESS NOTES
Called pt daughter Karine to see how pt is doing after restarting the Xeloda.      for a return call.     Doris Ballesteros RN on 3/24/2020 at 1:09 PM

## 2020-03-30 NOTE — PROGRESS NOTES
Karine called back today and left a message stating her mom is doing great and she just restarted the Xeloda yesterday. Will plan to follow up in a week or so to see if pt is having any side effects.     Will set pt up for a f/up with Dr. Diggs, week of April 20th with labs prior.     Updated plan with daughter.     Doris Ballesteros RN on 3/30/2020 at 1:46 PM

## 2020-04-06 ENCOUNTER — PATIENT OUTREACH (OUTPATIENT)
Dept: CARE COORDINATION | Facility: CLINIC | Age: 85
End: 2020-04-06

## 2020-04-06 DIAGNOSIS — C20 RECTAL CANCER (H): Primary | ICD-10-CM

## 2020-04-06 RX ORDER — CAPECITABINE 500 MG/1
TABLET, FILM COATED ORAL
Qty: 54 TABLET | Refills: 0 | Status: SHIPPED | OUTPATIENT
Start: 2020-04-06 | End: 2020-04-06

## 2020-04-06 RX ORDER — CAPECITABINE 500 MG/1
TABLET, FILM COATED ORAL
Qty: 54 TABLET | Refills: 0 | Status: SHIPPED | OUTPATIENT
Start: 2020-04-06 | End: 2020-04-23

## 2020-04-06 NOTE — PROGRESS NOTES
Daughter called today to report pt is doing great. Only side effect is hair thinning. She is doing yard work and continuing her normal activities. Pt has f/u with labs set up for 04.21.     Doris Ballesteros RN on 4/6/2020 at 9:16 AM

## 2020-04-06 NOTE — PROGRESS NOTES
Clinic Care Coordination Contact  [facility]/Voicemail       Clinical Data: Care Coordinator Outreach  Proactive follow up outreach    Outreach attempted x 2.  Left message on daughter Karine's voicemail with call back information and requested return call. Consent to communicate on file.    Per chart review, patient/daughter have been in recent contact with Oncology team and reported patient was doing well. She has Oncology f/u on 4/21/20.    Plan: Care Coordinator will try to reach patient/family again in 1 month.    AMAIRANI Patel, RN   [facility]  - Clinic Care Coordinator  Phone: [phone]

## 2020-04-15 DIAGNOSIS — E87.6 HYPOKALEMIA: ICD-10-CM

## 2020-04-15 RX ORDER — POTASSIUM CHLORIDE 750 MG/1
10 TABLET, EXTENDED RELEASE ORAL 2 TIMES DAILY
Qty: 60 TABLET | Refills: 0 | Status: SHIPPED | OUTPATIENT
Start: 2020-04-15 | End: 2020-05-06

## 2020-04-21 ENCOUNTER — VIRTUAL VISIT (OUTPATIENT)
Dept: ONCOLOGY | Facility: CLINIC | Age: 85
End: 2020-04-21
Attending: INTERNAL MEDICINE
Payer: MEDICARE

## 2020-04-21 ENCOUNTER — PATIENT OUTREACH (OUTPATIENT)
Dept: ONCOLOGY | Facility: CLINIC | Age: 85
End: 2020-04-21

## 2020-04-21 ENCOUNTER — HOSPITAL ENCOUNTER (OUTPATIENT)
Dept: LAB | Facility: CLINIC | Age: 85
Discharge: HOME | End: 2020-04-21
Attending: INTERNAL MEDICINE | Admitting: INTERNAL MEDICINE
Payer: MEDICARE

## 2020-04-21 DIAGNOSIS — C20 RECTAL CANCER (H): ICD-10-CM

## 2020-04-21 DIAGNOSIS — E87.6 HYPOKALEMIA: ICD-10-CM

## 2020-04-21 DIAGNOSIS — C20 RECTAL CANCER (H): Primary | ICD-10-CM

## 2020-04-21 DIAGNOSIS — L27.1 HAND FOOT SYNDROME: Primary | ICD-10-CM

## 2020-04-21 LAB
ALBUMIN SERPL-MCNC: 3.3 G/DL (ref 3.4–5)
ALP SERPL-CCNC: 64 U/L (ref 40–150)
ALT SERPL W P-5'-P-CCNC: 28 U/L (ref 0–50)
ANION GAP SERPL CALCULATED.3IONS-SCNC: 1 MMOL/L (ref 3–14)
AST SERPL W P-5'-P-CCNC: 21 U/L (ref 0–45)
BASOPHILS # BLD AUTO: 0 10E9/L (ref 0–0.2)
BASOPHILS NFR BLD AUTO: 0.6 %
BILIRUB SERPL-MCNC: 0.4 MG/DL (ref 0.2–1.3)
BUN SERPL-MCNC: 16 MG/DL (ref 7–30)
CALCIUM SERPL-MCNC: 9.2 MG/DL (ref 8.5–10.1)
CHLORIDE SERPL-SCNC: 105 MMOL/L (ref 94–109)
CO2 SERPL-SCNC: 34 MMOL/L (ref 20–32)
CREAT SERPL-MCNC: 0.73 MG/DL (ref 0.52–1.04)
DIFFERENTIAL METHOD BLD: ABNORMAL
EOSINOPHIL # BLD AUTO: 0.7 10E9/L (ref 0–0.7)
EOSINOPHIL NFR BLD AUTO: 11 %
ERYTHROCYTE [DISTWIDTH] IN BLOOD BY AUTOMATED COUNT: 15.4 % (ref 10–15)
GFR SERPL CREATININE-BSD FRML MDRD: 73 ML/MIN/{1.73_M2}
GLUCOSE SERPL-MCNC: 91 MG/DL (ref 70–99)
HCT VFR BLD AUTO: 34.2 % (ref 35–47)
HGB BLD-MCNC: 11.3 G/DL (ref 11.7–15.7)
IMM GRANULOCYTES # BLD: 0 10E9/L (ref 0–0.4)
IMM GRANULOCYTES NFR BLD: 0.5 %
LYMPHOCYTES # BLD AUTO: 1.7 10E9/L (ref 0.8–5.3)
LYMPHOCYTES NFR BLD AUTO: 25.8 %
MCH RBC QN AUTO: 33.7 PG (ref 26.5–33)
MCHC RBC AUTO-ENTMCNC: 33 G/DL (ref 31.5–36.5)
MCV RBC AUTO: 102 FL (ref 78–100)
MONOCYTES # BLD AUTO: 0.7 10E9/L (ref 0–1.3)
MONOCYTES NFR BLD AUTO: 10.3 %
NEUTROPHILS # BLD AUTO: 3.3 10E9/L (ref 1.6–8.3)
NEUTROPHILS NFR BLD AUTO: 51.8 %
NRBC # BLD AUTO: 0 10*3/UL
NRBC BLD AUTO-RTO: 0 /100
PLATELET # BLD AUTO: 254 10E9/L (ref 150–450)
POTASSIUM SERPL-SCNC: 3.6 MMOL/L (ref 3.4–5.3)
PROT SERPL-MCNC: 6.3 G/DL (ref 6.8–8.8)
RBC # BLD AUTO: 3.35 10E12/L (ref 3.8–5.2)
SODIUM SERPL-SCNC: 140 MMOL/L (ref 133–144)
WBC # BLD AUTO: 6.4 10E9/L (ref 4–11)

## 2020-04-21 PROCEDURE — 80053 COMPREHEN METABOLIC PANEL: CPT | Performed by: INTERNAL MEDICINE

## 2020-04-21 PROCEDURE — 99443 ZZC PHYSICIAN TELEPHONE EVALUATION 21-30 MIN: CPT | Performed by: INTERNAL MEDICINE

## 2020-04-21 PROCEDURE — 85025 COMPLETE CBC W/AUTO DIFF WBC: CPT | Performed by: INTERNAL MEDICINE

## 2020-04-21 PROCEDURE — 36415 COLL VENOUS BLD VENIPUNCTURE: CPT | Performed by: INTERNAL MEDICINE

## 2020-04-21 RX ORDER — CAPECITABINE 500 MG/1
TABLET, FILM COATED ORAL
Qty: 54 TABLET | Refills: 0 | Status: CANCELLED | OUTPATIENT
Start: 2020-04-21

## 2020-04-21 NOTE — PROGRESS NOTES
"Pam Pierce is a 89 year old female who is being evaluated via a billable telephone visit.      The patient has been notified of following:     \"This telephone visit will be conducted via a call between you and your physician/provider. We have found that certain health care needs can be provided without the need for a physical exam.  This service lets us provide the care you need with a short phone conversation.  If a prescription is necessary we can send it directly to your pharmacy.  If lab work is needed we can place an order for that and you can then stop by our lab to have the test done at a later time.    Telephone visits are billed at different rates depending on your insurance coverage. During this emergency period, for some insurers they may be billed the same as an in-person visit.  Please reach out to your insurance provider with any questions.    If during the course of the call the physician/provider feels a telephone visit is not appropriate, you will not be charged for this service.\"    Patient has given verbal consent for Telephone visit?  Yes    How would you like to obtain your AVS? Mail a copy     Painful toes in the middle of the night.      Johanny Hunter CMA          "

## 2020-04-21 NOTE — PROGRESS NOTES
ONCOLOGY FOLLOW UP      TELEPHONE VISIT      PATIENT NAME: Pam Pierce   MRN# [id number]     Date of Visit: Apr 21, 2020     YOB: 1930   REASON FOR VISIT/CC:    At age 89, dx in 11/2019 rectal cancer  S/p Palliative RT  On oral palliative Xeloda now        HISTORY OF ONCOLOGY ILLNESS:    At age 89, she presented with years duration of abdominal pain.      CT 11/2019 found Circumferential rectosigmoid mass causing relative colonic obstruction with the more proximal colon distended with fecal debris. Colorectal mass extends beyond the serosa of the bowel wall with multiple small probable metastatic mesorectal lymph nodes. No enlarged pelvic sidewall or retroperitoneal lymph nodes. No evidence of metastatic liver disease on the current exam.    She was transferred to [facility], had emergency diverting colostomy bag, and biopsy of the colorectal mass 11/2019.    Rectal biopsy pathology found Adenocarcinoma arising in a tubular adenoma, focal signet ring cell features identified.    Further staging with PET, MRI found advanced T4b-N2 tumor of the low and mid rectum with invasion through the superior vagina and to the posterior margin of the urinary bladder.  Likely enhancing meningioma versus mets on brain MRI.   She made informed decision to proceed with palliative RT for palliation done in 2/2020.     She then made informed decision to proceed oral Xeloda in [facility] 2/2020 for palliation purposes.  She was admitted late Feb 2020 for RT colitis. S/p 3 doses of dex helped her symptoms tremendously. She was discharged on Dicyclomine for cramping and pain four times daily, and  prn oxycodone.      INTERVAL HISTORY  She is continuing oral Xeloda seems to have decent tolerance.       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         SOCIAL HISTORY:    She lives independently even at age 89 fully functional her kids are close by.  Smoking denies alcohol abuse.    FAMILY HISTORY:    FH + for breast, colon, ovarian, thymic, melanoma.       REVIEW OF SYSTEMS:   She has baseline 3 times empty of her colostomy bag on Metamucil. She is also using Dicyclomine (antispasmodic and anticholinergic agent).  She reports toes pain at night.        PHYSICAL EXAMINATION ATTAINABLE DURING TELEPHONE VISIT:  CONTSTITUAIONL: Sounded  pleasant, not in acute distress.   NEURO: Oriented to time, person, and places. Good memory   RESPIRATORY: talk nl, no sob during conversation. No cough.  PSYCHIATRIC: Normal mood and affect. Good memory. Proper insight and judgement.   THE REST OF A COMPREHENSIVE PHYSICIAL EXAM IS DEFERRED DUE TO COVID-19 PUBLIC HEALTH EMERGENCY RELATED PHONE VISIT RESCTRICTION.      CURRENT LAB DATA REVIEWED TODAY TODAY WITH PATIENT DURING THE VIRTUAL VISIT:  Cbc diff, cmp are fine.           CURRENT IMAGING REVIEWED TODAY WITH PATIENT DURING THE VIRTUAL VISIT:    Pelvic MRI 1/2020 - T4b-N2 tumor of the low and mid rectum with invasion through the superior vagina and to the posterior margin of the urinary bladder.      PET 1/2020  1. Hypermetabolic rectal mass that appears to directly invade into the vagina which is diffusely hypermetabolic. Small perirectal lymph nodes are noted.  2. No evidence of metastatic disease in the head, neck, chest, or  abdomen. No evidence of metastatic disease in the extremities.  3. Significant coronary and aortic atherosclerosis. Ascending thoracic  aortic caliber is ectatic at 4.1 cm.  4. Small area of hypermetabolism associated with a nodule in the  isthmus of the thyroid. Consider further evaluation with ultrasound.    Brain MRI 1/2020 - 1.8 cm uniformly enhancing extra-axial mass positioned along the olfactory groove, most likely representing a meningioma. Metastatic disease is thought to be less likely          OLD DATA REVIEWED TODAY WITH SUMMARY  CEA at 4.6 in 11/2019      CT 11/2019 found Circumferential rectosigmoid mass causing relative colonic obstruction with the more proximal colon distended with fecal debris. Colorectal mass extends beyond the serosa of the bowel wall with multiple small probable metastatic mesorectal lymph nodes. No enlarged pelvic sidewall or retroperitoneal lymph nodes. No evidence of metastatic liver disease on the current exam.        ASSESSMENT AND PLAN DISCUSSED WITH PATIENT TODAY DURING THE VIRTUAL VISIT:    1. Diagnosed rectal cancer November 2019 at age 89, with presenting symptoms of years duration of deep abdominal pain worsening to the point near obstruction.  Had emergent colostomy bag put in, Rectal biopsy pathology found Adenocarcinoma arising in a tubular adenoma, focal signet ring cell features identified.    Further staging with PET, MRI found advanced T4b-N2 tumor of the low and mid rectum with invasion through the superior vagina and to the posterior margin of the urinary bladder.  Likely enhancing meningioma versus mets on brain MRI.    She made informed decision to proceed with palliative RT for palliation done in 2/2020.     She then made informed decision to proceed oral Xeloda for palliation purposes.    Her BSA is around 1.4, her target dose about 1.5 g p.o. twice daily.    Adviced to resume 1 g p.o. twice daily 7 days on 7 days off in 2 wks.  Since she has decent tolerance, would advise her to increase gradually to 1.5 g in the morning,  keep 1 g in the evening time 7 days on 7 days off.      I recommend patient to creat a Health Care Directive/advanced Directive.      2. HFS with toe pain mainly at night.  So will only increasing Xeloda dosing to a.m. dosing time.  Advised topical moisturizing, massage, closely using pain medication.    3. Mild hypokalemia with diarrhea from her colostomy bag.  She is to continue oral K supplement.       Virtual VISIT time is  30  minutes, spent in dicussion and review patient's cancer history and treatment history, labs and images results, symptom managment, further treatment recommendations, follow up plan.     I have reviewed the note as documented above.  This accurately captures the substance of my conversation with the patient.

## 2020-04-22 ENCOUNTER — PATIENT OUTREACH (OUTPATIENT)
Dept: ONCOLOGY | Facility: CLINIC | Age: 85
End: 2020-04-22

## 2020-04-22 RX ORDER — CAPECITABINE 500 MG/1
TABLET, FILM COATED ORAL
Qty: 70 TABLET | Refills: 0 | Status: SHIPPED | OUTPATIENT
Start: 2020-04-22 | End: 2020-04-23

## 2020-04-22 NOTE — PROGRESS NOTES
FMLA forms have been completed and mailed back to pt daughter per request. Copy sent to scanning.    Doris Ballesteros RN on 4/22/2020 at 9:10 AM

## 2020-04-23 DIAGNOSIS — C20 RECTAL CANCER (H): ICD-10-CM

## 2020-04-23 RX ORDER — CAPECITABINE 500 MG/1
TABLET, FILM COATED ORAL
Qty: 70 TABLET | Refills: 0 | Status: SHIPPED | OUTPATIENT
Start: 2020-04-23 | End: 2020-07-16

## 2020-04-23 NOTE — ORAL ONC MGMT
Oral Chemotherapy Monitoring Program    The capecitabine rx releaed 4/22 was local print due to sig length. Re-released rx to [facility] and edited future treatment plans.      Garfield Raya, PharmD  Hematology/Oncology Clinical Pharmacist  [facility]  [facility]

## 2020-05-01 ENCOUNTER — PATIENT OUTREACH (OUTPATIENT)
Dept: CARE COORDINATION | Facility: CLINIC | Age: 85
End: 2020-05-01

## 2020-05-01 NOTE — PROGRESS NOTES
Clinic Care Coordination Contact  [facility]/Voicemail       Clinical Data: Care Coordinator Outreach  Proactive follow up outreach    Outreach attempted x 3.  Left message on daughter Karine's voicemail with call back information and requested return call if any support needs or questions remain for care coordination.    Plan:  Care Coordinator will do no further outreaches at this time.    Lorena Vazquez, MARYN, RN   [facility]  - [facility] Care Coordinator

## 2020-05-06 DIAGNOSIS — E87.6 HYPOKALEMIA: ICD-10-CM

## 2020-05-06 RX ORDER — POTASSIUM CHLORIDE 750 MG/1
10 TABLET, EXTENDED RELEASE ORAL 2 TIMES DAILY
Qty: 60 TABLET | Refills: 0 | Status: SHIPPED | OUTPATIENT
Start: 2020-05-06 | End: 2020-06-11

## 2020-05-17 NOTE — PROGRESS NOTES
Type of Visit: Video Visit  Patient has given verbal consent for Video visit.     Video Start Time: 2:10 pm  Video End Time: 2:25 pm    Originating Location (pt. Location): Home     Distant Location (provider location):  [facility]      Platform used for Video Visit: [facility]         ONCOLOGY FOLLOW UP      PATIENT NAME: Pam Pierce   MRN# [id number]     Date of Visit: May 21, 2020     YOB: 1930   REASON FOR VISIT/CC:    At age 89, dx in 11/2019 rectal cancer  S/p Palliative RT  On oral palliative Xeloda now        HISTORY OF ONCOLOGY ILLNESS:    At age 89, she presented with years duration of abdominal pain.      CT 11/2019 found Circumferential rectosigmoid mass causing relative colonic obstruction with the more proximal colon distended with fecal debris. Colorectal mass extends beyond the serosa of the bowel wall with multiple small probable metastatic mesorectal lymph nodes. No enlarged pelvic sidewall or retroperitoneal lymph nodes. No evidence of metastatic liver disease on the current exam.    She was transferred to [facility], had emergency diverting colostomy bag, and biopsy of the colorectal mass 11/2019.    Rectal biopsy pathology found Adenocarcinoma arising in a tubular adenoma, focal signet ring cell features identified.    Further staging with PET, MRI found advanced T4b-N2 tumor of the low and mid rectum with invasion through the superior vagina and to the posterior margin of the urinary bladder.  Likely enhancing meningioma versus mets on brain MRI.   She made informed decision to proceed with palliative RT for palliation done in 2/2020.     She then made informed decision to proceed oral Xeloda in [facility] 2/2020 for palliation purposes.  She was admitted late Feb 2020 for RT colitis. S/p 3 doses of dex helped her symptoms tremendously. She was discharged on Dicyclomine for cramping and pain four times daily, and  prn oxycodone.      INTERVAL HISTORY  She is  continuing oral Xeloda seems to have decent tolerance.       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        SOCIAL HISTORY:    She lives independently even at age 89 fully functional her kids are close by.  Smoking denies alcohol abuse.    FAMILY HISTORY:    FH + for breast, colon, ovarian, thymic, melanoma.       REVIEW OF SYSTEMS:   She has baseline 5-6  times empty of her colostomy bag on Metamucil. She is also using Dicyclomine (antispasmodic and anticholinergic agent).  She is eating well, her weight is up.        PHYSICAL EXAMINATION ATTAINABLE DURING VIDEO VISIT:  CONSTITUTIONAL - Pt looks like stated age, pleasant, not in acute distress. Not obese.  NEURO: Oriented to time, person, and places. No tremor. Normal gait.   ENT, MOUTH: Pupils are equal.  Sclerae are anicteric.  Moist oral mucosa. No oral thrush.   NECK:  No jugular venous distention.  No thyroid enlargement.   RESPIRATORY: talk nl, no sob during conversation, no cough.   MUSCULOSKELETAL/EXTREMITIES:  No edema.  No joint deformity. Normal range of motion.  SKIN:  No petechiae.  No rash.  No signs of cellulitis. Mild erythematous changes on palms.  PSYCHIATRIC: Normal mood and affect. Good memory. Proper insight and judgement.   THE REST OF A COMPREHENSIVE PHYSICIAL EXAM IS DEFERRED DUE TO COVID-19 PUBLIC HEALTH EMERGENCY RELATED VIDEO VISIT RESCTRICTION.      CURRENT LAB DATA REVIEWED TODAY TODAY WITH PATIENT DURING THE VIRTUAL VISIT:  Cbc diff, cmp are fine.           CURRENT IMAGING REVIEWED TODAY WITH PATIENT DURING THE VIRTUAL VISIT:    Pelvic MRI 1/2020 - T4b-N2 tumor of the low and mid rectum with invasion through the superior vagina and to the posterior margin of the urinary bladder.      PET 1/2020  1. Hypermetabolic rectal mass that appears to directly invade into the vagina which  is diffusely hypermetabolic. Small perirectal lymph nodes are noted.  2. No evidence of metastatic disease in the head, neck, chest, or abdomen. No evidence of metastatic disease in the extremities.  3. Significant coronary and aortic atherosclerosis. Ascending thoracic aortic caliber is ectatic at 4.1 cm.  4. Small area of hypermetabolism associated with a nodule in the isthmus of the thyroid. Consider further evaluation with ultrasound.    Brain MRI 1/2020 - 1.8 cm uniformly enhancing extra-axial mass positioned along the olfactory groove, most likely representing a meningioma. Metastatic disease is thought to be less likely         OLD DATA REVIEWED TODAY WITH SUMMARY  CEA at 4.6 in 11/2019      CT 11/2019 found Circumferential rectosigmoid mass causing relative colonic obstruction with the more proximal colon distended with fecal debris. Colorectal mass extends beyond the serosa of the bowel wall with multiple small probable metastatic mesorectal lymph nodes. No enlarged pelvic sidewall or retroperitoneal lymph nodes. No evidence of metastatic liver disease on the current exam.        ASSESSMENT AND PLAN DISCUSSED WITH PATIENT and her daughter TODAY DURING THE VIRTUAL VISIT:    1. Diagnosed rectal cancer November 2019 at age 89, with presenting symptoms of years duration of deep abdominal pain worsening to the point near obstruction.  Had emergent colostomy bag put in, Rectal biopsy pathology found Adenocarcinoma arising in a tubular adenoma, focal signet ring cell features identified.    Further staging with PET, MRI found advanced T4b-N2 tumor of the low and mid rectum with invasion through the superior vagina and to the posterior margin of the urinary bladder.  Likely enhancing meningioma versus mets on brain MRI.    She made informed decision to proceed with palliative RT for palliation done in 2/2020.     She then made informed decision to proceed oral Xeloda for palliation purposes.    Her BSA is around  1.4, her target dose about 1.5 g p.o. twice daily.    Adviced to increase further to 1.5 g bid, 7 days on 7 days off.      I recommend patient to creat a Health Care Directive/advanced Directive.    CODE STATUS is discussed, recommend DNR/DNI.      2. HFS  Advised topical moisturizing, massage, closely using pain medication.      3. Mild hypokalemia with diarrhea from her colostomy bag.  She is to continue oral K supplement.

## 2020-05-19 ENCOUNTER — HOSPITAL ENCOUNTER (OUTPATIENT)
Dept: LAB | Facility: CLINIC | Age: 85
Discharge: HOME | End: 2020-05-19
Attending: INTERNAL MEDICINE | Admitting: INTERNAL MEDICINE
Payer: MEDICARE

## 2020-05-19 DIAGNOSIS — C20 RECTAL CANCER (H): Primary | ICD-10-CM

## 2020-05-19 LAB
ALBUMIN SERPL-MCNC: 3.2 G/DL (ref 3.4–5)
ALP SERPL-CCNC: 67 U/L (ref 40–150)
ALT SERPL W P-5'-P-CCNC: 30 U/L (ref 0–50)
ANION GAP SERPL CALCULATED.3IONS-SCNC: 7 MMOL/L (ref 3–14)
AST SERPL W P-5'-P-CCNC: 24 U/L (ref 0–45)
BASOPHILS # BLD AUTO: 0 10E9/L (ref 0–0.2)
BASOPHILS NFR BLD AUTO: 0.5 %
BILIRUB SERPL-MCNC: 0.3 MG/DL (ref 0.2–1.3)
BUN SERPL-MCNC: 25 MG/DL (ref 7–30)
CALCIUM SERPL-MCNC: 8.4 MG/DL (ref 8.5–10.1)
CHLORIDE SERPL-SCNC: 106 MMOL/L (ref 94–109)
CO2 SERPL-SCNC: 30 MMOL/L (ref 20–32)
CREAT SERPL-MCNC: 0.88 MG/DL (ref 0.52–1.04)
DIFFERENTIAL METHOD BLD: ABNORMAL
EOSINOPHIL # BLD AUTO: 0.6 10E9/L (ref 0–0.7)
EOSINOPHIL NFR BLD AUTO: 9.2 %
ERYTHROCYTE [DISTWIDTH] IN BLOOD BY AUTOMATED COUNT: 16.2 % (ref 10–15)
GFR SERPL CREATININE-BSD FRML MDRD: 57 ML/MIN/{1.73_M2}
GLUCOSE SERPL-MCNC: 126 MG/DL (ref 70–99)
HCT VFR BLD AUTO: 35.5 % (ref 35–47)
HGB BLD-MCNC: 11.9 G/DL (ref 11.7–15.7)
IMM GRANULOCYTES # BLD: 0 10E9/L (ref 0–0.4)
IMM GRANULOCYTES NFR BLD: 0.5 %
LYMPHOCYTES # BLD AUTO: 1.5 10E9/L (ref 0.8–5.3)
LYMPHOCYTES NFR BLD AUTO: 23.6 %
MCH RBC QN AUTO: 35 PG (ref 26.5–33)
MCHC RBC AUTO-ENTMCNC: 33.5 G/DL (ref 31.5–36.5)
MCV RBC AUTO: 104 FL (ref 78–100)
MONOCYTES # BLD AUTO: 0.5 10E9/L (ref 0–1.3)
MONOCYTES NFR BLD AUTO: 8.3 %
NEUTROPHILS # BLD AUTO: 3.6 10E9/L (ref 1.6–8.3)
NEUTROPHILS NFR BLD AUTO: 57.9 %
NRBC # BLD AUTO: 0 10*3/UL
NRBC BLD AUTO-RTO: 0 /100
PLATELET # BLD AUTO: 224 10E9/L (ref 150–450)
POTASSIUM SERPL-SCNC: 3.6 MMOL/L (ref 3.4–5.3)
PROT SERPL-MCNC: 6.5 G/DL (ref 6.8–8.8)
RBC # BLD AUTO: 3.4 10E12/L (ref 3.8–5.2)
SODIUM SERPL-SCNC: 143 MMOL/L (ref 133–144)
WBC # BLD AUTO: 6.2 10E9/L (ref 4–11)

## 2020-05-19 PROCEDURE — 85025 COMPLETE CBC W/AUTO DIFF WBC: CPT | Performed by: INTERNAL MEDICINE

## 2020-05-19 PROCEDURE — 80053 COMPREHEN METABOLIC PANEL: CPT | Performed by: INTERNAL MEDICINE

## 2020-05-19 PROCEDURE — 36415 COLL VENOUS BLD VENIPUNCTURE: CPT | Performed by: INTERNAL MEDICINE

## 2020-05-21 ENCOUNTER — VIRTUAL VISIT (OUTPATIENT)
Dept: ONCOLOGY | Facility: CLINIC | Age: 85
End: 2020-05-21
Attending: INTERNAL MEDICINE
Payer: MEDICARE

## 2020-05-21 VITALS — HEIGHT: 60 IN | BODY MASS INDEX: 21.2 KG/M2 | WEIGHT: 108 LBS

## 2020-05-21 DIAGNOSIS — C20 RECTAL CANCER (H): Primary | ICD-10-CM

## 2020-05-21 DIAGNOSIS — L27.1 HAND FOOT SYNDROME: ICD-10-CM

## 2020-05-21 DIAGNOSIS — E87.6 HYPOKALEMIA: ICD-10-CM

## 2020-05-21 PROCEDURE — 99214 OFFICE O/P EST MOD 30 MIN: CPT | Mod: 95 | Performed by: INTERNAL MEDICINE

## 2020-05-21 PROCEDURE — 40001009 ZZH VIDEO/TELEPHONE VISIT; NO CHARGE

## 2020-05-21 ASSESSMENT — MIFFLIN-ST. JEOR: SCORE: 831.38

## 2020-05-21 ASSESSMENT — PAIN SCALES - GENERAL: PAINLEVEL: NO PAIN (0)

## 2020-05-21 NOTE — PROGRESS NOTES
"Pam Pierce is a 89 year old female who is being evaluated via a billable video visit.      The patient has been notified of following:     \"This video visit will be conducted via a call between you and your physician/provider. This service lets us provide the care you need with a video conversation.  If a prescription is necessary we can send it directly to your pharmacy.  If lab work is needed we can place an order for that and you can then stop by our lab to have the test done at a later time.    Video visits maybe billed at different rates depending on your insurance coverage.  Please reach out to your insurance provider with any questions.    If during the course of the call the physician/provider feels a video visit is not appropriate, you will not be charged for this service.\"    Patient has given verbal consent for Video visit? Yes    How would you like to obtain your AVS? Mail copy     Patient would like the video invitation sent by: Send to e-mail at: [phone] and [email or web address].     Will anyone else be joining your video visit? Yes: Karine. How would they like to receive their invitation? Text to cell phone: [phone]          Lulu Givens CMA      "

## 2020-05-21 NOTE — PATIENT INSTRUCTIONS
Increase Xeloda to 1.5 g po bid, 7 days on, 7 days off.   Follow-up in 1 month with labs release from [facility]

## 2020-05-22 ENCOUNTER — PATIENT OUTREACH (OUTPATIENT)
Dept: ONCOLOGY | Facility: CLINIC | Age: 85
End: 2020-05-22

## 2020-05-22 DIAGNOSIS — C20 RECTAL CANCER (H): Primary | ICD-10-CM

## 2020-05-22 RX ORDER — CAPECITABINE 500 MG/1
TABLET, FILM COATED ORAL
Qty: 84 TABLET | Refills: 0 | Status: SHIPPED | OUTPATIENT
Start: 2020-05-22 | End: 2020-07-16

## 2020-06-11 DIAGNOSIS — E87.6 HYPOKALEMIA: ICD-10-CM

## 2020-06-11 RX ORDER — POTASSIUM CHLORIDE 750 MG/1
10 TABLET, EXTENDED RELEASE ORAL 2 TIMES DAILY
Qty: 60 TABLET | Refills: 1 | Status: SHIPPED | OUTPATIENT
Start: 2020-06-11 | End: 2020-08-14

## 2020-06-17 ENCOUNTER — HOSPITAL ENCOUNTER (OUTPATIENT)
Dept: LAB | Facility: CLINIC | Age: 85
Discharge: HOME | End: 2020-06-17
Attending: INTERNAL MEDICINE | Admitting: INTERNAL MEDICINE
Payer: MEDICARE

## 2020-06-17 DIAGNOSIS — C20 RECTAL CANCER (H): Primary | ICD-10-CM

## 2020-06-17 LAB
ALBUMIN SERPL-MCNC: 3.3 G/DL (ref 3.4–5)
ALP SERPL-CCNC: 71 U/L (ref 40–150)
ALT SERPL W P-5'-P-CCNC: 29 U/L (ref 0–50)
ANION GAP SERPL CALCULATED.3IONS-SCNC: 6 MMOL/L (ref 3–14)
AST SERPL W P-5'-P-CCNC: 23 U/L (ref 0–45)
BASOPHILS # BLD AUTO: 0.1 10E9/L (ref 0–0.2)
BASOPHILS NFR BLD AUTO: 0.8 %
BILIRUB SERPL-MCNC: 0.3 MG/DL (ref 0.2–1.3)
BUN SERPL-MCNC: 19 MG/DL (ref 7–30)
CALCIUM SERPL-MCNC: 9.1 MG/DL (ref 8.5–10.1)
CEA SERPL-MCNC: 1.7 UG/L (ref 0–2.5)
CHLORIDE SERPL-SCNC: 106 MMOL/L (ref 94–109)
CO2 SERPL-SCNC: 32 MMOL/L (ref 20–32)
CREAT SERPL-MCNC: 1.06 MG/DL (ref 0.52–1.04)
DIFFERENTIAL METHOD BLD: ABNORMAL
EOSINOPHIL # BLD AUTO: 1 10E9/L (ref 0–0.7)
EOSINOPHIL NFR BLD AUTO: 16.3 %
ERYTHROCYTE [DISTWIDTH] IN BLOOD BY AUTOMATED COUNT: 15.9 % (ref 10–15)
GFR SERPL CREATININE-BSD FRML MDRD: 46 ML/MIN/{1.73_M2}
GLUCOSE SERPL-MCNC: 106 MG/DL (ref 70–99)
HCT VFR BLD AUTO: 36.8 % (ref 35–47)
HGB BLD-MCNC: 12.1 G/DL (ref 11.7–15.7)
IMM GRANULOCYTES # BLD: 0 10E9/L (ref 0–0.4)
IMM GRANULOCYTES NFR BLD: 0.3 %
LYMPHOCYTES # BLD AUTO: 1.3 10E9/L (ref 0.8–5.3)
LYMPHOCYTES NFR BLD AUTO: 21.5 %
MCH RBC QN AUTO: 35 PG (ref 26.5–33)
MCHC RBC AUTO-ENTMCNC: 32.9 G/DL (ref 31.5–36.5)
MCV RBC AUTO: 106 FL (ref 78–100)
MONOCYTES # BLD AUTO: 0.5 10E9/L (ref 0–1.3)
MONOCYTES NFR BLD AUTO: 8 %
NEUTROPHILS # BLD AUTO: 3.3 10E9/L (ref 1.6–8.3)
NEUTROPHILS NFR BLD AUTO: 53.1 %
NRBC # BLD AUTO: 0 10*3/UL
NRBC BLD AUTO-RTO: 0 /100
PLATELET # BLD AUTO: 218 10E9/L (ref 150–450)
POTASSIUM SERPL-SCNC: 3.5 MMOL/L (ref 3.4–5.3)
PROT SERPL-MCNC: 6.6 G/DL (ref 6.8–8.8)
RBC # BLD AUTO: 3.46 10E12/L (ref 3.8–5.2)
SODIUM SERPL-SCNC: 144 MMOL/L (ref 133–144)
WBC # BLD AUTO: 6.1 10E9/L (ref 4–11)

## 2020-06-17 PROCEDURE — 85025 COMPLETE CBC W/AUTO DIFF WBC: CPT | Performed by: INTERNAL MEDICINE

## 2020-06-17 PROCEDURE — 36415 COLL VENOUS BLD VENIPUNCTURE: CPT | Performed by: INTERNAL MEDICINE

## 2020-06-17 PROCEDURE — 80053 COMPREHEN METABOLIC PANEL: CPT | Performed by: INTERNAL MEDICINE

## 2020-06-17 PROCEDURE — 82378 CARCINOEMBRYONIC ANTIGEN: CPT | Performed by: INTERNAL MEDICINE

## 2020-06-22 ENCOUNTER — VIRTUAL VISIT (OUTPATIENT)
Dept: ONCOLOGY | Facility: CLINIC | Age: 85
End: 2020-06-22
Attending: INTERNAL MEDICINE
Payer: MEDICARE

## 2020-06-22 DIAGNOSIS — E87.6 HYPOKALEMIA: ICD-10-CM

## 2020-06-22 DIAGNOSIS — L27.1 HAND FOOT SYNDROME: Primary | ICD-10-CM

## 2020-06-22 DIAGNOSIS — C20 RECTAL CANCER (H): ICD-10-CM

## 2020-06-22 PROCEDURE — 40001009 ZZH VIDEO/TELEPHONE VISIT; NO CHARGE

## 2020-06-22 PROCEDURE — 99214 OFFICE O/P EST MOD 30 MIN: CPT | Mod: 95 | Performed by: INTERNAL MEDICINE

## 2020-06-22 RX ORDER — CAPECITABINE 500 MG/1
TABLET, FILM COATED ORAL
Qty: 95 TABLET | Refills: 0 | Status: SHIPPED | OUTPATIENT
Start: 2020-06-22 | End: 2020-07-16

## 2020-06-22 RX ORDER — CAPECITABINE 500 MG/1
TABLET, FILM COATED ORAL
Qty: 84 TABLET | Refills: 0 | Status: CANCELLED | OUTPATIENT
Start: 2020-06-22

## 2020-06-22 NOTE — PROGRESS NOTES
Type of Visit: Video Visit  Patient has given verbal consent for Video visit.     Video Start Time: 11 am  Video End Time: 11:30 am    Originating Location (pt. Location): Home     Distant Location (provider location):  [facility]      Platform used for Video Visit: [employer]      ONCOLOGY FOLLOW UP      PATIENT NAME: Pam Pierce   MRN# [id number]     Date of Visit: Jun 22, 2020     YOB: 1930   REASON FOR VISIT/CC:    At age 89, dx in 11/2019 rectal cancer  S/p Palliative RT  On oral palliative Xeloda now        HISTORY OF ONCOLOGY ILLNESS:    At age 89, she presented with years duration of abdominal pain.      CT 11/2019 found Circumferential rectosigmoid mass causing relative colonic obstruction with the more proximal colon distended with fecal debris. Colorectal mass extends beyond the serosa of the bowel wall with multiple small probable metastatic mesorectal lymph nodes. No enlarged pelvic sidewall or retroperitoneal lymph nodes. No evidence of metastatic liver disease on the current exam.    She was transferred to [facility], had emergency diverting colostomy bag, and biopsy of the colorectal mass 11/2019.    Rectal biopsy pathology found Adenocarcinoma arising in a tubular adenoma, focal signet ring cell features identified.    Further staging with PET, MRI found advanced T4b-N2 tumor of the low and mid rectum with invasion through the superior vagina and to the posterior margin of the urinary bladder.  Likely enhancing meningioma versus mets on brain MRI.   She made informed decision to proceed with palliative RT for palliation done in 2/2020.     She then made informed decision to proceed oral Xeloda in [facility] 2/2020 for palliation purposes.  She was admitted late Feb 2020 for RT colitis. S/p 3 doses of dex helped her symptoms tremendously. She was discharged on Dicyclomine for cramping and pain four times daily, and  prn oxycodone.      INTERVAL HISTORY  She is  continuing oral Xeloda seems to have decent tolerance.       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        SOCIAL HISTORY:    She lives independently even at age 89,  fully functional her kids are close by.  Smoking denies alcohol abuse.    FAMILY HISTORY:    FH + for breast, colon, ovarian, thymic, melanoma.       REVIEW OF SYSTEMS:   She has baseline 5-6  times empty of her colostomy bag on Metamucil. She is also using Dicyclomine (antispasmodic and anticholinergic agent).  Is trying to keep her stool on the liquid side so it is easier for her to clean colostomy bag.  She is eating well, her weight is up.        PHYSICAL EXAMINATION ATTAINABLE DURING VIDEO VISIT:  CONSTITUTIONAL - Pt looks like stated age, pleasant, not in acute distress. Not obese.  NEURO: Oriented to time, person, and places. No tremor. Normal gait.   ENT, MOUTH: Pupils are equal.  Sclerae are anicteric.  Moist oral mucosa. No oral thrush.   NECK:  No jugular venous distention.  No thyroid enlargement.   RESPIRATORY: talk nl, no sob during conversation, no cough.   MUSCULOSKELETAL/EXTREMITIES:  No edema.  No joint deformity. Normal range of motion.  SKIN:  No petechiae.  No rash.  No signs of cellulitis. Mild erythematous changes on palms.  PSYCHIATRIC: Normal mood and affect. Good memory. Proper insight and judgement.   THE REST OF A COMPREHENSIVE PHYSICIAL EXAM IS DEFERRED DUE TO COVID-19 PUBLIC HEALTH EMERGENCY RELATED VIDEO VISIT RESCTRICTION.      CURRENT LAB DATA REVIEWED TODAY TODAY WITH PATIENT DURING THE VISIT:  Cbc diff, cmp are fine.   Cr 1.06  CEA 1.7        CURRENT IMAGING REVIEWED TODAY WITH PATIENT DURING THE VIRTUAL VISIT:    Pelvic MRI 1/2020 - T4b-N2 tumor of the low and mid rectum with invasion through the superior vagina and to the posterior margin of the urinary  bladder.      PET 1/2020  1. Hypermetabolic rectal mass that appears to directly invade into the vagina which is diffusely hypermetabolic. Small perirectal lymph nodes are noted.  2. No evidence of metastatic disease in the head, neck, chest, or abdomen. No evidence of metastatic disease in the extremities.  3. Significant coronary and aortic atherosclerosis. Ascending thoracic aortic caliber is ectatic at 4.1 cm.  4. Small area of hypermetabolism associated with a nodule in the isthmus of the thyroid. Consider further evaluation with ultrasound.    Brain MRI 1/2020 - 1.8 cm uniformly enhancing extra-axial mass positioned along the olfactory groove, most likely representing a meningioma. Metastatic disease is thought to be less likely         OLD DATA REVIEWED TODAY WITH SUMMARY  CEA at 4.6 in 11/2019      CT 11/2019 found Circumferential rectosigmoid mass causing relative colonic obstruction with the more proximal colon distended with fecal debris. Colorectal mass extends beyond the serosa of the bowel wall with multiple small probable metastatic mesorectal lymph nodes. No enlarged pelvic sidewall or retroperitoneal lymph nodes. No evidence of metastatic liver disease on the current exam.        ASSESSMENT AND PLAN DISCUSSED WITH PATIENT and her daughter TODAY DURING THE VIRTUAL VISIT:    1. Diagnosed rectal cancer November 2019 at age 89, with presenting symptoms of years duration of deep abdominal pain worsening to the point near obstruction.  Had emergent colostomy bag put in, Rectal biopsy pathology found Adenocarcinoma arising in a tubular adenoma, focal signet ring cell features identified.    Further staging with PET, MRI found advanced T4b-N2 tumor of the low and mid rectum with invasion through the superior vagina and to the posterior margin of the urinary bladder.  Likely enhancing meningioma versus mets on brain MRI.    She made informed decision to proceed with palliative RT for palliation done in  2/2020.     She then made informed decision to proceed oral Xeloda for palliation purposes.    Her BSA is around 1.4, her target dose about 1.5 g p.o. twice daily.    Adviced to increase further to 1.5 g bid, 7 days on 7 days off.  This is her target dose, will keep her on this one.     I recommend patient to creat a Health Care Directive/advanced Directive.    CODE STATUS is discussed, recommend DNR/DNI.      2. HFS  Advised topical moisturizing, massage, closely using pain medication.  She has slight hyperpigmentation without skin break without pain.      3. Mild hypokalemia with diarrhea from her colostomy bag.  She is to continue oral K supplement.

## 2020-06-22 NOTE — PROGRESS NOTES
"Pam Pierce is a 89 year old female who is being evaluated via a billable video visit.      The patient has been notified of following:     \"This video visit will be conducted via a call between you and your physician/provider. We have found that certain health care needs can be provided without the need for an in-person physical exam.  This service lets us provide the care you need with a video conversation.  If a prescription is necessary we can send it directly to your pharmacy.  If lab work is needed we can place an order for that and you can then stop by our lab to have the test done at a later time.    Video visits are billed at different rates depending on your insurance coverage.  Please reach out to your insurance provider with any questions.    If during the course of the call the physician/provider feels a video visit is not appropriate, you will not be charged for this service.\"    Patient has given verbal consent for Video visit? Yes    How would you like to obtain your AVS? Shantelle  Patient would like the video invitation sent by: Text to cell phone: [phone]    Will anyone else be joining your video visit? Gabi Hunter CMA        "

## 2020-06-22 NOTE — LETTER
"    6/22/2020         RE: Pam Pierce  [address] [place] [id number]        Dear Colleague,    Thank you for referring your patient, Pam Pierce, to the [facility]. Please see a copy of my visit note below.    Pam Pierce is a 89 year old female who is being evaluated via a billable video visit.      The patient has been notified of following:     \"This video visit will be conducted via a call between you and your physician/provider. We have found that certain health care needs can be provided without the need for an in-person physical exam.  This service lets us provide the care you need with a video conversation.  If a prescription is necessary we can send it directly to your pharmacy.  If lab work is needed we can place an order for that and you can then stop by our lab to have the test done at a later time.    Video visits are billed at different rates depending on your insurance coverage.  Please reach out to your insurance provider with any questions.    If during the course of the call the physician/provider feels a video visit is not appropriate, you will not be charged for this service.\"    Patient has given verbal consent for Video visit? Yes    How would you like to obtain your AVS? MyChart  Patient would like the video invitation sent by: Text to cell phone: [phone]    Will anyone else be joining your video visit? No      Johanny Hunter CMA          Type of Visit: Video Visit  Patient has given verbal consent for Video visit.     Video Start Time: 11 am  Video End Time: 11:30 am    Originating Location (pt. Location): Home     Distant Location (provider location):  [facility]      Platform used for Video Visit: Doximity      ONCOLOGY FOLLOW UP      PATIENT NAME: Pam Pierce   MRN# [id number]     Date of Visit: Jun 22, 2020     YOB: 1930   REASON FOR VISIT/CC:    At age 89, dx in 11/2019 rectal cancer  S/p Palliative RT  On oral palliative Xeloda " now        HISTORY OF ONCOLOGY ILLNESS:    At age 89, she presented with years duration of abdominal pain.      CT 11/2019 found Circumferential rectosigmoid mass causing relative colonic obstruction with the more proximal colon distended with fecal debris. Colorectal mass extends beyond the serosa of the bowel wall with multiple small probable metastatic mesorectal lymph nodes. No enlarged pelvic sidewall or retroperitoneal lymph nodes. No evidence of metastatic liver disease on the current exam.    She was transferred to [facility], had emergency diverting colostomy bag, and biopsy of the colorectal mass 11/2019.    Rectal biopsy pathology found Adenocarcinoma arising in a tubular adenoma, focal signet ring cell features identified.    Further staging with PET, MRI found advanced T4b-N2 tumor of the low and mid rectum with invasion through the superior vagina and to the posterior margin of the urinary bladder.  Likely enhancing meningioma versus mets on brain MRI.   She made informed decision to proceed with palliative RT for palliation done in 2/2020.     She then made informed decision to proceed oral Xeloda in [facility] 2/2020 for palliation purposes.  She was admitted late Feb 2020 for RT colitis. S/p 3 doses of dex helped her symptoms tremendously. She was discharged on Dicyclomine for cramping and pain four times daily, and  prn oxycodone.      INTERVAL HISTORY  She is continuing oral Xeloda seems to have decent tolerance.       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        SOCIAL HISTORY:    She lives independently even at age 89,  fully functional her kids are close by.  Smoking denies alcohol abuse.    FAMILY HISTORY:    FH + for breast, colon, ovarian, thymic, melanoma.       REVIEW OF SYSTEMS:   She has baseline 5-6  times empty  of her colostomy bag on Metamucil. She is also using Dicyclomine (antispasmodic and anticholinergic agent).  Is trying to keep her stool on the liquid side so it is easier for her to clean colostomy bag.  She is eating well, her weight is up.        PHYSICAL EXAMINATION ATTAINABLE DURING VIDEO VISIT:  CONSTITUTIONAL - Pt looks like stated age, pleasant, not in acute distress. Not obese.  NEURO: Oriented to time, person, and places. No tremor. Normal gait.   ENT, MOUTH: Pupils are equal.  Sclerae are anicteric.  Moist oral mucosa. No oral thrush.   NECK:  No jugular venous distention.  No thyroid enlargement.   RESPIRATORY: talk nl, no sob during conversation, no cough.   MUSCULOSKELETAL/EXTREMITIES:  No edema.  No joint deformity. Normal range of motion.  SKIN:  No petechiae.  No rash.  No signs of cellulitis. Mild erythematous changes on palms.  PSYCHIATRIC: Normal mood and affect. Good memory. Proper insight and judgement.   THE REST OF A COMPREHENSIVE PHYSICIAL EXAM IS DEFERRED DUE TO COVID-19 PUBLIC HEALTH EMERGENCY RELATED VIDEO VISIT RESCTRICTION.      CURRENT LAB DATA REVIEWED TODAY TODAY WITH PATIENT DURING THE VISIT:  Cbc diff, cmp are fine.   Cr 1.06  CEA 1.7        CURRENT IMAGING REVIEWED TODAY WITH PATIENT DURING THE VIRTUAL VISIT:    Pelvic MRI 1/2020 - T4b-N2 tumor of the low and mid rectum with invasion through the superior vagina and to the posterior margin of the urinary bladder.      PET 1/2020  1. Hypermetabolic rectal mass that appears to directly invade into the vagina which is diffusely hypermetabolic. Small perirectal lymph nodes are noted.  2. No evidence of metastatic disease in the head, neck, chest, or abdomen. No evidence of metastatic disease in the extremities.  3. Significant coronary and aortic atherosclerosis. Ascending thoracic aortic caliber is ectatic at 4.1 cm.  4. Small area of hypermetabolism associated with a nodule in the isthmus of the thyroid. Consider further evaluation  with ultrasound.    Brain MRI 1/2020 - 1.8 cm uniformly enhancing extra-axial mass positioned along the olfactory groove, most likely representing a meningioma. Metastatic disease is thought to be less likely         OLD DATA REVIEWED TODAY WITH SUMMARY  CEA at 4.6 in 11/2019      CT 11/2019 found Circumferential rectosigmoid mass causing relative colonic obstruction with the more proximal colon distended with fecal debris. Colorectal mass extends beyond the serosa of the bowel wall with multiple small probable metastatic mesorectal lymph nodes. No enlarged pelvic sidewall or retroperitoneal lymph nodes. No evidence of metastatic liver disease on the current exam.        ASSESSMENT AND PLAN DISCUSSED WITH PATIENT and her daughter TODAY DURING THE VIRTUAL VISIT:    1. Diagnosed rectal cancer November 2019 at age 89, with presenting symptoms of years duration of deep abdominal pain worsening to the point near obstruction.  Had emergent colostomy bag put in, Rectal biopsy pathology found Adenocarcinoma arising in a tubular adenoma, focal signet ring cell features identified.    Further staging with PET, MRI found advanced T4b-N2 tumor of the low and mid rectum with invasion through the superior vagina and to the posterior margin of the urinary bladder.  Likely enhancing meningioma versus mets on brain MRI.    She made informed decision to proceed with palliative RT for palliation done in 2/2020.     She then made informed decision to proceed oral Xeloda for palliation purposes.    Her BSA is around 1.4, her target dose about 1.5 g p.o. twice daily.    Adviced to increase further to 1.5 g bid, 7 days on 7 days off.  This is her target dose, will keep her on this one.     I recommend patient to creat a Health Care Directive/advanced Directive.    CODE STATUS is discussed, recommend DNR/DNI.      2. HFS  Advised topical moisturizing, massage, closely using pain medication.  She has slight hyperpigmentation without skin  break without pain.      3. Mild hypokalemia with diarrhea from her colostomy bag.  She is to continue oral K supplement.                   Again, thank you for allowing me to participate in the care of your patient.        Sincerely,        Tomasa Diggs MD, MD

## 2020-06-22 NOTE — LETTER
"    6/22/2020         RE: Pam Pierce  [address] [place] [phone]        Dear Colleague,    Thank you for referring your patient, Pam Pierce, to the [facility]. Please see a copy of my visit note below.    Pam Pierce is a 89 year old female who is being evaluated via a billable video visit.      The patient has been notified of following:     \"This video visit will be conducted via a call between you and your physician/provider. We have found that certain health care needs can be provided without the need for an in-person physical exam.  This service lets us provide the care you need with a video conversation.  If a prescription is necessary we can send it directly to your pharmacy.  If lab work is needed we can place an order for that and you can then stop by our lab to have the test done at a later time.    Video visits are billed at different rates depending on your insurance coverage.  Please reach out to your insurance provider with any questions.    If during the course of the call the physician/provider feels a video visit is not appropriate, you will not be charged for this service.\"    Patient has given verbal consent for Video visit? Yes    How would you like to obtain your AVS? MyChart  Patient would like the video invitation sent by: Text to cell phone: [phone]    Will anyone else be joining your video visit? No      Johanny Hunter CMA          Type of Visit: Video Visit  Patient has given verbal consent for Video visit.     Video Start Time: 11 am  Video End Time: 11:30 am    Originating Location (pt. Location): Home     Distant Location (provider location):  [facility]      Platform used for Video Visit: Doximity      ONCOLOGY FOLLOW UP      PATIENT NAME: Pam Pierce   MRN# [id number]     Date of Visit: Jun 22, 2020     YOB: 1930   REASON FOR VISIT/CC:    At age 89, dx in 11/2019 rectal cancer  S/p Palliative RT  On oral palliative Xeloda " now        HISTORY OF ONCOLOGY ILLNESS:    At age 89, she presented with years duration of abdominal pain.      CT 11/2019 found Circumferential rectosigmoid mass causing relative colonic obstruction with the more proximal colon distended with fecal debris. Colorectal mass extends beyond the serosa of the bowel wall with multiple small probable metastatic mesorectal lymph nodes. No enlarged pelvic sidewall or retroperitoneal lymph nodes. No evidence of metastatic liver disease on the current exam.    She was transferred to [facility], had emergency diverting colostomy bag, and biopsy of the colorectal mass 11/2019.    Rectal biopsy pathology found Adenocarcinoma arising in a tubular adenoma, focal signet ring cell features identified.    Further staging with PET, MRI found advanced T4b-N2 tumor of the low and mid rectum with invasion through the superior vagina and to the posterior margin of the urinary bladder.  Likely enhancing meningioma versus mets on brain MRI.   She made informed decision to proceed with palliative RT for palliation done in 2/2020.     She then made informed decision to proceed oral Xeloda in [facility] 2/2020 for palliation purposes.  She was admitted late Feb 2020 for RT colitis. S/p 3 doses of dex helped her symptoms tremendously. She was discharged on Dicyclomine for cramping and pain four times daily, and  prn oxycodone.      INTERVAL HISTORY  She is continuing oral Xeloda seems to have decent tolerance.       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        SOCIAL HISTORY:    She lives independently even at age 89,  fully functional her kids are close by.  Smoking denies alcohol abuse.    FAMILY HISTORY:    FH + for breast, colon, ovarian, thymic, melanoma.       REVIEW OF SYSTEMS:   She has baseline 5-6  times empty  of her colostomy bag on Metamucil. She is also using Dicyclomine (antispasmodic and anticholinergic agent).  Is trying to keep her stool on the liquid side so it is easier for her to clean colostomy bag.  She is eating well, her weight is up.        PHYSICAL EXAMINATION ATTAINABLE DURING VIDEO VISIT:  CONSTITUTIONAL - Pt looks like stated age, pleasant, not in acute distress. Not obese.  NEURO: Oriented to time, person, and places. No tremor. Normal gait.   ENT, MOUTH: Pupils are equal.  Sclerae are anicteric.  Moist oral mucosa. No oral thrush.   NECK:  No jugular venous distention.  No thyroid enlargement.   RESPIRATORY: talk nl, no sob during conversation, no cough.   MUSCULOSKELETAL/EXTREMITIES:  No edema.  No joint deformity. Normal range of motion.  SKIN:  No petechiae.  No rash.  No signs of cellulitis. Mild erythematous changes on palms.  PSYCHIATRIC: Normal mood and affect. Good memory. Proper insight and judgement.   THE REST OF A COMPREHENSIVE PHYSICIAL EXAM IS DEFERRED DUE TO COVID-19 PUBLIC HEALTH EMERGENCY RELATED VIDEO VISIT RESCTRICTION.      CURRENT LAB DATA REVIEWED TODAY TODAY WITH PATIENT DURING THE VISIT:  Cbc diff, cmp are fine.   Cr 1.06  CEA 1.7        CURRENT IMAGING REVIEWED TODAY WITH PATIENT DURING THE VIRTUAL VISIT:    Pelvic MRI 1/2020 - T4b-N2 tumor of the low and mid rectum with invasion through the superior vagina and to the posterior margin of the urinary bladder.      PET 1/2020  1. Hypermetabolic rectal mass that appears to directly invade into the vagina which is diffusely hypermetabolic. Small perirectal lymph nodes are noted.  2. No evidence of metastatic disease in the head, neck, chest, or abdomen. No evidence of metastatic disease in the extremities.  3. Significant coronary and aortic atherosclerosis. Ascending thoracic aortic caliber is ectatic at 4.1 cm.  4. Small area of hypermetabolism associated with a nodule in the isthmus of the thyroid. Consider further evaluation  with ultrasound.    Brain MRI 1/2020 - 1.8 cm uniformly enhancing extra-axial mass positioned along the olfactory groove, most likely representing a meningioma. Metastatic disease is thought to be less likely         OLD DATA REVIEWED TODAY WITH SUMMARY  CEA at 4.6 in 11/2019      CT 11/2019 found Circumferential rectosigmoid mass causing relative colonic obstruction with the more proximal colon distended with fecal debris. Colorectal mass extends beyond the serosa of the bowel wall with multiple small probable metastatic mesorectal lymph nodes. No enlarged pelvic sidewall or retroperitoneal lymph nodes. No evidence of metastatic liver disease on the current exam.        ASSESSMENT AND PLAN DISCUSSED WITH PATIENT and her daughter TODAY DURING THE VIRTUAL VISIT:    1. Diagnosed rectal cancer November 2019 at age 89, with presenting symptoms of years duration of deep abdominal pain worsening to the point near obstruction.  Had emergent colostomy bag put in, Rectal biopsy pathology found Adenocarcinoma arising in a tubular adenoma, focal signet ring cell features identified.    Further staging with PET, MRI found advanced T4b-N2 tumor of the low and mid rectum with invasion through the superior vagina and to the posterior margin of the urinary bladder.  Likely enhancing meningioma versus mets on brain MRI.    She made informed decision to proceed with palliative RT for palliation done in 2/2020.     She then made informed decision to proceed oral Xeloda for palliation purposes.    Her BSA is around 1.4, her target dose about 1.5 g p.o. twice daily.    Adviced to increase further to 1.5 g bid, 7 days on 7 days off.  This is her target dose, will keep her on this one.     I recommend patient to creat a Health Care Directive/advanced Directive.    CODE STATUS is discussed, recommend DNR/DNI.      2. HFS  Advised topical moisturizing, massage, closely using pain medication.  She has slight hyperpigmentation without skin  break without pain.      3. Mild hypokalemia with diarrhea from her colostomy bag.  She is to continue oral K supplement.                   Again, thank you for allowing me to participate in the care of your patient.        Sincerely,        Tomasa Diggs MD, MD

## 2020-06-29 ENCOUNTER — PATIENT OUTREACH (OUTPATIENT)
Dept: ONCOLOGY | Facility: CLINIC | Age: 85
End: 2020-06-29

## 2020-06-29 DIAGNOSIS — C20 RECTAL CANCER (H): Primary | ICD-10-CM

## 2020-06-29 DIAGNOSIS — G62.9 NEUROPATHY: ICD-10-CM

## 2020-06-29 RX ORDER — GABAPENTIN 100 MG/1
100 CAPSULE ORAL AT BEDTIME
Qty: 30 CAPSULE | Refills: 1 | Status: SHIPPED | OUTPATIENT
Start: 2020-06-29 | End: 2020-07-09

## 2020-06-29 NOTE — PROGRESS NOTES
Pt daughter called to report the pain in her legs has worsened the last few days down into her toes. Last night she was up all night walking around the house to help with the pain. Pain in much worse at night.    Pt is on target dose of Xeloda.    Pt is taking Tylenol at bedtime. Will review with Dr. Diggs to see if Gabapentin would be appropriate.    Doris Ballesteros RN on 6/29/2020 at 2:43 PM

## 2020-06-29 NOTE — PROGRESS NOTES
Called and updated Karine, she will have the pt start this tonight.     Doris Ballesteros RN on 6/29/2020 at 4:31 PM

## 2020-07-08 ENCOUNTER — TRANSFERRED RECORDS (OUTPATIENT)
Dept: HEALTH INFORMATION MANAGEMENT | Facility: CLINIC | Age: 85
End: 2020-07-08

## 2020-07-09 RX ORDER — GABAPENTIN 100 MG/1
100 CAPSULE ORAL 3 TIMES DAILY
Qty: 90 CAPSULE | Refills: 1 | Status: SHIPPED | OUTPATIENT
Start: 2020-07-09 | End: 2020-07-09

## 2020-07-09 RX ORDER — GABAPENTIN 100 MG/1
CAPSULE ORAL
Qty: 90 CAPSULE | Refills: 1 | Status: SHIPPED | OUTPATIENT
Start: 2020-07-09 | End: 2020-08-04

## 2020-07-09 NOTE — PROGRESS NOTES
Pt still having pain in her legs plan to increase slowly up to TID Gabapentin 100mg. Pt to increase to BID for 3-4 days then TID.     Doris Ballesteros RN on 7/9/2020 at 4:31 PM

## 2020-07-16 ENCOUNTER — TELEPHONE (OUTPATIENT)
Dept: ONCOLOGY | Facility: CLINIC | Age: 85
End: 2020-07-16

## 2020-07-16 DIAGNOSIS — C20 RECTAL CANCER (H): Primary | ICD-10-CM

## 2020-07-16 RX ORDER — CAPECITABINE 500 MG/1
TABLET, FILM COATED ORAL
Qty: 11 TABLET | Refills: 0 | Status: SHIPPED | OUTPATIENT
Start: 2020-07-16 | End: 2020-08-19

## 2020-07-16 NOTE — ORAL ONC MGMT
Oral Chemothearpy Monitoring Program     Next Xeloda 'on' week starts 7/19. Patient has 31 tablets remaining and needs 11 more tablets to complete cycle.     Due to Medicare Part B rules, [facility] is not able to dispense over 1 month supply or refill with partial refill remaining. Added new Xeloda rx for 11 tablets to Gabriels treatment plan and requested oncology team to sign rx and release to [facility].     Delivery of Xeloda scheduled for 7/21.     Thank you for the opportunity to participate in the care of the above patient,  Garfield Raya, PharmD  Hematology/Oncology Clinical Pharmacist  [facility]  [facility]  [phone]

## 2020-07-23 ENCOUNTER — HOSPITAL ENCOUNTER (OUTPATIENT)
Dept: MRI IMAGING | Facility: CLINIC | Age: 85
Discharge: HOME | End: 2020-07-23
Attending: INTERNAL MEDICINE | Admitting: INTERNAL MEDICINE
Payer: MEDICARE

## 2020-07-23 LAB
CREAT BLD-MCNC: 0.7 MG/DL (ref 0.52–1.04)
GFR SERPL CREATININE-BSD FRML MDRD: 79 ML/MIN/{1.73_M2}

## 2020-07-23 PROCEDURE — A9585 GADOBUTROL INJECTION: HCPCS | Performed by: INTERNAL MEDICINE

## 2020-07-23 PROCEDURE — 72197 MRI PELVIS W/O & W/DYE: CPT

## 2020-07-23 PROCEDURE — 82565 ASSAY OF CREATININE: CPT

## 2020-07-23 PROCEDURE — 25500064 ZZH RX 255 OP 636: Performed by: INTERNAL MEDICINE

## 2020-07-23 RX ORDER — GADOBUTROL 604.72 MG/ML
4 INJECTION INTRAVENOUS ONCE
Status: COMPLETED | OUTPATIENT
Start: 2020-07-23 | End: 2020-07-23

## 2020-07-23 RX ADMIN — GADOBUTROL 4 ML: 604.72 INJECTION INTRAVENOUS at 14:35

## 2020-07-25 PROBLEM — C20 RECTAL CANCER (H): Status: ACTIVE | Noted: 2019-11-19

## 2020-07-27 ENCOUNTER — VIRTUAL VISIT (OUTPATIENT)
Dept: ONCOLOGY | Facility: CLINIC | Age: 85
End: 2020-07-27
Attending: FAMILY MEDICINE
Payer: MEDICARE

## 2020-07-27 DIAGNOSIS — M79.661 PAIN IN BOTH LOWER LEGS: ICD-10-CM

## 2020-07-27 DIAGNOSIS — G47.00 INSOMNIA, UNSPECIFIED TYPE: ICD-10-CM

## 2020-07-27 DIAGNOSIS — C20 RECTAL CANCER (H): Primary | ICD-10-CM

## 2020-07-27 DIAGNOSIS — M79.662 PAIN IN BOTH LOWER LEGS: ICD-10-CM

## 2020-07-27 PROCEDURE — 99214 OFFICE O/P EST MOD 30 MIN: CPT | Mod: 95 | Performed by: INTERNAL MEDICINE

## 2020-07-27 PROCEDURE — 40001009 ZZH VIDEO/TELEPHONE VISIT; NO CHARGE

## 2020-07-27 NOTE — LETTER
7/27/2020         RE: Pam Pierce  [address] [place] [id number]        Dear Colleague,    Thank you for referring your patient, Pam Pierce, to the [facility]. Please see a copy of my visit note below.    Type of Visit: Video Visit  Patient has given verbal consent for Video visit.     Video Start Time: 1 pm  Video End Time: 1:30 am    Originating Location (pt. Location): Home     Distant Location (provider location):  [facility]      Platform used for Video Visit: Doximity      ONCOLOGY FOLLOW UP      PATIENT NAME: Pam Pierce   MRN# [id number]     Date of Visit: Jul 27, 2020     YOB: 1930   REASON FOR VISIT/CC:    At age 89, dx in 11/2019 rectal cancer  S/p Palliative RT  On oral palliative Xeloda now        HISTORY OF ONCOLOGY ILLNESS:    At age 89, she presented with years duration of abdominal pain.      CT 11/2019 found Circumferential rectosigmoid mass causing relative colonic obstruction with the more proximal colon distended with fecal debris. Colorectal mass extends beyond the serosa of the bowel wall with multiple small probable metastatic mesorectal lymph nodes. No enlarged pelvic sidewall or retroperitoneal lymph nodes. No evidence of metastatic liver disease on the current exam.    She was transferred to [facility], had emergency diverting colostomy bag, and biopsy of the colorectal mass 11/2019.    Rectal biopsy pathology found Adenocarcinoma arising in a tubular adenoma, focal signet ring cell features identified.    Further staging with PET, MRI found advanced T4b-N2 tumor of the low and mid rectum with invasion through the superior vagina and to the posterior margin of the urinary bladder.  Likely enhancing meningioma versus mets on brain MRI.   She made informed decision to proceed with palliative RT for palliation done in 2/2020.     She then made informed decision to proceed oral Xeloda in earely 2/2020 for palliation  purposes.  She was admitted late Feb 2020 for RT colitis. S/p 3 doses of dex helped her symptoms tremendously. She was discharged on Dicyclomine for cramping and pain four times daily, and  prn oxycodone.      INTERVAL HISTORY  Restaging pelvic MRI 6/2020 found Significant interval decrease in size of the mid to distal rectal mass since 1/16/2020 indicating improvement.        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        SOCIAL HISTORY:    She lives with her daughter,   fully functional her kids are close by.  Smoking denies alcohol abuse.      FAMILY HISTORY:    FH + for breast, colon, ovarian, thymic, melanoma.       REVIEW OF SYSTEMS:   She has baseline 8-10 times empty of her colostomy bag on Metamucil. She is also using Dicyclomine (antispasmodic and anticholinergic agent).  Is trying to keep her stool on the liquid side so it is easier for her to clean colostomy bag.    Reports new right leg pain from right hip, pain goes all the way down to foot. Left leg pain from knee down, not as bad.        PHYSICAL EXAMINATION ATTAINABLE DURING VIDEO VISIT:  CONSTITUTIONAL - Pt looks like stated age, pleasant, not in acute distress. Not obese.  NEURO: Oriented to time, person, and places. No tremor. Normal gait.   ENT, MOUTH: Pupils are equal.  Sclerae are anicteric.  Moist oral mucosa. No oral thrush.   NECK:  No jugular venous distention.  No thyroid enlargement.   RESPIRATORY: talk nl, no sob during conversation, no cough.   MUSCULOSKELETAL/EXTREMITIES:  No edema.  No joint deformity. Normal range of motion.  SKIN:  No petechiae.  No rash.  No signs of cellulitis. Mild erythematous changes on palms.  PSYCHIATRIC: Normal mood and affect. Good memory. Proper insight and judgement.   THE REST OF A COMPREHENSIVE PHYSICIAL EXAM IS DEFERRED DUE TO COVID-19 PUBLIC  HEALTH EMERGENCY RELATED VIDEO VISIT RESCTRICTION.      CURRENT LAB DATA REVIEWED TODAY TODAY WITH PATIENT DURING THE VISIT:  Cbc diff, cmp are fine.   Cr 1.06  CEA 1.7        CURRENT IMAGING REVIEWED TODAY WITH PATIENT DURING THE VIRTUAL VISIT:  Pelvic MRI 7/2020 - Significant interval decrease in size of the mid to distal rectal mass since 1/16/2020 indicating improvement. The mass is still seen to cause severe luminal stricture and appears to extend through the rectal wall and shows a degree of invasion into the adjacent  vagina and also just contacts the posterior wall of the midline  Bladder.      Pelvic MRI 1/2020 - T4b-N2 tumor of the low and mid rectum with invasion through the superior vagina and to the posterior margin of the urinary bladder.      PET 1/2020  1. Hypermetabolic rectal mass that appears to directly invade into the vagina which is diffusely hypermetabolic. Small perirectal lymph nodes are noted.  2. No evidence of metastatic disease in the head, neck, chest, or abdomen. No evidence of metastatic disease in the extremities.  3. Significant coronary and aortic atherosclerosis. Ascending thoracic aortic caliber is ectatic at 4.1 cm.  4. Small area of hypermetabolism associated with a nodule in the isthmus of the thyroid. Consider further evaluation with ultrasound.    Brain MRI 1/2020 - 1.8 cm uniformly enhancing extra-axial mass positioned along the olfactory groove, most likely representing a meningioma. Metastatic disease is thought to be less likely         OLD DATA REVIEWED TODAY WITH SUMMARY  CEA at 4.6 in 11/2019      CT 11/2019 found Circumferential rectosigmoid mass causing relative colonic obstruction with the more proximal colon distended with fecal debris. Colorectal mass extends beyond the serosa of the bowel wall with multiple small probable metastatic mesorectal lymph nodes. No enlarged pelvic sidewall or retroperitoneal lymph nodes. No evidence of metastatic liver disease on the  current exam.        ASSESSMENT AND PLAN DISCUSSED WITH PATIENT and her daughter TODAY DURING THE VISIT:    1. Diagnosed rectal cancer November 2019 at age 89, with presenting symptoms of years duration of deep abdominal pain worsening to the point near obstruction.  Had emergent colostomy bag put in, Rectal biopsy pathology found Adenocarcinoma arising in a tubular adenoma, focal signet ring cell features identified.    Further staging with PET, MRI found advanced T4b-N2 tumor of the low and mid rectum with invasion through the superior vagina and to the posterior margin of the urinary bladder.  Likely enhancing meningioma versus mets on brain MRI.    She made informed decision to proceed with palliative RT for palliation done in 2/2020.     She then made informed decision to proceed oral Xeloda for palliation purposes.    Her BSA is around 1.4, her target dose about 1.5 g p.o. twice daily.    Adviced to increase further to 1.5 g bid, 7 days on 7 days off.  This is her target dose, will keep her on this one.     Due to new complains of legs, weakness in [address], advice hold xeloda for 2 wks.     I recommend patient to creat a Health Care Directive/advanced Directive.    CODE STATUS is discussed, recommend DNR/DNI.      2. New complains of legs pain, right > left.   Advice increase gabapentin to 200 mg q hs and keep the day time dose.  Advice work up with x rays to start.   Advice PT.       3. Insomnia.  Advice to increase melatonin to 10 mg poq hs.                         Again, thank you for allowing me to participate in the care of your patient.        Sincerely,        Tomasa Diggs MD, MD

## 2020-07-27 NOTE — PROGRESS NOTES
Type of Visit: Video Visit  Patient has given verbal consent for Video visit.     Video Start Time: 1 pm  Video End Time: 1:30 am    Originating Location (pt. Location): Home     Distant Location (provider location):  [facility]      Platform used for Video Visit: Doximity      ONCOLOGY FOLLOW UP      PATIENT NAME: Pam Pierce   MRN# [id number]     Date of Visit: Jul 27, 2020     YOB: 1930   REASON FOR VISIT/CC:    At age 89, dx in 11/2019 rectal cancer  S/p Palliative RT  On oral palliative Xeloda now        HISTORY OF ONCOLOGY ILLNESS:    At age 89, she presented with years duration of abdominal pain.      CT 11/2019 found Circumferential rectosigmoid mass causing relative colonic obstruction with the more proximal colon distended with fecal debris. Colorectal mass extends beyond the serosa of the bowel wall with multiple small probable metastatic mesorectal lymph nodes. No enlarged pelvic sidewall or retroperitoneal lymph nodes. No evidence of metastatic liver disease on the current exam.    She was transferred to [facility], had emergency diverting colostomy bag, and biopsy of the colorectal mass 11/2019.    Rectal biopsy pathology found Adenocarcinoma arising in a tubular adenoma, focal signet ring cell features identified.    Further staging with PET, MRI found advanced T4b-N2 tumor of the low and mid rectum with invasion through the superior vagina and to the posterior margin of the urinary bladder.  Likely enhancing meningioma versus mets on brain MRI.   She made informed decision to proceed with palliative RT for palliation done in 2/2020.     She then made informed decision to proceed oral Xeloda in [facility] 2/2020 for palliation purposes.  She was admitted late Feb 2020 for RT colitis. S/p 3 doses of dex helped her symptoms tremendously. She was discharged on Dicyclomine for cramping and pain four times daily, and  prn oxycodone.      INTERVAL HISTORY  Restaging  pelvic MRI 6/2020 found Significant interval decrease in size of the mid to distal rectal mass since 1/16/2020 indicating improvement.        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        SOCIAL HISTORY:    She lives with her daughter,   fully functional her kids are close by.  Smoking denies alcohol abuse.      FAMILY HISTORY:    FH + for breast, colon, ovarian, thymic, melanoma.       REVIEW OF SYSTEMS:   She has baseline 8-10 times empty of her colostomy bag on Metamucil. She is also using Dicyclomine (antispasmodic and anticholinergic agent).  Is trying to keep her stool on the liquid side so it is easier for her to clean colostomy bag.    Reports new right leg pain from right hip, pain goes all the way down to foot. Left leg pain from knee down, not as bad.        PHYSICAL EXAMINATION ATTAINABLE DURING VIDEO VISIT:  CONSTITUTIONAL - Pt looks like stated age, pleasant, not in acute distress. Not obese.  NEURO: Oriented to time, person, and places. No tremor. Normal gait.   ENT, MOUTH: Pupils are equal.  Sclerae are anicteric.  Moist oral mucosa. No oral thrush.   NECK:  No jugular venous distention.  No thyroid enlargement.   RESPIRATORY: talk nl, no sob during conversation, no cough.   MUSCULOSKELETAL/EXTREMITIES:  No edema.  No joint deformity. Normal range of motion.  SKIN:  No petechiae.  No rash.  No signs of cellulitis. Mild erythematous changes on palms.  PSYCHIATRIC: Normal mood and affect. Good memory. Proper insight and judgement.   THE REST OF A COMPREHENSIVE PHYSICIAL EXAM IS DEFERRED DUE TO COVID-19 PUBLIC HEALTH EMERGENCY RELATED VIDEO VISIT RESCTRICTION.      CURRENT LAB DATA REVIEWED TODAY TODAY WITH PATIENT DURING THE VISIT:  Cbc diff, cmp are fine.   Cr 1.06  CEA 1.7        CURRENT IMAGING REVIEWED TODAY WITH PATIENT DURING THE VIRTUAL  VISIT:  Pelvic MRI 7/2020 - Significant interval decrease in size of the mid to distal rectal mass since 1/16/2020 indicating improvement. The mass is still seen to cause severe luminal stricture and appears to extend through the rectal wall and shows a degree of invasion into the adjacent  vagina and also just contacts the posterior wall of the midline  Bladder.      Pelvic MRI 1/2020 - T4b-N2 tumor of the low and mid rectum with invasion through the superior vagina and to the posterior margin of the urinary bladder.      PET 1/2020  1. Hypermetabolic rectal mass that appears to directly invade into the vagina which is diffusely hypermetabolic. Small perirectal lymph nodes are noted.  2. No evidence of metastatic disease in the head, neck, chest, or abdomen. No evidence of metastatic disease in the extremities.  3. Significant coronary and aortic atherosclerosis. Ascending thoracic aortic caliber is ectatic at 4.1 cm.  4. Small area of hypermetabolism associated with a nodule in the isthmus of the thyroid. Consider further evaluation with ultrasound.    Brain MRI 1/2020 - 1.8 cm uniformly enhancing extra-axial mass positioned along the olfactory groove, most likely representing a meningioma. Metastatic disease is thought to be less likely         OLD DATA REVIEWED TODAY WITH SUMMARY  CEA at 4.6 in 11/2019      CT 11/2019 found Circumferential rectosigmoid mass causing relative colonic obstruction with the more proximal colon distended with fecal debris. Colorectal mass extends beyond the serosa of the bowel wall with multiple small probable metastatic mesorectal lymph nodes. No enlarged pelvic sidewall or retroperitoneal lymph nodes. No evidence of metastatic liver disease on the current exam.        ASSESSMENT AND PLAN DISCUSSED WITH PATIENT and her daughter TODAY DURING THE VISIT:    1. Diagnosed rectal cancer November 2019 at age 89, with presenting symptoms of years duration of deep abdominal pain worsening to  the point near obstruction.  Had emergent colostomy bag put in, Rectal biopsy pathology found Adenocarcinoma arising in a tubular adenoma, focal signet ring cell features identified.    Further staging with PET, MRI found advanced T4b-N2 tumor of the low and mid rectum with invasion through the superior vagina and to the posterior margin of the urinary bladder.  Likely enhancing meningioma versus mets on brain MRI.    She made informed decision to proceed with palliative RT for palliation done in 2/2020.     She then made informed decision to proceed oral Xeloda for palliation purposes.    Her BSA is around 1.4, her target dose about 1.5 g p.o. twice daily.    Adviced to increase further to 1.5 g bid, 7 days on 7 days off.  This is her target dose, will keep her on this one.     Due to new complains of legs, weakness [place] [address], advice hold xeloda for 2 wks.     I recommend patient to creat a Health Care Directive/advanced Directive.    CODE STATUS is discussed, recommend DNR/DNI.      2. New complains of legs pain, right > left.   Advice increase gabapentin to 200 mg q hs and keep the day time dose.  Advice work up with x rays to start.   Advice PT.       3. Insomnia.  Advice to increase melatonin to 10 mg poq hs.

## 2020-07-27 NOTE — PATIENT INSTRUCTIONS
Hold Xeloda for 2 wks.   Increase gabapentin to 200 mg q hs, and 100 mg po bid.   X rays to be done  PT referral.   Follow-up open at this point.

## 2020-07-27 NOTE — PROCEDURES
"Pam Pierce is a 89 year old female who is being evaluated via a billable video visit.      The patient has been notified of following:     \"This video visit will be conducted via a call between you and your physician/provider. We have found that certain health care needs can be provided without the need for an in-person physical exam.  This service lets us provide the care you need with a video conversation.  If a prescription is necessary we can send it directly to your pharmacy.  If lab work is needed we can place an order for that and you can then stop by our lab to have the test done at a later time.    Video visits are billed at different rates depending on your insurance coverage.  Please reach out to your insurance provider with any questions.    If during the course of the call the physician/provider feels a video visit is not appropriate, you will not be charged for this service.\"    Patient has given verbal consent for Video visit? Yes  How would you like to obtain your AVS? Mail a copy  If you are dropped from the video visit, the video invite should be resent to: Text to cell phone: [phone] [email or web address]  Will anyone else be joining your video visit? Yes: The daughter (Karine) will be answering the call  . How would they like to receive their invitation? Text to cell phone: [phone]        Video-Visit Details    Type of service:  Video Visit        Originating Location (pt. Location): Home    Distant Location (provider location):  [facility]     Patients daughter has some questions regarding her mothers increased leg pain, pt is not sleeping and feeling more weak than usual. Also daughter Karine is wondering if her mother could get a PT eval at home says that she has been using a cane since she is unsteady.         Lacey Paris LPN      "

## 2020-07-27 NOTE — LETTER
7/27/2020         RE: Pam Pierce  [address] [place] [id number]        Dear Colleague,    Thank you for referring your patient, Pam Pierce, to the [facility]. Please see a copy of my visit note below.    Type of Visit: Video Visit  Patient has given verbal consent for Video visit.     Video Start Time: 1 pm  Video End Time: 1:30 am    Originating Location (pt. Location): Home     Distant Location (provider location):  [facility]      Platform used for Video Visit: Doximity      ONCOLOGY FOLLOW UP      PATIENT NAME: Pam Pierce   MRN# [id number]     Date of Visit: Jul 27, 2020     YOB: 1930   REASON FOR VISIT/CC:    At age 89, dx in 11/2019 rectal cancer  S/p Palliative RT  On oral palliative Xeloda now        HISTORY OF ONCOLOGY ILLNESS:    At age 89, she presented with years duration of abdominal pain.      CT 11/2019 found Circumferential rectosigmoid mass causing relative colonic obstruction with the more proximal colon distended with fecal debris. Colorectal mass extends beyond the serosa of the bowel wall with multiple small probable metastatic mesorectal lymph nodes. No enlarged pelvic sidewall or retroperitoneal lymph nodes. No evidence of metastatic liver disease on the current exam.    She was transferred to [facility], had emergency diverting colostomy bag, and biopsy of the colorectal mass 11/2019.    Rectal biopsy pathology found Adenocarcinoma arising in a tubular adenoma, focal signet ring cell features identified.    Further staging with PET, MRI found advanced T4b-N2 tumor of the low and mid rectum with invasion through the superior vagina and to the posterior margin of the urinary bladder.  Likely enhancing meningioma versus mets on brain MRI.   She made informed decision to proceed with palliative RT for palliation done in 2/2020.     She then made informed decision to proceed oral Xeloda in earely 2/2020 for palliation  purposes.  She was admitted late Feb 2020 for RT colitis. S/p 3 doses of dex helped her symptoms tremendously. She was discharged on Dicyclomine for cramping and pain four times daily, and  prn oxycodone.      INTERVAL HISTORY  Restaging pelvic MRI 6/2020 found Significant interval decrease in size of the mid to distal rectal mass since 1/16/2020 indicating improvement.        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        SOCIAL HISTORY:    She lives with her daughter,   fully functional her kids are close by.  Smoking denies alcohol abuse.      FAMILY HISTORY:    FH + for breast, colon, ovarian, thymic, melanoma.       REVIEW OF SYSTEMS:   She has baseline 8-10 times empty of her colostomy bag on Metamucil. She is also using Dicyclomine (antispasmodic and anticholinergic agent).  Is trying to keep her stool on the liquid side so it is easier for her to clean colostomy bag.    Reports new right leg pain from right hip, pain goes all the way down to foot. Left leg pain from knee down, not as bad.        PHYSICAL EXAMINATION ATTAINABLE DURING VIDEO VISIT:  CONSTITUTIONAL - Pt looks like stated age, pleasant, not in acute distress. Not obese.  NEURO: Oriented to time, person, and places. No tremor. Normal gait.   ENT, MOUTH: Pupils are equal.  Sclerae are anicteric.  Moist oral mucosa. No oral thrush.   NECK:  No jugular venous distention.  No thyroid enlargement.   RESPIRATORY: talk nl, no sob during conversation, no cough.   MUSCULOSKELETAL/EXTREMITIES:  No edema.  No joint deformity. Normal range of motion.  SKIN:  No petechiae.  No rash.  No signs of cellulitis. Mild erythematous changes on palms.  PSYCHIATRIC: Normal mood and affect. Good memory. Proper insight and judgement.   THE REST OF A COMPREHENSIVE PHYSICIAL EXAM IS DEFERRED DUE TO COVID-19 PUBLIC  HEALTH EMERGENCY RELATED VIDEO VISIT RESCTRICTION.      CURRENT LAB DATA REVIEWED TODAY TODAY WITH PATIENT DURING THE VISIT:  Cbc diff, cmp are fine.   Cr 1.06  CEA 1.7        CURRENT IMAGING REVIEWED TODAY WITH PATIENT DURING THE VIRTUAL VISIT:  Pelvic MRI 7/2020 - Significant interval decrease in size of the mid to distal rectal mass since 1/16/2020 indicating improvement. The mass is still seen to cause severe luminal stricture and appears to extend through the rectal wall and shows a degree of invasion into the adjacent  vagina and also just contacts the posterior wall of the midline  Bladder.      Pelvic MRI 1/2020 - T4b-N2 tumor of the low and mid rectum with invasion through the superior vagina and to the posterior margin of the urinary bladder.      PET 1/2020  1. Hypermetabolic rectal mass that appears to directly invade into the vagina which is diffusely hypermetabolic. Small perirectal lymph nodes are noted.  2. No evidence of metastatic disease in the head, neck, chest, or abdomen. No evidence of metastatic disease in the extremities.  3. Significant coronary and aortic atherosclerosis. Ascending thoracic aortic caliber is ectatic at 4.1 cm.  4. Small area of hypermetabolism associated with a nodule in the isthmus of the thyroid. Consider further evaluation with ultrasound.    Brain MRI 1/2020 - 1.8 cm uniformly enhancing extra-axial mass positioned along the olfactory groove, most likely representing a meningioma. Metastatic disease is thought to be less likely         OLD DATA REVIEWED TODAY WITH SUMMARY  CEA at 4.6 in 11/2019      CT 11/2019 found Circumferential rectosigmoid mass causing relative colonic obstruction with the more proximal colon distended with fecal debris. Colorectal mass extends beyond the serosa of the bowel wall with multiple small probable metastatic mesorectal lymph nodes. No enlarged pelvic sidewall or retroperitoneal lymph nodes. No evidence of metastatic liver disease on the  current exam.        ASSESSMENT AND PLAN DISCUSSED WITH PATIENT and her daughter TODAY DURING THE VISIT:    1. Diagnosed rectal cancer November 2019 at age 89, with presenting symptoms of years duration of deep abdominal pain worsening to the point near obstruction.  Had emergent colostomy bag put in, Rectal biopsy pathology found Adenocarcinoma arising in a tubular adenoma, focal signet ring cell features identified.    Further staging with PET, MRI found advanced T4b-N2 tumor of the low and mid rectum with invasion through the superior vagina and to the posterior margin of the urinary bladder.  Likely enhancing meningioma versus mets on brain MRI.    She made informed decision to proceed with palliative RT for palliation done in 2/2020.     She then made informed decision to proceed oral Xeloda for palliation purposes.    Her BSA is around 1.4, her target dose about 1.5 g p.o. twice daily.    Adviced to increase further to 1.5 g bid, 7 days on 7 days off.  This is her target dose, will keep her on this one.     Due to new complains of legs, weakness in [address], advice hold xeloda for 2 wks.     I recommend patient to creat a Health Care Directive/advanced Directive.    CODE STATUS is discussed, recommend DNR/DNI.      2. New complains of legs pain, right > left.   Advice increase gabapentin to 200 mg q hs and keep the day time dose.  Advice work up with x rays to start.   Advice PT.       3. Insomnia.  Advice to increase melatonin to 10 mg poq hs.                         Again, thank you for allowing me to participate in the care of your patient.        Sincerely,        Tomasa Diggs MD, MD

## 2020-08-04 ENCOUNTER — HOSPITAL ENCOUNTER (OUTPATIENT)
Dept: GENERAL RADIOLOGY | Facility: CLINIC | Age: 85
End: 2020-08-04
Attending: INTERNAL MEDICINE
Payer: MEDICARE

## 2020-08-04 DIAGNOSIS — C20 RECTAL CANCER (H): ICD-10-CM

## 2020-08-04 DIAGNOSIS — G62.9 NEUROPATHY: ICD-10-CM

## 2020-08-04 PROCEDURE — 73522 X-RAY EXAM HIPS BI 3-4 VIEWS: CPT

## 2020-08-04 PROCEDURE — 72100 X-RAY EXAM L-S SPINE 2/3 VWS: CPT

## 2020-08-04 RX ORDER — GABAPENTIN 100 MG/1
CAPSULE ORAL
Qty: 120 CAPSULE | Refills: 1 | Status: SHIPPED | OUTPATIENT
Start: 2020-08-04 | End: 2020-11-04

## 2020-08-05 ENCOUNTER — HOSPITAL ENCOUNTER (OUTPATIENT)
Dept: PHYSICAL THERAPY | Facility: CLINIC | Age: 85
Setting detail: THERAPIES SERIES
End: 2020-08-05
Attending: INTERNAL MEDICINE
Payer: MEDICARE

## 2020-08-05 DIAGNOSIS — C20 RECTAL CANCER (H): ICD-10-CM

## 2020-08-05 DIAGNOSIS — M79.661 PAIN IN BOTH LOWER LEGS: ICD-10-CM

## 2020-08-05 DIAGNOSIS — M79.662 PAIN IN BOTH LOWER LEGS: ICD-10-CM

## 2020-08-05 DIAGNOSIS — M79.661 PAIN IN BOTH LOWER LEGS: Primary | ICD-10-CM

## 2020-08-05 DIAGNOSIS — M79.662 PAIN IN BOTH LOWER LEGS: Primary | ICD-10-CM

## 2020-08-05 DIAGNOSIS — G47.00 INSOMNIA, UNSPECIFIED TYPE: ICD-10-CM

## 2020-08-05 PROCEDURE — 97110 THERAPEUTIC EXERCISES: CPT | Mod: GP | Performed by: PHYSICAL THERAPIST

## 2020-08-05 PROCEDURE — 97162 PT EVAL MOD COMPLEX 30 MIN: CPT | Mod: GP | Performed by: PHYSICAL THERAPIST

## 2020-08-05 NOTE — PROGRESS NOTES
[facility]    OUTPATIENT PHYSICAL THERAPY ORTHOPEDIC EVALUATION  PLAN OF TREATMENT FOR OUTPATIENT REHABILITATION  (COMPLETE FOR INITIAL CLAIMS ONLY)  Patient's Last Name, First Name, M.I.  YOB: 1930  KariPam       Provider s Name:  [facility]   Medical Record No.  [id number]   Start of Care Date:  08/05/20   Onset Date:  07/27/20(MD ORder date. 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      PT Diagnosis:  Decreased STrength, Balance issues,    Visits from SOC:  1      _________________________________________________________________________________  Plan of Treatment/Functional Goals:     PTRX#  [id number]   DEvelop HEP for STrengthening, Balance/Coordination.       Goals  Goal Identifier: 1  Goal Description: STG: IMprove Tinetti total score to 16 or more for improved safety.   Target Date: 09/11/20    Goal Identifier: 2  Goal Description: STG: Improve SLS to 5 seconds B sides w/ EO for better safety.   Target Date: 09/11/20    Goal Identifier: 3  Goal Description: STG: Improve MMT by 1/2 grade for better stability.   Target Date: 09/11/20    Goal Identifier: 4  Goal Description: LTG: Pt to be independent w/ HEP and Balance drills to improve safety w/ mobility and community access.  Target Date: 10/30/20    Therapy Frequency:  1 time/week  Predicted Duration of Therapy Intervention:  1 months then reassess, 9 visits.     Kelly Arroyo, PT, MTC                  I CERTIFY THE NEED FOR THESE SERVICES FURNISHED UNDER        THIS PLAN OF TREATMENT AND WHILE UNDER MY CARE     (Physician co-signature of this document indicates review and certification of the therapy plan).                     Certification Date From:  08/05/20   Certification Date To:  10/05/20    Referring Provider:  Dontae     Initial Assessment        See Epic Evaluation Start of Care Date: 08/05/20

## 2020-08-05 NOTE — RESULT ENCOUNTER NOTE
Called and reviewed results with daughter, she will call to schedule the MRI. We will call with the results.     Doris Ballesteros RN on 8/5/2020 at 2:10 PM

## 2020-08-05 NOTE — PROGRESS NOTES
08/05/20 1300   General Information   Type of Visit Initial OP Ortho PT Evaluation   Start of Care Date 08/05/20   Referring Physician Ge    Patient/Family Goals Statement Better Balance, Constant Pain in top of L hip.    Orders Evaluate and Treat   Orders Comment CA Rehab    Date of Order 07/27/20   Certification Required? Yes  (/[facility] - $2080 Left )   Medical Diagnosis Insomnia, Rectal CA, P in B legs.    Surgical/Medical history reviewed   (Rectal Surgery Nov 2019, High BP, Asthma)   Precautions/Limitations no known precautions/limitations   General Information Comments Back w/Pain medications.    Body Part(s)   Body Part(s) Lumbar Spine/SI   Presentation and Etiology   Pertinent history of current problem (include personal factors and/or comorbidities that impact the POC) Rectal CA surgery Novemeber 2019, Radiation and then started Chemo April 2020.    Impairments A. Pain;B. Decreased WB tolerance;C. Swelling;D. Decreased ROM;E. Decreased flexibility;F. Decreased strength and endurance;G. Impaired balance;H. Impaired gait;I. Impaired skin integrity;J. Burning;K. Numbness;L. Tingling   Functional Limitations perform required work activities   Symptom Location P L LB, top of L hip.    How/Where did it occur Other;From insidious onset   Onset date of current episode/exacerbation 07/27/20  (MD ORder date. )   Chronicity Chronic   Pain rating (0-10 point scale)   (4-7/10)   Pain quality A. Sharp;C. Aching   Frequency of pain/symptoms A. Constant   Pain/symptoms are: The same all the time   Pain/symptoms exacerbated by B. Walking;E. Rest   Pain/symptoms eased by F. Certain positions   Progression of symptoms since onset: Unchanged   Current / Previous Interventions   Diagnostic Tests: X-ray   X-ray Results unremarkable   X-ray results DEgenerative changes.    Prior Level of Function   Functional Level Prior Comment Indepeendent w/ aDL   Current Level of Function   Current Community Support Family/friend caregiver    Patient role/employment history F. Retired  ([employer], worked at [employer]. )   Living environment House/townhome   Home/community accessibility Stairs, Okay w/ stairs.    Fall Risk Screen   Fall screen completed by PT   Have you fallen 2 or more times in the past year? No   Have you fallen and had an injury in the past year? No   Timed Up and Go score (seconds) 15   Is patient a fall risk? Yes   Vitals Signs   Heart Rate 88   SpO2 98   Blood Pressure 146/92   System Outcome Measures   Outcome Measures   (FACIT 31;  Tinetti Balance 8, Gait 4, Total 12. )   Functional Scales   Functional Scales OPTIMAL   Optimal (1=able to do without difficulty, 2=able to do with little difficulty, 3=able to do with moderate difficulty, 4=able to do with much difficulty, 5=unable to do, 9=NA) Activity 1;Activity 2;Activity 3   Activity 1 comment Poor balance w/ dynamic activities.    Activity 2 comment WEakness B L/E's    Activity 3 comment Antalgic gait w/ SE Cane.    Lumbar Spine/SI Objective Findings   Observation No acute distress.    Integumentary Normal    Posture Head forward, Slight Increased Kyphosis   Gait/Locomotion Antalgic Gait but no complaints of Pain.    Balance/Proprioception (Single Leg Stance) EO R 2, L 4 seconds.    Hip Flexion (L2) Strength 4- B    Hip Abduction Strength 4+ B    Hip Adduction Strength 4+ B    Knee Flexion Strength 4+ B    Knee Extension (L3) Strength 4 B   Ankle Dorsiflexion (L4) Strength R 4+, L 5-    Palpation Tender R Q.L., Lateral Hip.    Planned Therapy Interventions   Planned Therapy Interventions Comment PTRX#  [id number]   DEvelop HEP for STrengthening, Balance/Coordination. Transferring to [facility] next week.    Clinical Impression   Criteria for Skilled Therapeutic Interventions Met yes, treatment indicated   PT Diagnosis Decreased STrength, Balance issues,    Influenced by the following impairments Pain, Decreased STrength, Impaired balance.    Functional limitations  due to impairments Safe mobility, HH duties,    Clinical Presentation Evolving/Changing   Clinical Presentation Rationale Tinetti, SLS, TUG, MMT, Hx of Rectal CA, Radiation and Chemo.    Clinical Decision Making (Complexity) Moderate complexity   Therapy Frequency 1 time/week   Predicted Duration of Therapy Intervention (days/wks) 1 months then reassess, 9 visits.    Risk & Benefits of therapy have been explained Yes   Patient, Family & other staff in agreement with plan of care Yes   Clinical Impression Comments Decreased STrength, Balance issues,    Education Assessment   Preferred Learning Style Listening;Demonstration;Pictures/video   Barriers to Learning No barriers;Hearing   ORTHO GOALS   PT Ortho Eval Goals 1;2;3;4   Ortho Goal 1   Goal Identifier 1   Goal Description STG: IMprove Tinetti total score to 16 or more for improved safety.    Target Date 09/11/20   Ortho Goal 2   Goal Identifier 2   Goal Description STG: Improve SLS to 5 seconds B sides w/ EO for better safety.    Target Date 09/11/20   Ortho Goal 3   Goal Identifier 3   Goal Description STG: Improve MMT by 1/2 grade for better stability.    Target Date 09/11/20   Ortho Goal 4   Goal Identifier 4   Goal Description LTG: Pt to be independent w/ HEP and Balance drills to improve safety w/ mobility and community access.   Target Date 10/30/20   Total Evaluation Time   PT Shabanaal, Moderate Complexity Minutes (28818) 30   Therapy Certification   Certification date from 08/05/20   Certification date to 10/05/20   Medical Diagnosis Insomnia, Rectal CA, P in B legs.    Kelly Arroyo, PT, [facility] (#[id number])  [facility]           [phone]  Fax          [phone]  Appt #      [phone]

## 2020-08-08 DIAGNOSIS — I10 BENIGN ESSENTIAL HYPERTENSION: ICD-10-CM

## 2020-08-10 NOTE — TELEPHONE ENCOUNTER
"Requested Prescriptions   Pending Prescriptions Disp Refills     chlorthalidone (HYGROTON) 25 MG tablet [Pharmacy Med Name: CHLORTHALIDONE 25MG TABS] 90 tablet 3     Sig: TAKE ONE TABLET BY MOUTH ONCE DAILY       Diuretics (Including Combos) Protocol Passed - 8/8/2020  8:38 AM        Passed - Blood pressure under 140/90 in past 12 months     BP Readings from Last 3 Encounters:   03/03/20 114/62   02/26/20 96/58   02/21/20 123/65                 Passed - Recent (12 mo) or future (30 days) visit within the authorizing provider's specialty     Patient has had an office visit with the authorizing provider or a provider within the authorizing providers department within the previous 12 mos or has a future within next 30 days. See \"Patient Info\" tab in inbasket, or \"Choose Columns\" in Meds & Orders section of the refill encounter.              Passed - Medication is active on med list        Passed - Patient is age 18 or older        Passed - No active pregancy on record        Passed - Normal serum creatinine on file in past 12 months     Recent Labs   Lab Test 06/17/20  1407   CR 1.06*              Passed - Normal serum potassium on file in past 12 months     Recent Labs   Lab Test 06/17/20  1407   POTASSIUM 3.5                    Passed - Normal serum sodium on file in past 12 months     Recent Labs   Lab Test 06/17/20  1407                 Passed - No positive pregnancy test in past 12 months             "

## 2020-08-11 ENCOUNTER — HOSPITAL ENCOUNTER (OUTPATIENT)
Dept: PHYSICAL THERAPY | Facility: CLINIC | Age: 85
Setting detail: THERAPIES SERIES
End: 2020-08-11
Attending: INTERNAL MEDICINE
Payer: MEDICARE

## 2020-08-11 PROCEDURE — 97110 THERAPEUTIC EXERCISES: CPT | Mod: GP | Performed by: PHYSICAL THERAPIST

## 2020-08-11 RX ORDER — CHLORTHALIDONE 25 MG/1
TABLET ORAL
Qty: 90 TABLET | Refills: 3 | Status: SHIPPED | OUTPATIENT
Start: 2020-08-11 | End: 2020-11-02

## 2020-08-12 DIAGNOSIS — E78.5 HYPERLIPIDEMIA LDL GOAL <100: ICD-10-CM

## 2020-08-12 NOTE — TELEPHONE ENCOUNTER
"Requested Prescriptions   Pending Prescriptions Disp Refills     simvastatin (ZOCOR) 40 MG tablet [Pharmacy Med Name: SIMVASTATIN 40MG TABS] 90 tablet 3     Sig: TAKE ONE TABLET BY MOUTH AT BEDTIME       Statins Protocol Failed - 8/12/2020  9:37 AM        Failed - LDL on file in past 12 months     Recent Labs   Lab Test 07/15/19  1041   LDL 73             Passed - No abnormal creatine kinase in past 12 months     No lab results found.             Passed - Recent (12 mo) or future (30 days) visit within the authorizing provider's specialty     Patient has had an office visit with the authorizing provider or a provider within the authorizing providers department within the previous 12 mos or has a future within next 30 days. See \"Patient Info\" tab in inbasket, or \"Choose Columns\" in Meds & Orders section of the refill encounter.              Passed - Medication is active on med list        Passed - Patient is age 18 or older        Passed - No active pregnancy on record        Passed - No positive pregnancy test in past 12 months             "

## 2020-08-13 DIAGNOSIS — C18.9 ADENOCARCINOMA, COLON (H): ICD-10-CM

## 2020-08-13 DIAGNOSIS — C20 RECTAL CANCER (H): ICD-10-CM

## 2020-08-13 RX ORDER — OXYCODONE HYDROCHLORIDE 5 MG/1
2.5 TABLET ORAL
Qty: 60 TABLET | Refills: 0 | Status: SHIPPED | OUTPATIENT
Start: 2020-08-13 | End: 2020-08-13

## 2020-08-13 RX ORDER — OXYCODONE HYDROCHLORIDE 5 MG/1
2.5 TABLET ORAL
Qty: 60 TABLET | Refills: 0 | Status: ON HOLD | OUTPATIENT
Start: 2020-08-13 | End: 2020-09-14

## 2020-08-13 NOTE — PROGRESS NOTES
request received from patient requesting a refill of oxycodone on behalf of Ria Ortiz NP.  Last refill: 2/20/20  # 60 with 0 refills at [facility] pharmacy.  Last office visit:  7/27/20  Next office visit:  10/27/20    Siria Hoyt RN

## 2020-08-14 ENCOUNTER — HOSPITAL ENCOUNTER (OUTPATIENT)
Dept: MRI IMAGING | Facility: CLINIC | Age: 85
Discharge: HOME | End: 2020-08-14
Attending: INTERNAL MEDICINE | Admitting: INTERNAL MEDICINE
Payer: MEDICARE

## 2020-08-14 DIAGNOSIS — M79.661 PAIN IN BOTH LOWER LEGS: ICD-10-CM

## 2020-08-14 DIAGNOSIS — E87.6 HYPOKALEMIA: ICD-10-CM

## 2020-08-14 DIAGNOSIS — M79.662 PAIN IN BOTH LOWER LEGS: ICD-10-CM

## 2020-08-14 DIAGNOSIS — C20 RECTAL CANCER (H): ICD-10-CM

## 2020-08-14 PROCEDURE — A9585 GADOBUTROL INJECTION: HCPCS | Performed by: INTERNAL MEDICINE

## 2020-08-14 PROCEDURE — 25500064 ZZH RX 255 OP 636: Performed by: INTERNAL MEDICINE

## 2020-08-14 PROCEDURE — 72158 MRI LUMBAR SPINE W/O & W/DYE: CPT

## 2020-08-14 RX ORDER — SIMVASTATIN 40 MG
TABLET ORAL
Qty: 90 TABLET | Refills: 3 | Status: SHIPPED | OUTPATIENT
Start: 2020-08-14 | End: 2020-12-10

## 2020-08-14 RX ORDER — POTASSIUM CHLORIDE 750 MG/1
10 TABLET, EXTENDED RELEASE ORAL 2 TIMES DAILY
Qty: 60 TABLET | Refills: 1 | Status: ON HOLD | OUTPATIENT
Start: 2020-08-14 | End: 2020-09-14

## 2020-08-14 RX ORDER — GADOBUTROL 604.72 MG/ML
5 INJECTION INTRAVENOUS ONCE
Status: COMPLETED | OUTPATIENT
Start: 2020-08-14 | End: 2020-08-14

## 2020-08-14 RX ADMIN — GADOBUTROL 5 ML: 604.72 INJECTION INTRAVENOUS at 09:14

## 2020-08-17 DIAGNOSIS — R93.89 ABNORMAL MRI: Primary | ICD-10-CM

## 2020-08-17 DIAGNOSIS — C20 RECTAL CANCER (H): Primary | ICD-10-CM

## 2020-08-17 RX ORDER — CAPECITABINE 500 MG/1
TABLET, FILM COATED ORAL
Qty: 64 TABLET | Refills: 0 | Status: SHIPPED | OUTPATIENT
Start: 2020-08-17 | End: 2020-08-19

## 2020-08-17 NOTE — RESULT ENCOUNTER NOTE
Called and reviewed results with the patient per Dr. Diggs. Patient had no further questions or concerns at this time and also verbalized understanding. Direct line provided if any further questions/concerns arise.    Ortho referral placed.     Doris Ballesteros RN on 8/17/2020 at 11:03 AM

## 2020-08-17 NOTE — RESULT ENCOUNTER NOTE
Attempted to call pt daughter Karine with results, left a VM and direct line to return call.     Doris Ballesteros RN on 8/17/2020 at 10:10 AM

## 2020-08-19 ENCOUNTER — TELEPHONE (OUTPATIENT)
Dept: ONCOLOGY | Facility: CLINIC | Age: 85
End: 2020-08-19

## 2020-08-19 DIAGNOSIS — C20 RECTAL CANCER (H): ICD-10-CM

## 2020-08-19 RX ORDER — CAPECITABINE 500 MG/1
TABLET, FILM COATED ORAL
Qty: 56 TABLET | Refills: 0 | Status: ON HOLD | OUTPATIENT
Start: 2020-08-19 | End: 2020-09-14

## 2020-08-19 NOTE — ORAL ONC MGMT
Per Medicare Part B rules reordered Xeloda 1 gram BID x 7 days on and 7 days off, 56 tabs, 0 refills per Dr. Diggs.   Released to [facility]. Delivery scheduled for 8/21. Next cycle starts 8/23. Update next cycle in [place] plan with new dose and quantity.     Garfield Raya, PharmD  Hematology/Oncology Clinical Pharmacist  [facility]  [facility]

## 2020-08-20 DIAGNOSIS — J45.20 MILD INTERMITTENT ASTHMA WITHOUT COMPLICATION: ICD-10-CM

## 2020-08-20 NOTE — TELEPHONE ENCOUNTER
"Requested Prescriptions   Pending Prescriptions Disp Refills     FLOVENT  MCG/ACT inhaler [Pharmacy Med Name: FLOVENT HFA 110MCG/ACT AERO] 12 g 11     Sig: INHALE TWO PUFFS BY MOUTH TWICE A DAY       Inhaled Steroids Protocol Failed - 8/20/2020  9:00 AM        Failed - Asthma control assessment score within normal limits in last 6 months     Please review ACT score.           Passed - Patient is age 12 or older        Passed - Medication is active on med list        Passed - Recent (6 mo) or future (30 days) visit within the authorizing provider's specialty     Patient had office visit in the last 6 months or has a visit in the next 30 days with authorizing provider or within the authorizing provider's specialty.  See \"Patient Info\" tab in inbasket, or \"Choose Columns\" in Meds & Orders section of the refill encounter.                 "

## 2020-08-21 RX ORDER — DEXAMETHASONE 4 MG/1
TABLET ORAL
Qty: 12 G | Refills: 11 | Status: SHIPPED | OUTPATIENT
Start: 2020-08-21 | End: 2021-10-18

## 2020-08-21 NOTE — TELEPHONE ENCOUNTER
Routing refill request to provider for review/approval because:  ACT not current:    Asthma Control Test (Copyright [employer], 2011; Used with Permission) 12/18/2019   1.  In the past 4 weeks, how much of the time did your asthma keep you from getting as much done at work, school or at home? 5   2.  During the past 4 weeks, how often have you had shortness of breath? 5   3.  During the past 4 weeks, how often did your asthma symptoms (wheezing, coughing, shortness of breath, chest tightness or pain) wake you up at night or earlier than usual in the morning? 5   4.  During the past 4 weeks, how often have you used your rescue inhaler or nebulizer medication (such as albuterol)? 5   5.  How would you rate your asthma control during the past 4 weeks? 5   ACT Total Score (Goal >/= 20) 25   In the past 12 months, how many times did you visit the emergency room for your asthma without being admitted to the hospital? 0   In the past 12 months, how many times were you hospitalized overnight because of your asthma? 0   RN / PROVIDER ONLY: Is this patient having an acute exacerbation today? No     Lorena Meier RN

## 2020-08-27 ENCOUNTER — OFFICE VISIT (OUTPATIENT)
Dept: ORTHOPEDICS | Facility: CLINIC | Age: 85
End: 2020-08-27
Payer: MEDICARE

## 2020-08-27 VITALS
DIASTOLIC BLOOD PRESSURE: 76 MMHG | HEIGHT: 59 IN | SYSTOLIC BLOOD PRESSURE: 122 MMHG | WEIGHT: 108 LBS | BODY MASS INDEX: 21.77 KG/M2

## 2020-08-27 DIAGNOSIS — C20 RECTAL CANCER (H): ICD-10-CM

## 2020-08-27 DIAGNOSIS — R29.898 RIGHT LEG WEAKNESS: Primary | ICD-10-CM

## 2020-08-27 DIAGNOSIS — M54.16 LUMBAR RADICULOPATHY, ACUTE: ICD-10-CM

## 2020-08-27 DIAGNOSIS — M51.369 DDD (DEGENERATIVE DISC DISEASE), LUMBAR: ICD-10-CM

## 2020-08-27 DIAGNOSIS — R93.89 ABNORMAL MRI: ICD-10-CM

## 2020-08-27 PROCEDURE — 99203 OFFICE O/P NEW LOW 30 MIN: CPT | Performed by: FAMILY MEDICINE

## 2020-08-27 ASSESSMENT — MIFFLIN-ST. JEOR: SCORE: 815.51

## 2020-08-27 NOTE — LETTER
2020         RE: Pam Pierce  [address] [place] [id number]        Dear Colleague,    Thank you for referring your patient, Pam Pierce, to the [facility]. Please see a copy of my visit note below.    Pam Pierce  :  1930  DOS: 2020  MRN: [id number]    Sports Medicine Clinic Visit    PCP: Reagan Thakkar    Pam Pierce is a 89 year old female who is seen in consultation at the request of  Tomasa Diggs M.D. presenting with acute radiating low back pain.    Injury: Gradual onset of radiating low back pain with right lower extremity weakness over the past ~ 2 months.  Pain located over right lower lumbar, SI joint region, radiating to right lower extremity.  Reports constant radiating pain, weakness to right posterior lateral thigh with intermittent numbness/tingling to right anterior lower leg and dorsal foot.  Additional Features:  Positive: weakness and antalgic gait.  Symptoms are better with Rest.  Symptoms are worse with: walking, going from sit to stand position, lifting.  Other evaluation and/or treatments so far consists of: Ice, Tylenol and Rest, physical therapy (1 visit).  Recent imaging completed: MRI completed 20.  Prior History of related problems: none    Social History: retired - lives at home with spouse  Daughter is present at appointment today.    Review of Systems  Musculoskeletal: as above  Remainder of review of systems is negative including constitutional, CV, pulmonary, GI, Skin and Neurologic except as noted in HPI or medical history.    Past Medical History:   Diagnosis Date     Actinic keratosis      Basal cell carcinoma      Other and unspecified hyperlipidemia      Unspecified asthma(493.90)      Unspecified essential hypertension      Urethral stricture due to unspecified infection     ureteral stone     Past Surgical History:   Procedure Laterality Date     ESOPHAGOSCOPY, GASTROSCOPY, DUODENOSCOPY  "(EGD), COMBINED N/A 2/20/2020    Procedure: ESOPHAGOGASTRODUODENOSCOPY, WITH BIOPSY;  Surgeon: Harrison Brown MD;  Location: [facility]     SURGICAL HISTORY OF -       open reduction of second metatarsal neck fx with internal fixation  cna d closed treatment of metatarsal to 2 and 4 fx     SURGICAL HISTORY OF -       hysterectomy and oophorectomy     SURGICAL HISTORY OF -       lithotripsy     Family History   Problem Relation Age of Onset     Cancer - colorectal Mother      Breast Cancer Mother      C.A.D. Father      Cardiovascular Father      Alcohol/Drug Father      Chronic Obstructive Pulmonary Disease Brother      Cancer Son         chest cancer (Thymus)/myesthenia gravis     Musculoskeletal Disorder Son         myesthenia gravis     Cerebrovascular Disease Sister      Chronic Obstructive Pulmonary Disease Brother        Objective  /76   Ht 1.499 m (4' 11\")   Wt 49 kg (108 lb)   BMI 21.81 kg/m        General: healthy, alert and in no distress      HEENT: no scleral icterus or conjunctival erythema     Skin: no suspicious lesions or rash. No jaundice.     CV: regular rhythm by palpation, 2+ distal pulses, no pedal edema      Resp: normal respiratory effort without conversational dyspnea     Psych: normal mood and affect      Gait: nonantalgic, appropriate coordination and balance     Neuro: normal light touch sensory exam of the extremities. Motor strength as noted below     Low back exam:    Inspection:       no visible deformity in the low back       normal skin       normal vascular       normal lymphatic    ROM:       limited flexion due to pain       limited extension due to pain       asymmetric rotation of the pelvis with flexion    Tender:       paraspinal muscles       Mild right glute    Non Tender:       remainder of lumbar spine       midline    Strength:       5/5 on the left, on the right:       hip flexion 3/5       knee extension 4/5       ankle dorsiflexion 2/5       ankle plantarflexion " 5/5       dorsiflexion of the great toe 2/5    Reflexes:       patellar (L3, L4) symmetric normal       achilles tendons (S1) symmetric diminished    Sensation:      grossly intact throughout lower extremities on the right, decreased light touch sensation over anterolateral lower leg and foot    Skin:       well perfused       capillary refill brisk    Special tests:       straight leg raise left neg        straight leg raise right + mild pain       neg (-) CHANDU        slump test equivocal       Radiology:  Recent Results (from the past 744 hour(s))   XR Lumbar Spine 2/3 Views    Narrative    LUMBAR SPINE TWO-THREE  VIEWS  8/4/2020 3:38 PM     HISTORY: Rectal cancer (H); Pain in both lower legs; Pain in both  lower legs    COMPARISON: None.    FINDINGS: There is normal osseous alignment.  No fractures are  identified.  No lytic or destructive lesions are seen. There is loss  of disc space height throughout the lumbar spine. There is  degenerative grade 1 anterolisthesis of L5 on S1. Calcifications are  seen in the abdominal aorta.      Impression    IMPRESSION:   1. No bone metastasis are identified on these plain films.  2. Multilevel degenerative changes    LAKE DIAZ MD   XR Hip G/E 2 Views Bilateral    Narrative    XR BILATERAL HIP TWO VIEWS EACH   8/4/2020 3:42 PM     HISTORY: Rectal cancer (H). Pain in both lower legs.      COMPARISON: None.      Impression    IMPRESSION:   Left hip: Diffuse osteopenia. No radiographic evidence of fracture. No  lytic or destructive lesion.    Right hip: Diffuse osteopenia. No radiographic evidence of fracture.  No lytic or destructive lesion. Degenerative changes in the lower  lumbar spine.    KY PONCE MD   MR Lumbar Spine w/o & w Contrast    Narrative    MRI OF THE LUMBAR SPINE WITHOUT AND WITH CONTRAST 8/14/2020 9:47 AM     HISTORY: Pain in both lower legs.  Rectal cancer (H).    TECHNIQUE: Multiplanar, multisequence MRI images of the lumbar spine  were acquired  without and with 5 mL Gadavist IV contrast.    COMPARISON: None.    FINDINGS: There are five lumbar-type vertebrae for the purposes of  this dictation.     There is grade 1 degenerative anterolisthesis of L5 upon S1. Alignment  of the lumbar vertebrae is otherwise normal. Vertebral body heights of  the lumbar spine are normal. There is Modic 1 degenerative marrow  signal change in the opposing endplates at the L1-L2 and L5-S1 levels.  There is diffuse fatty marrow signal replacement throughout the lumbar  spine. There is no evidence for fracture or pathologic bony lesion of  the lumbar spine. Aside from contrast enhancement associated with  Modic 1 degenerative marrow signal change at the L1-L2 and L5-S1  levels, there is no abnormal contrast enhancement in the lumbar  vertebrae.    There is loss of disc height, disc desiccation and posterior disc  bulging/herniation to varying degrees at all levels of the lumbar  spine.    The tip of the conus medullaris is at the mid L2 level which is within  normal limits. There is no evidence for intrathecal abnormality. There  is no abnormal intrathecal contrast enhancement.    Level by level:     T12-L1: Loss of disc height, disc desiccation and mild circumferential  disc bulging. No herniation. Mild facet arthropathy bilaterally. No  spinal canal stenosis. Minimal bilateral neural foraminal narrowing.    L1-L2: Loss of disc height, disc desiccation and mild circumferential  disc bulging. No herniation. Mild facet arthropathy bilaterally. No  spinal canal stenosis. Mild bilateral neural foraminal narrowing.    L2-L3: Loss of disc height, disc desiccation and mild circumferential  disc bulging. No herniation. Mild facet arthropathy bilaterally. No  spinal canal stenosis. Mild-moderate right foraminal narrowing. No  left foraminal stenosis.    L3-L4: Loss of disc height, disc desiccation and mild circumferential  disc bulging. No herniation. Moderate facet arthropathy  bilaterally.  No spinal canal stenosis. Mild right foraminal narrowing. No left  foraminal stenosis.    L4-L5: Loss of disc height, disc desiccation and moderate  circumferential disc bulging. No herniation. Moderate facet  arthropathy bilaterally. Mild spinal canal narrowing. Moderate-severe  left foraminal stenosis. Mild right foraminal narrowing.    L5-S1: Loss of disc height, disc desiccation and mild circumferential  disc bulging. No herniation. Severe facet arthropathy bilaterally.  Mild spinal canal narrowing. Severe left foraminal stenosis. Severe  right foraminal stenosis.      Impression    IMPRESSION:  1. Degenerative changes of the lumbar spine as detailed above.  2. No evidence for fracture or pathologic bony lesion of the lumbar  spine.  3. No significant spinal canal stenosis at any level of the lumbar  spine.  4. Moderate-to-severe neural foraminal stenosis on the left at L4-L5  and bilaterally at L5-S1.    EDENILSON CROWE MD         Assessment:  1. Right leg weakness    2. Abnormal MRI    3. Lumbar radiculopathy, acute    4. DDD (degenerative disc disease), lumbar    5. Rectal cancer (H)        Plan:  Discussed the assessment with the patient.  Follow up: prn based on advice from spine surgery team  Patient medically complicated, currently undergoing chemo, now with acute right leg weakness and abnormal gait with LBP  She is not looking for surgical intervention given ages and comorbidities, and we reviewed the potential reality of managing conservatively based on balance/strength/function limitation  Thankfully no clear metastatic component here, unclear if acute neuropathic sx could be related to her chemo tx, will defer to oncology team to consider that possibility, more likely explanation is an acute worsening of her chronic DDD relative to weakness/deconditioning  MRI shows significant chronic lower lumbar degenerative changes, reviewed concern for acute, significant weakness  Radicular and  weakness sx most consistent with L5 pathology  Reviewed potential option for EFRAIN vs consultation with spine surgeon, potential benefits of continuing to work with PT  Continue PT for now, will reach back out to her   XR and MR images independently visualized and reviewed with patient today in clinic  Home handouts provided and supportive care reviewed  All questions were answered today  Contact us with additional questions or concerns  Signs and sx of concern reviewed    Thanks very much for sending this nice lady to us, I will keep you updated with her progress      Manohar Ashford DO, CAQ  Primary Care Sports Medicine  [facility]               Disclaimer: This note consists of symbols derived from keyboarding, dictation and/or voice recognition software. As a result, there may be errors in the script that have gone undetected. Please consider this when interpreting information found in this chart.      Again, thank you for allowing me to participate in the care of your patient.        Sincerely,        Manohar Ashford DO

## 2020-08-27 NOTE — PROGRESS NOTES
Pam Pierce  :  1930  DOS: 2020  MRN: [id number]    Sports Medicine Clinic Visit    PCP: Reagan Thakkar    Pam Pierce is a 89 year old female who is seen in consultation at the request of  Tomasa Diggs M.D. presenting with acute radiating low back pain.    Injury: Gradual onset of radiating low back pain with right lower extremity weakness over the past ~ 2 months.  Pain located over right lower lumbar, SI joint region, radiating to right lower extremity.  Reports constant radiating pain, weakness to right posterior lateral thigh with intermittent numbness/tingling to right anterior lower leg and dorsal foot.  Additional Features:  Positive: weakness and antalgic gait.  Symptoms are better with Rest.  Symptoms are worse with: walking, going from sit to stand position, lifting.  Other evaluation and/or treatments so far consists of: Ice, Tylenol and Rest, physical therapy (1 visit).  Recent imaging completed: MRI completed 20.  Prior History of related problems: none    Social History: retired - lives at home with spouse  Daughter is present at appointment today.    Review of Systems  Musculoskeletal: as above  Remainder of review of systems is negative including constitutional, CV, pulmonary, GI, Skin and Neurologic except as noted in HPI or medical history.    Past Medical History:   Diagnosis Date     Actinic keratosis      Basal cell carcinoma      Other and unspecified hyperlipidemia      Unspecified asthma(493.90)      Unspecified essential hypertension      Urethral stricture due to unspecified infection     ureteral stone     Past Surgical History:   Procedure Laterality Date     ESOPHAGOSCOPY, GASTROSCOPY, DUODENOSCOPY (EGD), COMBINED N/A 2020    Procedure: ESOPHAGOGASTRODUODENOSCOPY, WITH BIOPSY;  Surgeon: Harrison Brown MD;  Location: [facility]     SURGICAL HISTORY OF -       open reduction of second metatarsal neck fx with internal fixation  cna d closed treatment of  "metatarsal to 2 and 4 fx     SURGICAL HISTORY OF -       hysterectomy and oophorectomy     SURGICAL HISTORY OF -       lithotripsy     Family History   Problem Relation Age of Onset     Cancer - colorectal Mother      Breast Cancer Mother      C.A.D. Father      Cardiovascular Father      Alcohol/Drug Father      Chronic Obstructive Pulmonary Disease Brother      Cancer Son         chest cancer (Thymus)/myesthenia gravis     Musculoskeletal Disorder Son         myesthenia gravis     Cerebrovascular Disease Sister      Chronic Obstructive Pulmonary Disease Brother        Objective  /76   Ht 1.499 m (4' 11\")   Wt 49 kg (108 lb)   BMI 21.81 kg/m        General: healthy, alert and in no distress      HEENT: no scleral icterus or conjunctival erythema     Skin: no suspicious lesions or rash. No jaundice.     CV: regular rhythm by palpation, 2+ distal pulses, no pedal edema      Resp: normal respiratory effort without conversational dyspnea     Psych: normal mood and affect      Gait: nonantalgic, appropriate coordination and balance     Neuro: normal light touch sensory exam of the extremities. Motor strength as noted below     Low back exam:    Inspection:       no visible deformity in the low back       normal skin       normal vascular       normal lymphatic    ROM:       limited flexion due to pain       limited extension due to pain       asymmetric rotation of the pelvis with flexion    Tender:       paraspinal muscles       Mild right glute    Non Tender:       remainder of lumbar spine       midline    Strength:       5/5 on the left, on the right:       hip flexion 3/5       knee extension 4/5       ankle dorsiflexion 2/5       ankle plantarflexion 5/5       dorsiflexion of the great toe 2/5    Reflexes:       patellar (L3, L4) symmetric normal       achilles tendons (S1) symmetric diminished    Sensation:      grossly intact throughout lower extremities on the right, decreased light touch sensation " over anterolateral lower leg and foot    Skin:       well perfused       capillary refill brisk    Special tests:       straight leg raise left neg        straight leg raise right + mild pain       neg (-) CHANDU        slump test equivocal       Radiology:  Recent Results (from the past 744 hour(s))   XR Lumbar Spine 2/3 Views    Narrative    LUMBAR SPINE TWO-THREE  VIEWS  8/4/2020 3:38 PM     HISTORY: Rectal cancer (H); Pain in both lower legs; Pain in both  lower legs    COMPARISON: None.    FINDINGS: There is normal osseous alignment.  No fractures are  identified.  No lytic or destructive lesions are seen. There is loss  of disc space height throughout the lumbar spine. There is  degenerative grade 1 anterolisthesis of L5 on S1. Calcifications are  seen in the abdominal aorta.      Impression    IMPRESSION:   1. No bone metastasis are identified on these plain films.  2. Multilevel degenerative changes    LAKE DIAZ MD   XR Hip G/E 2 Views Bilateral    Narrative    XR BILATERAL HIP TWO VIEWS EACH   8/4/2020 3:42 PM     HISTORY: Rectal cancer (H). Pain in both lower legs.      COMPARISON: None.      Impression    IMPRESSION:   Left hip: Diffuse osteopenia. No radiographic evidence of fracture. No  lytic or destructive lesion.    Right hip: Diffuse osteopenia. No radiographic evidence of fracture.  No lytic or destructive lesion. Degenerative changes in the lower  lumbar spine.    KY PONCE MD   MR Lumbar Spine w/o & w Contrast    Narrative    MRI OF THE LUMBAR SPINE WITHOUT AND WITH CONTRAST 8/14/2020 9:47 AM     HISTORY: Pain in both lower legs.  Rectal cancer (H).    TECHNIQUE: Multiplanar, multisequence MRI images of the lumbar spine  were acquired without and with 5 mL Gadavist IV contrast.    COMPARISON: None.    FINDINGS: There are five lumbar-type vertebrae for the purposes of  this dictation.     There is grade 1 degenerative anterolisthesis of L5 upon S1. Alignment  of the lumbar vertebrae is  otherwise normal. Vertebral body heights of  the lumbar spine are normal. There is Modic 1 degenerative marrow  signal change in the opposing endplates at the L1-L2 and L5-S1 levels.  There is diffuse fatty marrow signal replacement throughout the lumbar  spine. There is no evidence for fracture or pathologic bony lesion of  the lumbar spine. Aside from contrast enhancement associated with  Modic 1 degenerative marrow signal change at the L1-L2 and L5-S1  levels, there is no abnormal contrast enhancement in the lumbar  vertebrae.    There is loss of disc height, disc desiccation and posterior disc  bulging/herniation to varying degrees at all levels of the lumbar  spine.    The tip of the conus medullaris is at the mid L2 level which is within  normal limits. There is no evidence for intrathecal abnormality. There  is no abnormal intrathecal contrast enhancement.    Level by level:     T12-L1: Loss of disc height, disc desiccation and mild circumferential  disc bulging. No herniation. Mild facet arthropathy bilaterally. No  spinal canal stenosis. Minimal bilateral neural foraminal narrowing.    L1-L2: Loss of disc height, disc desiccation and mild circumferential  disc bulging. No herniation. Mild facet arthropathy bilaterally. No  spinal canal stenosis. Mild bilateral neural foraminal narrowing.    L2-L3: Loss of disc height, disc desiccation and mild circumferential  disc bulging. No herniation. Mild facet arthropathy bilaterally. No  spinal canal stenosis. Mild-moderate right foraminal narrowing. No  left foraminal stenosis.    L3-L4: Loss of disc height, disc desiccation and mild circumferential  disc bulging. No herniation. Moderate facet arthropathy bilaterally.  No spinal canal stenosis. Mild right foraminal narrowing. No left  foraminal stenosis.    L4-L5: Loss of disc height, disc desiccation and moderate  circumferential disc bulging. No herniation. Moderate facet  arthropathy bilaterally. Mild spinal  canal narrowing. Moderate-severe  left foraminal stenosis. Mild right foraminal narrowing.    L5-S1: Loss of disc height, disc desiccation and mild circumferential  disc bulging. No herniation. Severe facet arthropathy bilaterally.  Mild spinal canal narrowing. Severe left foraminal stenosis. Severe  right foraminal stenosis.      Impression    IMPRESSION:  1. Degenerative changes of the lumbar spine as detailed above.  2. No evidence for fracture or pathologic bony lesion of the lumbar  spine.  3. No significant spinal canal stenosis at any level of the lumbar  spine.  4. Moderate-to-severe neural foraminal stenosis on the left at L4-L5  and bilaterally at L5-S1.    EDENILSON CROWE MD         Assessment:  1. Right leg weakness    2. Abnormal MRI    3. Lumbar radiculopathy, acute    4. DDD (degenerative disc disease), lumbar    5. Rectal cancer (H)        Plan:  Discussed the assessment with the patient.  Follow up: prn based on advice from spine surgery team  Patient medically complicated, currently undergoing chemo, now with acute right leg weakness and abnormal gait with LBP  She is not looking for surgical intervention given ages and comorbidities, and we reviewed the potential reality of managing conservatively based on balance/strength/function limitation  Thankfully no clear metastatic component here, unclear if acute neuropathic sx could be related to her chemo tx, will defer to oncology team to consider that possibility, more likely explanation is an acute worsening of her chronic DDD relative to weakness/deconditioning  MRI shows significant chronic lower lumbar degenerative changes, reviewed concern for acute, significant weakness  Radicular and weakness sx most consistent with L5 pathology  Reviewed potential option for EFRAIN vs consultation with spine surgeon, potential benefits of continuing to work with PT  Continue PT for now, will reach back out to her   XR and MR images independently visualized and  reviewed with patient today in clinic  Home handouts provided and supportive care reviewed  All questions were answered today  Contact us with additional questions or concerns  Signs and sx of concern reviewed    Thanks very much for sending this nice lady to us, I will keep you updated with her progress      Manohar Ashford DO, CAQ  Primary Care Sports Medicine  [facility]               Disclaimer: This note consists of symbols derived from keyboarding, dictation and/or voice recognition software. As a result, there may be errors in the script that have gone undetected. Please consider this when interpreting information found in this chart.

## 2020-08-27 NOTE — Clinical Note
Serene Joyce,     Wondering if you can look over the MRI/story of this very sweet 89 yo.  Currently being treated for rectal cancer with chemo, 5 weeks ago started with right sided radicular sx and abnormal gait.  Significant weakness in L5 distribution based on my exam today.  Rapidly progressive weakness is usually a push to have a visit with you, but I know she doesn't want (and maybe shouldn't have) surgery, but the weakness is real.  She is already in PT.  Any contraindications/concerns for EFRAIN trial that you would have?  I am happy to send her your way just to discuss if you think it is worth it.    Thanks as always for the time and consideration!    Manohar Ashford DO, CAQ  Primary Care Sports Medicine  [facility]

## 2020-08-29 ENCOUNTER — TELEPHONE (OUTPATIENT)
Dept: ORTHOPEDICS | Facility: CLINIC | Age: 85
End: 2020-08-29
Payer: MEDICARE

## 2020-08-29 DIAGNOSIS — M54.16 LUMBAR RADICULOPATHY, ACUTE: ICD-10-CM

## 2020-08-29 DIAGNOSIS — M51.369 DDD (DEGENERATIVE DISC DISEASE), LUMBAR: ICD-10-CM

## 2020-08-29 DIAGNOSIS — C20 RECTAL CANCER (H): ICD-10-CM

## 2020-08-29 DIAGNOSIS — R29.898 RIGHT LEG WEAKNESS: Primary | ICD-10-CM

## 2020-08-29 NOTE — TELEPHONE ENCOUNTER
Dr Ashford consulted with Spine Surgery - Dr Maravilla would recommend trial of lumbar EFRAIN prior to scheduling a consult with him.  Pain Mgmt order placed.  They will contact clinic ~ 2 weeks post-injection, if pain is not improving.    Young Camarena ATC

## 2020-08-31 ENCOUNTER — TELEPHONE (OUTPATIENT)
Dept: PALLIATIVE MEDICINE | Facility: CLINIC | Age: 85
End: 2020-08-31

## 2020-08-31 NOTE — TELEPHONE ENCOUNTER
No PA required, okay to schedule        Lulu RANDALL    [place] [facility]

## 2020-08-31 NOTE — TELEPHONE ENCOUNTER
"Screening Questions for Radiology Injections:    Injection to be done at which interventional clinic site? [facility] [place]    Instruct patient to arrive as directed prior to the scheduled appointment time:    [place] minutes before      [place]: 30 minutes before; if IV needed 1 hour before     Dr. Santiago-no IV needed for Cervical EFRAIN; please instruct to arrive 30\" early    Procedure ordered by Repa     Procedure ordered?   Lumbar TFESI Right L5-S1            As a reminder, receiving steroids can decrease your body's ability to fight infection.   Would you still like to move forward with scheduling the injection?  Yes      Transforaminal Cervical EFRAIN - no pain provider currently performing    What insurance would patient like us to bill for this procedure? Medicare & BCBS      Worker's comp or MVA (motor vehicle accident) -Any injection DO NOT SCHEDULE and route to Lulu Harris.      [employer] insurance - For SI joint injections, DO NOT SCHEDULE and route Lulu Harris.       [employer] - Any injection besides hip/shoulder/knee joint DO NOT SCHEDULE and route to Lulu Harris. She will obtain PA and call pt back to schedule procedure or notify pt of denial.       [employer]-Route to Lulu for review      **BCBS- ALL need to be routed to [place] for review if a PA is needed**      IF SCHEDULING IN [place] AND NEEDS A PA, IT IS OKAY TO SCHEDULE. [place] HANDLES THEIR OWN PA'S AFTER THE PATIENT IS SCHEDULED. PLEASE SCHEDULE AT LEAST 1 WEEK OUT SO A PA CAN BE OBTAINED.    Any chance of pregnancy? Not Applicable   If YES, do NOT schedule and route to RN [place]    Is an  needed? No     Patient has a drive home? (mandatory) YES: Informed     Is patient taking any blood thinners (i.e. plavix, coumadin, jantoven, warfarin, heparin, pradaxa or dabigatran, etc)? No   If hold needed, do NOT schedule, route to RN pool     Is patient taking any aspirin products (includes Excedrin and Fiorinal)? " No     If more than 325mg/day, OK to schedule; Instruct pt to decrease to less than 325 mg for 7 days AND route to RN pool    For CERVICAL procedures, hold all aspirin products for 6 days.     Tell pt that if aspirin product is not held for 6 days, the procedure WILL BE cancelled.      Does the patient have a bleeding or clotting disorder? No     If YES, okay to schedule AND route to RN nurse pool    For any patients with platelet count <100, must be forwarded to provider    Is patient diabetic?  No  If YES, instruct them to bring their glucometer.    Does patient have an active infection or treated for one within the past week? No     Is patient currently taking any antibiotics?  No     For patients on chronic, preventative, or prophylactic antibiotics, procedures may be scheduled.     For patients on antibiotics for active or recent infection:antibiotic course must have been completed for 4 days    Is patient currently taking any steroid medications? (i.e. Prednisone, Medrol)  No     For patients on steroid medications, course must have been completed for 4 days    Is patient actively being treated for cancer or immunocompromised? Yes - [facility]   If YES, do NOT schedule and route to RN pool     Are you able to get on and off an exam table with minimal or no assistance? Yes  If NO, do NOT schedule and route to RN pool    Are you able to roll over and lay on your stomach with minimal or no assistance? No  If NO, do NOT schedule and route to RN pool     Any allergies to contrast dye, iodine, shellfish, or numbing and steroid medications? No  If YES, route to RN pool AND add allergy information to appointment notes    Allergies: Flu virus vaccine      Has the patient had a flu shot or any other vaccinations within 7 days before or after the procedure.  No     Does patient have an MRI/CT?  YES: 2020  Check Procedure Scheduling Grid to see if required.      Was the MRI done within the last 3 years?  Yes    If yes,  where was the MRI done i.e.[facility], [facility], [place], [facility] etc? [facility]     If no, do not schedule and route to RN pool    If MRI was not done at [facility], [facility] or [facility] do NOT schedule and route to RN pool.      If pt has an imaging disc, the injection MAY be scheduled but pt has to bring disc to appt.     If they show up without the disc the injection cannot be done    Procedure Specific Instructions:      If celiac plexus block, informed patient NPO for 6 hours and that it is okay to take medications with sips of water, especially blood pressure medications  NO         If this is for a cervical procedure, informed patient that aspirin needs to be held for 6 days.   Not Applicable      If IV needed:    Do not schedule procedures requiring IV placement in the first appointment of the day or first appointment after lunch. Do NOT schedule at 0745, 0815 or 1245.     Instructed pt to arrive 30 minutes early for IV start if required. (Check Procedure Scheduling Grid)  Not Applicable    Reminders:      If you are started on any steroids or antibiotics between now and your appointment, you must contact us because the procedure may need to be cancelled.  No      For all procedures except radiofrequency ablations (RFAs) and spinal cord stimulator (SCS) trials, informed patient:    IV sedation is not provided for this procedure.  If you feel that an oral anti-anxiety medication is needed, you can discuss this further with your referring provider or primary care provider.  The Pain Clinic provider will discuss specifics of what the procedure includes at your appointment.  Most procedures last 10-20 minutes.  We use numbing medications to help with any discomfort during the procedure.  Not Applicable      For patients 85 or older we recommend having an adult stay w/ them for the remainder of the day.       Does the patient have any questions?  NO  [facility]

## 2020-09-01 NOTE — TELEPHONE ENCOUNTER
Patient is requesting to schedule an injection with the [facility].    Will forward to Pt's Oncologist to review and advise if there are any contraindications to Pt getting a Lumbar epidural steroid injection.      Please advise and route back to Pain    Please keep call open and route back to the PAIN NURSE [[id number]] pool.     Thank you.    Routed to Tomasa Diggs MD

## 2020-09-01 NOTE — TELEPHONE ENCOUNTER
OK to scheduled Injection per Oncology.      Writer attempted to call pt's daughter Karine, no answer, LVM for Karine to call back and schedule Pt for injection    Manohar Galaviz, RN  Care Coordinator   [facility] [place]

## 2020-09-01 NOTE — TELEPHONE ENCOUNTER
Pt scheduled for 9/16.    Manohar Galaviz, RN  Care Coordinator   [facility] [place]

## 2020-09-08 DIAGNOSIS — R10.13 EPIGASTRIC PAIN: ICD-10-CM

## 2020-09-09 DIAGNOSIS — C20 RECTAL CANCER (H): ICD-10-CM

## 2020-09-09 DIAGNOSIS — C18.9 ADENOCARCINOMA, COLON (H): ICD-10-CM

## 2020-09-09 RX ORDER — OXYCODONE HYDROCHLORIDE 5 MG/1
2.5 TABLET ORAL
Qty: 60 TABLET | Refills: 0 | Status: CANCELLED | OUTPATIENT
Start: 2020-09-09

## 2020-09-09 NOTE — TELEPHONE ENCOUNTER
"Requested Prescriptions   Pending Prescriptions Disp Refills     pantoprazole (PROTONIX) 40 MG EC tablet [Pharmacy Med Name: PANTOPRAZOLE SODIUM 40MG TBEC] 90 tablet 1     Sig: TAKE ONE TABLET BY MOUTH EVERY MORNING BEFORE BREAKFAST       PPI Protocol Passed - 9/8/2020  2:33 PM        Passed - Not on Clopidogrel (unless Pantoprazole ordered)        Passed - No diagnosis of osteoporosis on record        Passed - Recent (12 mo) or future (30 days) visit within the authorizing provider's specialty     Patient has had an office visit with the authorizing provider or a provider within the authorizing providers department within the previous 12 mos or has a future within next 30 days. See \"Patient Info\" tab in inbasket, or \"Choose Columns\" in Meds & Orders section of the refill encounter.              Passed - Medication is active on med list        Passed - Patient is age 18 or older        Passed - No active pregnacy on record        Passed - No positive pregnancy test in past 12 months             "

## 2020-09-10 RX ORDER — PANTOPRAZOLE SODIUM 40 MG/1
TABLET, DELAYED RELEASE ORAL
Qty: 90 TABLET | Refills: 1 | Status: SHIPPED | OUTPATIENT
Start: 2020-09-10 | End: 2020-12-10

## 2020-09-10 NOTE — TELEPHONE ENCOUNTER
Routing refill request to provider for review/approval because:  Drug not on the [facility] refill protocol   Unsure who will continue to prescribe. Last ordered by Ria Ortiz NP Cindy F. RN, BSN

## 2020-09-13 ENCOUNTER — APPOINTMENT (OUTPATIENT)
Dept: GENERAL RADIOLOGY | Facility: CLINIC | Age: 85
End: 2020-09-13
Attending: EMERGENCY MEDICINE
Payer: MEDICARE

## 2020-09-13 ENCOUNTER — APPOINTMENT (OUTPATIENT)
Dept: CT IMAGING | Facility: CLINIC | Age: 85
End: 2020-09-13
Attending: EMERGENCY MEDICINE
Payer: MEDICARE

## 2020-09-13 ENCOUNTER — APPOINTMENT (OUTPATIENT)
Dept: PHYSICAL THERAPY | Facility: CLINIC | Age: 85
End: 2020-09-13
Payer: MEDICARE

## 2020-09-13 ENCOUNTER — HOSPITAL ENCOUNTER (OUTPATIENT)
Facility: CLINIC | Age: 85
Setting detail: OBSERVATION
LOS: 1 days | Discharge: NURSING, REHAB OR LONG-TERM CARE | End: 2020-09-14
Attending: EMERGENCY MEDICINE | Admitting: INTERNAL MEDICINE
Payer: MEDICARE

## 2020-09-13 DIAGNOSIS — S32.592A CLOSED FRACTURE OF MULTIPLE RAMI OF LEFT PUBIS, INITIAL ENCOUNTER (H): ICD-10-CM

## 2020-09-13 DIAGNOSIS — Z03.818 ENCNTR FOR OBS FOR SUSP EXPSR TO OTH BIOLG AGENTS RULED OUT: ICD-10-CM

## 2020-09-13 DIAGNOSIS — C20 RECTAL CANCER (H): ICD-10-CM
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
## 2020-09-13 DIAGNOSIS — I10 ESSENTIAL HYPERTENSION: ICD-10-CM

## 2020-09-13 DIAGNOSIS — E87.6 HYPOKALEMIA: ICD-10-CM

## 2020-09-13 DIAGNOSIS — C18.9 ADENOCARCINOMA, COLON (H): ICD-10-CM
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
## 2020-09-13 DIAGNOSIS — S32.599A CLOSED FRACTURE OF PUBIC RAMUS, UNSPECIFIED LATERALITY, INITIAL ENCOUNTER (H): Primary | ICD-10-CM

## 2020-09-13 LAB
ABO + RH BLD: NORMAL
ABO + RH BLD: NORMAL
ANION GAP SERPL CALCULATED.3IONS-SCNC: 5 MMOL/L (ref 3–14)
BASOPHILS # BLD AUTO: 0 10E9/L (ref 0–0.2)
BASOPHILS NFR BLD AUTO: 0.5 %
BLD GP AB SCN SERPL QL: NORMAL
BLOOD BANK CMNT PATIENT-IMP: NORMAL
BUN SERPL-MCNC: 25 MG/DL (ref 7–30)
CALCIUM SERPL-MCNC: 8.9 MG/DL (ref 8.5–10.1)
CHLORIDE SERPL-SCNC: 103 MMOL/L (ref 94–109)
CO2 SERPL-SCNC: 29 MMOL/L (ref 20–32)
CREAT SERPL-MCNC: 0.82 MG/DL (ref 0.52–1.04)
DIFFERENTIAL METHOD BLD: ABNORMAL
EOSINOPHIL # BLD AUTO: 0.2 10E9/L (ref 0–0.7)
EOSINOPHIL NFR BLD AUTO: 2.8 %
ERYTHROCYTE [DISTWIDTH] IN BLOOD BY AUTOMATED COUNT: 14.2 % (ref 10–15)
GFR SERPL CREATININE-BSD FRML MDRD: 63 ML/MIN/{1.73_M2}
GLUCOSE SERPL-MCNC: 106 MG/DL (ref 70–99)
HCT VFR BLD AUTO: 36.3 % (ref 35–47)
HGB BLD-MCNC: 12 G/DL (ref 11.7–15.7)
IMM GRANULOCYTES # BLD: 0 10E9/L (ref 0–0.4)
IMM GRANULOCYTES NFR BLD: 0.5 %
LABORATORY COMMENT REPORT: NORMAL
LYMPHOCYTES # BLD AUTO: 0.7 10E9/L (ref 0.8–5.3)
LYMPHOCYTES NFR BLD AUTO: 11.4 %
MCH RBC QN AUTO: 34.7 PG (ref 26.5–33)
MCHC RBC AUTO-ENTMCNC: 33.1 G/DL (ref 31.5–36.5)
MCV RBC AUTO: 105 FL (ref 78–100)
MONOCYTES # BLD AUTO: 0.4 10E9/L (ref 0–1.3)
MONOCYTES NFR BLD AUTO: 6.3 %
NEUTROPHILS # BLD AUTO: 5.1 10E9/L (ref 1.6–8.3)
NEUTROPHILS NFR BLD AUTO: 78.5 %
NRBC # BLD AUTO: 0 10*3/UL
NRBC BLD AUTO-RTO: 0 /100
PLATELET # BLD AUTO: 210 10E9/L (ref 150–450)
POTASSIUM SERPL-SCNC: 3.1 MMOL/L (ref 3.4–5.3)
RBC # BLD AUTO: 3.46 10E12/L (ref 3.8–5.2)
SARS-COV-2 RNA SPEC QL NAA+PROBE: NEGATIVE
SARS-COV-2 RNA SPEC QL NAA+PROBE: NORMAL
SODIUM SERPL-SCNC: 137 MMOL/L (ref 133–144)
SPECIMEN EXP DATE BLD: NORMAL
SPECIMEN SOURCE: NORMAL
SPECIMEN SOURCE: NORMAL
WBC # BLD AUTO: 6.5 10E9/L (ref 4–11)

## 2020-09-13 PROCEDURE — 73502 X-RAY EXAM HIP UNI 2-3 VIEWS: CPT

## 2020-09-13 PROCEDURE — 25000132 ZZH RX MED GY IP 250 OP 250 PS 637: Mod: GY | Performed by: INTERNAL MEDICINE

## 2020-09-13 PROCEDURE — 97162 PT EVAL MOD COMPLEX 30 MIN: CPT | Mod: GP

## 2020-09-13 PROCEDURE — G0378 HOSPITAL OBSERVATION PER HR: HCPCS

## 2020-09-13 PROCEDURE — 97116 GAIT TRAINING THERAPY: CPT | Mod: GP

## 2020-09-13 PROCEDURE — 99285 EMERGENCY DEPT VISIT HI MDM: CPT | Mod: 25 | Performed by: EMERGENCY MEDICINE

## 2020-09-13 PROCEDURE — 72192 CT PELVIS W/O DYE: CPT

## 2020-09-13 PROCEDURE — 85025 COMPLETE CBC W/AUTO DIFF WBC: CPT | Performed by: EMERGENCY MEDICINE

## 2020-09-13 PROCEDURE — 99285 EMERGENCY DEPT VISIT HI MDM: CPT | Mod: Z6 | Performed by: EMERGENCY MEDICINE

## 2020-09-13 PROCEDURE — 86900 BLOOD TYPING SEROLOGIC ABO: CPT | Performed by: EMERGENCY MEDICINE

## 2020-09-13 PROCEDURE — 25000128 H RX IP 250 OP 636: Performed by: EMERGENCY MEDICINE

## 2020-09-13 PROCEDURE — 80048 BASIC METABOLIC PNL TOTAL CA: CPT | Performed by: EMERGENCY MEDICINE

## 2020-09-13 PROCEDURE — 71045 X-RAY EXAM CHEST 1 VIEW: CPT

## 2020-09-13 PROCEDURE — 86901 BLOOD TYPING SEROLOGIC RH(D): CPT | Performed by: EMERGENCY MEDICINE

## 2020-09-13 PROCEDURE — 86850 RBC ANTIBODY SCREEN: CPT | Performed by: EMERGENCY MEDICINE

## 2020-09-13 PROCEDURE — U0003 INFECTIOUS AGENT DETECTION BY NUCLEIC ACID (DNA OR RNA); SEVERE ACUTE RESPIRATORY SYNDROME CORONAVIRUS 2 (SARS-COV-2) (CORONAVIRUS DISEASE [COVID-19]), AMPLIFIED PROBE TECHNIQUE, MAKING USE OF HIGH THROUGHPUT TECHNOLOGIES AS DESCRIBED BY CMS-2020-01-R: HCPCS | Performed by: EMERGENCY MEDICINE

## 2020-09-13 PROCEDURE — C9803 HOPD COVID-19 SPEC COLLECT: HCPCS | Performed by: EMERGENCY MEDICINE

## 2020-09-13 PROCEDURE — 96374 THER/PROPH/DIAG INJ IV PUSH: CPT | Performed by: EMERGENCY MEDICINE

## 2020-09-13 PROCEDURE — 99220 ZZC INITIAL OBSERVATION CARE,LEVL III: CPT | Performed by: INTERNAL MEDICINE

## 2020-09-13 PROCEDURE — 25000132 ZZH RX MED GY IP 250 OP 250 PS 637: Performed by: EMERGENCY MEDICINE

## 2020-09-13 PROCEDURE — 96376 TX/PRO/DX INJ SAME DRUG ADON: CPT

## 2020-09-13 RX ORDER — ASPIRIN 81 MG/1
81 TABLET ORAL DAILY
Status: DISCONTINUED | OUTPATIENT
Start: 2020-09-13 | End: 2020-09-14 | Stop reason: HOSPADM

## 2020-09-13 RX ORDER — PANTOPRAZOLE SODIUM 20 MG/1
40 TABLET, DELAYED RELEASE ORAL
Status: DISCONTINUED | OUTPATIENT
Start: 2020-09-13 | End: 2020-09-14 | Stop reason: HOSPADM

## 2020-09-13 RX ORDER — ALBUTEROL SULFATE 90 UG/1
2 AEROSOL, METERED RESPIRATORY (INHALATION) EVERY 4 HOURS PRN
Status: DISCONTINUED | OUTPATIENT
Start: 2020-09-13 | End: 2020-09-14 | Stop reason: HOSPADM

## 2020-09-13 RX ORDER — FLUTICASONE PROPIONATE 110 UG/1
2 AEROSOL, METERED RESPIRATORY (INHALATION) EVERY 12 HOURS
Status: DISCONTINUED | OUTPATIENT
Start: 2020-09-13 | End: 2020-09-13 | Stop reason: CLARIF

## 2020-09-13 RX ORDER — SIMVASTATIN 40 MG
40 TABLET ORAL AT BEDTIME
Status: DISCONTINUED | OUTPATIENT
Start: 2020-09-13 | End: 2020-09-14 | Stop reason: HOSPADM

## 2020-09-13 RX ORDER — GABAPENTIN 100 MG/1
100 CAPSULE ORAL 2 TIMES DAILY
Status: DISCONTINUED | OUTPATIENT
Start: 2020-09-13 | End: 2020-09-14 | Stop reason: HOSPADM

## 2020-09-13 RX ORDER — POLYETHYLENE GLYCOL 3350 17 G/17G
8.5 POWDER, FOR SOLUTION ORAL DAILY
Status: DISCONTINUED | OUTPATIENT
Start: 2020-09-13 | End: 2020-09-14 | Stop reason: HOSPADM

## 2020-09-13 RX ORDER — POTASSIUM CHLORIDE 750 MG/1
10 TABLET, EXTENDED RELEASE ORAL 2 TIMES DAILY
Status: DISCONTINUED | OUTPATIENT
Start: 2020-09-13 | End: 2020-09-14 | Stop reason: HOSPADM

## 2020-09-13 RX ORDER — NALOXONE HYDROCHLORIDE 0.4 MG/ML
.1-.4 INJECTION, SOLUTION INTRAMUSCULAR; INTRAVENOUS; SUBCUTANEOUS
Status: DISCONTINUED | OUTPATIENT
Start: 2020-09-13 | End: 2020-09-14 | Stop reason: HOSPADM

## 2020-09-13 RX ORDER — ACETAMINOPHEN 325 MG/1
650 TABLET ORAL EVERY 4 HOURS PRN
Status: DISCONTINUED | OUTPATIENT
Start: 2020-09-13 | End: 2020-09-14 | Stop reason: HOSPADM

## 2020-09-13 RX ORDER — MAGNESIUM OXIDE 400 MG/1
400 TABLET ORAL EVERY EVENING
Status: DISCONTINUED | OUTPATIENT
Start: 2020-09-13 | End: 2020-09-14 | Stop reason: HOSPADM

## 2020-09-13 RX ORDER — GABAPENTIN 100 MG/1
200 CAPSULE ORAL AT BEDTIME
Status: DISCONTINUED | OUTPATIENT
Start: 2020-09-13 | End: 2020-09-14 | Stop reason: HOSPADM

## 2020-09-13 RX ORDER — ATENOLOL 25 MG/1
25 TABLET ORAL DAILY
Status: DISCONTINUED | OUTPATIENT
Start: 2020-09-13 | End: 2020-09-14 | Stop reason: HOSPADM

## 2020-09-13 RX ORDER — NALOXONE HYDROCHLORIDE 0.4 MG/ML
.1-.4 INJECTION, SOLUTION INTRAMUSCULAR; INTRAVENOUS; SUBCUTANEOUS
Status: DISCONTINUED | OUTPATIENT
Start: 2020-09-13 | End: 2020-09-13

## 2020-09-13 RX ORDER — HYDROMORPHONE HYDROCHLORIDE 1 MG/ML
0.3 INJECTION, SOLUTION INTRAMUSCULAR; INTRAVENOUS; SUBCUTANEOUS
Status: DISCONTINUED | OUTPATIENT
Start: 2020-09-13 | End: 2020-09-14 | Stop reason: HOSPADM

## 2020-09-13 RX ORDER — DICYCLOMINE HYDROCHLORIDE 10 MG/1
10 CAPSULE ORAL 4 TIMES DAILY PRN
Status: DISCONTINUED | OUTPATIENT
Start: 2020-09-13 | End: 2020-09-14 | Stop reason: HOSPADM

## 2020-09-13 RX ORDER — ONDANSETRON 2 MG/ML
4 INJECTION INTRAMUSCULAR; INTRAVENOUS EVERY 30 MIN PRN
Status: DISCONTINUED | OUTPATIENT
Start: 2020-09-13 | End: 2020-09-14 | Stop reason: HOSPADM

## 2020-09-13 RX ORDER — CHLORTHALIDONE 25 MG/1
25 TABLET ORAL DAILY
Status: DISCONTINUED | OUTPATIENT
Start: 2020-09-13 | End: 2020-09-14 | Stop reason: HOSPADM

## 2020-09-13 RX ORDER — HYDROMORPHONE HYDROCHLORIDE 1 MG/ML
0.3 INJECTION, SOLUTION INTRAMUSCULAR; INTRAVENOUS; SUBCUTANEOUS
Status: DISCONTINUED | OUTPATIENT
Start: 2020-09-13 | End: 2020-09-13 | Stop reason: DRUGHIGH

## 2020-09-13 RX ADMIN — ACETAMINOPHEN 650 MG: 325 TABLET, FILM COATED ORAL at 10:44

## 2020-09-13 RX ADMIN — POTASSIUM CHLORIDE 10 MEQ: 750 TABLET, FILM COATED, EXTENDED RELEASE ORAL at 17:45

## 2020-09-13 RX ADMIN — PANTOPRAZOLE SODIUM 40 MG: 20 TABLET, DELAYED RELEASE ORAL at 17:45

## 2020-09-13 RX ADMIN — POLYETHYLENE GLYCOL 3350 8.5 G: 17 POWDER, FOR SOLUTION ORAL at 15:39

## 2020-09-13 RX ADMIN — HYDROMORPHONE HYDROCHLORIDE 0.3 MG: 1 INJECTION, SOLUTION INTRAMUSCULAR; INTRAVENOUS; SUBCUTANEOUS at 05:44

## 2020-09-13 RX ADMIN — CHLORTHALIDONE 25 MG: 25 TABLET ORAL at 17:45

## 2020-09-13 RX ADMIN — GABAPENTIN 200 MG: 100 CAPSULE ORAL at 21:33

## 2020-09-13 RX ADMIN — SIMVASTATIN 40 MG: 40 TABLET, FILM COATED ORAL at 21:33

## 2020-09-13 RX ADMIN — HYDROMORPHONE HYDROCHLORIDE 0.3 MG: 1 INJECTION, SOLUTION INTRAMUSCULAR; INTRAVENOUS; SUBCUTANEOUS at 08:50

## 2020-09-13 RX ADMIN — OXYCODONE HYDROCHLORIDE 2.5 MG: 5 TABLET ORAL at 16:59

## 2020-09-13 RX ADMIN — ACETAMINOPHEN 650 MG: 325 TABLET, FILM COATED ORAL at 22:18

## 2020-09-13 RX ADMIN — ACETAMINOPHEN 650 MG: 325 TABLET, FILM COATED ORAL at 16:59

## 2020-09-13 RX ADMIN — OXYCODONE HYDROCHLORIDE 2.5 MG: 5 TABLET ORAL at 10:44

## 2020-09-13 RX ADMIN — HYDROMORPHONE HYDROCHLORIDE 0.3 MG: 1 INJECTION, SOLUTION INTRAMUSCULAR; INTRAVENOUS; SUBCUTANEOUS at 04:17

## 2020-09-13 RX ADMIN — GABAPENTIN 100 MG: 100 CAPSULE ORAL at 17:45

## 2020-09-13 RX ADMIN — ATENOLOL 25 MG: 25 TABLET ORAL at 17:44

## 2020-09-13 RX ADMIN — OXYCODONE HYDROCHLORIDE 2.5 MG: 5 TABLET ORAL at 19:19

## 2020-09-13 RX ADMIN — Medication 400 MG: at 17:45

## 2020-09-13 ASSESSMENT — ENCOUNTER SYMPTOMS
SHORTNESS OF BREATH: 0
ABDOMINAL PAIN: 0
FEVER: 0

## 2020-09-13 ASSESSMENT — MIFFLIN-ST. JEOR: SCORE: 815.51

## 2020-09-13 NOTE — ED NOTES
Here with left hip pain after falling from standing, was up changing her colostomy bag & tripped, wasn't using her cane as she normally does. Down for about 30mins as  has dementia & wasn't able to get her the phone for help. Small abrasion to left knee.

## 2020-09-13 NOTE — LETTER
Transition Communication Hand-off for Care Transitions to Next Level of Care Provider    Name: Pam Pierce  : 1930  MRN #: [id number]  Primary Care Provider: Reagan Thakkar  Primary Care MD Name: Ariane  Primary Clinic: [address] [place] [id number]  Primary Care Clinic Name: [facility]  Reason for Hospitalization:  Rectal cancer (H) [C20]  Essential hypertension [I10]  Falls, initial encounter [W19.XXXA]  Closed fracture of multiple rami of left pubis, initial encounter (H) [S32.592A]  Admit Date/Time: 2020  3:23 AM  Discharge Date: 20  Payor Source: Payor: MEDICARE / Plan: MEDICARE / Product Type: Medicare /     Readmission Assessment Measure (JAIME) Risk Score/category: none         Reason for Communication Hand-off Referral: Fragility    Discharge Plan:TCU       Concern for non-adherence with plan of care:   Y/N no  Discharge Needs Assessment:  Needs      Most Recent Value   Skilled Nursing Facility  [facility] [phone], Fax: [phone]   PAS Number  [id number]        Follow-up plan:    Future Appointments   Date Time Provider Department Center   9/15/2020  1:50 PM [facility]   9/15/2020  2:15 PM Tomasa Diggs MD [facility]   2020 10:45 AM Mitzi Urbina MD [facility]         [place] Recommendations:  Pt is discharging today to [facility] (Admission phone: [phone] Main phone: [phone] Fax: [phone]). Goal will be to return back to her previous setting.     LLUVIA Clayton   [facility] [phone]        AVS/Discharge Summary is the source of truth; this is a helpful guide for improved communication of patient story

## 2020-09-13 NOTE — PROGRESS NOTES
Due to obs status, pt is declining to take medications ordered from PTA  list. Pt reports that manuel Milton will be here between  3:30-7:30 pm and would like to discuss her medications and obs status with dtr.   K+ was 3.1 and could be replaced per electrolyte replacement order, however pt does receive K+ routinely per PTA list. Awaiting to speak to dtr.

## 2020-09-13 NOTE — CONSULTS
Care Transition Initial Assessment - RN    Admission Date and Time:  [place] to Observation 9/13/20 at 0538.      Spoke with: Patient.    DATA   Active Problems:    Pubic ramus fracture (H)       Primary Care Clinic Name: [facility]  Primary Care MD Name: Ariane  Contact information and PCP information verified: Yes    ASSESSMENT  Cognitive Status: awake, alert and oriented.  Lives With: spouse   Living Arrangements: house     Description of Support System: Supportive, Involved   Who is your support system?: Children   Support Assessment: Adequate family and caregiver support   Insurance Concerns: No Insurance issues identified      This writer met with the patient, introduced myself and role.     The patient lives with her  independently in the community.  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  She has 2 sons and a daughter that live locally and assist if needed.  She does not drive.  Her daughter transports her to grocery shop.  She ambulates with a cane.     The patient is admitted with a pubic ramus fracture.  The patient reports she lost her balance and fell.  Her  did not understand to bring her the phone, but he did walk to her son's home a few houses away to get help.  Family is with her  at this time.  Discussed TCU.  Pt/family was provided with the Medicare Compare list for SNF.  Discussed associated Medicare star ratings to assist with choice for referrals/discharge planning Yes.  Education was given to pt/family that star ratings are updated/maintained by Medicare and can be reviewed by visiting www.medicare.gov Yes.  The patient prefers [facility] (Admissions phone: [phone] Main phone: [phone] Fax: [phone]), [facility] (Phone: [phone] Fax: [phone]) or [facility] (Phone: [phone] Admissions: [phone] Fax: [phone]).  TCU referrals are pending.      PLAN    TCU      Jessica Gonzales RN, Care Coordinator [phone]

## 2020-09-13 NOTE — PROGRESS NOTES
"[place] [facility] ADMISSION NOTE    Patient admitted to room 2400 at approximately 0545 via cart from emergency room. Patient was accompanied by transport tech.     Verbal SBAR report received from ER RN Nohemy prior to patient arrival.     Patient trasferred to bed via air marci. Patient alert and oriented X 3. Pain is controlled with current analgesics.  Medication(s) being used: narcotic analgesics including hydromorphone. 0-10 Pain Scale: 6. Admission vital signs: Blood pressure (!) 145/84, pulse 71, temperature 98.2  F (36.8  C), temperature source Oral, resp. rate 18, height 1.499 m (4' 11\"), weight 49 kg (108 lb), SpO2 96 %, not currently breastfeeding. Patient was oriented to plan of care, call light, bed controls, tv, telephone, bathroom and visiting hours.     Risk Assessment    The following safety risks were identified during admission: fall. Yellow risk band applied: YES.     Skin Initial Assessment    This writer admitted this patient and completed a full skin assessment and Desean score in the Adult PCS flowsheet. Appropriate interventions initiated as needed.     Secondary skin check completed by Coni Hart RN.         Education    Patient has a [place] to Observation order: Yes  Observation education completed and documented: Yes      Chalino Contreras RN    "

## 2020-09-13 NOTE — ED PROVIDER NOTES
History     Chief Complaint   Patient presents with     Hip Pain     fall     HPI  Pam Pierce is a 89 year old female who has past medical history significant for rectal cancer, chronic kidney disease, hyperlipidemia, HTN, presenting to the emergency department via EMS for concerns regarding fall, with left hip pain.  Initial history from EMS, with history corroborated by patient.  Patient reports that she was getting up in the middle of the night to change her colostomy bag.  She was in the front entryway of the house when she was walking without the use of a cane, and subsequently tripped, falling, and landed on the left hip.  She did scrape the left knee.  However, pain is localized to the left hip.  No significant right-sided pains.  Patient did not hit her head.  No loss of consciousness.  She lives at home with her , however  has dementia, and it took about 30 minutes for patient to describe to her  how to bring her the telephone.  911 was called.  Patient was administered 50 mcg fentanyl, and brought to the emergency department for further evaluation.  Currently complaining of left-sided hip pain, severe with movement, with pain which is tolerable with rest.  No prior left hip surgeries.    Allergies:  Allergies   Allergen Reactions     Flu Virus Vaccine Other (See Comments)     With egg base       Problem List:    Patient Active Problem List    Diagnosis Date Noted     Pubic ramus fracture (H) 09/13/2020     Priority: Medium     Abdominal pain 02/16/2020     Priority: Medium     Asymptomatic bacteriuria 02/16/2020     Priority: Medium     Mild intermittent asthma in adult without complication 12/18/2019     Priority: Medium     Rectal cancer (H) 11/19/2019     Priority: Medium     GERD (gastroesophageal reflux disease) 09/03/2014     Priority: Medium     CKD (chronic kidney disease) stage 3, GFR 30-59 ml/min (H) 04/04/2012     Priority: Medium     Hyperlipidemia LDL goal <100  10/31/2010     Priority: Medium     IMPAIRED FASTING GLUCOSE 11/25/2007     Priority: Medium     Urethral stricture due to infection 10/03/2005     Priority: Medium     ureteral stone  Problem list name updated by automated process. Provider to review       Hyperlipidemia 10/03/2005     Priority: Medium     Problem list name updated by automated process. Provider to review       Essential hypertension 10/03/2005     Priority: Medium     Problem list name updated by automated process. Provider to review          Past Medical History:    Past Medical History:   Diagnosis Date     Actinic keratosis      Basal cell carcinoma      Other and unspecified hyperlipidemia      Unspecified asthma(493.90)      Unspecified essential hypertension      Urethral stricture due to unspecified infection        Past Surgical History:    Past Surgical History:   Procedure Laterality Date     ESOPHAGOSCOPY, GASTROSCOPY, DUODENOSCOPY (EGD), COMBINED N/A 2/20/2020    Procedure: ESOPHAGOGASTRODUODENOSCOPY, WITH BIOPSY;  Surgeon: Harrison Brown MD;  Location: [facility]     SURGICAL HISTORY OF -       open reduction of second metatarsal neck fx with internal fixation  cna d closed treatment of metatarsal to 2 and 4 fx     SURGICAL HISTORY OF -       hysterectomy and oophorectomy     SURGICAL HISTORY OF -       lithotripsy       Family History:    Family History   Problem Relation Age of Onset     Cancer - colorectal Mother      Breast Cancer Mother      C.A.D. Father      Cardiovascular Father      Alcohol/Drug Father      Chronic Obstructive Pulmonary Disease Brother      Cancer Son         chest cancer (Thymus)/myesthenia gravis     Musculoskeletal Disorder Son         myesthenia gravis     Cerebrovascular Disease Sister      Chronic Obstructive Pulmonary Disease Brother        Social History:  Marital Status:   [2]  Social History     Tobacco Use     Smoking status: Never Smoker     Smokeless tobacco: Never Used   Substance Use Topics  "    Alcohol use: Yes     Frequency: Monthly or less     Comment: wine ocass     Drug use: No        Medications:    No current outpatient medications on file.        Review of Systems   Constitutional: Negative for fever.   Respiratory: Negative for shortness of breath.    Cardiovascular: Negative for chest pain.   Gastrointestinal: Negative for abdominal pain.   Musculoskeletal:        Left hip pain   All other systems reviewed and are negative.      Physical Exam   BP: (!) 167/92  Pulse: 69  Temp: 98.2  F (36.8  C)  Resp: 18  Height: 149.9 cm (4' 11\")  Weight: 49 kg (108 lb)  SpO2: 99 %      Physical Exam  BP (!) 145/84   Pulse 71   Temp 98.2  F (36.8  C) (Oral)   Resp 18   Ht 1.499 m (4' 11\")   Wt 49 kg (108 lb)   SpO2 96%   BMI 21.81 kg/m    General: alert, interactive, in pain with movement of LLE  Head: atraumatic  Nose: no rhinorrhea or epistaxis  Ears: no external auditory canal discharge or bleeding.    Eyes: Sclera nonicteric. Conjunctiva noninjected. PERRL, EOMI  Mouth: no tonsillar erythema, edema, or exudate  Neck: supple, no palp LAD  Lungs: CTAB  CV: RRR, S1/S2; peripheral pulses palpable and symmetric  Abdomen: soft, colostomy in place, no guarding or rebound. Positive bowel sounds  Extremities: Left lower extremity with normal DP and PT pulses.  Pain in left hip with any movement of the left lower extremity  Skin: no rash or diaphoresis  Neuro:   sensation intact to light touch in UE and LEs bilaterally;       ED Course        Procedures               Critical Care time:  none       Trauma Summary Disposition     Patient is trauma admission:  Trauma  Evaluation      Spine  Backboard removal time: Backboard not applied   C-collar and immobilization: not indicated, cleared.  CSpine Clearance: C spine cleared clinically  Full Primary and Secondary survey with appropriate immobilization of spine completed in exam section.     Neuro  GCS at arrival:  Motor 6=Obeys commands   Verbal 5=Oriented "   Eye Opening 4=Spontaneous   Total: 15     GCS at disposition: unchanged    ED Procedures completed  none    Admitting Consultants:  Orthopedic consult with Ya MACIEL at 0530. Plan: Admit and consult in AM.  Non-operative management.    Consult order placed into Epic:   Yes  Imaging reviewed by consultant:  YES                  Results for orders placed or performed during the hospital encounter of 09/13/20 (from the past 24 hour(s))   CBC with platelets differential   Result Value Ref Range    WBC 6.5 4.0 - 11.0 10e9/L    RBC Count 3.46 (L) 3.8 - 5.2 10e12/L    Hemoglobin 12.0 11.7 - 15.7 g/dL    Hematocrit 36.3 35.0 - 47.0 %     (H) 78 - 100 fl    MCH 34.7 (H) 26.5 - 33.0 pg    MCHC 33.1 31.5 - 36.5 g/dL    RDW 14.2 10.0 - 15.0 %    Platelet Count 210 150 - 450 10e9/L    Diff Method Automated Method     % Neutrophils 78.5 %    % Lymphocytes 11.4 %    % Monocytes 6.3 %    % Eosinophils 2.8 %    % Basophils 0.5 %    % Immature Granulocytes 0.5 %    Nucleated RBCs 0 0 /100    Absolute Neutrophil 5.1 1.6 - 8.3 10e9/L    Absolute Lymphocytes 0.7 (L) 0.8 - 5.3 10e9/L    Absolute Monocytes 0.4 0.0 - 1.3 10e9/L    Absolute Eosinophils 0.2 0.0 - 0.7 10e9/L    Absolute Basophils 0.0 0.0 - 0.2 10e9/L    Abs Immature Granulocytes 0.0 0 - 0.4 10e9/L    Absolute Nucleated RBC 0.0    Basic metabolic panel   Result Value Ref Range    Sodium 137 133 - 144 mmol/L    Potassium 3.1 (L) 3.4 - 5.3 mmol/L    Chloride 103 94 - 109 mmol/L    Carbon Dioxide 29 20 - 32 mmol/L    Anion Gap 5 3 - 14 mmol/L    Glucose 106 (H) 70 - 99 mg/dL    Urea Nitrogen 25 7 - 30 mg/dL    Creatinine 0.82 0.52 - 1.04 mg/dL    GFR Estimate 63 >60 mL/min/[1.73_m2]    GFR Estimate If Black 73 >60 mL/min/[1.73_m2]    Calcium 8.9 8.5 - 10.1 mg/dL   XR Pelvis and Hip Left 2 Views    Narrative    EXAM: XR PELVIS AND HIP LEFT 2 VIEWS  LOCATION: [facility]  DATE/TIME: 9/13/2020 3:43 AM    INDICATION: Fall with pain  COMPARISON: None.       Impression    IMPRESSION: Allowing for the marked osteopenia there is a subtle linear lucency along the superior lateral aspect of the left superior pubic ramus adjacent to the acetabulum cyst just above the nondisplaced fracture. Additionally near the medial aspect   of the left inferior pubic ramus there is irregularity to the cortex most compatible with fracture. Degenerative changes at intact symphysis pubis. Remainder the pelvis intact. Minimal degenerative changes both hips. Marked degenerative changes in the   lumbar spine.   XR Chest 1 View    Narrative    EXAM: XR CHEST 1 VW  LOCATION: [facility]  DATE/TIME: 9/13/2020 3:43 AM    INDICATION: Fall with left shoulder pain  COMPARISON: 11/22/2019      Impression    IMPRESSION: Minimal cardiac enlargement. Normal pulmonary vascularity. No focal infiltrate or consolidation. No pneumothorax. Tortuous and calcified thoracic aorta. Moderate marked degenerative changes in the spine. Marked degenerative changes both   shoulders, greater on the right.       CT Pelvis Bone wo Contrast    Narrative    EXAM: CT PELVIS BONE WITHOUT CONTRAST  LOCATION: [facility]  DATE/TIME: 09/13/2020, 4:33 AM    INDICATION: Fall with left hip and groin pain. Evaluate for fracture.  COMPARISON: None.  TECHNIQUE: CT scan of the pelvis was performed without IV contrast. Multiplanar reformats were obtained. Dose reduction techniques were used.  CONTRAST: None.    FINDINGS:    OSSEOUS STRUCTURES/JOINT SPACE: Acute oblique fracture involving the far lateral aspect of the left superior pubic ramus at its junction with the anterior wall of the acetabulum. No evidence for significant displacement or angulation. Oblique and very   minimally displaced fracture involving the middle to anterior third of the left inferior pubic ramus. No evidence for entrance into an intact symphysis pubis. No other superior or inferior pubic rami fractures bilaterally. No evidence for acute  displaced   femoral fracture. No hip dislocation. Oblique nondisplaced fracture involving the left sacral ala inferiorly, distal lateral aspect of the left S1 and S2 neural foramina. No evidence for significant entrance into an intact left SI joint. Minimal to   moderate degenerative changes both SI joints. No other fractures evident. Marked degenerative disc disease lower lumbar spine. Marked lower lumbar facet arthropathy.    MUSCLES/SOFT TISSUES/INTRAPELVIC FINDINGS: Partially visualized left lower quadrant ostomy. No bowel dilatation. Colonic diverticulosis without diverticulitis. Appendix unremarkable. Atherosclerotic vascular calcification. No significant subcutaneous or   intramuscular hematoma.      Impression    IMPRESSION:  1.  Acute oblique fracture involving the far lateral aspect of the left superior pubic ramus at its junction with the anterior wall of the acetabulum. This accounts for the finding on the recent pelvic radiograph. No evidence for significant displacement   or angulation of this fracture. Additionally, there is an oblique fracture involving the middle third to anterior third of the left inferior pubic ramus.    2.  Oblique nondisplaced fracture left sacral ala without evidence for entrance into the left S1 and S2 neural foramina.    3.  No other fractures evident.    4.  No significant intramuscular or subcutaneous hematoma.    5.  Remainder of detailed findings as above.         Medications   HYDROmorphone (PF) (DILAUDID) injection 0.3 mg (0.3 mg Intravenous Given 9/13/20 0544)   ondansetron (ZOFRAN) injection 4 mg (has no administration in time range)   naloxone (NARCAN) injection 0.1-0.4 mg (has no administration in time range)   oxyCODONE IR (ROXICODONE) half-tab 2.5 mg (has no administration in time range)   acetaminophen (TYLENOL) tablet 650 mg (has no administration in time range)   naloxone (NARCAN) injection 0.1-0.4 mg (has no administration in time range)   HYDROmorphone (PF)  (DILAUDID) injection 0.3 mg (has no administration in time range)       Assessments & Plan (with Medical Decision Making)  89 year old female presenting to the emergency department via EMS after the patient had a fall from standing height while walking in her house.  Patient fell, landing on the left hip.  Currently complaining of severe left hip pain, with left lower extremity hurting deep inside the hip.  50 mcg fentanyl given by EMS prior to arrival.  Concern is for left hip fracture, and x-ray images are ordered, pain medications given.    X-ray images personally reviewed, and radiology interpretation.  Concern for possible pubic rami fracture, and therefore CT scan imaging is performed of the pelvis.  CT imaging results are reviewed as documented above.  Patient with superior and inferior pubic rami fractures, in addition to small nondisplaced sacral ala fracture.  I discussed these findings with orthopedic surgeon, Dr. Pandya.  He also reviewed images himself, and feels it would be appropriate to admit the patient to [facility], with likely nonoperative management.  I discussed with Dr. Pool, hospitalist at 5:42 AM.  Patient will be admitted to hospital service, with physical therapy, Occupational Therapy, and pain control.  Appears to have been mechanical fall resulting in bony injury.       I have reviewed the nursing notes.    I have reviewed the findings, diagnosis, plan and need for follow up with the patient.       ED to Inpatient Handoff:    Discussed with Dr. Pool at 0542am  Patient accepted for Observation  Pending studies include n/a  Code Status: DNR/DNI           Current Discharge Medication List          Final diagnoses:   Closed fracture of multiple rami of left pubis, initial encounter (H)   Falls, initial encounter   Rectal cancer (H)   Essential hypertension       9/13/2020   [facility] EMERGENCY DEPARTMENT     Raudel Lan MD  09/13/20 0570

## 2020-09-13 NOTE — PLAN OF CARE
S:  Patient inpatient at [facility] unit    B:  Patient admitted for L hip fx secondary to fall    A:  Vitals stable on room air  A & O x 3  Pain in left hip reported 6/10 when stationary and 9/10 when moving  Patient has pivot transferred to bedside commode with assist of one and walker and gait belt  Patient has Ostomy that was new to patient Nov 2019  Patient lives at home with  (Kin) who has dementia  Patient's son Jake lives 3 houses down the street  Patient'janesradha (Karine) is contact, she is OB RN    R:  Patient rested in bed    Chalino Contreras RN

## 2020-09-13 NOTE — H&P
[facility]    History and Physical - Hospitalist Service       Date of Admission:  9/13/2020    Assessment & Plan   Pam Pierce is a 89 year old female admitted on 9/13/2020. She presents after a fall at home with fractures of the left superior and inferior pubic rami.    Fractures of left superior and inferior pubic rami  Fracture of left sacral hoa  Physical deconditioning  Gait instability    Pelvic CT demonstrates an acute oblique fracture involving the far lateral aspect of the left superior pubic ramus at its junction with the anterior wall of the acetabulum. No evidence for significant displacement or angulation. Oblique and very minimally displaced fracture involving the middle to anterior third of the left inferior pubic ramus. No evidence for entrance into an intact symphysis pubis. No other superior or inferior pubic rami fractures bilaterally. No evidence for acute displaced femoral fracture. No hip dislocation. Oblique nondisplaced fracture involving the left sacral ala inferiorly, distal lateral aspect of the left S1 and S2 neural foramina. No evidence for significant entrance into an intact left SI joint. Minimal to moderate degenerative changes both SI joints. No other fractures evident. Marked degenerative disc disease lower lumbar spine. Marked lower lumbar facet arthropathy.  -Orthopedic surgery consult to evaluate for need for surgery.  -Pain control with acetaminophen, oxycodone, and IV Dilaudid.  -Occupational and physical therapy consults.  -Anticipate patient will need TCU at discharge due to significant pain with mobilization.    Mild intermittent asthma  No evidence for acute exacerbation.  Continue prior to admission Flovent and as needed albuterol.    GERD  Continue prior to admission pantoprazole.    Hyperlipidemia  Continue prior to admission simvastatin.    Hypertension  Continue prior to admission atenolol and chlorthalidone.    Rectal cancer  Patient  presented in November 2018 with colonic obstruction due to a rectal mass.  The patient underwent a emergency diverting colostomy.  Pathology demonstrated adenocarcinoma.  She has invasion of the superior vagina and posterior margin of the urinary bladder.  Patient was treated with palliative Xeloda and radiation in February 2020.  She takes demeclocycline for radiation proctitis.  Restaging pelvic MRI in June 2020 demonstrated significant interval decrease in size of the residual distal rectal mass.  -Xeloda is currently on hold    Lumbar radiculopathy  Lumbar degenerative disc disease  Continue prior to admission gabapentin.  The patient has low back pain with right lower extremity weakness and numbness.  She has been evaluated in sports medicine and was to have a lumbar EFRAIN on Monday.     Diet: Regular Diet Adult    DVT Prophylaxis: Pneumatic Compression Devices  Clayton Catheter: not present  Code Status: DNR/DNI    Disposition Plan   Expected discharge: 2 - 3 days, recommended to transitional care unit once adequate pain management/ tolerating PO medications.  Entered: Jake Pool MD 09/13/2020, 1:51 PM       Jake Pool MD  [facility]    ______________________________________________________________________    Chief Complaint   Chief Complaint   Patient presents with     Hip Pain     fall        History is obtained from the patient    History of Present Illness   Pam Pierce is a 89 year old female who who presents to the emergency department with left hip pain after a fall.  Patient states she got up in the middle the night to change her colostomy bag.  She was ambulating without the use of her cane.  Patient tripped in the front hallway and landed on her left hip.  She immediately had severe left pain and was unable to stand.  She was able to call her , but due to dementia he had difficulty in helping her call for an ambulance.  Patient states that she did  not hit her head when she fell and denied loss of consciousness.  Patient denies any recent illnesses or sick contacts.  She denies any fevers or chills, headache, nasal congestion, runny nose, sore throat, or cough.  Patient denies any nausea vomiting or diarrhea.  She denies any dysuria or hematuria.  She has baseline right lower extremity weakness and numbness, but no acute numbness or weakness.    Review of Systems    The 10 point Review of Systems is negative other than noted in the HPI or here.     Past Medical History    I have reviewed this patient's medical history and updated it with pertinent information if needed.   Past Medical History:   Diagnosis Date     Actinic keratosis      Basal cell carcinoma      Other and unspecified hyperlipidemia      Unspecified asthma(493.90)      Unspecified essential hypertension      Urethral stricture due to unspecified infection     ureteral stone       Patient Active Problem List    Diagnosis Date Noted     Pubic ramus fracture (H) 09/13/2020     Priority: Medium     Abdominal pain 02/16/2020     Priority: Medium     Asymptomatic bacteriuria 02/16/2020     Priority: Medium     Mild intermittent asthma in adult without complication 12/18/2019     Priority: Medium     Rectal cancer (H) 11/19/2019     Priority: Medium     GERD (gastroesophageal reflux disease) 09/03/2014     Priority: Medium     CKD (chronic kidney disease) stage 3, GFR 30-59 ml/min (H) 04/04/2012     Priority: Medium     Hyperlipidemia LDL goal <100 10/31/2010     Priority: Medium     IMPAIRED FASTING GLUCOSE 11/25/2007     Priority: Medium     Urethral stricture due to infection 10/03/2005     Priority: Medium     ureteral stone  Problem list name updated by automated process. Provider to review       Hyperlipidemia 10/03/2005     Priority: Medium     Problem list name updated by automated process. Provider to review       Essential hypertension 10/03/2005     Priority: Medium     Problem list name  updated by automated process. Provider to review          Past Surgical History   I have reviewed this patient's surgical history and updated it with pertinent information if needed.  Past Surgical History:   Procedure Laterality Date     ESOPHAGOSCOPY, GASTROSCOPY, DUODENOSCOPY (EGD), COMBINED N/A 2/20/2020    Procedure: ESOPHAGOGASTRODUODENOSCOPY, WITH BIOPSY;  Surgeon: Harrison rBown MD;  Location: [facility]     SURGICAL HISTORY OF -       open reduction of second metatarsal neck fx with internal fixation  cna d closed treatment of metatarsal to 2 and 4 fx     SURGICAL HISTORY OF -       hysterectomy and oophorectomy     SURGICAL HISTORY OF -       lithotripsy       Social History   I have reviewed this patient's social history and updated it with pertinent information if needed.  Social History     Tobacco Use     Smoking status: Never Smoker     Smokeless tobacco: Never Used   Substance Use Topics     Alcohol use: Yes     Frequency: Monthly or less     Comment: wine ocass     Drug use: No       Family History   I have reviewed this patient's family history and updated it with pertinent information if needed.   Family History   Problem Relation Age of Onset     Cancer - colorectal Mother      Breast Cancer Mother      C.A.D. Father      Cardiovascular Father      Alcohol/Drug Father      Chronic Obstructive Pulmonary Disease Brother      Cancer Son         chest cancer (Thymus)/myesthenia gravis     Musculoskeletal Disorder Son         myesthenia gravis     Cerebrovascular Disease Sister      Chronic Obstructive Pulmonary Disease Brother        Prior to Admission Medications   Prior to Admission Medications   Prescriptions Last Dose Informant Patient Reported? Taking?   CALCIUM + D OR 9/12/2020 at 0800 Self Yes Yes   Sig: Take 1 tablet by mouth daily    FLOVENT  MCG/ACT inhaler 9/12/2020 at 2100  No Yes   Sig: INHALE TWO PUFFS BY MOUTH TWICE A DAY   MULTI-DAY VITAMINS OR 9/12/2020 at 0800 Self Yes Yes   Sig:  Take 1 tablet by mouth daily    Probiotic Product (PROBIOTIC DAILY PO) 9/12/2020 at 2100 Self Yes Yes   Sig: Take 1 capsule by mouth daily    acetaminophen (TYLENOL) 500 MG tablet 9/12/2020 at 2100 Self Yes Yes   Sig: Take 500-1,000 mg by mouth daily as needed for mild pain   albuterol (PROAIR HFA) 108 (90 Base) MCG/ACT inhaler 9/12/2020 at 2100 Self No Yes   Sig: Inhale 2 puffs into the lungs every 4 hours as needed   aspirin 81 MG EC tablet 9/12/2020 at 0800 Self Yes Yes   Sig: Take 81 mg by mouth daily   atenolol (TENORMIN) 25 MG tablet 9/12/2020 at 0800 Self No Yes   Sig: Take 1 tablet (25 mg) by mouth daily   capecitabine (XELODA) 500 MG tablet 9/12/2020 at 2100  No Yes   Sig: Take 1 gram po bid. 7 days on, 7 days off.   chlorthalidone (HYGROTON) 25 MG tablet 9/12/2020 at 0800  No Yes   Sig: TAKE ONE TABLET BY MOUTH ONCE DAILY   dicyclomine (BENTYL) 10 MG capsule 9/12/2020 at 2100  No Yes   Sig: Take 1 capsule (10 mg) by mouth 4 times daily as needed (abdominal pain/cramping) Please fill at patient's request   gabapentin (NEURONTIN) 100 MG capsule 9/12/2020 at 2100  No Yes   Sig: Increase gabapentin to 200 mg q hs, and 100 mg po bid.   magnesium oxide (MAG-OX) 400 MG tablet 9/12/2020 at 2100 Self Yes Yes   Sig: Take 400 mg by mouth every evening    melatonin 3 MG tablet 9/12/2020 at 2100 Self Yes Yes   Sig: Take 3 mg by mouth At Bedtime Also 1 tab overnight as needed   ondansetron 4 MG PO ODT tab More than a month at Unknown time  No No   Sig: Take 1 tablet (4 mg) by mouth every 6 hours as needed for nausea or vomiting   oxyCODONE (ROXICODONE) 5 MG tablet 9/12/2020 at 2100  No Yes   Sig: Take 0.5 tablets (2.5 mg) by mouth every 3 hours as needed for moderate to severe pain Higher doses appear to have caused dizziness.   pantoprazole (PROTONIX) 40 MG EC tablet 9/12/2020 at 0800  No Yes   Sig: TAKE ONE TABLET BY MOUTH EVERY MORNING BEFORE BREAKFAST   polyethylene glycol (MIRALAX) powder 9/12/2020 at 0800 Self Yes  Yes   Sig: Take 0.5 capfuls by mouth daily 8.5 grams daily in the morning    potassium chloride ER (KLOR-CON M) 10 MEQ CR tablet 9/12/2020 at 2100  No Yes   Sig: Take 1 tablet (10 mEq) by mouth 2 times daily   simvastatin (ZOCOR) 40 MG tablet 9/12/2020 at 2100  No Yes   Sig: TAKE ONE TABLET BY MOUTH AT BEDTIME      Facility-Administered Medications: None     Allergies   Allergies   Allergen Reactions     Flu Virus Vaccine Other (See Comments)     With egg base       Physical Exam   Vital Signs: Temp: 98.3  F (36.8  C) Temp src: Oral BP: (!) 144/76 Pulse: 78   Resp: 18 SpO2: 96 % O2 Device: None (Room air)    Weight: 108 lbs 0 oz    Gen: Thin debilitated elderly female, resting comfortably in bed, no acute distress  Head: atraumatic bitemporal wasting; sclera non-injected, anicterric; oral mucosa moist, no lesions, no exudates, normal dentition  Neck: supple without spinal abnormality  Chest: clear to auscultation bilaterally, no wheezes, no rhonchi, no rales.  Cardiovascular: regular rate and rhythm, no gallops or rubs, no murmurs, no edema  Abdomen: bowel sounds normal, colostomy, no hepatosplenomegaly, no masses, non-tender, non-distended, no guarding, no rebound tenderness  Musculoskeletal: Generalized muscle atrophy, normal muscle tone  Skin: no rashes, no chronic venous stasis  Lymph: no lymphadenopathy.  Neuro: cranial nerves II-XII intact, strength in upper extremities normal, right lower extremity foot drop, reflexes normal, coordination normal.    Data   Data reviewed today: I reviewed all medications, new labs and imaging results over the last 24 hours. I personally reviewed the chest x-ray image(s) showing Clear lung fields bilaterally.    Recent Labs   Lab 09/13/20  0336   WBC 6.5   HGB 12.0   *         POTASSIUM 3.1*   CHLORIDE 103   CO2 29   BUN 25   CR 0.82   ANIONGAP 5   DANIEL 8.9   *         Recent Results (from the past 24 hour(s))   XR Pelvis and Hip Left 2 Views     Narrative    EXAM: XR PELVIS AND HIP LEFT 2 VIEWS  LOCATION: [facility]  DATE/TIME: 9/13/2020 3:43 AM    INDICATION: Fall with pain  COMPARISON: None.      Impression    IMPRESSION: Allowing for the marked osteopenia there is a subtle linear lucency along the superior lateral aspect of the left superior pubic ramus adjacent to the acetabulum cyst just above the nondisplaced fracture. Additionally near the medial aspect   of the left inferior pubic ramus there is irregularity to the cortex most compatible with fracture. Degenerative changes at intact symphysis pubis. Remainder the pelvis intact. Minimal degenerative changes both hips. Marked degenerative changes in the   lumbar spine.   XR Chest 1 View    Narrative    EXAM: XR CHEST 1 VW  LOCATION: [facility]  DATE/TIME: 9/13/2020 3:43 AM    INDICATION: Fall with left shoulder pain  COMPARISON: 11/22/2019      Impression    IMPRESSION: Minimal cardiac enlargement. Normal pulmonary vascularity. No focal infiltrate or consolidation. No pneumothorax. Tortuous and calcified thoracic aorta. Moderate marked degenerative changes in the spine. Marked degenerative changes both   shoulders, greater on the right.       CT Pelvis Bone wo Contrast    Narrative    EXAM: CT PELVIS BONE WITHOUT CONTRAST  [facility]  DATE/TIME: 09/13/2020, 4:33 AM    INDICATION: Fall with left hip and groin pain. Evaluate for fracture.  COMPARISON: None.  TECHNIQUE: CT scan of the pelvis was performed without IV contrast. Multiplanar reformats were obtained. Dose reduction techniques were used.  CONTRAST: None.    FINDINGS:    OSSEOUS STRUCTURES/JOINT SPACE: Acute oblique fracture involving the far lateral aspect of the left superior pubic ramus at its junction with the anterior wall of the acetabulum. No evidence for significant displacement or angulation. Oblique and very   minimally displaced fracture involving the middle to anterior third of the  left inferior pubic ramus. No evidence for entrance into an intact symphysis pubis. No other superior or inferior pubic rami fractures bilaterally. No evidence for acute displaced   femoral fracture. No hip dislocation. Oblique nondisplaced fracture involving the left sacral ala inferiorly, distal lateral aspect of the left S1 and S2 neural foramina. No evidence for significant entrance into an intact left SI joint. Minimal to   moderate degenerative changes both SI joints. No other fractures evident. Marked degenerative disc disease lower lumbar spine. Marked lower lumbar facet arthropathy.    MUSCLES/SOFT TISSUES/INTRAPELVIC FINDINGS: Partially visualized left lower quadrant ostomy. No bowel dilatation. Colonic diverticulosis without diverticulitis. Appendix unremarkable. Atherosclerotic vascular calcification. No significant subcutaneous or   intramuscular hematoma.      Impression    IMPRESSION:  1.  Acute oblique fracture involving the far lateral aspect of the left superior pubic ramus at its junction with the anterior wall of the acetabulum. This accounts for the finding on the recent pelvic radiograph. No evidence for significant displacement   or angulation of this fracture. Additionally, there is an oblique fracture involving the middle third to anterior third of the left inferior pubic ramus.    2.  Oblique nondisplaced fracture left sacral ala without evidence for entrance into the left S1 and S2 neural foramina.    3.  No other fractures evident.    4.  No significant intramuscular or subcutaneous hematoma.    5.  Remainder of detailed findings as above.

## 2020-09-13 NOTE — CONSULTS
[facility] Orthopedic Consultation    Pam Pierce MRN# [id number]   Age: 89 year old YOB: 1930     Date of Admission:  9/13/2020    Reason for consult: Left superior/inferior pubic rami and sacral fractures       Requesting physician: Jon       Level of consult: One-time consult to assist in determining a diagnosis, recommend an appropriate treatment plan and place orders           Assessment and Plan:   Assessment:   Left superior/inferior pubic rami and sacral fractures, closed, minimally displaced      Plan:   Ambulate with walker and may progress with WBAT, no acute surgical intervention. Discharge disposition depending on mobility. Follow-up ortho in 2 weeks for eval and repeat Xrays.            Chief Complaint:   Fall with left hip pain       History is obtained from the patient         History of Present Illness:   This patient is a 89 year old female who presents with the following condition requiring a hospital admission:      Hip pain:    Patient fell yesterday when not using her cane. She landed on her left side and was unable to get up because of the pain. She was brought to the ER and evaluated. CT/Xray revealed pelvic fractures. She was admitted to the hospital. She has hx of rectal cancer and RLE radiculopathy.              Past Medical History:     Past Medical History:   Diagnosis Date     Actinic keratosis      Basal cell carcinoma      Other and unspecified hyperlipidemia      Unspecified asthma(493.90)      Unspecified essential hypertension      Urethral stricture due to unspecified infection     ureteral stone             Past Surgical History:     Past Surgical History:   Procedure Laterality Date     ESOPHAGOSCOPY, GASTROSCOPY, DUODENOSCOPY (EGD), COMBINED N/A 2/20/2020    Procedure: ESOPHAGOGASTRODUODENOSCOPY, WITH BIOPSY;  Surgeon: Harrison Brown MD;  Location: [facility]     SURGICAL HISTORY OF -       open reduction of second metatarsal neck fx with  internal fixation  cna d closed treatment of metatarsal to 2 and 4 fx     SURGICAL HISTORY OF -       hysterectomy and oophorectomy     SURGICAL HISTORY OF -       lithotripsy             Social History:     Social History     Tobacco Use     Smoking status: Never Smoker     Smokeless tobacco: Never Used   Substance Use Topics     Alcohol use: Yes     Frequency: Monthly or less     Comment: wine ocass             Family History:     Family History   Problem Relation Age of Onset     Cancer - colorectal Mother      Breast Cancer Mother      C.A.D. Father      Cardiovascular Father      Alcohol/Drug Father      Chronic Obstructive Pulmonary Disease Brother      Cancer Son         chest cancer (Thymus)/myesthenia gravis     Musculoskeletal Disorder Son         myesthenia gravis     Cerebrovascular Disease Sister      Chronic Obstructive Pulmonary Disease Brother      Family history reviewed and updated in EPIC and reviewed          Immunizations:             Allergies:     Allergies   Allergen Reactions     Flu Virus Vaccine Other (See Comments)     With egg base             Medications:     Medications Prior to Admission   Medication Sig Dispense Refill Last Dose     acetaminophen (TYLENOL) 500 MG tablet Take 500-1,000 mg by mouth daily as needed for mild pain   9/12/2020 at 2100     albuterol (PROAIR HFA) 108 (90 Base) MCG/ACT inhaler Inhale 2 puffs into the lungs every 4 hours as needed 1 Inhaler 11 9/12/2020 at 2100     aspirin 81 MG EC tablet Take 81 mg by mouth daily   9/12/2020 at 0800     atenolol (TENORMIN) 25 MG tablet Take 1 tablet (25 mg) by mouth daily 90 tablet 3 9/12/2020 at 0800     CALCIUM + D OR Take 1 tablet by mouth daily    9/12/2020 at 0800     capecitabine (XELODA) 500 MG tablet Take 1 gram po bid. 7 days on, 7 days off. 56 tablet 0 9/12/2020 at 2100     chlorthalidone (HYGROTON) 25 MG tablet TAKE ONE TABLET BY MOUTH ONCE DAILY 90 tablet 3 9/12/2020 at 0800     dicyclomine (BENTYL) 10 MG capsule  Take 1 capsule (10 mg) by mouth 4 times daily as needed (abdominal pain/cramping) Please fill at patient's request 120 capsule 3 9/12/2020 at 2100     FLOVENT  MCG/ACT inhaler INHALE TWO PUFFS BY MOUTH TWICE A DAY 12 g 11 9/12/2020 at 2100     gabapentin (NEURONTIN) 100 MG capsule Increase gabapentin to 200 mg q hs, and 100 mg po bid. 120 capsule 1 9/12/2020 at 2100     magnesium oxide (MAG-OX) 400 MG tablet Take 400 mg by mouth every evening    9/12/2020 at 2100     melatonin 3 MG tablet Take 3 mg by mouth At Bedtime Also 1 tab overnight as needed   9/12/2020 at 2100     MULTI-DAY VITAMINS OR Take 1 tablet by mouth daily    9/12/2020 at 0800     oxyCODONE (ROXICODONE) 5 MG tablet Take 0.5 tablets (2.5 mg) by mouth every 3 hours as needed for moderate to severe pain Higher doses appear to have caused dizziness. 60 tablet 0 9/12/2020 at 2100     pantoprazole (PROTONIX) 40 MG EC tablet TAKE ONE TABLET BY MOUTH EVERY MORNING BEFORE BREAKFAST 90 tablet 1 9/12/2020 at 0800     polyethylene glycol (MIRALAX) powder Take 0.5 capfuls by mouth daily 8.5 grams daily in the morning    9/12/2020 at 0800     potassium chloride ER (KLOR-CON M) 10 MEQ CR tablet Take 1 tablet (10 mEq) by mouth 2 times daily 60 tablet 1 9/12/2020 at 2100     Probiotic Product (PROBIOTIC DAILY PO) Take 1 capsule by mouth daily    9/12/2020 at 2100     simvastatin (ZOCOR) 40 MG tablet TAKE ONE TABLET BY MOUTH AT BEDTIME 90 tablet 3 9/12/2020 at 2100     ondansetron 4 MG PO ODT tab Take 1 tablet (4 mg) by mouth every 6 hours as needed for nausea or vomiting 30 tablet 1 More than a month at Unknown time             Review of Systems:   The Review of Systems is negative other than noted in the HPI          Physical Exam:   Temp: 98.2  F (36.8  C) Temp src: Oral BP: (!) 145/84 Pulse: 71   Resp: 18 SpO2: 96 % O2 Device: None (Room air)    All vitals have been reviewed  Musculoskeletal:   No pain with log roll of the left hip. pain with attempted SLR.  EHL/DF/PF intact.  no lower extremity pitting edema present  there is no redness, warmth, or swelling of the joints  tone is normal             Data:   All laboratory data reviewed  I reviewed the imaging and agree with the radiologist's report which is filed.  Non displaced left superior/inferior pubic rami fxs and left sacral ala fx     Attestation:  Care coordination / counseling time: 30 minutes    Emery Pandya DO

## 2020-09-13 NOTE — PROGRESS NOTES
Discharge Planner PT   Patient plan for discharge: Home  Current status: Pt transfers sit <> supine with min A of 1 to move LE, Max A of 2 to scoot in bed, sit <> stand with mod A of 1. Unable to amb due to the pain. Pivot transfer bed<> commode with min/CGA of 1.    Barriers to return to prior living situation: incr need for assistance, stairs  Recommendations for discharge: TCU  Rationale for recommendations: poor mobility       Entered by: Opal Carranza 09/13/2020 12:26 PM     Physical Therapy Evaluation       09/13/20 1000   Quick Adds   Type of Visit Initial PT Evaluation   Living Environment   Lives With spouse   Living Arrangements house   Home Accessibility stairs to enter home;stairs within home   Number of Stairs, Main Entrance 2   Stair Railings, Main Entrance railing on right side (ascending)   Number of Stairs, Within Home, Primary 10   Stair Railings, Within Home, Primary railing on right side (ascending)   Transportation Anticipated family or friend will provide   Functional Level Prior   Ambulation 1-->assistive equipment   Transferring 1-->assistive equipment   Toileting 0-->independent   Bathing 0-->independent   Communication 0-->understands/communicates without difficulty   Swallowing 0-->swallows foods/liquids without difficulty   Cognition 0 - no cognition issues reported   Fall history within last six months yes   Number of times patient has fallen within last six months 2   Which of the above functional risks had a recent onset or change? ambulation;transferring   General Information   Onset of Illness/Injury or Date of Surgery - Date 09/13/20   Referring Physician Dr Lan   Patient/Family Goals Statement wants to go home to avoid COVID in a TCU   Pertinent History of Current Problem (include personal factors and/or comorbidities that impact the POC) Pt went to the garage without her cane and fell and sustained a L pelvic fx   Precautions/Limitations fall precautions   Weight-Bearing Status -  "LLE weight-bearing as tolerated   Weight-Bearing Status - RLE weight-bearing as tolerated   General Observations movement is painful and effortful   Cognitive Status Examination   Orientation orientation to person, place and time   Level of Consciousness alert   Follows Commands and Answers Questions 100% of the time   Personal Safety and Judgment intact   Pain Assessment   Patient Currently in Pain Yes, see Vital Sign flowsheet  (L pelvic pain rated at 4/10 at rest, 10/10 with mvmt)   Posture    Posture Forward head position   Range of Motion (ROM)   ROM Comment WFL for age   Strength   Strength Comments NT due to pain but appears WFL B LE   Gait   Gait Comments Pt transfers sit <> supine with min A of 1 to move LE, Max A of 2 to scoot in bed, sit <> stand with mod A of 1. Unable to amb due to the pain. Pivot transfer bed<> commode with min/CGA of 1.    Balance   Balance Comments good with RW   General Therapy Interventions   Planned Therapy Interventions bed mobility training;gait training;strengthening;transfer training   Clinical Impression   Criteria for Skilled Therapeutic Intervention yes, treatment indicated   PT Diagnosis L pelvic fx   Influenced by the following impairments pain and weakness   Functional limitations due to impairments mobility and gait   Clinical Presentation Stable/Uncomplicated   Clinical Presentation Rationale clinical judgement   Clinical Decision Making (Complexity) Low complexity   Therapy Frequency Daily   Predicted Duration of Therapy Intervention (days/wks) 3 days   Anticipated Discharge Disposition Transitional Care Facility   Risk & Benefits of therapy have been explained Yes   Patient, Family & other staff in agreement with plan of care Yes   [facility] [employer] TM \"6 Clicks\"   2016, Trustees of [facility], under license to [employer].  All rights reserved.   6 Clicks Short Forms Basic Mobility Inpatient Short Form   [facility] AM-PAC  \"6 Clicks\" V.2 Basic " Mobility Inpatient Short Form   1. Turning from your back to your side while in a flat bed without using bedrails? 1 - Total   2. Moving from lying on your back to sitting on the side of a flat bed without using bedrails? 3 - A Little   3. Moving to and from a bed to a chair (including a wheelchair)? 2 - A Lot   4. Standing up from a chair using your arms (e.g., wheelchair, or bedside chair)? 2 - A Lot   5. To walk in hospital room? 1 - Total   6. Climbing 3-5 steps with a railing? 1 - Total   Basic Mobility Raw Score (Score out of 24.Lower scores equate to lower levels of function) 10   Total Evaluation Time   Total Evaluation Time (Minutes) 15     See Care Plan for Goals.    Opal Carranza PT

## 2020-09-14 ENCOUNTER — APPOINTMENT (OUTPATIENT)
Dept: PHYSICAL THERAPY | Facility: CLINIC | Age: 85
End: 2020-09-14
Payer: MEDICARE

## 2020-09-14 ENCOUNTER — TELEPHONE (OUTPATIENT)
Dept: PALLIATIVE MEDICINE | Facility: CLINIC | Age: 85
End: 2020-09-14

## 2020-09-14 VITALS
BODY MASS INDEX: 21.77 KG/M2 | SYSTOLIC BLOOD PRESSURE: 110 MMHG | OXYGEN SATURATION: 95 % | TEMPERATURE: 98.2 F | HEART RATE: 68 BPM | DIASTOLIC BLOOD PRESSURE: 68 MMHG | HEIGHT: 59 IN | RESPIRATION RATE: 18 BRPM | WEIGHT: 108 LBS

## 2020-09-14 LAB
ANION GAP SERPL CALCULATED.3IONS-SCNC: 4 MMOL/L (ref 3–14)
BUN SERPL-MCNC: 17 MG/DL (ref 7–30)
CALCIUM SERPL-MCNC: 8.8 MG/DL (ref 8.5–10.1)
CHLORIDE SERPL-SCNC: 104 MMOL/L (ref 94–109)
CO2 SERPL-SCNC: 32 MMOL/L (ref 20–32)
CREAT SERPL-MCNC: 0.73 MG/DL (ref 0.52–1.04)
ERYTHROCYTE [DISTWIDTH] IN BLOOD BY AUTOMATED COUNT: 14.1 % (ref 10–15)
GFR SERPL CREATININE-BSD FRML MDRD: 72 ML/MIN/{1.73_M2}
GLUCOSE SERPL-MCNC: 114 MG/DL (ref 70–99)
HCT VFR BLD AUTO: 32.9 % (ref 35–47)
HGB BLD-MCNC: 11.1 G/DL (ref 11.7–15.7)
MCH RBC QN AUTO: 34.6 PG (ref 26.5–33)
MCHC RBC AUTO-ENTMCNC: 33.7 G/DL (ref 31.5–36.5)
MCV RBC AUTO: 103 FL (ref 78–100)
PLATELET # BLD AUTO: 179 10E9/L (ref 150–450)
POTASSIUM SERPL-SCNC: 3 MMOL/L (ref 3.4–5.3)
RBC # BLD AUTO: 3.21 10E12/L (ref 3.8–5.2)
SODIUM SERPL-SCNC: 140 MMOL/L (ref 133–144)
WBC # BLD AUTO: 7.4 10E9/L (ref 4–11)

## 2020-09-14 PROCEDURE — 85027 COMPLETE CBC AUTOMATED: CPT | Mod: ET | Performed by: INTERNAL MEDICINE

## 2020-09-14 PROCEDURE — 80048 BASIC METABOLIC PNL TOTAL CA: CPT | Performed by: INTERNAL MEDICINE

## 2020-09-14 PROCEDURE — 25000132 ZZH RX MED GY IP 250 OP 250 PS 637: Mod: GY | Performed by: FAMILY MEDICINE

## 2020-09-14 PROCEDURE — 25000132 ZZH RX MED GY IP 250 OP 250 PS 637: Mod: GY,ET | Performed by: INTERNAL MEDICINE

## 2020-09-14 PROCEDURE — 25000132 ZZH RX MED GY IP 250 OP 250 PS 637: Mod: GY | Performed by: EMERGENCY MEDICINE

## 2020-09-14 PROCEDURE — 36415 COLL VENOUS BLD VENIPUNCTURE: CPT | Performed by: INTERNAL MEDICINE

## 2020-09-14 PROCEDURE — 99217 ZZC OBSERVATION CARE DISCHARGE: CPT | Performed by: FAMILY MEDICINE

## 2020-09-14 PROCEDURE — G0378 HOSPITAL OBSERVATION PER HR: HCPCS | Mod: ET

## 2020-09-14 PROCEDURE — 97530 THERAPEUTIC ACTIVITIES: CPT | Mod: GP | Performed by: PHYSICAL THERAPIST

## 2020-09-14 PROCEDURE — 97110 THERAPEUTIC EXERCISES: CPT | Mod: GP,ET | Performed by: PHYSICAL THERAPIST

## 2020-09-14 RX ORDER — OXYCODONE HYDROCHLORIDE 5 MG/1
2.5-5 TABLET ORAL EVERY 4 HOURS PRN
Qty: 60 TABLET | Refills: 0 | Status: SHIPPED | OUTPATIENT
Start: 2020-09-14 | End: 2020-09-15

## 2020-09-14 RX ORDER — POTASSIUM CHLORIDE 1500 MG/1
20-40 TABLET, EXTENDED RELEASE ORAL
Status: DISCONTINUED | OUTPATIENT
Start: 2020-09-14 | End: 2020-09-14 | Stop reason: HOSPADM

## 2020-09-14 RX ORDER — POTASSIUM CL/LIDO/0.9 % NACL 10MEQ/0.1L
10 INTRAVENOUS SOLUTION, PIGGYBACK (ML) INTRAVENOUS
Status: DISCONTINUED | OUTPATIENT
Start: 2020-09-14 | End: 2020-09-14 | Stop reason: HOSPADM

## 2020-09-14 RX ORDER — POTASSIUM CHLORIDE 29.8 MG/ML
20 INJECTION INTRAVENOUS
Status: DISCONTINUED | OUTPATIENT
Start: 2020-09-14 | End: 2020-09-14 | Stop reason: CLARIF

## 2020-09-14 RX ORDER — ACETAMINOPHEN 325 MG/1
650 TABLET ORAL EVERY 4 HOURS PRN
DISCHARGE
Start: 2020-09-14

## 2020-09-14 RX ORDER — POTASSIUM CHLORIDE 1500 MG/1
40 TABLET, EXTENDED RELEASE ORAL 2 TIMES DAILY
DISCHARGE
Start: 2020-09-14 | End: 2020-11-02

## 2020-09-14 RX ORDER — POTASSIUM CHLORIDE 1.5 G/1.58G
20-40 POWDER, FOR SOLUTION ORAL
Status: DISCONTINUED | OUTPATIENT
Start: 2020-09-14 | End: 2020-09-14 | Stop reason: HOSPADM

## 2020-09-14 RX ORDER — POTASSIUM CHLORIDE 7.45 MG/ML
10 INJECTION INTRAVENOUS
Status: DISCONTINUED | OUTPATIENT
Start: 2020-09-14 | End: 2020-09-14 | Stop reason: HOSPADM

## 2020-09-14 RX ADMIN — OXYCODONE HYDROCHLORIDE 2.5 MG: 5 TABLET ORAL at 12:17

## 2020-09-14 RX ADMIN — OXYCODONE HYDROCHLORIDE 2.5 MG: 5 TABLET ORAL at 08:47

## 2020-09-14 RX ADMIN — POLYETHYLENE GLYCOL 3350 8.5 G: 17 POWDER, FOR SOLUTION ORAL at 08:47

## 2020-09-14 RX ADMIN — CHLORTHALIDONE 25 MG: 25 TABLET ORAL at 08:47

## 2020-09-14 RX ADMIN — POTASSIUM CHLORIDE 40 MEQ: 20 TABLET, EXTENDED RELEASE ORAL at 12:55

## 2020-09-14 RX ADMIN — POTASSIUM CHLORIDE 20 MEQ: 20 TABLET, EXTENDED RELEASE ORAL at 15:21

## 2020-09-14 RX ADMIN — ATENOLOL 25 MG: 25 TABLET ORAL at 08:47

## 2020-09-14 RX ADMIN — POTASSIUM CHLORIDE 10 MEQ: 750 TABLET, FILM COATED, EXTENDED RELEASE ORAL at 10:59

## 2020-09-14 RX ADMIN — OXYCODONE HYDROCHLORIDE 2.5 MG: 5 TABLET ORAL at 02:10

## 2020-09-14 RX ADMIN — GABAPENTIN 100 MG: 100 CAPSULE ORAL at 14:22

## 2020-09-14 RX ADMIN — GABAPENTIN 100 MG: 100 CAPSULE ORAL at 08:47

## 2020-09-14 RX ADMIN — OXYCODONE HYDROCHLORIDE 2.5 MG: 5 TABLET ORAL at 14:22

## 2020-09-14 RX ADMIN — PANTOPRAZOLE SODIUM 40 MG: 20 TABLET, DELAYED RELEASE ORAL at 08:47

## 2020-09-14 RX ADMIN — ACETAMINOPHEN 650 MG: 325 TABLET, FILM COATED ORAL at 12:17

## 2020-09-14 NOTE — PROGRESS NOTES
[facility] DISCHARGE NOTE    Patient discharged to transitional care unit at 3:37 PM via wheel chair. Accompanied by son and daughter and staff. Discharge instructions reviewed with patient and daughter, opportunity offered to ask questions. Report was called to [facility]. Prescriptions sent with patient to fill . All belongings sent with patient.    Siria Kaba RN

## 2020-09-14 NOTE — PLAN OF CARE
"Nursing Assessment:   Patient is alert and oriented x4.  She slept throughout night only waking to use the restroom.  Vss on room air.  Very painful transfers from bed<>bsc with max ao2.  2.5mg oxycodone given for lower back and pelvic pain.  Continent or urine and manages own colostomy bag-medium output tonight.   Alarms on.   Vital signs:  Temp: 98.5  F (36.9  C) Temp src: Oral BP: 137/71 Pulse: 67   Resp: 20 SpO2: 97 % O2 Device: None (Room air)   Height: 149.9 cm (4' 11\") Weight: 49 kg (108 lb)  Estimated body mass index is 21.81 kg/m  as calculated from the following:    Height as of this encounter: 1.499 m (4' 11\").    Weight as of this encounter: 49 kg (108 lb).     Frannie Swan RN on 9/14/2020 at 3:57 AM  '  "

## 2020-09-14 NOTE — PLAN OF CARE
Discharge Planner PT   Patient plan for discharge: TCU    Current status: Pt reports her pain is controlled at rest; continues to be high with WB. Pt required min. Assistance of one w/ bed mobility. /  Pt transferred bed> chair w/ RW and Minimal/ mod assistance of one; difficult at the end to take steps backward to sit safely.  Pivoting only; unable to tolerate WB on the Left  to initiate steps       Barriers to return to prior living situation: increased need for assistance, stairs    Recommendations for discharge: TCU    Rationale for recommendations: Poor mobility status, level of assistance        Entered by: Kelly Richter 09/14/2020 9:33 AM

## 2020-09-14 NOTE — TELEPHONE ENCOUNTER
Call to Pt's daughter Karine.  No answer.    Pt is in hospital due to fall and pelvis/sacral fracture, Pt has Appt with pain management on 9/16/20 for an injection, Dora called to discuss with daren if Pt will be able to make Appt and asked that if pt is unable to make Appt that they cancel.      Pt's daughter to call back and cancel Appt if Unable to attend.    Manohar Galaviz, RN  Care Coordinator   [facility] [place]

## 2020-09-14 NOTE — DISCHARGE SUMMARY
[facility] Discharge Summary    Pam Pierce MRN# [id number]   Age: 89 year old YOB: 1930     Date of Admission:  9/13/2020  Date of Discharge::  9/14/2020  Admitting Physician:  Jake Pool MD  Discharge Physician:  Jona Crowley MD, MD             Admission Diagnoses:   Rectal cancer (H) [C20]  Essential hypertension [I10]  Falls, initial encounter [W19.XXXA]  Closed fracture of multiple rami of left pubis, initial encounter (H) [S32.592A]          Principle Discharge Diagnosis:       Fractures of left superior and inferior pubic rami  Fracture of left sacral hoa  Physical deconditioning  Gait instability    See hospital course for further active diagnoses addressed during this admission.            Procedures:   No procedures performed during this admission          Medications Prior to Admission:     Medications Prior to Admission   Medication Sig Dispense Refill Last Dose     acetaminophen (TYLENOL) 500 MG tablet Take 500-1,000 mg by mouth daily as needed for mild pain   9/12/2020 at 2100     albuterol (PROAIR HFA) 108 (90 Base) MCG/ACT inhaler Inhale 2 puffs into the lungs every 4 hours as needed 1 Inhaler 11 9/12/2020 at 2100     aspirin 81 MG EC tablet Take 81 mg by mouth daily   9/12/2020 at 0800     atenolol (TENORMIN) 25 MG tablet Take 1 tablet (25 mg) by mouth daily 90 tablet 3 9/12/2020 at 0800     CALCIUM + D OR Take 1 tablet by mouth daily    9/12/2020 at 0800     capecitabine (XELODA) 500 MG tablet Take 1 gram po bid. 7 days on, 7 days off. 56 tablet 0 9/12/2020 at 2100     chlorthalidone (HYGROTON) 25 MG tablet TAKE ONE TABLET BY MOUTH ONCE DAILY 90 tablet 3 9/12/2020 at 0800     dicyclomine (BENTYL) 10 MG capsule Take 1 capsule (10 mg) by mouth 4 times daily as needed (abdominal pain/cramping) Please fill at patient's request 120 capsule 3 9/12/2020 at 2100     FLOVENT  MCG/ACT inhaler INHALE TWO PUFFS BY MOUTH TWICE A DAY 12 g 11 9/12/2020 at 2100      gabapentin (NEURONTIN) 100 MG capsule Increase gabapentin to 200 mg q hs, and 100 mg po bid. 120 capsule 1 9/12/2020 at 2100     magnesium oxide (MAG-OX) 400 MG tablet Take 400 mg by mouth every evening    9/12/2020 at 2100     melatonin 3 MG tablet Take 3 mg by mouth At Bedtime Also 1 tab overnight as needed   9/12/2020 at 2100     MULTI-DAY VITAMINS OR Take 1 tablet by mouth daily    9/12/2020 at 0800     oxyCODONE (ROXICODONE) 5 MG tablet Take 0.5 tablets (2.5 mg) by mouth every 3 hours as needed for moderate to severe pain Higher doses appear to have caused dizziness. 60 tablet 0 9/12/2020 at 2100     pantoprazole (PROTONIX) 40 MG EC tablet TAKE ONE TABLET BY MOUTH EVERY MORNING BEFORE BREAKFAST 90 tablet 1 9/12/2020 at 0800     polyethylene glycol (MIRALAX) powder Take 0.5 capfuls by mouth daily 8.5 grams daily in the morning    9/12/2020 at 0800     potassium chloride ER (KLOR-CON M) 10 MEQ CR tablet Take 1 tablet (10 mEq) by mouth 2 times daily 60 tablet 1 9/12/2020 at 2100     Probiotic Product (PROBIOTIC DAILY PO) Take 1 capsule by mouth daily    9/12/2020 at 2100     simvastatin (ZOCOR) 40 MG tablet TAKE ONE TABLET BY MOUTH AT BEDTIME 90 tablet 3 9/12/2020 at 2100     ondansetron 4 MG PO ODT tab Take 1 tablet (4 mg) by mouth every 6 hours as needed for nausea or vomiting 30 tablet 1 More than a month at Unknown time             Discharge Medications:     Current Discharge Medication List      CONTINUE these medications which have CHANGED    Details   acetaminophen (TYLENOL) 325 MG tablet Take 2 tablets (650 mg) by mouth every 4 hours as needed for mild pain  Qty:      Associated Diagnoses: Closed fracture of pubic ramus, unspecified laterality, initial encounter (H)      oxyCODONE (ROXICODONE) 5 MG tablet Take 0.5-1 tablets (2.5-5 mg) by mouth every 4 hours as needed for moderate to severe pain (2.5 mg for pain 1-6 with activity, 5 mg for pain 7-10 with activity) Higher doses appear to have caused  dizziness.  Qty: 60 tablet, Refills: 0    Associated Diagnoses: Adenocarcinoma, colon (H); Rectal cancer (H)      potassium chloride ER (KLOR-CON M) 20 MEQ CR tablet Take 2 tablets (40 mEq) by mouth 2 times daily    Associated Diagnoses: Hypokalemia         CONTINUE these medications which have NOT CHANGED    Details   albuterol (PROAIR HFA) 108 (90 Base) MCG/ACT inhaler Inhale 2 puffs into the lungs every 4 hours as needed  Qty: 1 Inhaler, Refills: 11    Associated Diagnoses: Mild intermittent asthma without complication      aspirin 81 MG EC tablet Take 81 mg by mouth daily      atenolol (TENORMIN) 25 MG tablet Take 1 tablet (25 mg) by mouth daily  Qty: 90 tablet, Refills: 3    Associated Diagnoses: Benign essential hypertension      CALCIUM + D OR Take 1 tablet by mouth daily       chlorthalidone (HYGROTON) 25 MG tablet TAKE ONE TABLET BY MOUTH ONCE DAILY  Qty: 90 tablet, Refills: 3    Associated Diagnoses: Benign essential hypertension      dicyclomine (BENTYL) 10 MG capsule Take 1 capsule (10 mg) by mouth 4 times daily as needed (abdominal pain/cramping) Please fill at patient's request  Qty: 120 capsule, Refills: 3      FLOVENT  MCG/ACT inhaler INHALE TWO PUFFS BY MOUTH TWICE A DAY  Qty: 12 g, Refills: 11    Associated Diagnoses: Mild intermittent asthma without complication      gabapentin (NEURONTIN) 100 MG capsule Increase gabapentin to 200 mg q hs, and 100 mg po bid.  Qty: 120 capsule, Refills: 1    Associated Diagnoses: Rectal cancer (H); Neuropathy      magnesium oxide (MAG-OX) 400 MG tablet Take 400 mg by mouth every evening       melatonin 3 MG tablet Take 3 mg by mouth At Bedtime Also 1 tab overnight as needed      MULTI-DAY VITAMINS OR Take 1 tablet by mouth daily       pantoprazole (PROTONIX) 40 MG EC tablet TAKE ONE TABLET BY MOUTH EVERY MORNING BEFORE BREAKFAST  Qty: 90 tablet, Refills: 1    Associated Diagnoses: Epigastric pain      polyethylene glycol (MIRALAX) powder Take 0.5 capfuls by  "mouth daily 8.5 grams daily in the morning       Probiotic Product (PROBIOTIC DAILY PO) Take 1 capsule by mouth daily       simvastatin (ZOCOR) 40 MG tablet TAKE ONE TABLET BY MOUTH AT BEDTIME  Qty: 90 tablet, Refills: 3    Associated Diagnoses: Hyperlipidemia LDL goal <100      ondansetron 4 MG PO ODT tab Take 1 tablet (4 mg) by mouth every 6 hours as needed for nausea or vomiting  Qty: 30 tablet, Refills: 1    Associated Diagnoses: Adenocarcinoma, colon (H); Nausea; Rectal cancer (H)         STOP taking these medications       capecitabine (XELODA) 500 MG tablet Comments:   Reason for Stopping:                       Brief History of Illness:     From Admission H+P:   Pam Pierce is a 89 year old female who who presents to the emergency department with left hip pain after a fall.  Patient states she got up in the middle the night to change her colostomy bag.  She was ambulating without the use of her cane.  Patient tripped in the front hallway and landed on her left hip.  She immediately had severe left pain and was unable to stand.  She was able to call her , but due to dementia he had difficulty in helping her call for an ambulance.  Patient states that she did not hit her head when she fell and denied loss of consciousness.  Patient denies any recent illnesses or sick contacts.  She denies any fevers or chills, headache, nasal congestion, runny nose, sore throat, or cough.  Patient denies any nausea vomiting or diarrhea.  She denies any dysuria or hematuria.  She has baseline right lower extremity weakness and numbness, but no acute numbness or weakness.               TODAY:     Subjective:  Doing well, minimal pain at rest, but still \"quite bad\" with any movement.  Pain medications help, but not as much as she'd like.  No fever or chills.  No grogginess or confusion with 2.5 mg oxycodone, would like to try a higher dosage.    No other pain.  .   ROS:   ROS: 10 point ROS neg other than the symptoms " "noted above in the HPI.   /68   Pulse 68   Temp 98.2  F (36.8  C) (Oral)   Resp 18   Ht 1.499 m (4' 11\")   Wt 49 kg (108 lb)   SpO2 95%   BMI 21.81 kg/m     ,EXAM:  General: awake and alert, NAD, oriented x 3  Head: normocephalic  Neck: unremarkable, no lymphadenopathy   HEENT: oropharynx pink and moist    Heart: Regular rate and rhythm, no murmurs, rubs, or gallops  Lungs: clear to auscultation bilaterally with good air movement throughout  Abdomen: soft, non-tender, no masses or organomegaly  Extremities: no edema in lower extremities   Skin unremarkable.            Hospital Course:       Pam Pierce is a 89 year old female admitted on 9/13/2020. She presents after a fall at home with fractures of the left superior and inferior pubic rami.     Fractures of left superior and inferior pubic rami  Fracture of left sacral hoa  Physical deconditioning  Gait instability    Pelvic CT demonstrates an acute oblique fracture involving the far lateral aspect of the left superior pubic ramus at its junction with the anterior wall of the acetabulum. No evidence for significant displacement or angulation. Oblique and very minimally displaced fracture involving the middle to anterior third of the left inferior pubic ramus. No evidence for entrance into an intact symphysis pubis. No other superior or inferior pubic rami fractures bilaterally. No evidence for acute displaced femoral fracture. No hip dislocation. Oblique nondisplaced fracture involving the left sacral ala inferiorly, distal lateral aspect of the left S1 and S2 neural foramina. No evidence for significant entrance into an intact left SI joint. Minimal to moderate degenerative changes both SI joints. No other fractures evident. Marked degenerative disc disease lower lumbar spine. Marked lower lumbar facet arthropathy.  -Orthopedic surgery consult to evaluate for need for surgery.  -Pain control with acetaminophen, oxycodone, and IV " Dilaudid.  -Occupational and physical therapy consults.  -pain good at rest but still lots of pain with activity.  Has tolerated 2.5 mg oxycodone with no sleepiness, would like to increase- ordered as 2.5-5mg depending on level of pain with activity (not at rest).  Plan for discharge to TCU due to significant pain with mobilization.      Hypokalemia  3 today - takes supplement at home but still running low - will replace today per protocol, increase potassium to 40 meq daily from 20 meq daily prior to admission on discharge, recheck potassium within 2-3 days at TCU and reassess potassium supplement dosage at that time.       Mild intermittent asthma  No evidence for acute exacerbation.   Continue prior to admission Flovent and as needed albuterol.     GERD  Continue prior to admission pantoprazole.     Hyperlipidemia  Continue prior to admission simvastatin.     Hypertension  Stable - Continue prior to admission atenolol and chlorthalidone.     Rectal cancer  Patient presented in November 2018 with colonic obstruction due to a rectal mass.  The patient underwent a emergency diverting colostomy.  Pathology demonstrated adenocarcinoma.  She has invasion of the superior vagina and posterior margin of the urinary bladder.  Patient was treated with palliative Xeloda and radiation in February 2020.  She takes demeclocycline for radiation proctitis.  Restaging pelvic MRI in June 2020 demonstrated significant interval decrease in size of the residual distal rectal mass.  -Xeloda is currently on hold - recommend outpatient follow-up with Dr. Diggs as per her recommendations - family will contact her today, had appointment tomorrow but likely will reschedule this.  Likely resume xeloda after discharge from TCU.       Lumbar radiculopathy  Lumbar degenerative disc disease  Continue prior to admission gabapentin.  The patient has low back pain with right lower extremity weakness and numbness.  She has been evaluated in sports  medicine and was to have a lumbar EFRAIN on 9/16 - recommend proceeding with this if able as it may help her recovery from the pelvic fracture, otherwise reschedule to 2-3 weeks later.        Diet: Regular Diet Adult    DVT Prophylaxis: Pneumatic Compression Devices  Clayton Catheter: not present  Code Status: DNR/DNI                  Discharge Instructions and Follow-Up:     Discharge diet: Orders Placed This Encounter      Regular Diet Adult       Discharge activity: Activity as tolerated   Discharge follow-up: Follow-up with provider at TCU within 1 week. Recheck potassium by Wed or Thurs. Reassess potassium supplement at that time (was increased from 20 meq daily prior to admission dose to 40 meq daily on discharge due to hypokalemia). Patient and family to contact Dr. Diggs (oncology) to determine when to follow-up with her. Follow-up for spine injection on Wed as planned.             Discharge Disposition:     Discharged to home      Attestation:  I have reviewed today's vital signs, notes, medications, labs and imaging.  Amount of time performed on this discharge summary: 50 minutes.    Jona Crowley MD, MD

## 2020-09-14 NOTE — PROGRESS NOTES
Name: Pam Pierce    Admit Date and Time: 9/13/2020  3:23 AM    MRN#: [id number]    Reason for Hospitalization: Rectal cancer (H) [C20]  Essential hypertension [I10]  Falls, initial encounter [W19.XXXA]  Closed fracture of multiple rami of left pubis, initial encounter (H) [S32.592A]    Discharge Date: 9/14/2020    Patient / Family response to discharge plan: Pt will [facility]icharge today to [facility] (Admission phone: [phone] Main phone: [phone] Fax: [phone]). Pt and family in agreement with discharge plan.     Care Transitions staff discussed current COVID 19 pandemic and Medicare waiver of the traditional 3 day stay requirement. Referred patient to medicare.gov, insurance provider, and admissions department at accepting facility for further questions or concerns on coverage for SNF stay.    1. This patient has been evacuated from a nursing home in the emergency area? no  2. This patient is being discharged from a hospital (in the emergency area) in order to provide care to more seriously ill patients? yes  3. This patient needs SNF care as a result of the emergency, regardless of whether he/she was in a hospital/nursing home prior to this stay? no          Other Providers (Care Coordinator, County Services, PCA services etc): No    CTS Hand Off Completed: Yes: completed    PAS #: PAS-RR    Per DHS regulation, CTS team completed and submitted PAS-RR to [place] [employer] Direct Connect via the [employer]. CTS team advised SNF and they are aware a PAS-RR has been submitted.     CTS team reviewed with pt or health care agent that they may be contacted for a follow up appointment within 10 days of hospital discharge if SNF stay is <30 days. Contact information for [employer] was also provided.     Pt or health care agent verbalized understanding.     PAS-RR # [id number]      JAIME Score: none    Future Appointments:   Future Appointments   Date Time Provider Department Center    9/15/2020  1:50 PM [facility]   9/15/2020  2:15 PM Tomasa Diggs MD [facility]   9/16/2020 10:45 AM Mitzi Urbina MD [facility]       Discharge Disposition: transitional care unit    Discharge Planner   Discharge Plans in progress: [facility]  Barriers to discharge plan: none  Follow up plan: TCU       Entered by: Deedee Vicente 09/14/2020 12:01 PM           Deedee RAMOS, LICSW, [facility] [phone]

## 2020-09-14 NOTE — PLAN OF CARE
Physical Therapy Discharge Summary    Reason for therapy discharge:    Discharged to transitional care facility.    Progress towards therapy goal(s). See goals on Care Plan in [facility] electronic health record for goal details.  Goals not met.  Barriers to achieving goals:   pain, weakness.    Therapy recommendation(s):    Continued therapy is recommended.  Rationale/Recommendations:  continued PT at TCU to improve pt's ability to transfer,ambulate.

## 2020-09-14 NOTE — PLAN OF CARE
OT: Per discussion with SW safe discharge plan in place and OT eval not needed. Pt's plan is to discharge to TCU possibly today. Will discontinue OT orders.

## 2020-09-14 NOTE — TELEPHONE ENCOUNTER
Call from pts daughter Karine to state they plan to make their appointment on 9/16.    Frannie SANTANA    [facility]

## 2020-09-15 ENCOUNTER — COMMUNICATION - HEALTHEAST (OUTPATIENT)
Dept: GERIATRICS | Facility: CLINIC | Age: 85
End: 2020-09-15

## 2020-09-15 ENCOUNTER — RECORDS - HEALTHEAST (OUTPATIENT)
Dept: LAB | Facility: CLINIC | Age: 85
End: 2020-09-15

## 2020-09-15 ENCOUNTER — VIRTUAL VISIT (OUTPATIENT)
Dept: ONCOLOGY | Facility: CLINIC | Age: 85
End: 2020-09-15
Attending: INTERNAL MEDICINE
Payer: MEDICARE

## 2020-09-15 ENCOUNTER — PATIENT OUTREACH (OUTPATIENT)
Dept: CARE COORDINATION | Facility: CLINIC | Age: 85
End: 2020-09-15

## 2020-09-15 ENCOUNTER — COMMUNICATION - HEALTHEAST (OUTPATIENT)
Dept: SCHEDULING | Facility: CLINIC | Age: 85
End: 2020-09-15

## 2020-09-15 DIAGNOSIS — C18.9 ADENOCARCINOMA, COLON (H): ICD-10-CM

## 2020-09-15 DIAGNOSIS — M79.661 PAIN IN BOTH LOWER LEGS: ICD-10-CM

## 2020-09-15 DIAGNOSIS — C20 RECTAL CANCER (H): ICD-10-CM

## 2020-09-15 DIAGNOSIS — C20 RECTAL CANCER (H): Primary | ICD-10-CM

## 2020-09-15 DIAGNOSIS — G47.00 INSOMNIA, UNSPECIFIED TYPE: ICD-10-CM

## 2020-09-15 DIAGNOSIS — M79.662 PAIN IN BOTH LOWER LEGS: ICD-10-CM

## 2020-09-15 PROCEDURE — 99443 ZZC PHYSICIAN TELEPHONE EVALUATION 21-30 MIN: CPT | Performed by: INTERNAL MEDICINE

## 2020-09-15 PROCEDURE — 40001009 ZZH VIDEO/TELEPHONE VISIT; NO CHARGE

## 2020-09-15 RX ORDER — OXYCODONE HYDROCHLORIDE 5 MG/1
2.5-5 TABLET ORAL EVERY 4 HOURS PRN
Qty: 60 TABLET | Refills: 0 | Status: SHIPPED | OUTPATIENT
Start: 2020-09-15 | End: 2020-11-02 | Stop reason: DRUGHIGH

## 2020-09-15 RX ORDER — OXYCODONE HYDROCHLORIDE 5 MG/1
2.5-5 TABLET ORAL EVERY 4 HOURS PRN
Qty: 60 TABLET | Refills: 0 | Status: CANCELLED | OUTPATIENT
Start: 2020-09-15

## 2020-09-15 RX ORDER — CAPECITABINE 500 MG/1
TABLET, FILM COATED ORAL
Qty: 56 TABLET | Refills: 1 | Status: SHIPPED | OUTPATIENT
Start: 2020-09-15 | End: 2020-12-02

## 2020-09-15 NOTE — PROGRESS NOTES
"Pam Pierce is a 89 year old female who is being evaluated via a billable telephone visit.      The patient has been notified of following:     \"This telephone visit will be conducted via a call between you and your physician/provider. We have found that certain health care needs can be provided without the need for a physical exam.  This service lets us provide the care you need with a short phone conversation.  If a prescription is necessary we can send it directly to your pharmacy.  If lab work is needed we can place an order for that and you can then stop by our lab to have the test done at a later time.    Telephone visits are billed at different rates depending on your insurance coverage. During this emergency period, for some insurers they may be billed the same as an in-person visit.  Please reach out to your insurance provider with any questions.    If during the course of the call the physician/provider feels a telephone visit is not appropriate, you will not be charged for this service.\"    Patient has given verbal consent for Telephone visit?  Yes    What phone number would you like to be contacted at?    [phone]     How would you like to obtain your AVS? Mail a copy    Phone call duration: 7  minutes    Johanny Hunter CMA      "

## 2020-09-15 NOTE — PROGRESS NOTES
Clinic Care Coordination Contact  Care Coordination Transition Communication    Referral Source: IP Handoff    Clinical Data: Patient was hospitalized at [facility] [place], [place] from 9/13 to 9/14 with diagnosis of closed fracture of left pubis sustained from fall at home. Patient has medical hx signifcant for rectal cancer and follows with Dr. Diggs at [facility] (formerly [facility]).    Transition to Facility:              Facility Name: [facility]   Phone: [phone]  Fax: [phone]    Plan: Clinic Care Coordination will review chart to monitor for discharge planning every 3-4 weeks and as needed and outreach to patient once appropriate.     MARY PatelN, RN   [facility]  - Clinic Care Coordinator  Phone: [phone]

## 2020-09-15 NOTE — LETTER
"    9/15/2020         RE: Pam Pierce  [address] [place] [id number]        Dear Colleague,    Thank you for referring your patient, Pam Pierce, to the [facility]. Please see a copy of my visit note below.    Pam Pierce is a 89 year old female who is being evaluated via a billable telephone visit.      The patient has been notified of following:     \"This telephone visit will be conducted via a call between you and your physician/provider. We have found that certain health care needs can be provided without the need for a physical exam.  This service lets us provide the care you need with a short phone conversation.  If a prescription is necessary we can send it directly to your pharmacy.  If lab work is needed we can place an order for that and you can then stop by our lab to have the test done at a later time.    Telephone visits are billed at different rates depending on your insurance coverage. During this emergency period, for some insurers they may be billed the same as an in-person visit.  Please reach out to your insurance provider with any questions.    If during the course of the call the physician/provider feels a telephone visit is not appropriate, you will not be charged for this service.\"    Patient has given verbal consent for Telephone visit?  Yes    What phone number would you like to be contacted at?    [phone]     How would you like to obtain your AVS? Mail a copy    Phone call duration: 7  minutes    Jhoanny Hunter [facility]          ONCOLOGY FOLLOW UP    Tel Visit    PATIENT NAME: Pam Pierce   MRN# [id number]     Date of Visit: Sep 15, 2020     YOB: 1930   REASON FOR VISIT/CC:    At age 89, dx in 11/2019 rectal cancer  S/p Palliative RT  On oral palliative Xeloda now        HISTORY OF ONCOLOGY ILLNESS:    At age 89, she presented with years duration of abdominal pain.      CT 11/2019 found Circumferential rectosigmoid mass causing relative colonic " obstruction with the more proximal colon distended with fecal debris. Colorectal mass extends beyond the serosa of the bowel wall with multiple small probable metastatic mesorectal lymph nodes. No enlarged pelvic sidewall or retroperitoneal lymph nodes. No evidence of metastatic liver disease on the current exam.    She was transferred to [facility], had emergency diverting colostomy bag, and biopsy of the colorectal mass 11/2019.    Rectal biopsy pathology found Adenocarcinoma arising in a tubular adenoma, focal signet ring cell features identified.    Further staging with PET, MRI found advanced T4b-N2 tumor of the low and mid rectum with invasion through the superior vagina and to the posterior margin of the urinary bladder.  Likely enhancing meningioma versus mets on brain MRI.   She made informed decision to proceed with palliative RT for palliation done in 2/2020.     She then made [facility].  She was admitted late Feb 2020 for RT colitis. S/p 3 doses of dex helped her symptoms tremendously. She was discharged on Dicyclomine for cramping and pain four times daily, and  prn oxycodone.    Restaging pelvic MRI 6/2020 found significant interval decrease in size of the mid to distal rectal mass since 1/16/2020 indicating improvement.  Xeloda tx is continued. Dose was reduced to 1 g po bid in Aug due to complains of increasing legs pain.       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   She has pelvic fracture over the weekend.   She is in TCU now.       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        SOCIAL HISTORY:    She lives with her daughter,   fully functional her kids are close by.  Smoking denies alcohol abuse.      FAMILY HISTORY:    FH + for  breast, colon, ovarian, thymic, melanoma.       REVIEW OF SYSTEMS:   She has baseline 8-10 times empty of her colostomy bag on Metamucil. She is also using Dicyclomine (antispasmodic and anticholinergic agent).  Is trying to keep her stool on the liquid side so it is easier for her to clean colostomy bag.    Reports new right leg pain from right hip, pain goes all the way down to foot. Left leg pain from knee down, not as bad. MRI L spine found lots of DJD, she is getting steroid injection.        PHYSICAL EXAMINATION ATTAINABLE DURING TELEPHONE VISIT:  CONTSTITUAIONL: Sounded  pleasant, not in acute distress.   NEURO: Oriented to time, person, and places. Good memory   RESPIRATORY: talk nl, no sob during conversation. No cough.  PSYCHIATRIC: Normal mood and affect. Good memory. Proper insight and judgement.   THE REST OF A COMPREHENSIVE PHYSICIAL EXAM IS DEFERRED DUE TO COVID-19 PUBLIC HEALTH EMERGENCY RELATED PHONE VISIT RESCTRICTION.        CURRENT LAB DATA REVIEWED TODAY TODAY WITH PATIENT DURING THE VISIT:  Cbc diff, cmp are fine.   Cr 1.06  CEA 1.7        CURRENT IMAGING REVIEWED TODAY WITH PATIENT DURING THE VIRTUAL VISIT:  MRI L spine 8/2020 - lots of DJD.     Pelvic MRI 7/2020 - Significant interval decrease in size of the mid to distal rectal mass since 1/16/2020 indicating improvement. The mass is still seen to cause severe luminal stricture and appears to extend through the rectal wall and shows a degree of invasion into the adjacent  vagina and also just contacts the posterior wall of the midline  Bladder.      Pelvic MRI 1/2020 - T4b-N2 tumor of the low and mid rectum with invasion through the superior vagina and to the posterior margin of the urinary bladder.      PET 1/2020  1. Hypermetabolic rectal mass that appears to directly invade into the vagina which is diffusely hypermetabolic. Small perirectal lymph nodes are noted.  2. No evidence of metastatic disease in the head, neck, chest, or abdomen.  No evidence of metastatic disease in the extremities.  3. Significant coronary and aortic atherosclerosis. Ascending thoracic aortic caliber is ectatic at 4.1 cm.  4. Small area of hypermetabolism associated with a nodule in the isthmus of the thyroid. Consider further evaluation with ultrasound.    Brain MRI 1/2020 - 1.8 cm uniformly enhancing extra-axial mass positioned along the olfactory groove, most likely representing a meningioma. Metastatic disease is thought to be less likely         OLD DATA REVIEWED TODAY WITH SUMMARY  CEA at 4.6 in 11/2019      CT 11/2019 found Circumferential rectosigmoid mass causing relative colonic obstruction with the more proximal colon distended with fecal debris. Colorectal mass extends beyond the serosa of the bowel wall with multiple small probable metastatic mesorectal lymph nodes. No enlarged pelvic sidewall or retroperitoneal lymph nodes. No evidence of metastatic liver disease on the current exam.        ASSESSMENT AND PLAN DISCUSSED WITH PATIENT TODAY DURING THE VISIT:    1. Diagnosed rectal cancer November 2019 at age 89.  Had emergent colostomy bag put in, Rectal biopsy pathology found Adenocarcinoma arising in a tubular adenoma, focal signet ring cell features identified.    Further staging with PET, MRI found advanced T4b-N2 disease.   She made informed decision to proceed with palliative RT for palliation done in 2/2020.     She then made informed decision to proceed oral Xeloda for palliation purposes.    Her BSA is around 1.4, her target dose about 1.5 g p.o. twice daily.    Adviced to increase further to 1.5 g bid, 7 days on 7 days off.  This is her target dose, will keep her on this one.     Due to new complains of legs, weakness in 72020, adviced hold xeloda for 2 wks in Aug and dose down to 1 g bid.   She seems to tolerating this dose better.     I recommend patient to creat a Health Care Directive/advanced Directive.    CODE STATUS is discussed, recommend  DNR/DNI.      2. New complains of legs pain, right > left in Aug 2020.  Pelvic MRI found DJD.   She is on local steroid injection.     Advice increase gabapentin to 200 mg q hs and keep the day time dose.      3. Insomnia.  Advice to increase melatonin to 10 mg poq hs.     Phone VISIT time is 25 minutes, spent in dicussion and review patient's cancer  and treatment history, labs and images results, symptom managment, further treatment recommendations, follow up plan.                             Again, thank you for allowing me to participate in the care of your patient.        Sincerely,        Tomasa Diggs MD, MD

## 2020-09-15 NOTE — LETTER
"    9/15/2020         RE: Pam Pierce  [address] [place] [id number]        Dear Colleague,    Thank you for referring your patient, Pam Pierce, to the [facility]. Please see a copy of my visit note below.    Pam Pierce is a 89 year old female who is being evaluated via a billable telephone visit.      The patient has been notified of following:     \"This telephone visit will be conducted via a call between you and your physician/provider. We have found that certain health care needs can be provided without the need for a physical exam.  This service lets us provide the care you need with a short phone conversation.  If a prescription is necessary we can send it directly to your pharmacy.  If lab work is needed we can place an order for that and you can then stop by our lab to have the test done at a later time.    Telephone visits are billed at different rates depending on your insurance coverage. During this emergency period, for some insurers they may be billed the same as an in-person visit.  Please reach out to your insurance provider with any questions.    If during the course of the call the physician/provider feels a telephone visit is not appropriate, you will not be charged for this service.\"    Patient has given verbal consent for Telephone visit?  Yes    What phone number would you like to be contacted at?    [phone]     How would you like to obtain your AVS? Mail a copy    Phone call duration: 7  minutes    Johanny Hunter [facility]          ONCOLOGY FOLLOW UP    Tel Visit    PATIENT NAME: Pam Pierce   MRN# [id number]     Date of Visit: Sep 15, 2020     YOB: 1930   REASON FOR VISIT/CC:    At age 89, dx in 11/2019 rectal cancer  S/p Palliative RT  On oral palliative Xeloda now        HISTORY OF ONCOLOGY ILLNESS:    At age 89, she presented with years duration of abdominal pain.      CT 11/2019 found Circumferential rectosigmoid mass causing relative colonic " obstruction with the more proximal colon distended with fecal debris. Colorectal mass extends beyond the serosa of the bowel wall with multiple small probable metastatic mesorectal lymph nodes. No enlarged pelvic sidewall or retroperitoneal lymph nodes. No evidence of metastatic liver disease on the current exam.    She was transferred to [facility], had emergency diverting colostomy bag, and biopsy of the colorectal mass 11/2019.    Rectal biopsy pathology found Adenocarcinoma arising in a tubular adenoma, focal signet ring cell features identified.    Further staging with PET, MRI found advanced T4b-N2 tumor of the low and mid rectum with invasion through the superior vagina and to the posterior margin of the urinary bladder.  Likely enhancing meningioma versus mets on brain MRI.   She made informed decision to proceed with palliative RT for palliation done in 2/2020.     She then [facility].  She was admitted late Feb 2020 for RT colitis. S/p 3 doses of dex helped her symptoms tremendously. She was discharged on Dicyclomine for cramping and pain four times daily, and  prn oxycodone.    Restaging pelvic MRI 6/2020 found significant interval decrease in size of the mid to distal rectal mass since 1/16/2020 indicating improvement.  Xeloda tx is continued. Dose was reduced to 1 g po bid in Aug due to complains of increasing legs pain.       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   She has pelvic fracture over the weekend.   She is in TCU now.       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        SOCIAL HISTORY:    She lives with her daughter,   fully functional her kids are close by.  Smoking denies alcohol abuse.      FAMILY HISTORY:    FH + for  breast, colon, ovarian, thymic, melanoma.       REVIEW OF SYSTEMS:   She has baseline 8-10 times empty of her colostomy bag on Metamucil. She is also using Dicyclomine (antispasmodic and anticholinergic agent).  Is trying to keep her stool on the liquid side so it is easier for her to clean colostomy bag.    Reports new right leg pain from right hip, pain goes all the way down to foot. Left leg pain from knee down, not as bad. MRI L spine found lots of DJD, she is getting steroid injection.        PHYSICAL EXAMINATION ATTAINABLE DURING TELEPHONE VISIT:  CONTSTITUAIONL: Sounded  pleasant, not in acute distress.   NEURO: Oriented to time, person, and places. Good memory   RESPIRATORY: talk nl, no sob during conversation. No cough.  PSYCHIATRIC: Normal mood and affect. Good memory. Proper insight and judgement.   THE REST OF A COMPREHENSIVE PHYSICIAL EXAM IS DEFERRED DUE TO COVID-19 PUBLIC HEALTH EMERGENCY RELATED PHONE VISIT RESCTRICTION.        CURRENT LAB DATA REVIEWED TODAY TODAY WITH PATIENT DURING THE VISIT:  Cbc diff, cmp are fine.   Cr 1.06  CEA 1.7        CURRENT IMAGING REVIEWED TODAY WITH PATIENT DURING THE VIRTUAL VISIT:  MRI L spine 8/2020 - lots of DJD.     Pelvic MRI 7/2020 - Significant interval decrease in size of the mid to distal rectal mass since 1/16/2020 indicating improvement. The mass is still seen to cause severe luminal stricture and appears to extend through the rectal wall and shows a degree of invasion into the adjacent  vagina and also just contacts the posterior wall of the midline  Bladder.      Pelvic MRI 1/2020 - T4b-N2 tumor of the low and mid rectum with invasion through the superior vagina and to the posterior margin of the urinary bladder.      PET 1/2020  1. Hypermetabolic rectal mass that appears to directly invade into the vagina which is diffusely hypermetabolic. Small perirectal lymph nodes are noted.  2. No evidence of metastatic disease in the head, neck, chest, or abdomen.  No evidence of metastatic disease in the extremities.  3. Significant coronary and aortic atherosclerosis. Ascending thoracic aortic caliber is ectatic at 4.1 cm.  4. Small area of hypermetabolism associated with a nodule in the isthmus of the thyroid. Consider further evaluation with ultrasound.    Brain MRI 1/2020 - 1.8 cm uniformly enhancing extra-axial mass positioned along the olfactory groove, most likely representing a meningioma. Metastatic disease is thought to be less likely         OLD DATA REVIEWED TODAY WITH SUMMARY  CEA at 4.6 in 11/2019      CT 11/2019 found Circumferential rectosigmoid mass causing relative colonic obstruction with the more proximal colon distended with fecal debris. Colorectal mass extends beyond the serosa of the bowel wall with multiple small probable metastatic mesorectal lymph nodes. No enlarged pelvic sidewall or retroperitoneal lymph nodes. No evidence of metastatic liver disease on the current exam.        ASSESSMENT AND PLAN DISCUSSED WITH PATIENT TODAY DURING THE VISIT:    1. Diagnosed rectal cancer November 2019 at age 89.  Had emergent colostomy bag put in, Rectal biopsy pathology found Adenocarcinoma arising in a tubular adenoma, focal signet ring cell features identified.    Further staging with PET, MRI found advanced T4b-N2 disease.   She made informed decision to proceed with palliative RT for palliation done in 2/2020.     She then made informed decision to proceed oral Xeloda for palliation purposes.    Her BSA is around 1.4, her target dose about 1.5 g p.o. twice daily.    Adviced to increase further to 1.5 g bid, 7 days on 7 days off.  This is her target dose, will keep her on this one.     Due to new complains of legs, weakness in 72020, adviced hold xeloda for 2 wks in Aug and dose down to 1 g bid.   She seems to tolerating this dose better.     I recommend patient to creat a Health Care Directive/advanced Directive.    CODE STATUS is discussed, recommend  DNR/DNI.      2. New complains of legs pain, right > left in Aug 2020.  Pelvic MRI found DJD.   She is on local steroid injection.     Advice increase gabapentin to 200 mg q hs and keep the day time dose.      3. Insomnia.  Advice to increase melatonin to 10 mg poq hs.     Phone VISIT time is 25 minutes, spent in dicussion and review patient's cancer  and treatment history, labs and images results, symptom managment, further treatment recommendations, follow up plan.                             Again, thank you for allowing me to participate in the care of your patient.        Sincerely,        Tomasa Diggs MD, MD

## 2020-09-15 NOTE — PROGRESS NOTES
ONCOLOGY FOLLOW UP    Tel Visit    PATIENT NAME: Pam Pierce   MRN# [id number]     Date of Visit: Sep 15, 2020     YOB: 1930   REASON FOR VISIT/CC:    At age 89, dx in 11/2019 rectal cancer  S/p Palliative RT  On oral palliative Xeloda now        HISTORY OF ONCOLOGY ILLNESS:    At age 89, she presented with years duration of abdominal pain.      CT 11/2019 found Circumferential rectosigmoid mass causing relative colonic obstruction with the more proximal colon distended with fecal debris. Colorectal mass extends beyond the serosa of the bowel wall with multiple small probable metastatic mesorectal lymph nodes. No enlarged pelvic sidewall or retroperitoneal lymph nodes. No evidence of metastatic liver disease on the current exam.    She was transferred to [facility], had emergency diverting colostomy bag, and biopsy of the colorectal mass 11/2019.    Rectal biopsy pathology found Adenocarcinoma arising in a tubular adenoma, focal signet ring cell features identified.    Further staging with PET, MRI found advanced T4b-N2 tumor of the low and mid rectum with invasion through the superior vagina and to the posterior margin of the urinary bladder.  Likely enhancing meningioma versus mets on brain MRI.   She made informed decision to proceed with palliative RT for palliation done in 2/2020.     [facility] then [facility].  She was admitted late Feb 2020 for RT colitis. S/p 3 doses of dex helped her symptoms tremendously. She was discharged on Dicyclomine for cramping and pain four times daily, and  prn oxycodone.    Restaging pelvic MRI 6/2020 found significant interval decrease in size of the mid to distal rectal mass since 1/16/2020 indicating improvement.  Xeloda tx is continued. Dose was reduced to 1 g po bid in Aug due to complains of increasing legs pain.       INTERVAL HISTORY  MRI 8/2020 L spine found lots of  DJD.   She has pelvic fracture over the weekend.   She is in TCU now.       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        SOCIAL HISTORY:    She lives with her daughter,   fully functional her kids are close by.  Smoking denies alcohol abuse.      FAMILY HISTORY:    FH + for breast, colon, ovarian, thymic, melanoma.       REVIEW OF SYSTEMS:   She has baseline 8-10 times empty of her colostomy bag on Metamucil. She is also using Dicyclomine (antispasmodic and anticholinergic agent).  Is trying to keep her stool on the liquid side so it is easier for her to clean colostomy bag.    Reports new right leg pain from right hip, pain goes all the way down to foot. Left leg pain from knee down, not as bad. MRI L spine found lots of DJD, she is getting steroid injection.        PHYSICAL EXAMINATION ATTAINABLE DURING TELEPHONE VISIT:  CONTSTITUAIONL: Sounded  pleasant, not in acute distress.   NEURO: Oriented to time, person, and places. Good memory   RESPIRATORY: talk nl, no sob during conversation. No cough.  PSYCHIATRIC: Normal mood and affect. Good memory. Proper insight and judgement.   THE REST OF A COMPREHENSIVE PHYSICIAL EXAM IS DEFERRED DUE TO COVID-19 PUBLIC HEALTH EMERGENCY RELATED PHONE VISIT RESCTRICTION.        CURRENT LAB DATA REVIEWED TODAY TODAY WITH PATIENT DURING THE VISIT:  Cbc diff, cmp are fine.   Cr 1.06  CEA 1.7        CURRENT IMAGING REVIEWED TODAY WITH PATIENT DURING THE VIRTUAL VISIT:  MRI L spine 8/2020 - lots of DJD.     Pelvic MRI 7/2020 - Significant interval decrease in size of the mid to distal rectal mass since 1/16/2020 indicating improvement. The mass is still seen to cause severe luminal stricture and appears to extend through the rectal wall and shows a degree of invasion into the adjacent  vagina and also just contacts the  posterior wall of the midline  Bladder.      Pelvic MRI 1/2020 - T4b-N2 tumor of the low and mid rectum with invasion through the superior vagina and to the posterior margin of the urinary bladder.      PET 1/2020  1. Hypermetabolic rectal mass that appears to directly invade into the vagina which is diffusely hypermetabolic. Small perirectal lymph nodes are noted.  2. No evidence of metastatic disease in the head, neck, chest, or abdomen. No evidence of metastatic disease in the extremities.  3. Significant coronary and aortic atherosclerosis. Ascending thoracic aortic caliber is ectatic at 4.1 cm.  4. Small area of hypermetabolism associated with a nodule in the isthmus of the thyroid. Consider further evaluation with ultrasound.    Brain MRI 1/2020 - 1.8 cm uniformly enhancing extra-axial mass positioned along the olfactory groove, most likely representing a meningioma. Metastatic disease is thought to be less likely         OLD DATA REVIEWED TODAY WITH SUMMARY  CEA at 4.6 in 11/2019      CT 11/2019 found Circumferential rectosigmoid mass causing relative colonic obstruction with the more proximal colon distended with fecal debris. Colorectal mass extends beyond the serosa of the bowel wall with multiple small probable metastatic mesorectal lymph nodes. No enlarged pelvic sidewall or retroperitoneal lymph nodes. No evidence of metastatic liver disease on the current exam.        ASSESSMENT AND PLAN DISCUSSED WITH PATIENT TODAY DURING THE VISIT:    1. Diagnosed rectal cancer November 2019 at age 89.  Had emergent colostomy bag put in, Rectal biopsy pathology found Adenocarcinoma arising in a tubular adenoma, focal signet ring cell features identified.    Further staging with PET, MRI found advanced T4b-N2 disease.   She made informed decision to proceed with palliative RT for palliation done in 2/2020.     She then made informed decision to proceed oral Xeloda for palliation purposes.    Her BSA is around 1.4, her  target dose about 1.5 g p.o. twice daily.    Adviced to increase further to 1.5 g bid, 7 days on 7 days off.  This is her target dose, will keep her on this one.     Due to new complains of legs, weakness in 72020, adviced hold xeloda for 2 wks in Aug and dose down to 1 g bid.   She seems to tolerating this dose better.     I recommend patient to creat a Health Care Directive/advanced Directive.    CODE STATUS is discussed, recommend DNR/DNI.      2. New complains of legs pain, right > left in Aug 2020.  Pelvic MRI found DJD.   She is on local steroid injection.     Advice increase gabapentin to 200 mg q hs and keep the day time dose.      3. Insomnia.  Advice to increase melatonin to 10 mg poq hs.     Phone VISIT time is 25 minutes, spent in dicussion and review patient's cancer  and treatment history, labs and images results, symptom managment, further treatment recommendations, follow up plan.

## 2020-09-15 NOTE — TELEPHONE ENCOUNTER
This script printed, are you able to resend electronic?    Thank you  Doris Ballesteros, RN, BSN, OCN, CBCN  Oncology/Hematology   Breast Cancer Navigator  [facility]  [phone]

## 2020-09-16 ENCOUNTER — COMMUNICATION - HEALTHEAST (OUTPATIENT)
Dept: GERIATRICS | Facility: CLINIC | Age: 85
End: 2020-09-16

## 2020-09-16 LAB — POTASSIUM BLD-SCNC: 3.9 MMOL/L (ref 3.5–5)

## 2020-09-17 ENCOUNTER — AMBULATORY - HEALTHEAST (OUTPATIENT)
Dept: ADMINISTRATIVE | Facility: CLINIC | Age: 85
End: 2020-09-17

## 2020-09-17 ENCOUNTER — TRANSFERRED RECORDS (OUTPATIENT)
Dept: HEALTH INFORMATION MANAGEMENT | Facility: CLINIC | Age: 85
End: 2020-09-17

## 2020-09-17 ENCOUNTER — OFFICE VISIT - HEALTHEAST (OUTPATIENT)
Dept: GERIATRICS | Facility: CLINIC | Age: 85
End: 2020-09-17

## 2020-09-17 DIAGNOSIS — J45.20 MILD INTERMITTENT ASTHMA WITHOUT COMPLICATION: ICD-10-CM

## 2020-09-17 DIAGNOSIS — S32.592S CLOSED BILATERAL FRACTURE OF PUBIC RAMI, SEQUELA: ICD-10-CM

## 2020-09-17 DIAGNOSIS — C20 RECTAL CANCER (H): ICD-10-CM

## 2020-09-17 DIAGNOSIS — M54.16 LUMBAR RADICULOPATHY: ICD-10-CM

## 2020-09-17 DIAGNOSIS — Z93.3 S/P COLOSTOMY (H): ICD-10-CM

## 2020-09-17 DIAGNOSIS — R52 PAIN MANAGEMENT: ICD-10-CM

## 2020-09-17 DIAGNOSIS — E87.6 HYPOKALEMIA: ICD-10-CM

## 2020-09-17 DIAGNOSIS — K63.89 COLONIC MASS: ICD-10-CM

## 2020-09-17 DIAGNOSIS — I10 ESSENTIAL HYPERTENSION: ICD-10-CM

## 2020-09-17 DIAGNOSIS — S32.591S CLOSED BILATERAL FRACTURE OF PUBIC RAMI, SEQUELA: ICD-10-CM

## 2020-09-17 LAB — POTASSIUM SERPL-SCNC: 3.9 MMOL/L (ref 3.5–5)

## 2020-09-18 ENCOUNTER — TRANSFERRED RECORDS (OUTPATIENT)
Dept: HEALTH INFORMATION MANAGEMENT | Facility: CLINIC | Age: 85
End: 2020-09-18

## 2020-09-18 ENCOUNTER — OFFICE VISIT - HEALTHEAST (OUTPATIENT)
Dept: GERIATRICS | Facility: CLINIC | Age: 85
End: 2020-09-18

## 2020-09-18 DIAGNOSIS — Z93.3 S/P COLOSTOMY (H): ICD-10-CM

## 2020-09-18 DIAGNOSIS — J96.00 ACUTE RESPIRATORY FAILURE, UNSPECIFIED WHETHER WITH HYPOXIA OR HYPERCAPNIA (H): ICD-10-CM

## 2020-09-18 DIAGNOSIS — I35.0 MODERATE AORTIC STENOSIS: ICD-10-CM

## 2020-09-20 ENCOUNTER — RECORDS - HEALTHEAST (OUTPATIENT)
Dept: LAB | Facility: CLINIC | Age: 85
End: 2020-09-20

## 2020-09-21 ENCOUNTER — TRANSFERRED RECORDS (OUTPATIENT)
Dept: HEALTH INFORMATION MANAGEMENT | Facility: CLINIC | Age: 85
End: 2020-09-21

## 2020-09-21 ENCOUNTER — OFFICE VISIT - HEALTHEAST (OUTPATIENT)
Dept: GERIATRICS | Facility: CLINIC | Age: 85
End: 2020-09-21

## 2020-09-21 DIAGNOSIS — S32.592S CLOSED BILATERAL FRACTURE OF PUBIC RAMI, SEQUELA: ICD-10-CM

## 2020-09-21 DIAGNOSIS — I10 ESSENTIAL HYPERTENSION: ICD-10-CM

## 2020-09-21 DIAGNOSIS — S32.591S CLOSED BILATERAL FRACTURE OF PUBIC RAMI, SEQUELA: ICD-10-CM

## 2020-09-21 DIAGNOSIS — E87.6 HYPOKALEMIA: ICD-10-CM

## 2020-09-21 DIAGNOSIS — Z93.3 S/P COLOSTOMY (H): ICD-10-CM

## 2020-09-21 DIAGNOSIS — E83.42 HYPOMAGNESEMIA: ICD-10-CM

## 2020-09-21 LAB
ANION GAP SERPL CALCULATED.3IONS-SCNC: 9 MMOL/L (ref 5–18)
BUN SERPL-MCNC: 20 MG/DL (ref 8–28)
CALCIUM SERPL-MCNC: 9.2 MG/DL (ref 8.5–10.5)
CHLORIDE BLD-SCNC: 101 MMOL/L (ref 98–107)
CO2 SERPL-SCNC: 29 MMOL/L (ref 22–31)
CREAT SERPL-MCNC: 0.69 MG/DL (ref 0.6–1.1)
CREAT SERPL-MCNC: 0.69 MG/DL (ref 0.6–1.1)
ERYTHROCYTE [DISTWIDTH] IN BLOOD BY AUTOMATED COUNT: 14.6 % (ref 11–14.5)
GFR SERPL CREATININE-BSD FRML MDRD: >60 ML/MIN/1.73M2
GFR SERPL CREATININE-BSD FRML MDRD: >60 ML/MIN/1.73M2
GLUCOSE BLD-MCNC: 94 MG/DL (ref 70–125)
GLUCOSE SERPL-MCNC: 94 MG/DL (ref 70–125)
HCT VFR BLD AUTO: 32.7 % (ref 35–47)
HGB BLD-MCNC: 10.9 G/DL (ref 12–16)
MAGNESIUM SERPL-MCNC: 1.7 MG/DL (ref 1.8–2.6)
MCH RBC QN AUTO: 35 PG (ref 27–34)
MCHC RBC AUTO-ENTMCNC: 33.3 G/DL (ref 32–36)
MCV RBC AUTO: 105 FL (ref 80–100)
PLATELET # BLD AUTO: 267 THOU/UL (ref 140–440)
PMV BLD AUTO: 8.7 FL (ref 8.5–12.5)
POTASSIUM BLD-SCNC: 3.3 MMOL/L (ref 3.5–5)
POTASSIUM SERPL-SCNC: 3.3 MMOL/L (ref 3.5–5)
RBC # BLD AUTO: 3.11 MILL/UL (ref 3.8–5.4)
SODIUM SERPL-SCNC: 139 MMOL/L (ref 136–145)
WBC: 5.6 THOU/UL (ref 4–11)

## 2020-09-23 ENCOUNTER — RECORDS - HEALTHEAST (OUTPATIENT)
Dept: LAB | Facility: CLINIC | Age: 85
End: 2020-09-23

## 2020-09-23 ENCOUNTER — COMMUNICATION - HEALTHEAST (OUTPATIENT)
Dept: GERIATRICS | Facility: CLINIC | Age: 85
End: 2020-09-23

## 2020-09-23 DIAGNOSIS — R10.2 PELVIC PAIN IN FEMALE: ICD-10-CM

## 2020-09-24 LAB — POTASSIUM BLD-SCNC: 4 MMOL/L (ref 3.5–5)

## 2020-09-25 DIAGNOSIS — C18.9 ADENOCARCINOMA, COLON (H): ICD-10-CM

## 2020-09-25 DIAGNOSIS — R11.0 NAUSEA: ICD-10-CM

## 2020-09-25 DIAGNOSIS — C20 RECTAL CANCER (H): ICD-10-CM

## 2020-09-25 RX ORDER — OXYCODONE HYDROCHLORIDE 5 MG/1
2.5-5 TABLET ORAL EVERY 4 HOURS PRN
Qty: 60 TABLET | Refills: 0 | OUTPATIENT
Start: 2020-09-25

## 2020-09-25 NOTE — TELEPHONE ENCOUNTER
I see in her chart that you might've sent the refill to us but we have never received the script. The chart said sent 9/15 and still haven't gotten anything. Please look into this asap.    Thank you,   Lashay Godwin [facility] [place]

## 2020-09-28 ENCOUNTER — OFFICE VISIT - HEALTHEAST (OUTPATIENT)
Dept: GERIATRICS | Facility: CLINIC | Age: 85
End: 2020-09-28

## 2020-09-28 DIAGNOSIS — I10 ESSENTIAL HYPERTENSION: ICD-10-CM

## 2020-09-28 DIAGNOSIS — E87.6 HYPOKALEMIA: ICD-10-CM

## 2020-09-28 DIAGNOSIS — Z93.3 S/P COLOSTOMY (H): ICD-10-CM

## 2020-09-28 DIAGNOSIS — S32.592S CLOSED BILATERAL FRACTURE OF PUBIC RAMI, SEQUELA: ICD-10-CM

## 2020-09-28 DIAGNOSIS — S32.591S CLOSED BILATERAL FRACTURE OF PUBIC RAMI, SEQUELA: ICD-10-CM

## 2020-09-30 ENCOUNTER — RECORDS - HEALTHEAST (OUTPATIENT)
Dept: LAB | Facility: CLINIC | Age: 85
End: 2020-09-30

## 2020-10-01 LAB
MAGNESIUM SERPL-MCNC: 1.7 MG/DL (ref 1.8–2.6)
POTASSIUM BLD-SCNC: 3.9 MMOL/L (ref 3.5–5)

## 2020-10-04 NOTE — PROGRESS NOTES
Lilia is healing from pelvic fractures.  I talked to her and her mom, and asked her to reschedule in 1 month.    Rosa Urbina MD  [facility] Pain Management

## 2020-10-05 ENCOUNTER — OFFICE VISIT - HEALTHEAST (OUTPATIENT)
Dept: GERIATRICS | Facility: CLINIC | Age: 85
End: 2020-10-05

## 2020-10-05 ENCOUNTER — RADIOLOGY INJECTION OFFICE VISIT (OUTPATIENT)
Dept: PALLIATIVE MEDICINE | Facility: CLINIC | Age: 85
End: 2020-10-05
Attending: FAMILY MEDICINE
Payer: MEDICARE

## 2020-10-05 VITALS — DIASTOLIC BLOOD PRESSURE: 66 MMHG | HEART RATE: 82 BPM | SYSTOLIC BLOOD PRESSURE: 105 MMHG

## 2020-10-05 DIAGNOSIS — E87.6 HYPOKALEMIA: ICD-10-CM

## 2020-10-05 DIAGNOSIS — S32.592S CLOSED BILATERAL FRACTURE OF PUBIC RAMI, SEQUELA: ICD-10-CM

## 2020-10-05 DIAGNOSIS — M54.16 LUMBAR RADICULOPATHY, ACUTE: ICD-10-CM

## 2020-10-05 DIAGNOSIS — C20 RECTAL CANCER (H): ICD-10-CM

## 2020-10-05 DIAGNOSIS — S32.591S CLOSED BILATERAL FRACTURE OF PUBIC RAMI, SEQUELA: ICD-10-CM

## 2020-10-05 DIAGNOSIS — R29.898 RIGHT LEG WEAKNESS: ICD-10-CM

## 2020-10-05 DIAGNOSIS — I10 ESSENTIAL HYPERTENSION: ICD-10-CM

## 2020-10-05 DIAGNOSIS — M51.369 DDD (DEGENERATIVE DISC DISEASE), LUMBAR: ICD-10-CM

## 2020-10-05 DIAGNOSIS — R60.0 LOWER EXTREMITY EDEMA: ICD-10-CM

## 2020-10-05 DIAGNOSIS — Z53.9 ERRONEOUS ENCOUNTER--DISREGARD: Primary | ICD-10-CM

## 2020-10-05 ASSESSMENT — PAIN SCALES - GENERAL: PAINLEVEL: MILD PAIN (3)

## 2020-10-05 NOTE — NURSING NOTE
Pre-procedure Intake    Have you been fasting? NA    If yes, for how long? no    Are you taking a prescribed blood thinner such as coumadin, Plavix, Xarelto?    No    If yes, when did you take your last dose? No     Do you take aspirin?  Yes     If cervical procedure, have you held aspirin for 6 days?   No     Do you have any allergies to contrast dye, iodine, steroid and/or numbing medications?  NO    Are you currently taking antibiotics or have an active infection?  NO    Have you had a fever/elevated temperature within the past week? NO    Are you currently taking oral steroids? NO    Do you have a ? Yes       Are you pregnant or breastfeeding?  NO    Are the vital signs normal?  Yes    Fernando Collins MA

## 2020-10-07 ENCOUNTER — RECORDS - HEALTHEAST (OUTPATIENT)
Dept: LAB | Facility: CLINIC | Age: 85
End: 2020-10-07

## 2020-10-08 ENCOUNTER — OFFICE VISIT - HEALTHEAST (OUTPATIENT)
Dept: GERIATRICS | Facility: CLINIC | Age: 85
End: 2020-10-08

## 2020-10-08 ENCOUNTER — TELEPHONE (OUTPATIENT)
Dept: ONCOLOGY | Facility: CLINIC | Age: 85
End: 2020-10-08

## 2020-10-08 DIAGNOSIS — C20 RECTAL CANCER (H): ICD-10-CM

## 2020-10-08 DIAGNOSIS — C18.9 ADENOCARCINOMA, COLON (H): ICD-10-CM

## 2020-10-08 DIAGNOSIS — M54.16 LUMBAR RADICULOPATHY: ICD-10-CM

## 2020-10-08 DIAGNOSIS — S32.592S CLOSED BILATERAL FRACTURE OF PUBIC RAMI, SEQUELA: ICD-10-CM

## 2020-10-08 DIAGNOSIS — S32.591S CLOSED BILATERAL FRACTURE OF PUBIC RAMI, SEQUELA: ICD-10-CM

## 2020-10-08 DIAGNOSIS — S32.9XXD CLOSED DISPLACED FRACTURE OF PELVIS WITH ROUTINE HEALING, UNSPECIFIED PART OF PELVIS, SUBSEQUENT ENCOUNTER: Primary | ICD-10-CM

## 2020-10-08 DIAGNOSIS — R60.0 LOWER EXTREMITY EDEMA: ICD-10-CM

## 2020-10-08 DIAGNOSIS — E87.6 HYPOKALEMIA: ICD-10-CM

## 2020-10-08 LAB — POTASSIUM BLD-SCNC: 3.9 MMOL/L (ref 3.5–5)

## 2020-10-08 RX ORDER — OXYCODONE HYDROCHLORIDE 5 MG/1
2.5-5 TABLET ORAL EVERY 6 HOURS PRN
Qty: 30 TABLET | Refills: 0 | Status: SHIPPED | OUTPATIENT
Start: 2020-10-08 | End: 2020-11-02 | Stop reason: DRUGHIGH

## 2020-10-08 RX ORDER — OXYCODONE HYDROCHLORIDE 5 MG/1
2.5-5 TABLET ORAL EVERY 4 HOURS PRN
Qty: 60 TABLET | Refills: 0 | OUTPATIENT
Start: 2020-10-08

## 2020-10-08 NOTE — TELEPHONE ENCOUNTER
Requested Prescriptions   Pending Prescriptions Disp Refills     oxyCODONE (ROXICODONE) 5 MG tablet 60 tablet 0     Sig: Take 0.5-1 tablets (2.5-5 mg) by mouth every 4 hours as needed for moderate to severe pain (2.5 mg for pain 1-6 with activity, 5 mg for pain 7-10 with activity) Higher doses appear to have caused dizziness.       There is no refill protocol information for this order

## 2020-10-09 ENCOUNTER — PATIENT OUTREACH (OUTPATIENT)
Dept: NURSING | Facility: CLINIC | Age: 85
End: 2020-10-09
Payer: MEDICARE

## 2020-10-09 NOTE — LETTER
[facility]  Complex Care Plan  About Me:    Patient Name:  Pam Reeves    YOB: 1930  Age:         89 year old   [place] MRN:    [id number] Telephone Information:  Home Phone [phone]   Mobile [phone]       Address:  [address] [place] [id number] Email address:  No e-mail address on record      Emergency Contact(s)    Name Relationship Lgl Grd Work Phone Home Phone Mobile Phone   1. SYLVIA SAN Daughter No [phone]   2. PAUL REEVES Son No  [phone]           Primary language:  English     needed? No   [place] [facility]:  [phone] op. 1  Other communication barriers: None  Preferred Method of Communication:     Current living arrangement: I live in a private home with spouse  Mobility Status/ Medical Equipment:        My Access Plan  Medical Emergency 911   Primary Clinic Line [facility] - [phone]   24 Hour Appointment Line [phone] or  [phone]) (toll-free)   24 Hour Nurse Line [phone] (toll-free)   Preferred Urgent Care [facility], [phone]   Preferred Hospital [place], [place]  [phone]   Preferred Pharmacy [facility] - [address]     Behavioral Health Crisis Line The National Suicide Prevention Lifeline at 1-201.728.2057 or 911             My Care Team Members  Patient Care Team       Relationship Specialty Notifications Start End    Reagan Thakkar MD PCP - General Family Practice  1/15/15     Phone: [phone] Fax: [phone] [address] [facility] [address]    Reagan Thakkar MD Assigned PCP   5/14/17     Phone: [phone] Fax: [phone] [address] [facility] [address]    Filippo Robbins MD MD Radiation Oncology  1/28/20     Phone: [phone] Fax: [phone] [address] [facility]  [facility] [address]    Jie Alonso APRN CNP Nurse Practitioner Nurse Practitioner  1/28/20     Phone: [phone] Fax: [phone]         [address] [facility] [address] [facility] [address]    Tomasa Diggs MD MD Oncology  1/28/20     Phone: [phone] Pager: [phone] Fax: [phone]        [facility] [address] [facility] [address]    Doris Ballesteros, BRENNA Specialty Care Coordinator Oncology Admissions 2/17/20     Phone: [phone]         Lorena Vazquez, RN Lead Care Coordinator Primary Care - CC Admissions 10/9/20     Phone: [phone]                 My Care Plans  Self Management and Treatment Plan  Goals and (Comments)  Goals        General    #1 Pain Management (pt-stated)     Notes - Note created  10/9/2020  2:43 PM by Lorena Vazquez, RN    Goal Statement: I will have suitable pain relief to perform activities of daily living.  Date Goal set: 10/9/20  Barriers: medical history, known pelvis fracture  Strengths: follow up in place; pending home care referral following TCU stay  Date to Achieve By: 3/1/21  Patient expressed understanding of goal: Yes  Action steps to achieve this goal:  1. I will follow up with my PCP or specialist(s) as recommended.   2. I will take medications as prescribed to maintain adequate pain control/relief.  3. I will try to remain as physically active as I can tolerate to maintain a healthy lifestyle               Action Plans on File:                       Advance Care Plans/Directives Type:   Type Advanced Care Plans/Directives: Advanced Directive - On File    My Medical and Care Information  Problem List   Patient Active Problem List   Diagnosis     Urethral stricture due to infection     Hyperlipidemia     Essential hypertension     IMPAIRED FASTING GLUCOSE     Hyperlipidemia LDL goal <100     CKD (chronic kidney disease) stage 3, GFR 30-59 ml/min (H)     GERD (gastroesophageal reflux disease)     Mild intermittent asthma in adult without  complication     Rectal cancer (H)     Abdominal pain     Asymptomatic bacteriuria     Pubic ramus fracture (H)      Current Medications and Allergies:  See printed Medication Report.    Care Coordination Start Date: 10/9/2020   Frequency of Care Coordination: monthly   Form Last Updated: 10/09/2020

## 2020-10-09 NOTE — PROGRESS NOTES
Clinic Care Coordination Contact    Clinic Care Coordination Contact  OUTREACH    Referral Information:  Referral Source: IP Handoff    Primary Diagnosis: Injury/Fall    Chief Complaint   Patient presents with     Clinic Care Coordination - Homecare/TCU     RN CC        Universal Utilization: Patient was hospitalized at [facility] from 9/13 to 9/14 with diagnosis of closed fracture of left pubis sustained from fall at home. [facility]  It was unclear in chart review 10/9 AM if she had discharged home yet. RN CC placed call to patient's daughter Karine who provided update.   Clinic Utilization  Difficulty keeping appointments:: No  Compliance Concerns: No  No-Show Concerns: No  No PCP office visit in Past Year: No  Utilization    Last refreshed: 10/9/2020  2:31 PM: Hospital Admissions 2           Last refreshed: 10/9/2020  2:31 PM: ED Visits 3           Last refreshed: 10/9/2020  2:31 PM: No Show Count (past year) 0              Current as of: 10/9/2020  2:31 PM              Clinical Concerns:  Current Medical Concerns:  Patient was hospitalized at [facility] from 9/13 to 9/14 with diagnosis of closed fracture of left pubis sustained from fall at home. [facility].  It was unclear in chart review 10/9 AM if she had discharged home yet. RN CC placed call to patient's daughter Karine who provided update (consent to communicate on file):    Patient is at TCU as of 10/9 however she is planning to discharge next Tuesday 10/13 to home.  Karine had several questions regarding home care referral.  Answered her questions as appropriate however also strongly encouraged her to communicate additional questions to TCU team as they will be the care team making home care referral at discharge.  Patient has prior experience with [facility] and hopes for referral to go through this agency again.    Karine requested to schedule PCP follow up while on the call.  She will  provide transportation.  Preferred date was 11/6. We discussed that if acute concerns arise following TCU discharge next week, a sooner clinic follow up appointment would be advised. Karine verbalized good understanding.    Current Behavioral Concerns: none     Education Provided to patient: explained role and reviewed contact number to Karine.     Pain  Pain (GOAL):: Yes  Type: Acute (<3mo)  Location of chronic pain:: pelvis  Progression: Improving  Alleviating Factors: Rest  Aggravating Factors: Positioning  Health Maintenance Reviewed: Due/Overdue   Health Maintenance Due   Topic Date Due     DEXA  01/06/1930     ASTHMA ACTION PLAN  01/06/1930     MEDICARE ANNUAL WELLNESS VISIT  01/06/1995     ZOSTER IMMUNIZATION (2 of 3) 03/17/2010     ASTHMA CONTROL TEST  06/18/2020     INFLUENZA VACCINE (1) 09/01/2020       Clinical Pathway: None    Medication Management:  Diid not complete med reconciliation yet as pateint remains at TCU.  Will complete at next outreach     Functional Status:  Bed or wheelchair confined:: No    Living Situation:  Current living arrangement:: I live in a private home with spouse  Type of residence:: Private home - no stairs    Lifestyle & Psychosocial Needs:        Diet:: Regular  Inadequate nutrition (GOAL):: No  Tube Feeding: No  Inadequate activity/exercise (GOAL):: No  Significant changes in sleep pattern (GOAL): No        Episcopal or spiritual beliefs that impact treatment:: No  Mental health DX:: No  Mental health management concern (GOAL):: No  Informal Support system:: Children, Family   Socioeconomic History     Marital status:      Spouse name: Not on file     Number of children: Not on file     Years of education: Not on file     Highest education level: Not on file   Occupational History     Employer: RETIRED     Tobacco Use     Smoking status: Never Smoker     Smokeless tobacco: Never Used   Substance and Sexual Activity     Alcohol use: Yes     Frequency: Monthly or less      Comment: wine ocass     Drug use: No     Sexual activity: Yes     Partners: Male            Resources and Interventions:  Current Resources:      Community Resources: Transitional Care  Supplies used at home:: None  Equipment Currently Used at Home: none    Advance Care Plan/Directive  Advanced Care Plans/Directives on file:: Yes  Type Advanced Care Plans/Directives: Advanced Directive - On File  Advanced Care Plan/Directive Status: Not Applicable          Goals:   Goals        General    #1 Pain Management (pt-stated)     Notes - Note created  10/9/2020  2:43 PM by Lorena Vazquez RN    Goal Statement: I will have suitable pain relief to perform activities of daily living.  Date Goal set: 10/9/20  Barriers: medical history, known pelvis fracture  Strengths: follow up in place; pending home care referral following TCU stay  Date to Achieve By: 3/1/21  Patient expressed understanding of goal: Yes  Action steps to achieve this goal:  1. I will follow up with my PCP or specialist(s) as recommended.   2. I will take medications as prescribed to maintain adequate pain control/relief.  3. I will try to remain as physically active as I can tolerate to maintain a healthy lifestyle              Patient/Caregiver understanding: Yes- Karine understands we will plan to touch base after TCU discharge occurs next week, with anticipation for patient to be present for RN CC call as well so she can participate and be engaged in conversation.     Outreach Frequency: monthly  Future Appointments              In 4 weeks Reagan Thakkar MD [facility]  Arrive at: Clinic A    In 1 month Mitzi Urbina MD [facility] [place], [facility]    In 1 month [facility]    In 1 month Tomasa Diggs MD [facility]          Plan: Patient anticipating TCU discharge on Tues 10/13.   RN CC will  make outreach next week.    MARY PatelN, RN   [facility]  - Clinic Care Coordinator  Phone: [phone]

## 2020-10-12 ENCOUNTER — OFFICE VISIT - HEALTHEAST (OUTPATIENT)
Dept: GERIATRICS | Facility: CLINIC | Age: 85
End: 2020-10-12

## 2020-10-12 DIAGNOSIS — S32.592S CLOSED BILATERAL FRACTURE OF PUBIC RAMI, SEQUELA: ICD-10-CM

## 2020-10-12 DIAGNOSIS — E87.6 HYPOKALEMIA: ICD-10-CM

## 2020-10-12 DIAGNOSIS — S32.591S CLOSED BILATERAL FRACTURE OF PUBIC RAMI, SEQUELA: ICD-10-CM

## 2020-10-12 DIAGNOSIS — E83.42 HYPOMAGNESEMIA: ICD-10-CM

## 2020-10-12 DIAGNOSIS — I10 ESSENTIAL HYPERTENSION: ICD-10-CM

## 2020-10-12 DIAGNOSIS — M54.16 LUMBAR RADICULOPATHY: ICD-10-CM

## 2020-10-12 DIAGNOSIS — C20 RECTAL CANCER (H): ICD-10-CM

## 2020-10-12 DIAGNOSIS — Z93.3 S/P COLOSTOMY (H): ICD-10-CM

## 2020-10-12 ASSESSMENT — MIFFLIN-ST. JEOR: SCORE: 816.87

## 2020-10-14 ENCOUNTER — AMBULATORY - HEALTHEAST (OUTPATIENT)
Dept: GERIATRICS | Facility: CLINIC | Age: 85
End: 2020-10-14

## 2020-10-14 ENCOUNTER — PATIENT OUTREACH (OUTPATIENT)
Dept: NURSING | Facility: CLINIC | Age: 85
End: 2020-10-14
Payer: MEDICARE

## 2020-10-14 ENCOUNTER — TELEPHONE (OUTPATIENT)
Dept: FAMILY MEDICINE | Facility: CLINIC | Age: 85
End: 2020-10-14

## 2020-10-14 DIAGNOSIS — I10 ESSENTIAL HYPERTENSION: Primary | ICD-10-CM

## 2020-10-14 RX ORDER — ATENOLOL 25 MG/1
12.5 TABLET ORAL DAILY
COMMUNITY
End: 2020-10-14

## 2020-10-14 RX ORDER — ATENOLOL 25 MG/1
12.5 TABLET ORAL DAILY
Qty: 90 TABLET | Refills: 3 | Status: SHIPPED | OUTPATIENT
Start: 2020-10-14 | End: 2021-11-03

## 2020-10-14 ASSESSMENT — ACTIVITIES OF DAILY LIVING (ADL): DEPENDENT_IADLS:: TRANSPORTATION

## 2020-10-14 NOTE — TELEPHONE ENCOUNTER
Patient updated and verbalized good understanding.    AMAIRANI Patel, RN   [facility]  - Clinic Care Coordinator  Phone: [phone]

## 2020-10-14 NOTE — TELEPHONE ENCOUNTER
Background: Patient was hospitalized at [facility] from 9/13 to 9/14 with diagnosis of closed fracture of left pubis sustained from fall at home. [facility].  Discharged home on 10/13/20. Home care referral pending start of care 10/14-10/15.    Situation: RN Care Coordinator spoke to patient and her daughter, Karine this morning following TCU discharge. Patient is out of atenolol (TENORMIN) at home.  [place] med record showed dose of 25 mg once daily however Karine reports dose was reduced to 12.5 mg once daily by PCP many months ago due to low BP readings.  Unable to find this in chart review but most recent TCU med list does show 12.5 mg dose.  Last prescribed by PCP was 7/15/2019.    Pharmacy:   [facility] [place], [place] - [address] [address]     Patient is scheduled for PCP follow up on 11/6/20. Writer did make it a 40 minute visit due to hospitalization and TCU follow up. Courtesy FYI to provider in case you prefer to change back to 20 minute.    Plan: Routing to PCP to review medication refill request. Writer elected not to send to refill RN pool since dose is different than what is on our records.    Thank you!  AMAIRANI Patel, RN   [facility]  - Clinic Care Coordinator  Phone: [phone]

## 2020-10-14 NOTE — PROGRESS NOTES
Clinic Care Coordination Contact    Clinic Care Coordination Contact  OUTREACH    Referral Information:  Referral Source: IP Handoff    Primary Diagnosis: Injury/Fall    Chief Complaint   Patient presents with     Clinic Care Coordination - Homecare/TCU     RN CC        Lucien Utilization:   Clinic Utilization  Difficulty keeping appointments:: No  Compliance Concerns: No  No-Show Concerns: No  No PCP office visit in Past Year: No  Utilization    Last refreshed: 10/10/2020  7:19 AM: Hospital Admissions 2           Last refreshed: 10/10/2020  7:19 AM: ED Visits 3           Last refreshed: 10/10/2020  7:19 AM: No Show Count (past year) 0              Current as of: 10/10/2020  7:19 AM              Clinical Concerns:  Current Medical Concerns:  Patient discharged home from TCU on 10/13/20.  She is happy to be home and reports a good night of sleep.  Her daughter Karine is at their home presently this morning and going through medications. We completed full medication reconciliation. One medication refill need will be routed to PCP in separate telephone encounter.    Karine reports they heard from [facility] yesterday and are expecting a call today to arrange initial assessment.  Referral is pending for RN, PT, OT.    Education Provided to patient: reviewed appointments and plan of care     Pain  Pain (GOAL):: Yes  Type: Acute (<3mo)  Location of chronic pain:: pelvis  Progression: Improving  Alleviating Factors: Rest  Aggravating Factors: Positioning  Health Maintenance Reviewed: Due/Overdue  Health Maintenance Due   Topic Date Due     DEXA  01/06/1930     ASTHMA ACTION PLAN  01/06/1930     MEDICARE ANNUAL WELLNESS VISIT  01/06/1995     ZOSTER IMMUNIZATION (2 of 3) 03/17/2010     ASTHMA CONTROL TEST  06/18/2020     INFLUENZA VACCINE (1) 09/01/2020       Clinical Pathway: None    Medication Management:  Medication reconciliation status: Medications reviewed and reconciled.  Changed medications per patient  report.       Functional Status:  Dependent ADLs:: Ambulation-walker  Dependent IADLs:: Transportation  Bed or wheelchair confined:: No  Mobility Status: Independent w/Device  Fallen 2 or more times in the past year?: Yes  Any fall with injury in the past year?: Yes    Living Situation:  Current living arrangement:: I live in a private home with spouse  Type of residence:: Private home - no stairs    Lifestyle & Psychosocial Needs:        Diet:: Regular  Inadequate nutrition (GOAL):: No  Tube Feeding: No  Inadequate activity/exercise (GOAL):: No  Significant changes in sleep pattern (GOAL): No        Anglican or spiritual beliefs that impact treatment:: No  Mental health DX:: No  Mental health management concern (GOAL):: No  Informal Support system:: Children, Family   Socioeconomic History     Marital status:      Spouse name: Not on file     Number of children: Not on file     Years of education: Not on file     Highest education level: Not on file   Occupational History     Employer: RETIRED     Tobacco Use     Smoking status: Never Smoker     Smokeless tobacco: Never Used   Substance and Sexual Activity     Alcohol use: Yes     Frequency: Monthly or less     Comment: wine ocass     Drug use: No     Sexual activity: Yes     Partners: Male            Resources and Interventions:  Current Resources:   List of home care services:: Skilled Nursing, Physicial Therapy, Occupational Therapy  Community Resources: Home Care  Supplies used at home:: None  Equipment Currently Used at Home: none  Employment Status: retired)   )    Advance Care Plan/Directive  Advanced Care Plans/Directives on file:: Yes  Type Advanced Care Plans/Directives: Advanced Directive - On File  Advanced Care Plan/Directive Status: Not Applicable    Referrals Placed: None     Goals:   Goals        General    #1 Pain Management (pt-stated)     Notes - Note created  10/9/2020  2:43 PM by Lorena Vazquez, RN    Goal Statement: I will have  suitable pain relief to perform activities of daily living.  Date Goal set: 10/9/20  Barriers: medical history, known pelvis fracture  Strengths: follow up in place; pending home care referral following TCU stay  Date to Achieve By: 3/1/21  Patient expressed understanding of goal: Yes  Action steps to achieve this goal:  1. I will follow up with my PCP or specialist(s) as recommended.   2. I will take medications as prescribed to maintain adequate pain control/relief.  3. I will try to remain as physically active as I can tolerate to maintain a healthy lifestyle              Patient/Caregiver understanding: Yes    Outreach Frequency: monthly  Future Appointments              In 3 weeks Reagan Thakkar MD [facility]  Arrive at: Clinic A    In 3 weeks Mitzi Urbina MD [facility],  PAIN BLAI    In 3 weeks [facility]    In 3 weeks Tomasa Diggs MD [facility]          Plan: Patient will continue working on physical rehab and recovery from fall with support from family and home care.  Patient will maintain clinic follow up as scheduled.    Care Coordinator will follow up in 1 month.    AMAIRANI Patel, RN   [facility]  - Clinic Care Coordinator  Phone: [phone]

## 2020-11-02 ENCOUNTER — ANCILLARY PROCEDURE (OUTPATIENT)
Dept: RADIOLOGY | Facility: CLINIC | Age: 85
End: 2020-11-02
Attending: PSYCHIATRY & NEUROLOGY
Payer: MEDICARE

## 2020-11-02 ENCOUNTER — VIRTUAL VISIT (OUTPATIENT)
Dept: FAMILY MEDICINE | Facility: CLINIC | Age: 85
End: 2020-11-02
Payer: MEDICARE

## 2020-11-02 ENCOUNTER — RADIOLOGY INJECTION OFFICE VISIT (OUTPATIENT)
Dept: PALLIATIVE MEDICINE | Facility: CLINIC | Age: 85
End: 2020-11-02
Payer: MEDICARE

## 2020-11-02 ENCOUNTER — TELEPHONE (OUTPATIENT)
Dept: FAMILY MEDICINE | Facility: CLINIC | Age: 85
End: 2020-11-02

## 2020-11-02 VITALS
RESPIRATION RATE: 16 BRPM | SYSTOLIC BLOOD PRESSURE: 131 MMHG | DIASTOLIC BLOOD PRESSURE: 90 MMHG | OXYGEN SATURATION: 99 % | HEART RATE: 81 BPM

## 2020-11-02 DIAGNOSIS — E87.6 HYPOKALEMIA: ICD-10-CM

## 2020-11-02 DIAGNOSIS — S72.009A CLOSED FRACTURE OF HIP, UNSPECIFIED LATERALITY, INITIAL ENCOUNTER (H): ICD-10-CM

## 2020-11-02 DIAGNOSIS — S32.9XXD CLOSED DISPLACED FRACTURE OF PELVIS WITH ROUTINE HEALING, UNSPECIFIED PART OF PELVIS, SUBSEQUENT ENCOUNTER: ICD-10-CM

## 2020-11-02 DIAGNOSIS — Z09 HOSPITAL DISCHARGE FOLLOW-UP: Primary | ICD-10-CM

## 2020-11-02 DIAGNOSIS — M54.16 LUMBAR RADICULOPATHY: ICD-10-CM

## 2020-11-02 DIAGNOSIS — M47.819 FACET ARTHROPATHY: ICD-10-CM

## 2020-11-02 DIAGNOSIS — C18.9 ADENOCARCINOMA, COLON (H): ICD-10-CM

## 2020-11-02 DIAGNOSIS — C20 RECTAL CANCER (H): ICD-10-CM

## 2020-11-02 DIAGNOSIS — M54.16 LUMBAR RADICULOPATHY: Primary | ICD-10-CM

## 2020-11-02 PROCEDURE — 99442 PR PHYSICIAN TELEPHONE EVALUATION 11-20 MIN: CPT | Mod: 95 | Performed by: FAMILY MEDICINE

## 2020-11-02 PROCEDURE — 62323 NJX INTERLAMINAR LMBR/SAC: CPT | Performed by: PSYCHIATRY & NEUROLOGY

## 2020-11-02 RX ORDER — OXYCODONE HCL 10 MG/1
10 TABLET, FILM COATED, EXTENDED RELEASE ORAL EVERY 12 HOURS
Qty: 60 TABLET | Refills: 0 | Status: SHIPPED | OUTPATIENT
Start: 2020-11-02 | End: 2020-11-02

## 2020-11-02 RX ORDER — POTASSIUM CHLORIDE 1500 MG/1
40 TABLET, EXTENDED RELEASE ORAL 2 TIMES DAILY
Qty: 120 TABLET | Refills: 3 | Status: SHIPPED | OUTPATIENT
Start: 2020-11-02 | End: 2020-11-23

## 2020-11-02 RX ORDER — OXYCODONE HYDROCHLORIDE 5 MG/1
2.5-5 TABLET ORAL EVERY 6 HOURS PRN
Qty: 30 TABLET | Refills: 0 | Status: CANCELLED | OUTPATIENT
Start: 2020-11-02

## 2020-11-02 RX ORDER — OXYCODONE HCL 10 MG/1
10 TABLET, FILM COATED, EXTENDED RELEASE ORAL EVERY 12 HOURS
Qty: 60 TABLET | Refills: 0 | Status: ON HOLD | OUTPATIENT
Start: 2020-11-02 | End: 2020-11-11

## 2020-11-02 ASSESSMENT — PAIN SCALES - GENERAL: PAINLEVEL: WORST PAIN (10)

## 2020-11-02 NOTE — NURSING NOTE
Pre-procedure Intake    Have you been fasting? NA    If yes, for how long? No     Are you taking a prescribed blood thinner such as coumadin, Plavix, Xarelto?    No    If yes, when did you take your last dose? No     Do you take aspirin?  No    If cervical procedure, have you held aspirin for 6 days?   No     Do you have any allergies to contrast dye, iodine, steroid and/or numbing medications?  NO    Are you currently taking antibiotics or have an active infection?  NO    Have you had a fever/elevated temperature within the past week? NO    Are you currently taking oral steroids? NO    Do you have a ? Yes       Are you pregnant or breastfeeding?  NO    Are the vital signs normal?  No    Fernando Collins MA

## 2020-11-02 NOTE — PATIENT INSTRUCTIONS
[facility]   Procedure Discharge Instructions    Today you saw: Dr. Mitzi Urbina     You had an:  Epidural steroid injection          If you were holding your blood thinning medication, please restart taking it: N/A    Be cautious when walking. Numbness and/or weakness in the lower extremities may occur for up to 6-8 hours after the procedure due to effect of the local anesthetic    Do not drive for 6 hours. The effect of the local anesthetic could slow your reflexes.     You may resume your regular activities after 24 hours    Avoid strenuous activity for the first 24 hours    You may shower, however avoid swimming, tub baths or hot tubs for 24 hours following your procedure    You may have a mild to moderate increase in pain for several days following the injection.    It may take up to 14 days for the steroid medication to start working although you may feel the effect as early as a few days after the procedure.       You may use ice packs for 10-15 minutes, 3 to 4 times a day at the injection site for comfort    Do not use heat to painful areas for 6 to 8 hours. This will give the local anesthetic time to wear off and prevent you from accidentally burning your skin.     Unless you have been directed to avoid the use of anti-inflammatory medications (NSAIDS), you may use medications such as ibuprofen, Aleve or Tylenol for pain control if needed.     Possible side effects of steroids that you may experience include flushing, elevated blood pressure, increased appetite, mild headaches and restlessness.  All of these symptoms will get better with time.    If you experience any of the following, call the Pain Clinic during work hours (Mon-Friday 8-4:30 pm) at [phone] or the Provider Line after hours at [phone]:  -Fever over 100 degree F  -Swelling, bleeding, redness, drainage, warmth at the injection site  -Progressive weakness or numbness in your legs  -Loss of bowel or  bladder function  -Unusual new onset of pain that is not improving

## 2020-11-02 NOTE — TELEPHONE ENCOUNTER
Please see message below. Ok to send in new RX and cancel the one here?    RX pended.    Thank you  Nancy PELAYO RN

## 2020-11-02 NOTE — TELEPHONE ENCOUNTER
Reason for Call:  Medication or medication refill:    Do you use a [facility]?  Name of the pharmacy and phone number for the current request:  [facility] [place] [phone]    Name of the medication requested: Oxycontin    Other request: Patient and daughter had phone visit, med was called into [facility], they do not have med, but [facility] in [place] does. Patient is in a lot of pain.    Can we leave a detailed message on this number? YES    Phone number patient can be reached at: Home number on file [phone] (home)    Best Time: Any    Call taken on 11/2/2020 at 10:41 AM by Opal Jeffers

## 2020-11-02 NOTE — PROGRESS NOTES
"Pam Pierce is a 89 year old female who is being evaluated via a billable telephone visit.      The patient has been notified of following:     \"This telephone visit will be conducted via a call between you and your physician/provider. We have found that certain health care needs can be provided without the need for a physical exam.  This service lets us provide the care you need with a short phone conversation.  If a prescription is necessary we can send it directly to your pharmacy.  If lab work is needed we can place an order for that and you can then stop by our lab to have the test done at a later time.    Telephone visits are billed at different rates depending on your insurance coverage. During this emergency period, for some insurers they may be billed the same as an in-person visit.  Please reach out to your insurance provider with any questions.    If during the course of the call the physician/provider feels a telephone visit is not appropriate, you will not be charged for this service.\"    Patient has given verbal consent for Telephone visit?  Yes    What phone number would you like to be contacted at? [phone]    How would you like to obtain your AVS? Mail a copy    Subjective     Pam Pierce is a 89 year old female who presents via phone visit today for the following health issues:    HPI     Patient is a 89 yr old female here for medication refills and hospital follow up. She was admitted in the hospital for a fall in September and she sustained closed amparo fracture of pubic rami it was deemed non surgical.  - she was subsequently transferred to a skilled nursing facility where she was for a month.     She was discharged a few weeks  ago. Her daughter reports that her pain has been unbearable since her discharge and patient is taking oxycodone 5 mg every three hours and also taking Tylenol and also Ibuprofen.     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 She is getting chemotherapy every two weeks.       Medication Followup of Roxicodone    Taking Medication as prescribed: NO, taking 5 mg    Side Effects:  None    Medication Helping Symptoms:  NO, daughter does not feel that 2.5 mg is a strong enough dose. Has been doubling dose. Will be running out today. Is scheduled for lumbar steroid injection today.       Hospital Follow-up Visit:   Daughter would like to do hospital follow up today rather than bring Pt in as scheduled on Friday  Hospital/Nursing Home/IP Rehab Facility: [facility]  Date of Admission: 9/13/20  Date of Discharge: 9/14/20  Reason(s) for Admission: Fractures of left superior and inferior pubic rami  Fracture of left sacral hoa  Physical deconditioning  Gait instability      Was your hospitalization related to COVID-19? No   Problems taking medications regularly:  None  Medication changes since discharge: None  Problems adhering to non-medication therapy:  None    Summary of hospitalization:  [facility] discharge summary reviewed  Diagnostic Tests/Treatments reviewed.  Follow up needed: none  Other Healthcare Providers Involved in Patient s Care:         None  Update since discharge: improved.       Post Discharge Medication Reconciliation: discharge medications reconciled, continue medications without change.  Plan of care communicated with patient                 \"Tia Washington 11/2/20 7:35 AM Note     Reason for call:    Symptom or request:      Daughter percy called concerned about patients pain, she had a fall 3 weeks ago causing a non operable fracture of her pelvis--Closed fracture of multiple rami of left pubis. Also a history of rectal cancer and chronic back pain. She is currently taking:  She reports 2.5 mg is not covering her pain. --she needs to taking 2- 2.5 mg to control pains.     However she will be out of pain medication today. Has an appt this Friday with Dr Thakkar.  " "     Daughter is reporting pain is so severe that she is unable to sit. Asking for increase and an additional refills.      [facility] pharmacy     Encourage family to schedule a virtual vist. --     Best Time:  any     Can we leave a detailed message on this number?  YES      Tia MK  Station \"                   Review of Systems   Constitutional, HEENT, cardiovascular, pulmonary, gi and gu systems are negative, except as otherwise noted.       Objective          Vitals:  No vitals were obtained today due to virtual visit.    healthy, alert and no distress  PSYCH: Alert and oriented times 3; coherent speech, normal   rate and volume, able to articulate logical thoughts, able   to abstract reason, no tangential thoughts, no hallucinations   or delusions  Her affect is normal  RESP: No cough, no audible wheezing, able to talk in full sentences  Remainder of exam unable to be completed due to telephone visits    No results found for this or any previous visit (from the past 24 hour(s)).        Assessment/Plan:    Assessment & Plan     Pam was seen today for back pain.    Diagnoses and all orders for this visit:    Hospital discharge follow-up    Adenocarcinoma, colon (H)    Rectal cancer (H)    Closed fracture of hip, unspecified laterality, initial encounter (H)  -     Discontinue: oxyCODONE (OXYCONTIN) 10 MG 12 hr tablet; Take 1 tablet (10 mg) by mouth every 12 hours maximum 2 tablet(s) per day    Hypokalemia  -     potassium chloride ER (KLOR-CON M) 20 MEQ CR tablet; Take 2 tablets (40 mEq) by mouth 2 times daily            FUTURE APPOINTMENTS:       - Follow-up visit in one week or sooner as needed.    Return in about 1 week (around 11/9/2020), or if symptoms worsen or fail to improve.    Reagan Thakkar MD  [facility]    Phone call duration:  8 minutes                "

## 2020-11-02 NOTE — TELEPHONE ENCOUNTER
Patient's daughter called again and is desperate for prescription to be sent to [facility], patient is out of pain meds. Opal Jeffers on 11/2/2020 at 12:31 PM\

## 2020-11-02 NOTE — PROGRESS NOTES
Pre procedure Diagnosis: lumbar radiculopathy    Post procedure Diagnosis: Same  Procedure performed: caudal epidural steroid injection  Anesthesia: none  Complications: none  Operators: Rosa Urbina MD     Indications:   Pam Pierce is a 89 year old female was sent by Dr. Ashford for low back and radicular symptoms.  They have a history of right worse than left radicular symptoms down the lateral thigh and crossing to the medial knee.  Exam shows positive straight leg raise on the right more so than the left and they have tried conservative treatment including OTC pain medication. Pain is limiting daily function, and thus has not tried PT.      MRI was done on 8/14/20 which showed   1. Degenerative changes of the lumbar spine as detailed above.  2. No evidence for fracture or pathologic bony lesion of the lumbar spine.  3. No significant spinal canal stenosis at any level of the lumbar spine.  4. Moderate-to-severe neural foraminal stenosis on the left at L4-L5 and bilaterally at L5-S1.    Options/alternatives, benefits and risks were discussed with the patient including bleeding, infection, tissue trauma, numbness, weakness, paralysis, spinal cord injury, radiation exposure, headache, reaction to medications including seizure, and effects on the bladder from local anesthetic.  Questions were answered to her satisfaction and she agrees to proceed. Voluntary informed consent was obtained and signed.     Vitals were reviewed:   Yes  Allergies were reviewed:   Yes   Medications were reviewed:   Yes   Pre-procedure pain score:  10/10    Patient unable to lay on her stomach, and has trouble getting on and off of the table.  Therefore we offered her a caudal epidural steroid injection and it was completed while laying in a lateral decubitus position.    Procedure:  After getting informed consent, patient was brought into the procedure suite and was placed in a lateral decubitus position on the procedure table.   A  Pause for the Cause was performed.  Patient was prepped and draped in sterile fashion.     The sacral hiatus and cornua were palpated.  Under AP and lateral fluoroscopic guidance sacral hiatus was identified.  4ml of lidocaine 1% was then injected at the needle entry point to anesthetize the skin. Then a 22 gauge 5 inch quincke type spinal needle was inserted into sacral hiatus.  Once the needle was advanced through the sacral hiatus, the needle angle was decreased to allow entrance into the sacral canal and epidural space.  This was verified in both the AP and lateral view for correct alignment.   The needle was advanced using intermittent fluoroscopy.  After negative aspiration for heme and CSF, 2ml of Omnipaque 300 contrast dye was injected.  The contrast showed good epidural spread, and was verified in both the AP and lateral view. 8ml was wasted.    The injection was then accomplished with 2ml of  0.2% ropivacaine, 10mg of dexamethasone and 2ml of preservative free saline.  Spread was noted up to L5. The needle was withdrawn, patient's back was cleaned, and they were brought to recovery area.      Hemostasis was achieved, the area was cleaned, and bandaids were placed when appropriate.  The patient tolerated the procedure well, and was taken to the recovery room.    Images were saved to PACS.    Post-procedure pain score: 10/10  Follow-up includes:   -f/u phone call in one week  -f/u with referring provider  -given significant difficulty with getting positioned for a procedure, I'm not sure of anything else to offer her for injection.    Rosa Urbina MD  [facility]

## 2020-11-02 NOTE — TELEPHONE ENCOUNTER
Kind of frustrated with this. I sent it to the [facility] . [facility]

## 2020-11-02 NOTE — TELEPHONE ENCOUNTER
Reason for call:    Symptom or request:     Daughter percy called concerned about patients pain, she had a fall 3 weeks ago causing a non operable fracture of her pelvis--Closed fracture of multiple rami of left pubis. Also a history of rectal cancer and chronic back pain. She is currently taking:  She reports 2.5 mg is not covering her pain. --she needs to taking 2- 2.5 mg to control pains.    However she will be out of pain medication today. Has an appt this Friday with Dr Thakkar.      Daughter is reporting pain is so severe that she is unable to sit. Asking for increase and an additional refills.     [facility] pharmacy    Encourage family to schedule a virtual vist. --    Best Time:  any    Can we leave a detailed message on this number?  YES     Tia TERRAZAS  Station

## 2020-11-02 NOTE — NURSING NOTE
Discharge Information    IV Discontiued Time:  NA    Amount of Fluid Infused:  NA    Discharge Criteria = When patient returns to baseline or as per MD order    Consciousness:  Pt is fully awake    Circulation:  BP +/- 20% of pre-procedure level    Respiration:  Patient is able to breathe deeply    O2 Sat:  Patient is able to maintain O2 Sat >92% on room air    Activity:  Moves 4 extremities on command    Ambulation:  Patient is able to stand and walk or stand and pivot into wheelchair    Dressing:  Clean/dry or No Dressing    Notes:   Discharge instructions and AVS given to patient    Patient meets criteria for discharge?  YES    Admitted to PCU?  No    Responsible adult present to accompany patient home?  Yes    Signature/Title:    Manohar Galaviz RN  RN Care Coordinator  [facility] [place]

## 2020-11-03 ENCOUNTER — TELEPHONE (OUTPATIENT)
Dept: FAMILY MEDICINE | Facility: CLINIC | Age: 85
End: 2020-11-03

## 2020-11-03 ENCOUNTER — TELEPHONE (OUTPATIENT)
Dept: PALLIATIVE MEDICINE | Facility: CLINIC | Age: 85
End: 2020-11-03

## 2020-11-03 NOTE — TELEPHONE ENCOUNTER
Dr. Thakkar,    Routing refill request to provider for review/approval because:  Drug not on the [facility] refill protocol Oxycodone.   Would like increase in dose as she has fallen and has increased pain.  Please advise. Kaci MARES RN

## 2020-11-03 NOTE — TELEPHONE ENCOUNTER
Patients daughter called asking to speak to a nurse about her mothers order for PT she stated she wants home PT     [phone]      Kandice Meehan    [facility]

## 2020-11-03 NOTE — TELEPHONE ENCOUNTER
This has already been addressed twice ! I sent the prescription to [facility] in [place] yesterday.

## 2020-11-04 ENCOUNTER — TELEPHONE (OUTPATIENT)
Dept: ONCOLOGY | Facility: CLINIC | Age: 85
End: 2020-11-04

## 2020-11-04 DIAGNOSIS — C20 RECTAL CANCER (H): ICD-10-CM

## 2020-11-04 DIAGNOSIS — G62.9 NEUROPATHY: ICD-10-CM

## 2020-11-04 RX ORDER — GABAPENTIN 100 MG/1
CAPSULE ORAL
Qty: 120 CAPSULE | Refills: 1 | Status: SHIPPED | OUTPATIENT
Start: 2020-11-04 | End: 2020-12-07

## 2020-11-04 NOTE — TELEPHONE ENCOUNTER
I believe this order was unintentional.    I called patient's daughter.  We discussed her challenging procedure, as well as the difficulty with moving to another procedure unless she can be on her stomach.    I advised she schedule follow up with Dr. Ashford after 2 weeks.  We can certainly try a injection for palliative reason given her significant distress an inability to tolerate pain.  Daughter works in L and D at [employer], and asks that we consider letting her join to help with her mom during the procedure.  I do think that would likely be needed if we repeat.    Will speak with Dr. Ashford as well.  PT order cancelled- daughter notified.    Rosa Urbina MD  [facility]

## 2020-11-04 NOTE — TELEPHONE ENCOUNTER
Central Prior Authorization Team   Phone: [phone]      Prior Authorization Approval    Authorization Effective Date: 8/6/2020  Authorization Expiration Date: 11/4/2021  Medication: oxycontin- APPROVED  Approved Dose/Quantity: 60 FOR 30  Reference #:     Insurance Company: [employer] - Phone [phone] Fax [phone]  Expected CoPay:       CoPay Card Available:      Foundation Assistance Needed:    Which Pharmacy is filling the prescription (Not needed for infusion/clinic administered): [facility] [id number] [facility]  Pharmacy Notified: Yes  Patient Notified: Yes (**pt paid cash - pharmacy will issue a refund and notify pt**)

## 2020-11-04 NOTE — TELEPHONE ENCOUNTER
Prior Authorization Retail Medication Request    Medication/Dose: oxycontin  ICD code (if different than what is on RX):    Previously Tried and Failed:  Dilaudid; norco; oxycontin 10 mg; roxicodone 5 mg  Rationale:  Patient has used with success    Insurance Name:  Not provided  Insurance ID:  Not provided  CMM Key: (2 keys provided I believe for the same med)  [id number]; [id number]      Pharmacy Information (if different than what is on RX)  Name:    Phone:

## 2020-11-04 NOTE — TELEPHONE ENCOUNTER
Outreach X1. Left a  requesting call back. Provided call back number.    MARY SheppardN, RN  Care Coordinator  [facility] [place]

## 2020-11-04 NOTE — TELEPHONE ENCOUNTER
"----- Message from Tomasa Diggs MD sent at 11/4/2020  1:07 PM CST -----  Regarding: RE: flu shot  State: we do not offer an egg free vaccination in the cancer clinic but family practice does and we are not allowed to \"borrow\" or cross departments as we are different entities.      She can request it with her pMD  ----- Message -----  From: Akilah Carranza, RN  Sent: 11/4/2020  10:50 AM CST  To: Tomasa Diggs MD, Akilah Carranza RN  Subject: flu shot                                         Hi Dr. Diggs,     This patient's daughter, Karine, is requesting a flu shot for her mother at her visit with you next week. The patient has had severe allergic reaction (almost dies) to previous flu shot. Per pharmacy, we do not offer an egg free vaccination in the cancer clinic but family practice does and we are not allowed to \"borrow\" or cross departments as we are different entities.    Per [place], the CDC recommendations are:    Special Consideration Regarding Egg Allergy    People with egg allergies can receive any licensed, recommended age-appropriate influenza vaccine (IIV, RIV4, or LAIV4) that is otherwise appropriate. People who have a history of severe egg allergy (those who have had any symptom other than hives after exposure to egg) should be vaccinated in a medical setting, supervised by a health care provider who is able to recognize and manage severe allergic reactions. Two completely egg-free (ovalbumin-free) flu vaccine options are available: quadrivalent recombinant vaccine and quadrivalent cell-based vaccine.      1. Are you in agreement with administering a flu vaccination to this patient here in clinic? If yes, are you able to monitor the patient? Will she need premedications?    Or    2. Do you recommend the patient be seen by PMD for the egg-free flu vaccination?    The patient's daughter would like an answer today if possible.    ThanksAkilah"

## 2020-11-04 NOTE — TELEPHONE ENCOUNTER
Spoke with patient's daughter Karine and reviewed Dr. Diggs's recommendation. Karine states understanding and will make an appt with PMD. Advised to call back with further questions or concerns. Direct line provided. Akilah Carranza RN on 11/4/2020 at 2:03 PM

## 2020-11-04 NOTE — TELEPHONE ENCOUNTER
Central Prior Authorization Team   Phone: [phone]      PA Initiation    Medication: oxycontin- INITIATED  Insurance Company: [employer] - Phone [phone] Fax [phone]  Pharmacy Filling the Rx: [facility] - [place] - [id number] [facility]  Filling Pharmacy Phone: [phone]  Filling Pharmacy Fax: [phone]  Start Date: 11/4/2020

## 2020-11-07 ENCOUNTER — APPOINTMENT (OUTPATIENT)
Dept: CT IMAGING | Facility: CLINIC | Age: 85
DRG: 543 | End: 2020-11-07
Attending: FAMILY MEDICINE
Payer: MEDICARE

## 2020-11-07 ENCOUNTER — HOSPITAL ENCOUNTER (INPATIENT)
Facility: CLINIC | Age: 85
LOS: 1 days | Discharge: NURSING, REHAB OR LONG-TERM CARE | DRG: 543 | End: 2020-11-11
Attending: FAMILY MEDICINE | Admitting: INTERNAL MEDICINE
Payer: MEDICARE

## 2020-11-07 ENCOUNTER — APPOINTMENT (OUTPATIENT)
Dept: GENERAL RADIOLOGY | Facility: CLINIC | Age: 85
DRG: 543 | End: 2020-11-07
Attending: FAMILY MEDICINE
Payer: MEDICARE

## 2020-11-07 DIAGNOSIS — M54.41 ACUTE RIGHT-SIDED LOW BACK PAIN WITH RIGHT-SIDED SCIATICA: ICD-10-CM

## 2020-11-07 DIAGNOSIS — S32.9XXD CLOSED DISPLACED FRACTURE OF PELVIS WITH ROUTINE HEALING, UNSPECIFIED PART OF PELVIS, SUBSEQUENT ENCOUNTER: ICD-10-CM

## 2020-11-07 DIAGNOSIS — G47.30 SLEEP APNEA, UNSPECIFIED TYPE: Primary | ICD-10-CM

## 2020-11-07 DIAGNOSIS — S32.502D CLOSED FRACTURE OF LEFT PUBIS WITH ROUTINE HEALING, UNSPECIFIED PORTION OF PUBIS, SUBSEQUENT ENCOUNTER: ICD-10-CM
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
## 2020-11-07 DIAGNOSIS — M54.41 ACUTE BACK PAIN WITH SCIATICA, RIGHT: ICD-10-CM

## 2020-11-07 DIAGNOSIS — M54.9 INTRACTABLE BACK PAIN: ICD-10-CM

## 2020-11-07 DIAGNOSIS — Z20.828 EXPOSURE TO SARS-ASSOCIATED CORONAVIRUS: ICD-10-CM

## 2020-11-07 DIAGNOSIS — M84.48XA BILATERAL SACRAL INSUFFICIENCY FRACTURE, INITIAL ENCOUNTER: ICD-10-CM

## 2020-11-07 DIAGNOSIS — S32.599A CLOSED FRACTURE OF PUBIC RAMUS, UNSPECIFIED LATERALITY, INITIAL ENCOUNTER (H): ICD-10-CM

## 2020-11-07 DIAGNOSIS — G47.00 INSOMNIA, UNSPECIFIED TYPE: ICD-10-CM

## 2020-11-07 DIAGNOSIS — C20 RECTAL CANCER (H): ICD-10-CM

## 2020-11-07 PROCEDURE — 72131 CT LUMBAR SPINE W/O DYE: CPT

## 2020-11-07 PROCEDURE — 74176 CT ABD & PELVIS W/O CONTRAST: CPT

## 2020-11-07 PROCEDURE — U0003 INFECTIOUS AGENT DETECTION BY NUCLEIC ACID (DNA OR RNA); SEVERE ACUTE RESPIRATORY SYNDROME CORONAVIRUS 2 (SARS-COV-2) (CORONAVIRUS DISEASE [COVID-19]), AMPLIFIED PROBE TECHNIQUE, MAKING USE OF HIGH THROUGHPUT TECHNOLOGIES AS DESCRIBED BY CMS-2020-01-R: HCPCS | Performed by: FAMILY MEDICINE

## 2020-11-07 PROCEDURE — 99285 EMERGENCY DEPT VISIT HI MDM: CPT | Performed by: FAMILY MEDICINE

## 2020-11-07 PROCEDURE — 99219 PR INITIAL OBSERVATION CARE,LEVEL II: CPT | Performed by: INTERNAL MEDICINE

## 2020-11-07 PROCEDURE — G0378 HOSPITAL OBSERVATION PER HR: HCPCS

## 2020-11-07 PROCEDURE — 250N000013 HC RX MED GY IP 250 OP 250 PS 637: Performed by: FAMILY MEDICINE

## 2020-11-07 PROCEDURE — 99285 EMERGENCY DEPT VISIT HI MDM: CPT | Mod: 25 | Performed by: FAMILY MEDICINE

## 2020-11-07 PROCEDURE — C9803 HOPD COVID-19 SPEC COLLECT: HCPCS | Performed by: FAMILY MEDICINE

## 2020-11-07 PROCEDURE — 72100 X-RAY EXAM L-S SPINE 2/3 VWS: CPT

## 2020-11-07 PROCEDURE — 250N000013 HC RX MED GY IP 250 OP 250 PS 637: Mod: GY | Performed by: INTERNAL MEDICINE

## 2020-11-07 PROCEDURE — 73502 X-RAY EXAM HIP UNI 2-3 VIEWS: CPT

## 2020-11-07 RX ORDER — AMOXICILLIN 250 MG
2 CAPSULE ORAL 2 TIMES DAILY PRN
Status: DISCONTINUED | OUTPATIENT
Start: 2020-11-07 | End: 2020-11-11 | Stop reason: HOSPADM

## 2020-11-07 RX ORDER — ACETAMINOPHEN 325 MG/1
650 TABLET ORAL EVERY 4 HOURS PRN
Status: DISCONTINUED | OUTPATIENT
Start: 2020-11-07 | End: 2020-11-11 | Stop reason: HOSPADM

## 2020-11-07 RX ORDER — GABAPENTIN 100 MG/1
100 CAPSULE ORAL 2 TIMES DAILY
Status: DISCONTINUED | OUTPATIENT
Start: 2020-11-08 | End: 2020-11-11 | Stop reason: HOSPADM

## 2020-11-07 RX ORDER — PANTOPRAZOLE SODIUM 40 MG/1
40 TABLET, DELAYED RELEASE ORAL
Status: DISCONTINUED | OUTPATIENT
Start: 2020-11-08 | End: 2020-11-11 | Stop reason: HOSPADM

## 2020-11-07 RX ORDER — TIZANIDINE 2 MG/1
2-4 TABLET ORAL EVERY 6 HOURS PRN
Status: DISCONTINUED | OUTPATIENT
Start: 2020-11-07 | End: 2020-11-11 | Stop reason: HOSPADM

## 2020-11-07 RX ORDER — AMOXICILLIN 250 MG
1 CAPSULE ORAL 2 TIMES DAILY PRN
Status: DISCONTINUED | OUTPATIENT
Start: 2020-11-07 | End: 2020-11-11 | Stop reason: HOSPADM

## 2020-11-07 RX ORDER — AMOXICILLIN 250 MG
1 CAPSULE ORAL 2 TIMES DAILY
Status: DISCONTINUED | OUTPATIENT
Start: 2020-11-07 | End: 2020-11-11 | Stop reason: HOSPADM

## 2020-11-07 RX ORDER — NALOXONE HYDROCHLORIDE 0.4 MG/ML
.1-.4 INJECTION, SOLUTION INTRAMUSCULAR; INTRAVENOUS; SUBCUTANEOUS
Status: DISCONTINUED | OUTPATIENT
Start: 2020-11-07 | End: 2020-11-11 | Stop reason: HOSPADM

## 2020-11-07 RX ORDER — SIMVASTATIN 40 MG
40 TABLET ORAL AT BEDTIME
Status: DISCONTINUED | OUTPATIENT
Start: 2020-11-07 | End: 2020-11-11 | Stop reason: HOSPADM

## 2020-11-07 RX ORDER — PROCHLORPERAZINE 25 MG
12.5 SUPPOSITORY, RECTAL RECTAL EVERY 12 HOURS PRN
Status: DISCONTINUED | OUTPATIENT
Start: 2020-11-07 | End: 2020-11-11 | Stop reason: HOSPADM

## 2020-11-07 RX ORDER — PROCHLORPERAZINE MALEATE 5 MG
5 TABLET ORAL EVERY 6 HOURS PRN
Status: DISCONTINUED | OUTPATIENT
Start: 2020-11-07 | End: 2020-11-11 | Stop reason: HOSPADM

## 2020-11-07 RX ORDER — BISACODYL 10 MG
10 SUPPOSITORY, RECTAL RECTAL DAILY PRN
Status: DISCONTINUED | OUTPATIENT
Start: 2020-11-07 | End: 2020-11-11 | Stop reason: HOSPADM

## 2020-11-07 RX ORDER — OXYCODONE HCL 10 MG/1
10 TABLET, FILM COATED, EXTENDED RELEASE ORAL EVERY 12 HOURS
Status: DISCONTINUED | OUTPATIENT
Start: 2020-11-07 | End: 2020-11-11 | Stop reason: HOSPADM

## 2020-11-07 RX ORDER — DICYCLOMINE HYDROCHLORIDE 10 MG/1
10 CAPSULE ORAL DAILY
Status: DISCONTINUED | OUTPATIENT
Start: 2020-11-08 | End: 2020-11-09

## 2020-11-07 RX ORDER — CAPECITABINE 500 MG/1
1000 TABLET, FILM COATED ORAL 2 TIMES DAILY
Status: DISCONTINUED | OUTPATIENT
Start: 2020-11-07 | End: 2020-11-07

## 2020-11-07 RX ORDER — POLYETHYLENE GLYCOL 3350 17 G/17G
17 POWDER, FOR SOLUTION ORAL 2 TIMES DAILY
Status: DISCONTINUED | OUTPATIENT
Start: 2020-11-07 | End: 2020-11-11 | Stop reason: HOSPADM

## 2020-11-07 RX ORDER — ALBUTEROL SULFATE 90 UG/1
2 AEROSOL, METERED RESPIRATORY (INHALATION) EVERY 4 HOURS PRN
Status: DISCONTINUED | OUTPATIENT
Start: 2020-11-07 | End: 2020-11-11 | Stop reason: HOSPADM

## 2020-11-07 RX ORDER — GABAPENTIN 100 MG/1
200 CAPSULE ORAL AT BEDTIME
Status: DISCONTINUED | OUTPATIENT
Start: 2020-11-07 | End: 2020-11-11 | Stop reason: HOSPADM

## 2020-11-07 RX ORDER — AMOXICILLIN 250 MG
2 CAPSULE ORAL 2 TIMES DAILY
Status: DISCONTINUED | OUTPATIENT
Start: 2020-11-07 | End: 2020-11-11 | Stop reason: HOSPADM

## 2020-11-07 RX ORDER — ATENOLOL 25 MG/1
12.5 TABLET ORAL DAILY
Status: DISCONTINUED | OUTPATIENT
Start: 2020-11-08 | End: 2020-11-11 | Stop reason: HOSPADM

## 2020-11-07 RX ORDER — ASPIRIN 81 MG/1
81 TABLET ORAL DAILY
Status: DISCONTINUED | OUTPATIENT
Start: 2020-11-08 | End: 2020-11-11 | Stop reason: HOSPADM

## 2020-11-07 RX ORDER — OXYCODONE HYDROCHLORIDE 5 MG/1
5 TABLET ORAL EVERY 4 HOURS PRN
Qty: 12 TABLET | Refills: 0 | Status: SHIPPED | OUTPATIENT
Start: 2020-11-07 | End: 2020-11-11

## 2020-11-07 RX ORDER — ACETAMINOPHEN 650 MG/1
650 SUPPOSITORY RECTAL EVERY 4 HOURS PRN
Status: DISCONTINUED | OUTPATIENT
Start: 2020-11-07 | End: 2020-11-11 | Stop reason: HOSPADM

## 2020-11-07 RX ORDER — POTASSIUM CHLORIDE 1500 MG/1
40 TABLET, EXTENDED RELEASE ORAL 2 TIMES DAILY
Status: DISCONTINUED | OUTPATIENT
Start: 2020-11-07 | End: 2020-11-11 | Stop reason: HOSPADM

## 2020-11-07 RX ORDER — OXYCODONE HYDROCHLORIDE 5 MG/1
5-10 TABLET ORAL
Status: DISCONTINUED | OUTPATIENT
Start: 2020-11-07 | End: 2020-11-11 | Stop reason: HOSPADM

## 2020-11-07 RX ORDER — HYDROMORPHONE HYDROCHLORIDE 1 MG/ML
0.5 INJECTION, SOLUTION INTRAMUSCULAR; INTRAVENOUS; SUBCUTANEOUS
Status: DISCONTINUED | OUTPATIENT
Start: 2020-11-07 | End: 2020-11-07

## 2020-11-07 RX ORDER — ONDANSETRON 2 MG/ML
4 INJECTION INTRAMUSCULAR; INTRAVENOUS EVERY 6 HOURS PRN
Status: DISCONTINUED | OUTPATIENT
Start: 2020-11-07 | End: 2020-11-11 | Stop reason: HOSPADM

## 2020-11-07 RX ORDER — FLUTICASONE PROPIONATE 110 UG/1
2 AEROSOL, METERED RESPIRATORY (INHALATION) EVERY 12 HOURS
Status: DISCONTINUED | OUTPATIENT
Start: 2020-11-07 | End: 2020-11-07 | Stop reason: CLARIF

## 2020-11-07 RX ORDER — ACETAMINOPHEN 325 MG/1
975 TABLET ORAL EVERY 8 HOURS
Status: DISCONTINUED | OUTPATIENT
Start: 2020-11-07 | End: 2020-11-11 | Stop reason: HOSPADM

## 2020-11-07 RX ORDER — HYDROMORPHONE HYDROCHLORIDE 1 MG/ML
.5-1 INJECTION, SOLUTION INTRAMUSCULAR; INTRAVENOUS; SUBCUTANEOUS
Status: DISCONTINUED | OUTPATIENT
Start: 2020-11-07 | End: 2020-11-11 | Stop reason: HOSPADM

## 2020-11-07 RX ORDER — OXYCODONE HYDROCHLORIDE 5 MG/1
10 TABLET ORAL ONCE
Status: COMPLETED | OUTPATIENT
Start: 2020-11-07 | End: 2020-11-07

## 2020-11-07 RX ORDER — POLYETHYLENE GLYCOL 3350 17 G/17G
17 POWDER, FOR SOLUTION ORAL DAILY
Status: DISCONTINUED | OUTPATIENT
Start: 2020-11-08 | End: 2020-11-11 | Stop reason: HOSPADM

## 2020-11-07 RX ORDER — MORPHINE SULFATE 15 MG/1
15 TABLET ORAL EVERY 4 HOURS PRN
Status: DISCONTINUED | OUTPATIENT
Start: 2020-11-07 | End: 2020-11-07

## 2020-11-07 RX ORDER — ONDANSETRON 4 MG/1
4 TABLET, ORALLY DISINTEGRATING ORAL EVERY 6 HOURS PRN
Status: DISCONTINUED | OUTPATIENT
Start: 2020-11-07 | End: 2020-11-11 | Stop reason: HOSPADM

## 2020-11-07 RX ADMIN — POTASSIUM CHLORIDE 40 MEQ: 20 TABLET, EXTENDED RELEASE ORAL at 21:28

## 2020-11-07 RX ADMIN — SIMVASTATIN 40 MG: 40 TABLET, FILM COATED ORAL at 21:29

## 2020-11-07 RX ADMIN — ACETAMINOPHEN 975 MG: 325 TABLET, FILM COATED ORAL at 20:35

## 2020-11-07 RX ADMIN — OXYCODONE HYDROCHLORIDE 10 MG: 10 TABLET, FILM COATED, EXTENDED RELEASE ORAL at 20:35

## 2020-11-07 RX ADMIN — OXYCODONE HYDROCHLORIDE 10 MG: 5 TABLET ORAL at 12:40

## 2020-11-07 RX ADMIN — GABAPENTIN 200 MG: 100 CAPSULE ORAL at 21:28

## 2020-11-07 RX ADMIN — POLYETHYLENE GLYCOL 3350 17 G: 17 POWDER, FOR SOLUTION ORAL at 21:28

## 2020-11-07 RX ADMIN — OXYCODONE HYDROCHLORIDE 5 MG: 5 TABLET ORAL at 23:33

## 2020-11-07 RX ADMIN — TIZANIDINE 4 MG: 2 TABLET ORAL at 21:30

## 2020-11-07 RX ADMIN — DOCUSATE SODIUM AND SENNOSIDES 1 TABLET: 8.6; 5 TABLET ORAL at 21:44

## 2020-11-07 RX ADMIN — MORPHINE SULFATE 15 MG: 15 TABLET ORAL at 15:51

## 2020-11-07 ASSESSMENT — ENCOUNTER SYMPTOMS
BLOOD IN STOOL: 0
SHORTNESS OF BREATH: 0
NAUSEA: 0
VOMITING: 0
HEADACHES: 0
ABDOMINAL PAIN: 0
SINUS PRESSURE: 0
DIARRHEA: 0
CHILLS: 0
BACK PAIN: 1
DIAPHORESIS: 0
FEVER: 0
CONSTIPATION: 0
SORE THROAT: 0
COUGH: 0
WHEEZING: 0
DYSURIA: 0
PALPITATIONS: 0
FREQUENCY: 0

## 2020-11-07 ASSESSMENT — MIFFLIN-ST. JEOR
SCORE: 792.83
SCORE: 785.63

## 2020-11-07 NOTE — ED NOTES
Pt here with increased lower back pain over the past week, has seen ortho and has known spinal stenosis. Fell 7 weeks ago and had 3 pelvic fractures. Had a steroid injection on Monday and was switched from oxycodone to oxycontin on Monday as well.

## 2020-11-07 NOTE — ED AVS SNAPSHOT
[facility] Emergency Dept  [address] [address]  Phone: [phone]  Fax: [phone]                                    Pam Pierce   MRN: [id number]    Department: [facility] Emergency Dept   Date of Visit: 11/7/2020           After Visit Summary Signature Page    I have received my discharge instructions, and my questions have been answered. I have discussed any challenges I see with this plan with the nurse or doctor.    ..........................................................................................................................................  Patient/Patient Representative Signature      ..........................................................................................................................................  Patient Representative Print Name and Relationship to Patient    ..................................................               ................................................  Date                                   Time    ..........................................................................................................................................  Reviewed by Signature/Title    ...................................................              ..............................................  Date                                               Time          22EPIC Rev 08/18

## 2020-11-07 NOTE — ED NOTES
"    Went to discharge pt to home.  WIth assistance of daughter we tried to get pt up and standing.  This was very difficult and painful for patient.   We assisted pt to standing with walker, and she continued to be in pain, and stating \"I can't do this, I can't do this\".   We took it slow and easy, but with every movement and rest, pain in R hip/pelvic area did not go away, and we then assisted her back into cot.   Pt was then assisted on to a fx pan as she needed to void.   Comfort and reassurance given to daughter and pt.   I have informed MD that pt can not move, stand or ambulate at this time, daughter is not sure what she is suppose to do.    MD will look for bed placement.     "

## 2020-11-07 NOTE — ED PROVIDER NOTES
History     Chief Complaint   Patient presents with     Back Pain     pt medication changed to 10mg of oxycontin on Monday and has increased pain and unable to walk.  hx of colorectal ca, arthritic stenosis.  pt also had steroid injection on Monday.     HPI  Pam Pierce is a 89 year old female who presents with a history of rectal cancer status post colostomy secondary to colonic obstruction and status post palliative radiation and Xeloda, pubic ramus fracture, mild intermittent asthma, hyperlipidemia hypertension and chronic kidney disease stage III who presents here for increased pain in her back.  Has been on MS Contin 10 mg.  Underwent steroid injection Monday.    In reviewing most recent skilled nursing evaluation in the [facility] system.  The patient she had been seen for left pubic ramus fracture left sacral ala fracture after mechanical fall at home and admitted to TCU.  She had advanced to ambulating independently with a walker.  She had been placed on as needed Tylenol and oxycodone.    Also with a history of constipation and bowel regimen has been initiated.      On Monday she underwent a corticosteroid injection there appears to been from her hemorrhoid injection.  She also had oxycodone switched over to OxyContin 10 mg but does not have any breakthrough pain meds other than using her Tylenol and ibuprofen 600 mg that she is continuing to use.  She since then has had progressively increased pain with no new neurologic changes no cauda equina symptoms.  Chronic weakness of the right leg.  She has no fever.  There is no rash in the area of injection.  No swelling.  No repeat trauma or falls.  She notes severe pain    Allergies:  Allergies   Allergen Reactions     Eggs [Chicken-Derived Products (Egg)]      Flu Virus Vaccine Other (See Comments)     With egg base       Problem List:    Patient Active Problem List    Diagnosis Date Noted     Pubic ramus fracture (H) 09/13/2020     Priority: Medium      Abdominal pain 02/16/2020     Priority: Medium     Asymptomatic bacteriuria 02/16/2020     Priority: Medium     Mild intermittent asthma in adult without complication 12/18/2019     Priority: Medium     Rectal cancer (H) 11/19/2019     Priority: Medium     GERD (gastroesophageal reflux disease) 09/03/2014     Priority: Medium     CKD (chronic kidney disease) stage 3, GFR 30-59 ml/min (H) 04/04/2012     Priority: Medium     Hyperlipidemia LDL goal <100 10/31/2010     Priority: Medium     IMPAIRED FASTING GLUCOSE 11/25/2007     Priority: Medium     Urethral stricture due to infection 10/03/2005     Priority: Medium     ureteral stone  Problem list name updated by automated process. Provider to review       Hyperlipidemia 10/03/2005     Priority: Medium     Problem list name updated by automated process. Provider to review       Essential hypertension 10/03/2005     Priority: Medium     Problem list name updated by automated process. Provider to review          Past Medical History:    Past Medical History:   Diagnosis Date     Actinic keratosis      Basal cell carcinoma      Other and unspecified hyperlipidemia      Unspecified asthma(493.90)      Unspecified essential hypertension      Urethral stricture due to unspecified infection        Past Surgical History:    Past Surgical History:   Procedure Laterality Date     ESOPHAGOSCOPY, GASTROSCOPY, DUODENOSCOPY (EGD), COMBINED N/A 2/20/2020    Procedure: ESOPHAGOGASTRODUODENOSCOPY, WITH BIOPSY;  Surgeon: Harrison Brown MD;  Location: [facility]     SURGICAL HISTORY OF -       open reduction of second metatarsal neck fx with internal fixation  cna d closed treatment of metatarsal to 2 and 4 fx     SURGICAL HISTORY OF -       hysterectomy and oophorectomy     SURGICAL HISTORY OF -       lithotripsy       Family History:    Family History   Problem Relation Age of Onset     Cancer - colorectal Mother      Breast Cancer Mother      C.A.D. Father      Cardiovascular Father       Alcohol/Drug Father      Chronic Obstructive Pulmonary Disease Brother      Cancer Son         chest cancer (Thymus)/myesthenia gravis     Musculoskeletal Disorder Son         myesthenia gravis     Cerebrovascular Disease Sister      Chronic Obstructive Pulmonary Disease Brother        Social History:  Marital Status:   [2]  Social History     Tobacco Use     Smoking status: Never Smoker     Smokeless tobacco: Never Used   Substance Use Topics     Alcohol use: Yes     Frequency: Monthly or less     Comment: wine ocass     Drug use: No        Medications:         acetaminophen (TYLENOL) 325 MG tablet       albuterol (PROAIR HFA) 108 (90 Base) MCG/ACT inhaler       aspirin 81 MG EC tablet       atenolol (TENORMIN) 25 MG tablet       CALCIUM + D OR       calcium carbonate (OS-DANIEL) 1500 (600 Ca) MG tablet       capecitabine (XELODA) 500 MG tablet       dicyclomine (BENTYL) 10 MG capsule       FLOVENT  MCG/ACT inhaler       gabapentin (NEURONTIN) 100 MG capsule       magnesium chloride 535 (64 Mg) MG TBEC CR tablet       magnesium oxide (MAG-OX) 400 MG tablet       melatonin 3 MG tablet       MULTI-DAY VITAMINS OR       ondansetron 4 MG PO ODT tab       oxyCODONE (OXYCONTIN) 10 MG 12 hr tablet       pantoprazole (PROTONIX) 40 MG EC tablet       polyethylene glycol (MIRALAX) powder       potassium chloride ER (KLOR-CON M) 20 MEQ CR tablet       Probiotic Product (PROBIOTIC DAILY PO)       simvastatin (ZOCOR) 40 MG tablet          Review of Systems   Constitutional: Negative for chills, diaphoresis and fever.   HENT: Negative for ear pain, sinus pressure and sore throat.    Eyes: Negative for visual disturbance.   Respiratory: Negative for cough, shortness of breath and wheezing.    Cardiovascular: Negative for chest pain and palpitations.   Gastrointestinal: Negative for abdominal pain, blood in stool, constipation, diarrhea, nausea and vomiting.   Genitourinary: Negative for dysuria, frequency and urgency.  "  Musculoskeletal: Positive for back pain.   Skin: Negative for rash.   Neurological: Negative for headaches.   All other systems reviewed and are negative.      Physical Exam   BP: (!) 147/69  Pulse: 68  Temp: 97  F (36.1  C)  Resp: 18  Height: 149.9 cm (4' 11\")  Weight: 46.7 kg (103 lb)  SpO2: 98 %      Physical Exam  Constitutional:       General: She is in acute distress.      Appearance: She is not diaphoretic.   HENT:      Head: Atraumatic.   Eyes:      Conjunctiva/sclera: Conjunctivae normal.   Neck:      Musculoskeletal: Neck supple.   Cardiovascular:      Rate and Rhythm: Normal rate and regular rhythm.      Pulses: Normal pulses.      Heart sounds: No murmur.   Pulmonary:      Effort: Pulmonary effort is normal. No respiratory distress.      Breath sounds: No stridor. No wheezing or rhonchi.   Abdominal:      General: There is no distension.      Tenderness: There is no abdominal tenderness. There is no guarding.   Musculoskeletal:      Right lower leg: No edema.      Left lower leg: No edema.   Skin:     Coloration: Skin is not pale.      Findings: No rash.   Neurological:      General: No focal deficit present.      Mental Status: She is alert.      Cranial Nerves: No cranial nerve deficit.      Motor: No weakness.       Is tender to palpation over the lateral right hip region.  Her lower leg strength is reduced but seems to be limited by pain and when I have her flex her knee and extend against resistance she seems to have improved strength.  Dorsiflexion and plantar flexion against resistance of the great toes normal.  She has intact distal sensation.  She has a numbness that she describes laterally but no inner thigh numbness  There is tenderness to palpation over the lumbar low back midline and also paraspinous and also over the SI joint.  No rashes present.        ED Course        Procedures               Critical Care time:  none               Results for orders placed or performed during the " hospital encounter of 11/07/20 (from the past 24 hour(s))   Lumbar spine XR, 2-3 views    Narrative    LUMBAR SPINE TWO TO THREE VIEWS   11/7/2020 1:46 PM     HISTORY: Atrial fibrillation, orthostatic hypotension, MGUS,  Parkinson's disease, polyneuropathy, chronic kidney disease stage III,  hypothyroidism.    COMPARISON: None.      Impression    IMPRESSION: There is some grade 1 spondylolisthesis at L5-S1 which  appears to be degenerative. There is some minimal retrolisthesis at  L3-L4 and L2-L3. There is mild leftward lateral curvature. Posterior  alignment is normal. Vertebral body heights are relatively maintained.  There is some moderate multilevel degenerative disc and facet disease.    NAS OLMSTEAD MD   Pelvis XR w/ unilateral hip right    Narrative    EXAM: XR PELVIS AND HIP RIGHT 1 VIEW  LOCATION: [facility]  DATE/TIME: 11/7/2020 1:12 PM    INDICATION: Pain.  COMPARISON: CT dated 9/13/2020      Impression    IMPRESSION: Subacute left pubic rami fractures. Left-sided sacral fracture is not visible on x-ray. Bones are demineralized. There is no definitive evidence of an acute right hip fracture. No dislocation.   Abd/pelvis CT - no contrast - Stone Protocol    Narrative    CT ABDOMEN PELVIS W/O CONTRAST 11/7/2020 4:37 PM    CLINICAL HISTORY: severe back pain - while awaiting discharge today -  now unable to walk due to pain  TECHNIQUE: CT scan of the abdomen and pelvis was performed without IV  contrast. Multiplanar reformats were obtained. Dose reduction  techniques were used.  CONTRAST: None.    COMPARISON: CT pelvis 9/13/2020    FINDINGS:   LOWER CHEST: Multiple stable subcentimeter peripheral noncalcified  pulmonary nodules throughout both lungs suggests seen in the lateral  right lower lobe measuring 5 mm series 6-image 101. No pleural  effusion, pneumothorax or focal consolidation.    Interval increase in size of right thyroid hypodensity extending into  the isthmus. Stable exophytic  hypodense nodule in the left lobe of the  thyroid.    HEPATOBILIARY: Cholelithiasis. Unenhanced contours of the liver  unremarkable.    PANCREAS: Normal.    SPLEEN: Normal.    ADRENAL GLANDS: Normal.    KIDNEYS/BLADDER: Normal.    BOWEL: No bowel obstruction. Left lower quadrant diverting colostomy.  Irregularity at the colorectal junction without measurable mass.  Presacral stranding and edema noted.    LYMPH NODES: No abdominal or pelvic lymphadenopathy.    VASCULATURE: Abdominal aorta normal in caliber with extensive  calcified atheromatous plaque.    PELVIC ORGANS: Hysterectomy with the vaginal cuff unremarkable.    OTHER: None.    MUSCULOSKELETAL: Patient noted left hemipelvic fractures demonstrate  mild callus formation with persistent fracture lines demonstrated in  the left anterior acetabular wall, left superior and inferior pubic  rami. Subacute appearing bilateral longitudinally oriented sacral alar  fractures without evidence of extension to the SI articulations.,      Impression    IMPRESSION:   1.  Partial progressive incomplete healing of left hemipelvic  fractures.  2.  Bilateral sacral insufficiency fractures without SI diastasis.  3.  Left lower quadrant diverting colostomy, no bowel obstruction.  4.  Cholelithiasis that CT evidence of acute cholecystitis.  5.  Stable innumerable subcentimeter peripheral noncalcified pulmonary  nodules, indeterminate.    JEET CURTIS MD   Lumbar spine CT w/o contrast    Narrative    CT LUMBAR SPINE WITHOUT CONTRAST  11/7/2020 4:37 PM     HISTORY: Severe back pain - while awaiting discharge today - now  unable to walk due to pain.      TECHNIQUE: Axial images of the lumbar spine were obtained without  intravenous contrast. Multiplanar reformations were performed.  Radiation dose for this scan was reduced using automated exposure  control, adjustment of the mA and/or kV according to patient size, or  iterative reconstruction technique.     COMPARISON: MRI lumbar  spine 8/14/2020, radiographs of the lumbar  spine dated 11/7/2020. Correlation is also made with prior CT pelvis  dated 9/13/2020.    FINDINGS: There are acute on chronic appearing bilateral sacral wing  fractures, probably representing sacral insufficiency fractures.  Specifically, there are areas of linear lucency and cortical  discontinuity involving the bilateral sacral alar. This is  superimposed upon areas of more subacute to chronic appearing  sclerosis involving the sacral wings. There is diffuse osseous  demineralization of the lumbar spine and visualized bony pelvis. No  acute fracture identified involving the lumbar spine. The lumbar  vertebral body heights are maintained. Mild levoconvex curvature  centered at L2-L3. Minimal right lateral listhesis of L1 on L2.  Minimal retrolisthesis of L1 on L2 and L2 on L3 and minimal grade 1  anterolisthesis of L5 on S1.    Multilevel degenerative disc disease including moderate or moderate to  severe disc space narrowing essentially from T12-L1 through L4-L5.  Moderate to advanced multilevel facet arthropathy predominantly in the  mid to lower lumbar spine. Mild to moderate spinal canal narrowing at  L4-L5 and L5-S1. No definite high-grade spinal canal narrowing.  Moderate to severe bilateral L5-S1 neural foraminal stenosis in the  setting of diffuse disc bulge with posterior endplate osteolytic  ridging and facet arthrosis. There is also moderate left foraminal  stenosis at L4-L5 with lesser degrees of neural foraminal narrowing  elsewhere. Atherosclerotic calcifications of the abdominal aorta and  iliac arteries. Please see separate report from CT of the abdomen and  pelvis of same day for additional details regarding extraspinal  findings.      Impression    IMPRESSION:  1. Acute on chronic bilateral sacral wing fractures, likely  representing sacral insufficiency fractures.  2. Diffuse osseous demineralization.  3. Multilevel degenerative disc disease and facet  arthropathy of the  lumbar spine, as described.       Medications - No data to display    Assessments & Plan (with Medical Decision Making)     MDM: Pam Pierce is a 89 year old female presented with a history of rectal cancer status post colostomy last November and status post palliative radiation and Xeloda and with the pubic ramus fracture approximately a month ago and status post TCU admission presenting here with intractable low back pain after having been transitioned to OxyContin 10 mg twice daily and a I believe foraminal stenosis injection done Monday.  She is progressive back pain since that time without red flags no fever..  She was able to ambulate into the department but this was quite painful for her.  While here she already taken Tylenol and ibuprofen earlier and then was given oxycodone here 10 mg which relieved pain.  And her plain film imaging was reassuring without acute changes but the patient after this tried to ambulate out of bed and able to return to her functional status she was walking in with but she could not ambulate due to pain and was true even after additional oral analgesics including morphine sulfate 15 mg.   Had planned for discharge home but this is not possible now.  Will bring in for hospital stay observation possible TCU.  We will plan occupational therapy physical therapy.  Covid swab done.        I have reviewed the nursing notes.    I have reviewed the findings, diagnosis, plan and need for follow up with the patient.       New Prescriptions    No medications on file       Final diagnoses:   Closed fracture of pubic ramus, unspecified laterality, initial encounter (H)   Acute right-sided low back pain with right-sided sciatica - back pain multiple possible causes including - lumbar degeneration with radiculopathy, SI joint dys due to recent fractures.  I suspect the pain is worse due to switching to oxycontin.  would recommend continuing this, but breakthrough pain meds  with oxycodone;.  recommend follow-up rivas asencio and DR. Diggs as well as palliative care.   Rectal cancer (H)       11/7/2020   [facility] EMERGENCY DEPT     Manohar Fernandez MD  11/07/20 1735       Manohar Fernandez MD  11/07/20 1734

## 2020-11-08 ENCOUNTER — APPOINTMENT (OUTPATIENT)
Dept: PHYSICAL THERAPY | Facility: CLINIC | Age: 85
DRG: 543 | End: 2020-11-08
Payer: MEDICARE

## 2020-11-08 PROBLEM — M81.0 AGE RELATED OSTEOPOROSIS: Status: ACTIVE | Noted: 2020-11-08

## 2020-11-08 PROBLEM — J45.20 MILD INTERMITTENT ASTHMA IN ADULT WITHOUT COMPLICATION: Chronic | Status: ACTIVE | Noted: 2019-12-18

## 2020-11-08 PROBLEM — R82.71 ASYMPTOMATIC BACTERIURIA: Status: RESOLVED | Noted: 2020-02-16 | Resolved: 2020-11-08

## 2020-11-08 PROBLEM — R10.9 ABDOMINAL PAIN: Status: RESOLVED | Noted: 2020-02-16 | Resolved: 2020-11-08

## 2020-11-08 PROBLEM — M81.0 AGE RELATED OSTEOPOROSIS: Chronic | Status: ACTIVE | Noted: 2020-11-08

## 2020-11-08 PROBLEM — C20 RECTAL CANCER (H): Chronic | Status: ACTIVE | Noted: 2019-11-19

## 2020-11-08 LAB
ANION GAP SERPL CALCULATED.3IONS-SCNC: 4 MMOL/L (ref 3–14)
BUN SERPL-MCNC: 19 MG/DL (ref 7–30)
CALCIUM SERPL-MCNC: 8.6 MG/DL (ref 8.5–10.1)
CHLORIDE SERPL-SCNC: 108 MMOL/L (ref 94–109)
CO2 SERPL-SCNC: 28 MMOL/L (ref 20–32)
CREAT SERPL-MCNC: 1.09 MG/DL (ref 0.52–1.04)
ERYTHROCYTE [DISTWIDTH] IN BLOOD BY AUTOMATED COUNT: 15.1 % (ref 10–15)
GFR SERPL CREATININE-BSD FRML MDRD: 44 ML/MIN/{1.73_M2}
GLUCOSE SERPL-MCNC: 94 MG/DL (ref 70–99)
HCT VFR BLD AUTO: 32.6 % (ref 35–47)
HGB BLD-MCNC: 10.5 G/DL (ref 11.7–15.7)
LABORATORY COMMENT REPORT: NORMAL
MCH RBC QN AUTO: 33.5 PG (ref 26.5–33)
MCHC RBC AUTO-ENTMCNC: 32.2 G/DL (ref 31.5–36.5)
MCV RBC AUTO: 104 FL (ref 78–100)
PLATELET # BLD AUTO: 282 10E9/L (ref 150–450)
POTASSIUM SERPL-SCNC: 4 MMOL/L (ref 3.4–5.3)
RBC # BLD AUTO: 3.13 10E12/L (ref 3.8–5.2)
SARS-COV-2 RNA SPEC QL NAA+PROBE: NEGATIVE
SARS-COV-2 RNA SPEC QL NAA+PROBE: NORMAL
SODIUM SERPL-SCNC: 140 MMOL/L (ref 133–144)
SPECIMEN SOURCE: NORMAL
SPECIMEN SOURCE: NORMAL
WBC # BLD AUTO: 7 10E9/L (ref 4–11)

## 2020-11-08 PROCEDURE — 36415 COLL VENOUS BLD VENIPUNCTURE: CPT | Performed by: INTERNAL MEDICINE

## 2020-11-08 PROCEDURE — 85027 COMPLETE CBC AUTOMATED: CPT | Performed by: INTERNAL MEDICINE

## 2020-11-08 PROCEDURE — 250N000013 HC RX MED GY IP 250 OP 250 PS 637: Performed by: FAMILY MEDICINE

## 2020-11-08 PROCEDURE — 97140 MANUAL THERAPY 1/> REGIONS: CPT | Mod: GP

## 2020-11-08 PROCEDURE — 97161 PT EVAL LOW COMPLEX 20 MIN: CPT | Mod: GP

## 2020-11-08 PROCEDURE — 99225 PR SUBSEQUENT OBSERVATION CARE,LEVEL II: CPT | Performed by: INTERNAL MEDICINE

## 2020-11-08 PROCEDURE — G0378 HOSPITAL OBSERVATION PER HR: HCPCS

## 2020-11-08 PROCEDURE — 80048 BASIC METABOLIC PNL TOTAL CA: CPT | Performed by: INTERNAL MEDICINE

## 2020-11-08 PROCEDURE — 97116 GAIT TRAINING THERAPY: CPT | Mod: GP

## 2020-11-08 PROCEDURE — 250N000013 HC RX MED GY IP 250 OP 250 PS 637: Performed by: INTERNAL MEDICINE

## 2020-11-08 RX ADMIN — TIZANIDINE 4 MG: 2 TABLET ORAL at 06:56

## 2020-11-08 RX ADMIN — DOCUSATE SODIUM AND SENNOSIDES 1 TABLET: 8.6; 5 TABLET ORAL at 20:33

## 2020-11-08 RX ADMIN — OXYCODONE HYDROCHLORIDE 5 MG: 5 TABLET ORAL at 11:32

## 2020-11-08 RX ADMIN — OXYCODONE HYDROCHLORIDE 5 MG: 5 TABLET ORAL at 23:47

## 2020-11-08 RX ADMIN — OXYCODONE HYDROCHLORIDE 10 MG: 10 TABLET, FILM COATED, EXTENDED RELEASE ORAL at 08:19

## 2020-11-08 RX ADMIN — ACETAMINOPHEN 975 MG: 325 TABLET, FILM COATED ORAL at 20:32

## 2020-11-08 RX ADMIN — TIZANIDINE 4 MG: 2 TABLET ORAL at 22:07

## 2020-11-08 RX ADMIN — GABAPENTIN 200 MG: 100 CAPSULE ORAL at 22:04

## 2020-11-08 RX ADMIN — Medication 1 MG: at 22:04

## 2020-11-08 RX ADMIN — DICYCLOMINE HYDROCHLORIDE 10 MG: 10 CAPSULE ORAL at 08:21

## 2020-11-08 RX ADMIN — GABAPENTIN 100 MG: 100 CAPSULE ORAL at 11:32

## 2020-11-08 RX ADMIN — SIMVASTATIN 40 MG: 40 TABLET, FILM COATED ORAL at 22:04

## 2020-11-08 RX ADMIN — OXYCODONE HYDROCHLORIDE 5 MG: 5 TABLET ORAL at 03:26

## 2020-11-08 RX ADMIN — OXYCODONE HYDROCHLORIDE 5 MG: 5 TABLET ORAL at 18:04

## 2020-11-08 RX ADMIN — DOCUSATE SODIUM AND SENNOSIDES 1 TABLET: 8.6; 5 TABLET ORAL at 08:20

## 2020-11-08 RX ADMIN — GABAPENTIN 100 MG: 100 CAPSULE ORAL at 08:22

## 2020-11-08 RX ADMIN — PANTOPRAZOLE SODIUM 40 MG: 40 TABLET, DELAYED RELEASE ORAL at 07:35

## 2020-11-08 RX ADMIN — POTASSIUM CHLORIDE 40 MEQ: 20 TABLET, EXTENDED RELEASE ORAL at 08:21

## 2020-11-08 RX ADMIN — ACETAMINOPHEN 975 MG: 325 TABLET, FILM COATED ORAL at 03:25

## 2020-11-08 RX ADMIN — POLYETHYLENE GLYCOL 3350 17 G: 17 POWDER, FOR SOLUTION ORAL at 20:32

## 2020-11-08 RX ADMIN — ACETAMINOPHEN 975 MG: 325 TABLET, FILM COATED ORAL at 11:34

## 2020-11-08 RX ADMIN — POTASSIUM CHLORIDE 40 MEQ: 20 TABLET, EXTENDED RELEASE ORAL at 20:33

## 2020-11-08 RX ADMIN — ASPIRIN 81 MG: 81 TABLET ORAL at 08:20

## 2020-11-08 RX ADMIN — Medication 12.5 MG: at 08:21

## 2020-11-08 RX ADMIN — OXYCODONE HYDROCHLORIDE 10 MG: 10 TABLET, FILM COATED, EXTENDED RELEASE ORAL at 20:32

## 2020-11-08 RX ADMIN — OXYCODONE HYDROCHLORIDE 5 MG: 5 TABLET ORAL at 14:04

## 2020-11-08 RX ADMIN — TIZANIDINE 4 MG: 2 TABLET ORAL at 15:45

## 2020-11-08 ASSESSMENT — ACTIVITIES OF DAILY LIVING (ADL): DEPENDENT_IADLS:: TRANSPORTATION

## 2020-11-08 NOTE — H&P
[facility]     History and Physical - Hospitalist Service       Date of Admission:  11/7/2020    Assessment & Plan   Pam Pierce is a 89 year old female admitted on 11/7/2020. She presents with worsening low back pain radiating down her right leg.    Chronic low back pain  Radicular right leg pain  Acute on chronic bilateral sacral wing fractures  History of left superior and inferior pubic rami fractures  X-ray of the lumbar spine demonstrates grade 1 spondylolisthesis at L5-S1 likely degenerative, minimal retrolisthesis at L3-L4 and L2-L3.  Moderate multilevel degenerative disc and facet disease.    X-ray of the right hip demonstrates subacute left pubic rami fractures without acute right hip fracture or dislocation.    CT of the lumbar spine demonstrates an acute on chronic bilateral sacral wing fractures, likely insufficiency fractures.  There is diffuse osseous demineralization and multilevel degenerative disc disease and facet arthropathy of the lumbar spine.  -Orthopedic surgery consult to evaluate for management recommendations  -Pain control with acetaminophen, oxycodone, and IV Dilaudid.  -Continue prior to admission gabapentin.  -Occupational and physical therapy consults.  -Anticipate patient will need TCU at discharge due to significant pain with mobilization.    Mild intermittent asthma  No evidence for acute exacerbation.  Continue prior to admission Flovent and as needed albuterol.     GERD  Continue prior to admission pantoprazole.     Hyperlipidemia  Continue prior to admission simvastatin.     Hypertension  Continue prior to admission atenolol and chlorthalidone.     Rectal cancer  Patient presented in November 2018 with colonic obstruction due to a rectal mass.  The patient underwent a emergency diverting colostomy.  Pathology demonstrated adenocarcinoma.  She has invasion of the superior vagina and posterior margin of the urinary bladder.  Patient was treated with  palliative Xeloda and radiation in February 2020.  She takes demeclocycline for radiation proctitis.  Restaging pelvic MRI in June 2020 demonstrated significant interval decrease in size of the residual distal rectal mass.  The patient is followed by Dr. Diggs.  Cori this evening to complete a 7-day cycle.     Diet: Regular Diet Adult    DVT Prophylaxis: Pneumatic Compression Devices  Clayton Catheter: not present  Code Status: No CPR- Do NOT Intubate      Disposition Plan   Expected discharge: Tomorrow, recommended to transitional care unit once adequate pain management/ tolerating PO medications.  Entered: Jake Pool MD 11/07/2020, 9:24 PM       Jake Pool MD  [facility]     ______________________________________________________________________    Chief Complaint   Chief Complaint   Patient presents with     Back Pain     pt medication changed to 10mg of oxycontin on Monday and has increased pain and unable to walk.  hx of colorectal ca, arthritic stenosis.  pt also had steroid injection on Monday.        History is obtained from the patient    History of Present Illness   Pam Pierce is a 89 year old female who presents with worsening low back pain and right leg radicular pain.  The patient was hospitalized from September 13-14 with fractures of the left sacral ala, left superior pubic rami, and left inferior pubic rami.  She had gotten up in the middle the night to change her colostomy bag and was ambulating without the use of her cane.  She tripped and landed on her left hip.  The patient was discharged to a transitional care unit on oxycodone and acetaminophen for pain control.  The patient was discharged to her daughter's home on October 13.  Unfortunately, patient ran out of pain medication on November 2.  The patient had been taking oxycodone 5 mg every 3 hours as well as Tylenol and ibuprofen.  A prescription for OxyContin 10 mg twice daily was sent to the  patient's local pharmacy.  Also on November 2, the patient underwent a caudal epidural steroid injection.  Patient states that she was unable to lie on her stomach for the procedure and so it was done while she was lying on her side.  The patient continued to have pain following the procedure and believes that it was not successfully injected into the right location.  Patient presented the emergency room today with severe uncontrolled pain in her right sacrum and radiating down right leg to her foot.  Patient states she has numbness in her right foot but no weakness.    The patient denies headache, nasal congestion, runny nose, sore throat, wheezing, cough, or shortness of breath.  Patient denies chest pain, chest pressure, palpitations, lightheadedness, or dizziness.  Patient denies recent syncope, falls, or trauma.  Patient denies nausea, vomiting, diarrhea, melena, hematochezia, constipation, or abdominal pain.  Patient denies dysuria or hematuria.  Patient denies rash, muscle aches, or edema.  Patient denies headache or neck pain.  Patient denies diplopia, dysarthria, dysphagia, incoordination, focal numbness, or unilateral weakness.    Review of Systems    The 10 point Review of Systems is negative other than noted in the HPI or here.     Past Medical History    I have reviewed this patient's medical history and updated it with pertinent information if needed.   Past Medical History:   Diagnosis Date     Actinic keratosis      Basal cell carcinoma      Other and unspecified hyperlipidemia      Unspecified asthma(493.90)      Unspecified essential hypertension      Urethral stricture due to unspecified infection     ureteral stone       Patient Active Problem List    Diagnosis Date Noted     Intractable back pain 11/07/2020     Priority: Medium     Acute right-sided low back pain with right-sided sciatica 11/07/2020     Priority: Medium     Bilateral sacral insufficiency fracture, initial encounter 11/07/2020      Priority: Medium     Pubic ramus fracture (H) 09/13/2020     Priority: Medium     Abdominal pain 02/16/2020     Priority: Medium     Asymptomatic bacteriuria 02/16/2020     Priority: Medium     Mild intermittent asthma in adult without complication 12/18/2019     Priority: Medium     Rectal cancer (H) 11/19/2019     Priority: Medium     GERD (gastroesophageal reflux disease) 09/03/2014     Priority: Medium     CKD (chronic kidney disease) stage 3, GFR 30-59 ml/min (H) 04/04/2012     Priority: Medium     Hyperlipidemia LDL goal <100 10/31/2010     Priority: Medium     IMPAIRED FASTING GLUCOSE 11/25/2007     Priority: Medium     Urethral stricture due to infection 10/03/2005     Priority: Medium     ureteral stone  Problem list name updated by automated process. Provider to review       Hyperlipidemia 10/03/2005     Priority: Medium     Problem list name updated by automated process. Provider to review       Essential hypertension 10/03/2005     Priority: Medium     Problem list name updated by automated process. Provider to review          Past Surgical History   I have reviewed this patient's surgical history and updated it with pertinent information if needed.  Past Surgical History:   Procedure Laterality Date     ESOPHAGOSCOPY, GASTROSCOPY, DUODENOSCOPY (EGD), COMBINED N/A 2/20/2020    Procedure: ESOPHAGOGASTRODUODENOSCOPY, WITH BIOPSY;  Surgeon: Harrison Brown MD;  Location: [facility]     SURGICAL HISTORY OF -       open reduction of second metatarsal neck fx with internal fixation  cna d closed treatment of metatarsal to 2 and 4 fx     SURGICAL HISTORY OF -       hysterectomy and oophorectomy     SURGICAL HISTORY OF -       lithotripsy       Social History   I have reviewed this patient's social history and updated it with pertinent information if needed.  Social History     Tobacco Use     Smoking status: Never Smoker     Smokeless tobacco: Never Used   Substance Use Topics     Alcohol use: Yes     Frequency:  Monthly or less     Comment: wine ocass     Drug use: No       Family History   I have reviewed this patient's family history and updated it with pertinent information if needed.   Family History   Problem Relation Age of Onset     Cancer - colorectal Mother      Breast Cancer Mother      C.A.D. Father      Cardiovascular Father      Alcohol/Drug Father      Chronic Obstructive Pulmonary Disease Brother      Cancer Son         chest cancer (Thymus)/myesthenia gravis     Musculoskeletal Disorder Son         myesthenia gravis     Cerebrovascular Disease Sister      Chronic Obstructive Pulmonary Disease Brother        Prior to Admission Medications   Prior to Admission Medications   Prescriptions Last Dose Informant Patient Reported? Taking?   Cholecalciferol (VITAMIN D-3 PO) 11/7/2020 at am Self Yes Yes   Sig: Take 1 capsule by mouth daily   FLOVENT  MCG/ACT inhaler 11/7/2020 at am Self No Yes   Sig: INHALE TWO PUFFS BY MOUTH TWICE A DAY   MULTI-DAY VITAMINS OR 11/7/2020 at am Self Yes Yes   Sig: Take 1 tablet by mouth daily    acetaminophen (TYLENOL) 325 MG tablet 11/7/2020 at 1200 Self No Yes   Sig: Take 2 tablets (650 mg) by mouth every 4 hours as needed for mild pain   albuterol (PROAIR HFA) 108 (90 Base) MCG/ACT inhaler 11/6/2020 at pm Self No Yes   Sig: Inhale 2 puffs into the lungs every 4 hours as needed   aspirin 81 MG EC tablet 11/7/2020 at am Self Yes Yes   Sig: Take 81 mg by mouth daily   atenolol (TENORMIN) 25 MG tablet 11/7/2020 at am Self No Yes   Sig: Take 0.5 tablets (12.5 mg) by mouth daily   calcium carbonate (OS-DANIEL) 1500 (600 Ca) MG tablet 11/7/2020 at am Self Yes Yes   Sig: Take 1,200 mg by mouth daily   capecitabine (XELODA) 500 MG tablet 11/7/2020 at pm last dose for this order Self No Yes   Sig: Take 1 gram po bid. 7 days on, 7 days off.   dicyclomine (BENTYL) 10 MG capsule 11/7/2020 at am Self No Yes   Sig: Take 1 capsule (10 mg) by mouth 4 times daily as needed (abdominal  pain/cramping) Please fill at patient's request   Patient taking differently: Take 10 mg by mouth daily Please fill at patient's request   gabapentin (NEURONTIN) 100 MG capsule 11/7/2020 at am Self No Yes   Sig: Increase gabapentin to 200 mg q hs, and 100 mg po bid.   magnesium oxide (MAG-OX) 400 MG tablet 11/7/2020 at am Self Yes Yes   Sig: Take 400 mg by mouth 2 times daily    melatonin 3 MG tablet 11/6/2020 at hs Self Yes Yes   Sig: Take 3 mg by mouth At Bedtime Also 1 tab overnight as needed   ondansetron 4 MG PO ODT tab never used Self No No   Sig: Take 1 tablet (4 mg) by mouth every 6 hours as needed for nausea or vomiting   oxyCODONE (OXYCONTIN) 10 MG 12 hr tablet 11/7/2020 at 0900 Self No Yes   Sig: Take 1 tablet (10 mg) by mouth every 12 hours maximum 2 tablet(s) per day   pantoprazole (PROTONIX) 40 MG EC tablet 11/7/2020 at am Self No Yes   Sig: TAKE ONE TABLET BY MOUTH EVERY MORNING BEFORE BREAKFAST   Patient taking differently: Take 40 mg by mouth daily before breakfast    polyethylene glycol (MIRALAX) powder 11/7/2020 at am Self Yes Yes   Sig: Take 1 capful by mouth 2 times daily 8.5 grams daily in the morning    potassium chloride ER (KLOR-CON M) 20 MEQ CR tablet 11/7/2020 at am Self No Yes   Sig: Take 2 tablets (40 mEq) by mouth 2 times daily   simvastatin (ZOCOR) 40 MG tablet 11/6/2020 at hs Self No Yes   Sig: TAKE ONE TABLET BY MOUTH AT BEDTIME   Patient taking differently: Take 40 mg by mouth At Bedtime       Facility-Administered Medications: None     Allergies   Allergies   Allergen Reactions     Eggs [Chicken-Derived Products (Egg)]      Flu Virus Vaccine Other (See Comments)     With egg base       Physical Exam   Vital Signs: Temp: 97  F (36.1  C) Temp src: Temporal BP: (!) 131/110 Pulse: 77   Resp: 18 SpO2: 97 % O2 Device: None (Room air)    Weight: 103 lbs 0 oz    Gen: Well nourished, well developed, resting comfortably in bed, no acute distress  Head: atraumatic normocephalic; sclera  non-injected, anicterric; oral mucosa moist, no lesions, no exudates, normal dentition  Neck: supple without spinal abnormality  Chest: clear to auscultation bilaterally, no wheezes, no rhonchi, no rales.  Cardiovascular: regular rate and rhythm, no gallops or rubs, no murmurs, no edema  Abdomen: bowel sounds normal, no hepatosplenomegaly, no masses, non-tender, non-distended, no guarding, no rebound tenderness  Musculoskeletal: normal muscle mass, normal muscle tone  Skin: no rashes, no chronic venous stasis  Lymph: no lymphadenopathy.  Neuro: cranial nerves II-XII intact, strength in all four extremities normal, reflexes normal, coordination normal.    Data   Data reviewed today: I reviewed all medications, new labs and imaging results over the last 24 hours. I personally reviewed no images or EKG's today.    No lab results found in last 7 days.      Recent Results (from the past 24 hour(s))   Lumbar spine XR, 2-3 views    Narrative    LUMBAR SPINE TWO TO THREE VIEWS   11/7/2020 1:46 PM     HISTORY: Atrial fibrillation, orthostatic hypotension, MGUS,  Parkinson's disease, polyneuropathy, chronic kidney disease stage III,  hypothyroidism.    COMPARISON: None.      Impression    IMPRESSION: There is some grade 1 spondylolisthesis at L5-S1 which  appears to be degenerative. There is some minimal retrolisthesis at  L3-L4 and L2-L3. There is mild leftward lateral curvature. Posterior  alignment is normal. Vertebral body heights are relatively maintained.  There is some moderate multilevel degenerative disc and facet disease.    NAS OLMSTEAD MD   Pelvis XR w/ unilateral hip right    Narrative    EXAM: XR PELVIS AND HIP RIGHT 1 VIEW  LOCATION: [facility]  DATE/TIME: 11/7/2020 1:12 PM    INDICATION: Pain.  COMPARISON: CT dated 9/13/2020      Impression    IMPRESSION: Subacute left pubic rami fractures. Left-sided sacral fracture is not visible on x-ray. Bones are demineralized. There is no definitive  evidence of an acute right hip fracture. No dislocation.   Abd/pelvis CT - no contrast - Stone Protocol    Narrative    CT ABDOMEN PELVIS W/O CONTRAST 11/7/2020 4:37 PM    CLINICAL HISTORY: severe back pain - while awaiting discharge today -  now unable to walk due to pain  TECHNIQUE: CT scan of the abdomen and pelvis was performed without IV  contrast. Multiplanar reformats were obtained. Dose reduction  techniques were used.  CONTRAST: None.    COMPARISON: CT pelvis 9/13/2020    FINDINGS:   LOWER CHEST: Multiple stable subcentimeter peripheral noncalcified  pulmonary nodules throughout both lungs suggests seen in the lateral  right lower lobe measuring 5 mm series 6-image 101. No pleural  effusion, pneumothorax or focal consolidation.    Interval increase in size of right thyroid hypodensity extending into  the isthmus. Stable exophytic hypodense nodule in the left lobe of the  thyroid.    HEPATOBILIARY: Cholelithiasis. Unenhanced contours of the liver  unremarkable.    PANCREAS: Normal.    SPLEEN: Normal.    ADRENAL GLANDS: Normal.    KIDNEYS/BLADDER: Normal.    BOWEL: No bowel obstruction. Left lower quadrant diverting colostomy.  Irregularity at the colorectal junction without measurable mass.  Presacral stranding and edema noted.    LYMPH NODES: No abdominal or pelvic lymphadenopathy.    VASCULATURE: Abdominal aorta normal in caliber with extensive  calcified atheromatous plaque.    PELVIC ORGANS: Hysterectomy with the vaginal cuff unremarkable.    OTHER: None.    MUSCULOSKELETAL: Patient noted left hemipelvic fractures demonstrate  mild callus formation with persistent fracture lines demonstrated in  the left anterior acetabular wall, left superior and inferior pubic  rami. Subacute appearing bilateral longitudinally oriented sacral alar  fractures without evidence of extension to the SI articulations.,      Impression    IMPRESSION:   1.  Partial progressive incomplete healing of left  hemipelvic  fractures.  2.  Bilateral sacral insufficiency fractures without SI diastasis.  3.  Left lower quadrant diverting colostomy, no bowel obstruction.  4.  Cholelithiasis that CT evidence of acute cholecystitis.  5.  Stable innumerable subcentimeter peripheral noncalcified pulmonary  nodules, indeterminate.    JEET CURTIS MD   Lumbar spine CT w/o contrast    Narrative    CT LUMBAR SPINE WITHOUT CONTRAST  11/7/2020 4:37 PM     HISTORY: Severe back pain - while awaiting discharge today - now  unable to walk due to pain.      TECHNIQUE: Axial images of the lumbar spine were obtained without  intravenous contrast. Multiplanar reformations were performed.  Radiation dose for this scan was reduced using automated exposure  control, adjustment of the mA and/or kV according to patient size, or  iterative reconstruction technique.     COMPARISON: MRI lumbar spine 8/14/2020, radiographs of the lumbar  spine dated 11/7/2020. Correlation is also made with prior CT pelvis  dated 9/13/2020.    FINDINGS: There are acute on chronic appearing bilateral sacral wing  fractures, probably representing sacral insufficiency fractures.  Specifically, there are areas of linear lucency and cortical  discontinuity involving the bilateral sacral alar. This is  superimposed upon areas of more subacute to chronic appearing  sclerosis involving the sacral wings. There is diffuse osseous  demineralization of the lumbar spine and visualized bony pelvis. No  acute fracture identified involving the lumbar spine. The lumbar  vertebral body heights are maintained. Mild levoconvex curvature  centered at L2-L3. Minimal right lateral listhesis of L1 on L2.  Minimal retrolisthesis of L1 on L2 and L2 on L3 and minimal grade 1  anterolisthesis of L5 on S1.    Multilevel degenerative disc disease including moderate or moderate to  severe disc space narrowing essentially from T12-L1 through L4-L5.  Moderate to advanced multilevel facet arthropathy  predominantly in the  mid to lower lumbar spine. Mild to moderate spinal canal narrowing at  L4-L5 and L5-S1. No definite high-grade spinal canal narrowing.  Moderate to severe bilateral L5-S1 neural foraminal stenosis in the  setting of diffuse disc bulge with posterior endplate osteolytic  ridging and facet arthrosis. There is also moderate left foraminal  stenosis at L4-L5 with lesser degrees of neural foraminal narrowing  elsewhere. Atherosclerotic calcifications of the abdominal aorta and  iliac arteries. Please see separate report from CT of the abdomen and  pelvis of same day for additional details regarding extraspinal  findings.      Impression    IMPRESSION:  1. Acute on chronic bilateral sacral wing fractures, likely  representing sacral insufficiency fractures.  2. Diffuse osseous demineralization.  3. Multilevel degenerative disc disease and facet arthropathy of the  lumbar spine, as described.    SARBJIT THAPA MD

## 2020-11-08 NOTE — CONSULTS
ORTHOPEDIC CONSULTATION    HPI:  89 y.o. female with known pelvic ring and sacral insufficiency fractures.  She also has bilateral foraminal stenosis at L5/S1.  She presents with worsening pain and inability to walk.  She is 6 weeks out from the first onset of these symptoms with CT evidence of the aforementioned injuries and findings.  With the worsening symptoms, there was a new CT obtained yesterday.  I have been asked by Dr. Hanson to review the findings and render recommendations for care.    I have personally reviewed the following imaging studies:    CT of pelvis:  Partial progressive incomplete healing of left hemipelvic fractures.  Bilateral sacral insufficiency fractures without SI diastasis- these look acute on chronic.    CT of Lumbar spine:  See above.  Plus, there is also severe disc space narrowing T12/L1- L4/L5.  Severe foraminal stenosis at L5/S1 bilaterally.  No spinal canal narrowing, just foraminal stenosis.    Assessment:    1.  Bilateral Sacral Insufficiency Fractures (likely acute on chronic)  2.  Severe Foraminal Stenosis Bilaterally at L5/S1    Plan:    1.  Patient has a stable pelvic ring and sacrum.  She may be safely mobilized, WBAT through physical therapy and proper pain management.  This is not a surgical injury.  Orthopedic surgical follow up is not necessary.  Instead, this is mainly a metabolic problem and is best managed medically through our bone density clinic.  Dr. Torres Munson at [facility] can be contacted at [phone] for bone density work up and follow up.    2.  The spine team can be seen as an outpatient when the patient is able to follow up in an ambulatory nature.  No inpatient consultation for spine is indicated.  I'd recommend Dr. Isiah Maravilla of [facility]  An appointment can be made through [phone].    Thanks and don't hesitate to contact us if there are additional questions.

## 2020-11-08 NOTE — PROGRESS NOTES
Writer completed secondary full skin assessment. Agrees with Desean score and interventions.   Sneha CAMILO RN

## 2020-11-08 NOTE — PROGRESS NOTES
Patient slept off and on during the night. The pain would wake her. She had Her scheduled Oxycontin, 2 Oxycodone 5 mg and 4 mg Xanaflex during the night. This morning she seems brighter and calmer and says the pain is less than on admit. She is using the bedpan to void, colostomy bag is intact with liquid stool.

## 2020-11-08 NOTE — PROGRESS NOTES
[facility]     Medicine Progress Note - Hospitalist Service       Date of Admission:  11/7/2020  Assessment & Plan       Pam Pierce is a 89 year old female admitted on 11/7/2020. She presents with worsening low back pain radiating down her right leg.  She was admitted in 9/2020 with pelvic scral wing fractures, transferred to TCU, improved, and now has sudden worsening for unclear reasons      Chronic low back pain  Radicular right leg pain  Acute on chronic bilateral sacral wing fractures  History of left superior and inferior pubic rami fractures  X-ray of the lumbar spine demonstrates grade 1 spondylolisthesis at L5-S1 likely degenerative, minimal retrolisthesis at L3-L4 and L2-L3.  Moderate multilevel degenerative disc and facet disease.  X-ray of the right hip demonstrates subacute left pubic rami fractures without acute right hip fracture or dislocation.  CT of the lumbar spine demonstrates an acute on chronic bilateral sacral wing fractures, likely insufficiency fractures.  There is diffuse osseous demineralization and multilevel degenerative disc disease and facet arthropathy of the lumbar spine.  -Orthopedic surgery consult to evaluate for management recommendations  -Pain control with acetaminophen, oxycontin, oxycodone, and IV Dilaudid.  -Continue prior to admission gabapentin.  -Occupational and physical therapy consults.  -Anticipate patient will need TCU at discharge due to significant pain with mobilization.    Mild intermittent asthma  No evidence for acute exacerbation.    -Continue prior to admission Flovent and as needed albuterol.     GERD  -Continue prior to admission pantoprazole.     Hyperlipidemia  -Continue prior to admission simvastatin.     Hypertension  -Continue prior to admission atenolol and chlorthalidone.     Rectal cancer  Patient presented in November 2018 with colonic obstruction due to a rectal mass.  The patient underwent a emergency diverting  colostomy.  Pathology demonstrated adenocarcinoma.  She has invasion of the superior vagina and posterior margin of the urinary bladder.  Patient was treated with palliative Xeloda and radiation in February 2020.  She takes demeclocycline for radiation proctitis.  Restaging pelvic MRI in June 2020 demonstrated significant interval decrease in size of the residual distal rectal mass.  The patient is followed by Dr. Diggs.  -Xeloda evening 11/7/2020 completed a 7-day cycle.       Diet: Regular Diet Adult    DVT Prophylaxis: Pneumatic Compression Devices  Clayton Catheter: not present  Code Status: No CPR- Do NOT Intubate           Disposition Plan   Expected discharge: 1-2 days, recommended to transitional care unit once adequate pain management/ tolerating PO medications.  Entered: Miller Hanson MD 11/08/2020, 1:49 PM       The patient's care was discussed with the Bedside Nurse.    Miller Hanson MD  Hospitalist Service  [facility]   Contact information available via [facility] Paging/Directory    ______________________________________________________________________    Interval History   Still has significant pain  Does not now why things worsened, was rather sudden on awakening in the morning    Data reviewed today: I reviewed all medications, new labs and imaging results over the last 24 hours. I personally reviewed no images or EKG's today.    Physical Exam   Vital Signs: Temp: 98.2  F (36.8  C) Temp src: Oral BP: 125/67 Pulse: 64   Resp: 20 SpO2: 97 % O2 Device: None (Room air)    Weight: 101 lbs 6.59 oz  Constitutional: awake, alert, cooperative, mild distress with movements, and appears stated age    Data   CMP  Recent Labs   Lab 11/08/20  0448      POTASSIUM 4.0   CHLORIDE 108   CO2 28   ANIONGAP 4   GLC 94   BUN 19   CR 1.09*   GFRESTIMATED 44*   GFRESTBLACK 51*   DANIEL 8.6     CBC  Recent Labs   Lab 11/08/20  0448   WBC 7.0   RBC 3.13*   HGB 10.5*   HCT 32.6*   *   MCH 33.5*    MCHC 32.2   RDW 15.1*

## 2020-11-08 NOTE — PROGRESS NOTES
Patient woke at 2315 with pain, requesting more pain medication. She rates it 6-7/10. Oxycodone 5 mg given.

## 2020-11-08 NOTE — PROGRESS NOTES
"[facility] ADMISSION NOTE    Patient admitted to [id number] cart from emergency room. Patient was accompanied by transport tech.     Verbal SBAR report received from Barbara CEJA prior to patient arrival.     Patient trasferred to bed via air marci. Patient alert and oriented X 3. Pain is not well controlled.  Medication(s) being used: narcotic analgesics including morphine. 0-10 Pain Scale: 10. Admission vital signs: Blood pressure (!) 131/110, pulse 77, temperature 97  F (36.1  C), temperature source Temporal, resp. rate 18, height 1.499 m (4' 11\"), weight 46.7 kg (103 lb), SpO2 97 %, not currently breastfeeding. Patient was oriented to plan of care, call light, bed controls, tv, telephone, bathroom and visiting hours.     Risk Assessment    The following safety risks were identified during admission: fall. Yellow risk band applied: YES.     Skin Initial Assessment    This writer admitted this patient and completed a full skin assessment and Desean score in the Adult PCS flowsheet. Appropriate interventions initiated as needed.     Secondary skin check completed by Sneha GABRIEL RN.         Education    Patient has a [place] to Observation order: Yes  Observation education completed and documented: Yes      Radha العلي RN    "

## 2020-11-08 NOTE — PROGRESS NOTES
Physical Therapy Evaluation       11/08/20 1100   Quick Adds   Type of Visit Initial PT Evaluation   Living Environment   People in home spouse  (has dementia and will need LT placement per pt)   Current Living Arrangements house   Self-Care   Equipment Currently Used at Home walker, rolling   Disability/Function   Hearing Difficulty or Deaf no   Wear Glasses or Blind yes   Concentrating, Remembering or Making Decisions Difficulty yes   Concentration Management Pt reported having R LBP after her pelvic fx and now having new pain on the L. She reiterated her new pain was on the L after questioning which side it was.  But upon examination, she indicated the new pain was on her R.   Difficulty Communicating no   Difficulty Eating/Swallowing no   Walking or Climbing Stairs Difficulty yes   Walking or Climbing Stairs ambulation difficulty, requires equipment;ambulation difficulty, assistance 1 person;transferring difficulty, assistance 1 person;stair climbing difficulty, dependent   Doing Errands Independently Difficulty (such as shopping) no   Fall history within last six months yes   Number of times patient has fallen within last six months 1   General Information   Onset of Illness/Injury or Date of Surgery 11/07/20   Referring Physician Dr Fernandez   Patient/Family Therapy Goals Statement (PT) open to going to a TCU before returning home   Pertinent History of Current Problem (include personal factors and/or comorbidities that impact the POC) Per H&P:  Pam Pierce is a 89 year old female who presents with worsening low back pain and right leg radicular pain.  The patient was hospitalized from September 13-14 with fractures of the left sacral ala, left superior pubic rami, and left inferior pubic rami.  She had gotten up in the middle the night to change her colostomy bag and was ambulating without the use of her cane.  She tripped and landed on her left hip.  The patient was discharged to a transitional care unit on  oxycodone and acetaminophen for pain control.  The patient was discharged to her daughter's home on October 13.  Unfortunately, patient ran out of pain medication on November 2.  The patient had been taking oxycodone 5 mg every 3 hours as well as Tylenol and ibuprofen.  A prescription for OxyContin 10 mg twice daily was sent to the patient's local pharmacy.  Also on November 2, the patient underwent a caudal epidural steroid injection.  Patient states that she was unable to lie on her stomach for the procedure and so it was done while she was lying on her side.  The patient continued to have pain following the procedure and believes that it was not successfully injected into the right location.  Patient presented the emergency room today with severe uncontrolled pain in her right sacrum and radiating down right leg to her foot.  Patient states she has numbness in her right foot but no weakness.   Weight-Bearing Status - LLE weight-bearing as tolerated   Weight-Bearing Status - RLE weight-bearing as tolerated   General Observations Palpation: tender B SI jts and R piriformis; L PSIS deep   Cognition   Orientation Status (Cognition) oriented x 4   Affect/Mental Status (Cognition) anxious   Follows Commands (Cognition) follows one-step commands   Safety Deficit (Cognition) ability to follow commands;awareness of need for assistance;insight into deficits/self-awareness   Pain Assessment   Patient Currently in Pain Yes, see Vital Sign flowsheet  (LBP R>L rated at 4/10 at rest, 10/10 with mvmt)   Posture    Posture Forward head position;Protracted shoulders   Range of Motion (ROM)   ROM Comment WFL for age   Strength   Strength Comments WFL for age   Bed Mobility   Bed Mobility supine-sit;sit-supine   Supine-Sit Painter (Bed Mobility) moderate assist (50% patient effort)   Sit-Supine Painter (Bed Mobility) moderate assist (50% patient effort);2 person assist   Impairments Contributing to Impaired Bed Mobility  pain   Transfers   Transfers sit-stand transfer   Sit-Stand Transfer   Sit-Stand Warren (Transfers) minimum assist (75% patient effort);2 person assist   Assistive Device (Sit-Stand Transfers) walker, front-wheeled   Gait/Stairs (Locomotion)   Warren Level (Gait) contact guard   Assistive Device (Gait) walker, front-wheeled   Distance in Feet (Required for LE Total Joints) 2 steps fwd and back   Pattern (Gait) step-to   Deviations/Abnormal Patterns (Gait) antalgic;base of support, wide;stride length decreased   Comment (Gait/Stairs) limited by pain   Balance   Balance Comments good with RW   Clinical Impression   Criteria for Skilled Therapeutic Intervention yes, treatment indicated   PT Diagnosis (PT) B SI jt pain and R buttock pain   Influenced by the following impairments intractable pain   Functional limitations due to impairments mobility and gait   Clinical Presentation Stable/Uncomplicated   Clinical Presentation Rationale clinical judgement   Clinical Decision Making (Complexity) low complexity   Therapy Frequency (PT) Daily   Predicted Duration of Therapy Intervention (days/wks) 3 days   Planned Therapy Interventions (PT) bed mobility training;gait training;ROM (range of motion);strengthening   Risk & Benefits of therapy have been explained evaluation/treatment results reviewed;care plan/treatment goals reviewed;risks/benefits reviewed;participants included;patient   PT Discharge Planning    PT Discharge Recommendation (DC Rec) Transitional Care Facility   PT Rationale for DC Rec increased need for assistance   PT Brief overview of current status  Transfers with min A of 1-2, amb with RW 2 steps fwd and 2 steps back. Limited by LBP and R buttock pain.   Total Evaluation Time   Total Evaluation Time (Minutes) 15     See Care Plan for Goals.    Opal Carranza PT

## 2020-11-08 NOTE — ED NOTES
Patient has  [place] to Observation  order. Patient has been given Patient Bill of Rights, Observation brochure and  What does Observation mean to me  forms.  Patient has been given the opportunity to ask questions about observation status and their plan of care.  Patient has been oriented to the observation room, bathroom, and call light is in place.    Merle Robles RN

## 2020-11-08 NOTE — PROGRESS NOTES
Patient is crying due to low back and right leg pain. She says her foot is numb although she can feel the nurse touching it. Repositioned for comfort, pillows used, scheduled pain meds given Oxycontin and Tylenol. Patient declined ice. Muscle relaxant may be indicated as well. Patient is alert and oriented and uses the call light appropriately. Bed alarm is on for safety. Monitor and treat pain.

## 2020-11-08 NOTE — CONSULTS
Care Management Initial Consult    General Information  Assessment completed with: PatientPam  Type of CM/SW Visit: Initial Assessment  Primary Care Provider verified and updated as needed: Yes   Readmission within the last 30 days: no previous admission in last 30 days      Reason for Consult: discharge planning  Advance Care Planning: Advance Care Planning Reviewed: present on chart          Communication Assessment  Patient's communication style: spoken language (English or Bilingual)    Hearing Difficulty or Deaf: no   Wear Glasses or Blind: yes    Cognitive  Cognitive/Neuro/Behavioral: WDL                      Living Environment:   People in home: spouse  Gonzales  Current living Arrangements: house      Able to return to prior arrangements: yes       Family/Social Support:  Care provided by: self  Provides care for: spouse  Marital Status:   Children          Description of Support System: Supportive;Involved    Support Assessment: Adequate family and caregiver support    Current Resources:   Skilled Home Care Services:  none  Community Resources: None  Equipment currently used at home: walker, rolling  Supplies currently used at home: None    Employment/Financial:  Employment Status: retired        Financial Concerns: No concerns identified           Lifestyle & Psychosocial Needs:        Socioeconomic History     Marital status:      Spouse name: Not on file     Number of children: Not on file     Years of education: Not on file     Highest education level: Not on file   Occupational History     Employer: RETIRED     Tobacco Use     Smoking status: Never Smoker     Smokeless tobacco: Never Used   Substance and Sexual Activity     Alcohol use: Yes     Frequency: Monthly or less     Comment: wine ocass     Drug use: No     Sexual activity: Yes     Partners: Male       Functional Status:  Prior to admission patient needed assistance:   Dependent ADLs:: Ambulation-walker  Dependent IADLs::  Transportation  Assesssment of Functional Status: Not at baseline with ADL Functioning;Not at baseline with mobility            Values/Beliefs:  Spiritual, Cultural Beliefs, Roman Catholic Practices, Values that affect care: no               Additional Information:  Spoke with patient via phone, introduced self and role. Patient currently lives in a home in [place] with her , Gonzales.  She is the primary caregiver for her  with dementia.  She was hospitalized 9/13-14 at [facility] following a fall.  She was discharged to [facility] (Admission phone: [phone] Main phone: [phone] Fax: [phone]) for rehab.  She discharged home 10/14 with home care services (currently discontinued).  Patient reports she was doing well at home following TCU stay, she was managing her mobility and pain using a walker.  Patient states she ran out of her pain medication and became less mobile, requiring admission.      Patient's son and daughter-in-law live 6 blocks away.  They are involved in care and currently caring for patient's .  Family is working on transitioning patient's  to memory care hopefully this week, per patient.  Patient states her daughter, Karine, who is a RN at [employer] manages all her and her husbands coordination and is very supportive.     Discussed discharge plans and therapy recommendations.  Patient would like to return home if able, she states she would need to be able to walk.  Discussed therapy recommendations and concern for safety.  Patient agreeable to have plan B for TCU if needed.  Referrals pending at [facility] (Admission phone: [phone] Main phone: [phone] Fax: [phone]) and [facility] (Admissions phone: [phone] Main phone: [phone] Fax: [phone]) and [facility] (Phone: [phone] Fax: [phone]).      Left message with patient's daughter, Karine at patient's request to  discuss discharge plans.      Patient is current with clinic care coordination.  Hand off will be made upon discharge.      Maci HERNANDEZN RN  Inpatient Care Coordinator  [facility] [phone]  [facility] [phone]

## 2020-11-09 ENCOUNTER — APPOINTMENT (OUTPATIENT)
Dept: PHYSICAL THERAPY | Facility: CLINIC | Age: 85
DRG: 543 | End: 2020-11-09
Payer: MEDICARE

## 2020-11-09 ENCOUNTER — PATIENT OUTREACH (OUTPATIENT)
Dept: ONCOLOGY | Facility: CLINIC | Age: 85
End: 2020-11-09

## 2020-11-09 LAB
ANION GAP SERPL CALCULATED.3IONS-SCNC: 3 MMOL/L (ref 3–14)
BUN SERPL-MCNC: 20 MG/DL (ref 7–30)
CALCIUM SERPL-MCNC: 9 MG/DL (ref 8.5–10.1)
CHLORIDE SERPL-SCNC: 112 MMOL/L (ref 94–109)
CO2 SERPL-SCNC: 28 MMOL/L (ref 20–32)
CREAT SERPL-MCNC: 1.06 MG/DL (ref 0.52–1.04)
ERYTHROCYTE [DISTWIDTH] IN BLOOD BY AUTOMATED COUNT: 15.3 % (ref 10–15)
GFR SERPL CREATININE-BSD FRML MDRD: 46 ML/MIN/{1.73_M2}
GLUCOSE SERPL-MCNC: 94 MG/DL (ref 70–99)
HCT VFR BLD AUTO: 33.4 % (ref 35–47)
HGB BLD-MCNC: 10.7 G/DL (ref 11.7–15.7)
MCH RBC QN AUTO: 33.4 PG (ref 26.5–33)
MCHC RBC AUTO-ENTMCNC: 32 G/DL (ref 31.5–36.5)
MCV RBC AUTO: 104 FL (ref 78–100)
PLATELET # BLD AUTO: 277 10E9/L (ref 150–450)
POTASSIUM SERPL-SCNC: 3.8 MMOL/L (ref 3.4–5.3)
RBC # BLD AUTO: 3.2 10E12/L (ref 3.8–5.2)
SODIUM SERPL-SCNC: 143 MMOL/L (ref 133–144)
WBC # BLD AUTO: 8.9 10E9/L (ref 4–11)

## 2020-11-09 PROCEDURE — 99225 PR SUBSEQUENT OBSERVATION CARE,LEVEL II: CPT | Performed by: PHYSICIAN ASSISTANT

## 2020-11-09 PROCEDURE — 250N000013 HC RX MED GY IP 250 OP 250 PS 637: Performed by: FAMILY MEDICINE

## 2020-11-09 PROCEDURE — G0378 HOSPITAL OBSERVATION PER HR: HCPCS

## 2020-11-09 PROCEDURE — 80048 BASIC METABOLIC PNL TOTAL CA: CPT | Performed by: INTERNAL MEDICINE

## 2020-11-09 PROCEDURE — 250N000013 HC RX MED GY IP 250 OP 250 PS 637: Performed by: INTERNAL MEDICINE

## 2020-11-09 PROCEDURE — 36415 COLL VENOUS BLD VENIPUNCTURE: CPT | Performed by: INTERNAL MEDICINE

## 2020-11-09 PROCEDURE — 97530 THERAPEUTIC ACTIVITIES: CPT | Mod: GP | Performed by: PHYSICAL THERAPIST

## 2020-11-09 PROCEDURE — 97110 THERAPEUTIC EXERCISES: CPT | Mod: GP | Performed by: PHYSICAL THERAPIST

## 2020-11-09 PROCEDURE — 85027 COMPLETE CBC AUTOMATED: CPT | Performed by: INTERNAL MEDICINE

## 2020-11-09 PROCEDURE — 99207 PR CDG-CODE CATEGORY CHANGED: CPT | Performed by: PHYSICIAN ASSISTANT

## 2020-11-09 RX ORDER — DICYCLOMINE HYDROCHLORIDE 10 MG/1
10 CAPSULE ORAL 2 TIMES DAILY WITH MEALS
Status: DISCONTINUED | OUTPATIENT
Start: 2020-11-09 | End: 2020-11-11 | Stop reason: HOSPADM

## 2020-11-09 RX ADMIN — OXYCODONE HYDROCHLORIDE 5 MG: 5 TABLET ORAL at 06:45

## 2020-11-09 RX ADMIN — GABAPENTIN 100 MG: 100 CAPSULE ORAL at 13:37

## 2020-11-09 RX ADMIN — GABAPENTIN 100 MG: 100 CAPSULE ORAL at 09:12

## 2020-11-09 RX ADMIN — DICYCLOMINE HYDROCHLORIDE 10 MG: 10 CAPSULE ORAL at 20:20

## 2020-11-09 RX ADMIN — DICYCLOMINE HYDROCHLORIDE 10 MG: 10 CAPSULE ORAL at 09:19

## 2020-11-09 RX ADMIN — SIMVASTATIN 40 MG: 40 TABLET, FILM COATED ORAL at 22:11

## 2020-11-09 RX ADMIN — TIZANIDINE 4 MG: 2 TABLET ORAL at 15:42

## 2020-11-09 RX ADMIN — OXYCODONE HYDROCHLORIDE 5 MG: 5 TABLET ORAL at 20:18

## 2020-11-09 RX ADMIN — PANTOPRAZOLE SODIUM 40 MG: 40 TABLET, DELAYED RELEASE ORAL at 06:44

## 2020-11-09 RX ADMIN — ACETAMINOPHEN 975 MG: 325 TABLET, FILM COATED ORAL at 20:19

## 2020-11-09 RX ADMIN — OXYCODONE HYDROCHLORIDE 10 MG: 10 TABLET, FILM COATED, EXTENDED RELEASE ORAL at 20:26

## 2020-11-09 RX ADMIN — POTASSIUM CHLORIDE 40 MEQ: 20 TABLET, EXTENDED RELEASE ORAL at 20:19

## 2020-11-09 RX ADMIN — ACETAMINOPHEN 975 MG: 325 TABLET, FILM COATED ORAL at 04:42

## 2020-11-09 RX ADMIN — DOCUSATE SODIUM AND SENNOSIDES 1 TABLET: 8.6; 5 TABLET ORAL at 09:13

## 2020-11-09 RX ADMIN — DOCUSATE SODIUM AND SENNOSIDES 2 TABLET: 8.6; 5 TABLET ORAL at 20:19

## 2020-11-09 RX ADMIN — POTASSIUM CHLORIDE 40 MEQ: 20 TABLET, EXTENDED RELEASE ORAL at 09:13

## 2020-11-09 RX ADMIN — OXYCODONE HYDROCHLORIDE 10 MG: 10 TABLET, FILM COATED, EXTENDED RELEASE ORAL at 09:13

## 2020-11-09 RX ADMIN — POLYETHYLENE GLYCOL 3350 17 G: 17 POWDER, FOR SOLUTION ORAL at 20:20

## 2020-11-09 RX ADMIN — GABAPENTIN 200 MG: 100 CAPSULE ORAL at 22:11

## 2020-11-09 RX ADMIN — ASPIRIN 81 MG: 81 TABLET ORAL at 09:12

## 2020-11-09 RX ADMIN — ACETAMINOPHEN 975 MG: 325 TABLET, FILM COATED ORAL at 13:37

## 2020-11-09 RX ADMIN — OXYCODONE HYDROCHLORIDE 5 MG: 5 TABLET ORAL at 04:42

## 2020-11-09 RX ADMIN — POLYETHYLENE GLYCOL 3350 17 G: 17 POWDER, FOR SOLUTION ORAL at 09:12

## 2020-11-09 RX ADMIN — TIZANIDINE 4 MG: 2 TABLET ORAL at 06:45

## 2020-11-09 RX ADMIN — OXYCODONE HYDROCHLORIDE 5 MG: 5 TABLET ORAL at 13:37

## 2020-11-09 RX ADMIN — Medication 12.5 MG: at 09:19

## 2020-11-09 ASSESSMENT — MIFFLIN-ST. JEOR: SCORE: 823.63

## 2020-11-09 NOTE — PROGRESS NOTES
Spoke with patient's daughter, Karine. Writer was informed patient is in the hospital and will be going to a TCU today or tomorrow and will not be making her appointment with Dr. Diggs on Tuesday, 11/10/2020. Patient's daughter was wondering if there were labs that should be drawn since she will be missing her lab appointment as well. Writer told daughter to give us a call back when things get settled and when the patient is feeling better and back home.     Writer sent a message to Dr. Diggs asking about labs.     Elzbieta Ybarra, RN BSN

## 2020-11-09 NOTE — PROGRESS NOTES
Pt and pt's family wondering what discharge is looking like for today. Hoping [facility] will take patient and thought possibly it would be today. Left message for Care Transitions to update family and writer.

## 2020-11-09 NOTE — PROGRESS NOTES
"Patient Called RN in and was sobbing. She said \"I can't do this anymore\". She was referring to pain and lying in bed and \"what will the outcome be anyway\"? Patient given pain med and muscle relaxants, she used the bed pan and mood seemed to improve a short time later. She has a palliative care consult ordered.   "

## 2020-11-09 NOTE — PROGRESS NOTES
Care Management Discharge Note    Discharge Date: 11/09/20       Discharge Disposition: Skilled Nursing Facilty      Discharge Transportation: family or friend will provide;agency    Private pay costs discussed: Not applicable    PAS Confirmation Code:       PAS-RR    Per DHS regulation, CTS team completed and submitted PAS-RR to [place] Board on Aging Direct Connect via the [employer]. CTS team advised SNF and they are aware a PAS-RR has been submitted.     CTS team reviewed with pt or health care agent that they may be contacted for a follow up appointment within 10 days of hospital discharge if SNF stay is <30 days. Contact information for [employer] was also provided.     Pt or health care agent verbalized understanding.     PAS-RR # [id number]    Patient/family educated on Medicare website which has current facility and service quality ratings: yes    Education Provided on the Discharge Plan:  yes  Persons Notified of Discharge Plans: pt, family, MD, TCU  Patient/Family in Agreement with the Plan: yes    Handoff Referral Completed: Yes    Additional Information:      This writer spoke with dtKarine herrera, she reported that pt will require TCU on discharge. Pt and family's first choice is   [facility] (Admission phone: [phone] Main phone: [phone] Fax: [phone]), 2nd choice is [facility] (Admissions phone: [phone] Main phone: [phone] Fax: [phone]), 3rd choice was [facility] (Phone: [phone] Fax: [phone]). Pt was accepted at [facility], however; family is wanting her to go elsewhere.     This writer spoke with [facility] admission team, they are having staffing issues d/t COVID so they are unable to accept pt.     [facility] cont to review.     Family will transport pt on discharge.       LLUVIA Clayton   [facility] [phone]

## 2020-11-09 NOTE — PROGRESS NOTES
Patient called to report her colostomy bag burst. Patient does her won colostomy care at home. Bag was partially detatched from skin. Bag changed and patient cleaned up. Ostomy is healthy cherry red.

## 2020-11-09 NOTE — PLAN OF CARE
OT: Per discussion in rounds pt with safe discharge plan in place. Will defer IP OT evaluation to next level of care (TCU).

## 2020-11-09 NOTE — UTILIZATION REVIEW
"Admission Status; Secondary Review Determination     Admission Date: 11/7/2020 12:20 PM       Under the authority of the Utilization Management Committee, the utilization review process indicated a secondary review on the above patient.  The review outcome is based on review of the medical records, discussions with staff, and applying clinical experience noted on the date of the review.          (x) Observation Status Appropriate - This patient does not meet hospital inpatient criteria and is placed in observation status. If this patient's primary payer is Medicare and was admitted as an inpatient, Condition Code 44 should be used and patient status changed to \"observation\".       RATIONALE FOR DETERMINATION      Brief clinical presentation, information copied from the chart, abbreviated and edited for relevant content:  Patient is waiting for placement. No criteria for inpatient  Pam Pierce is a 89 year old female admitted on 11/7/2020. She presents\ed with worsening low back pain radiating down her right leg. Found with Acute on chronic bilateral sacral wing fractures  With a History of left superior and inferior pubic rami fractures. X-ray of the lumbar spine demonstrates grade 1 spondylolisthesis at L5-S1 likely degenerative, minimal retrolisthesis at L3-L4 and L2-L3.  Moderate multilevel degenerative disc and facet disease.  X-ray of the right hip demonstrates subacute left pubic rami fractures without acute right hip fracture or dislocation.  CT of the lumbar spine demonstrates an acute on chronic bilateral sacral wing fractures, likely insufficiency fractures.  There is diffuse osseous demineralization and multilevel degenerative disc disease and facet arthropathy of the lumbar spine. Non surgical. Supportive cares.    The information on this document is developed by the utilization review team in order for the business office to ensure compliance.  This only denotes the appropriateness of proper " admission status and does not reflect the quality of care rendered.         The definitions of Inpatient Status and Observation Status used in making the determination above are those provided in the CMS Coverage Manual, Chapter 1 and Chapter 6, section 70.4.      Sincerely,     Terri Hernandez MD   Utilization Review/ Case Management  [facility].

## 2020-11-09 NOTE — PROGRESS NOTES
[facility]     Medicine Progress Note - Hospitalist Service       Date of Admission:  11/7/2020  Assessment & Plan             Pam Pierce is a 89 year old female admitted on 11/7/2020. She presents with worsening low back pain radiating down her right leg.  She was admitted in 9/2020 with pelvic sacral wing fractures, transferred to TCU, improved, and now has sudden worsening for unclear reasons.    Chronic low back pain  Radicular right leg pain  Acute on chronic bilateral sacral wing fractures  History of left superior and inferior pubic rami fractures  Chronic back pain and foraminal stenosis, saw Dr. Maravilla (ortho spine) a few months ago, recommended EFRAIN which was delayed due to a fall on 9/13/20. Acquired sacral and pelvic fractures was in TCU for a month, had improvement in ambulation and pain. Discharged and had EFRAIN on 11/2/20. Now progressively worsening sciatic-like pain for the past week.    X-ray of the lumbar spine demonstrates grade 1 spondylolisthesis at L5-S1 likely degenerative, minimal retrolisthesis at L3-L4 and L2-L3.  Moderate multilevel degenerative disc and facet disease.  X-ray of the right hip demonstrates subacute left pubic rami fractures without acute right hip fracture or dislocation.  CT of the lumbar spine demonstrates an acute on chronic bilateral sacral wing fractures, likely insufficiency fractures.  There is diffuse osseous demineralization and multilevel degenerative disc disease and facet arthropathy of the lumbar spine.    Patient's daughter is concerned her acute worsening over the past week may be related to her recent EFRAIN on 11/2/20, requests discussion or recommendations from Dr. Maravilla.    - Orthopedic surgery consulted 11/8, see note from Dr. Miranda (non-surgical pelvic / sacral fractures, outpatient follow-up with Dr. Maravilla for spinal foraminal stenosis)  - Pain control with scheduled acetaminophen, OxyContin, oxycodone, and IV Dilaudid.  -  Continue prior to admission gabapentin.  - Occupational and physical therapy consults  - Patient will require TCU placement, appreciate  assistance finding placement    ADDENDUM 4:45 PM   Discussed case with ortho spine, who had reviewed imaging previously. Does not feel it is likely that the EFRAIN caused current worsening pain, thought the sacral fracture may be putting pressure on the nerves causing the increased pain. If pain control is poor, could consider oral or IV steroids in addition to pain control options already utilized. [facility] does not do inpatient spine injections, so that is not an option during this hospital stay.     Mild intermittent asthma  No evidence for acute exacerbation.    -Continue prior to admission Flovent and as needed albuterol.     GERD  -Continue prior to admission pantoprazole.     Hyperlipidemia  -Continue prior to admission simvastatin.     Hypertension  -Continue prior to admission atenolol and chlorthalidone.     Rectal cancer  Patient presented in November 2018 with colonic obstruction due to a rectal mass.  The patient underwent a emergency diverting colostomy.  Pathology demonstrated adenocarcinoma.  She has invasion of the superior vagina and posterior margin of the urinary bladder.  Patient was treated with palliative Xeloda and radiation in February 2020.  She takes demeclocycline for radiation proctitis.  Restaging pelvic MRI in June 2020 demonstrated significant interval decrease in size of the residual distal rectal mass.  The patient is followed by Dr. Diggs.  -Xeloda evening 11/7/2020 completed a 7-day cycle.  - Routine colostomy cares       Diet: Regular Diet Adult    DVT Prophylaxis: Low Risk/Ambulatory with no VTE prophylaxis indicated  Clayton Catheter: not present  Code Status: No CPR- Do NOT Intubate           Disposition Plan   Expected discharge: likely tomorrow, recommended to transitional care unit once safe disposition plan/ TCU bed available and and  follow-up plan established.  Entered: Marcella Milton PA-C 11/09/2020, 1:08 PM       The patient's care was discussed with the Attending Physician, Dr. Kiara Flannery, Patient and Patient's Family.    Marcella Milton PA-C  Hospitalist Service  [facility]   Contact information available via [facility] Paging/Directory    ______________________________________________________________________    Interval History   Continues to have severe pain, currently rated 4 /10.  Pain starts in hip and radiates down leg into feet.   Had complications with her colostomy bag this morning, now cleaned up and functioning properly. No stool changes from baseline.   Family is concerned that recent EFRAIN one week ago possibly caused worsening pain - prior to EFRAIN she had been walking and ambulating well, pain well controlled.     Data reviewed today: I reviewed all medications, new labs and imaging results over the last 24 hours. I personally reviewed no images or EKG's today.    Physical Exam   Vital Signs: Temp: 98.4  F (36.9  C) Temp src: Axillary BP: 120/57 Pulse: 69   Resp: 20 SpO2: 94 % O2 Device: None (Room air)    Weight: 109 lbs 12.63 oz    General appearance: Awake, alert. Appears uncomfortable and in pain. Pleasant and conversational, speaking in full sentences.  CV: Regular rhythm & rate, no murmurs. No edema. Peripheral pulses intact.  Respiratory: Moving air well bilaterally, no wheezing, crackles, or rhonchi.  GI: Non-distended, soft, nontender to palpation. No rebound or guarding. Normoactive bowel sounds. Colostomy present, brown stool, no melena / hematochezia.   Skin: Warm, dry, no rashes or ecchymoses. No mottling of skin.  Musculoskeletal / extremities: Pain with active range of motion of right lower extremity. No obvious abnormalities. Distal CMS intact.  Neurologic: No focal deficits.      Data   Recent Labs   Lab 11/09/20  0455 11/08/20  0448   WBC 8.9 7.0   HGB 10.7* 10.5*   *  104*    282    140   POTASSIUM 3.8 4.0   CHLORIDE 112* 108   CO2 28 28   BUN 20 19   CR 1.06* 1.09*   ANIONGAP 3 4   DANIEL 9.0 8.6   GLC 94 94     No results found for this or any previous visit (from the past 24 hour(s)).  Medications       acetaminophen  975 mg Oral Q8H     aspirin  81 mg Oral Daily     atenolol  12.5 mg Oral Daily     dicyclomine  10 mg Oral Daily     fluticasone  1 puff Inhalation Daily     gabapentin  100 mg Oral BID     gabapentin  200 mg Oral At Bedtime     [START ON 11/10/2020] influenza recomb quadrivalent PF  0.5 mL Intramuscular Prior to discharge     oxyCODONE  10 mg Oral Q12H     pantoprazole  40 mg Oral QAM AC     polyethylene glycol  17 g Oral BID     polyethylene glycol  17 g Oral Daily     potassium chloride ER  40 mEq Oral BID     senna-docusate  1 tablet Oral BID    Or     senna-docusate  2 tablet Oral BID     simvastatin  40 mg Oral At Bedtime

## 2020-11-10 ENCOUNTER — TELEPHONE (OUTPATIENT)
Dept: ONCOLOGY | Facility: CLINIC | Age: 85
End: 2020-11-10

## 2020-11-10 PROCEDURE — G0008 ADMIN INFLUENZA VIRUS VAC: HCPCS | Performed by: PHYSICIAN ASSISTANT

## 2020-11-10 PROCEDURE — G0378 HOSPITAL OBSERVATION PER HR: HCPCS

## 2020-11-10 PROCEDURE — 250N000013 HC RX MED GY IP 250 OP 250 PS 637: Performed by: INTERNAL MEDICINE

## 2020-11-10 PROCEDURE — 36415 COLL VENOUS BLD VENIPUNCTURE: CPT | Performed by: INTERNAL MEDICINE

## 2020-11-10 PROCEDURE — 250N000011 HC RX IP 250 OP 636: Performed by: PHYSICIAN ASSISTANT

## 2020-11-10 PROCEDURE — 99224 PR SUBSEQUENT OBSERVATION CARE,LEVEL I: CPT | Performed by: INTERNAL MEDICINE

## 2020-11-10 PROCEDURE — 90682 RIV4 VACC RECOMBINANT DNA IM: CPT | Performed by: PHYSICIAN ASSISTANT

## 2020-11-10 PROCEDURE — 250N000012 HC RX MED GY IP 250 OP 636 PS 637: Performed by: INTERNAL MEDICINE

## 2020-11-10 PROCEDURE — 250N000013 HC RX MED GY IP 250 OP 250 PS 637: Performed by: FAMILY MEDICINE

## 2020-11-10 PROCEDURE — 82378 CARCINOEMBRYONIC ANTIGEN: CPT | Performed by: INTERNAL MEDICINE

## 2020-11-10 RX ORDER — PREDNISONE 20 MG/1
40 TABLET ORAL DAILY
Status: DISCONTINUED | OUTPATIENT
Start: 2020-11-10 | End: 2020-11-11 | Stop reason: HOSPADM

## 2020-11-10 RX ADMIN — POLYETHYLENE GLYCOL 3350 17 G: 17 POWDER, FOR SOLUTION ORAL at 20:29

## 2020-11-10 RX ADMIN — DOCUSATE SODIUM AND SENNOSIDES 1 TABLET: 8.6; 5 TABLET ORAL at 20:29

## 2020-11-10 RX ADMIN — GABAPENTIN 100 MG: 100 CAPSULE ORAL at 12:12

## 2020-11-10 RX ADMIN — FLUTICASONE FUROATE 1 PUFF: 200 POWDER RESPIRATORY (INHALATION) at 08:22

## 2020-11-10 RX ADMIN — PANTOPRAZOLE SODIUM 40 MG: 40 TABLET, DELAYED RELEASE ORAL at 08:04

## 2020-11-10 RX ADMIN — GABAPENTIN 100 MG: 100 CAPSULE ORAL at 08:05

## 2020-11-10 RX ADMIN — OXYCODONE HYDROCHLORIDE 5 MG: 5 TABLET ORAL at 08:03

## 2020-11-10 RX ADMIN — DICYCLOMINE HYDROCHLORIDE 10 MG: 10 CAPSULE ORAL at 17:22

## 2020-11-10 RX ADMIN — POTASSIUM CHLORIDE 40 MEQ: 20 TABLET, EXTENDED RELEASE ORAL at 20:29

## 2020-11-10 RX ADMIN — DICYCLOMINE HYDROCHLORIDE 10 MG: 10 CAPSULE ORAL at 08:16

## 2020-11-10 RX ADMIN — ACETAMINOPHEN 975 MG: 325 TABLET, FILM COATED ORAL at 12:12

## 2020-11-10 RX ADMIN — POTASSIUM CHLORIDE 40 MEQ: 20 TABLET, EXTENDED RELEASE ORAL at 08:05

## 2020-11-10 RX ADMIN — GABAPENTIN 200 MG: 100 CAPSULE ORAL at 22:33

## 2020-11-10 RX ADMIN — SIMVASTATIN 40 MG: 40 TABLET, FILM COATED ORAL at 22:33

## 2020-11-10 RX ADMIN — ACETAMINOPHEN 975 MG: 325 TABLET, FILM COATED ORAL at 03:54

## 2020-11-10 RX ADMIN — OXYCODONE HYDROCHLORIDE 5 MG: 5 TABLET ORAL at 03:54

## 2020-11-10 RX ADMIN — OXYCODONE HYDROCHLORIDE 5 MG: 5 TABLET ORAL at 20:30

## 2020-11-10 RX ADMIN — PREDNISONE 40 MG: 20 TABLET ORAL at 12:12

## 2020-11-10 RX ADMIN — OXYCODONE HYDROCHLORIDE 10 MG: 10 TABLET, FILM COATED, EXTENDED RELEASE ORAL at 20:30

## 2020-11-10 RX ADMIN — ACETAMINOPHEN 975 MG: 325 TABLET, FILM COATED ORAL at 20:30

## 2020-11-10 RX ADMIN — ASPIRIN 81 MG: 81 TABLET ORAL at 08:05

## 2020-11-10 RX ADMIN — Medication 12.5 MG: at 08:16

## 2020-11-10 RX ADMIN — OXYCODONE HYDROCHLORIDE 5 MG: 5 TABLET ORAL at 15:32

## 2020-11-10 RX ADMIN — OXYCODONE HYDROCHLORIDE 10 MG: 10 TABLET, FILM COATED, EXTENDED RELEASE ORAL at 08:05

## 2020-11-10 RX ADMIN — POLYETHYLENE GLYCOL 3350 17 G: 17 POWDER, FOR SOLUTION ORAL at 08:04

## 2020-11-10 RX ADMIN — INFLUENZA A VIRUS A/HAWAII/70/2019 (H1N1) RECOMBINANT HEMAGGLUTININ ANTIGEN, INFLUENZA A VIRUS A/MINNESOTA/41/2019 (H3N2) RECOMBINANT HEMAGGLUTININ ANTIGEN, INFLUENZA B VIRUS B/WASHINGTON/02/2019 RECOMBINANT HEMAGGLUTININ ANTIGEN, AND INFLUENZA B VIRUS B/PHUKET/3073/2013 RECOMBINANT HEMAGGLUTININ ANTIGEN 0.5 ML: 45; 45; 45; 45 INJECTION INTRAMUSCULAR at 12:20

## 2020-11-10 ASSESSMENT — MIFFLIN-ST. JEOR: SCORE: 811.63

## 2020-11-10 NOTE — PROGRESS NOTES
[facility]     Medicine Progress Note - Hospitalist Service       Date of Admission:  11/7/2020  Assessment & Plan             Pam Pierce is a 89 year old female admitted on 11/7/2020. She presents with worsening low back pain radiating down her right leg.  She was admitted in 9/2020 with pelvic sacral wing fractures, transferred to TCU, improved, and now has sudden worsening for unclear reasons.    Chronic low back pain  Radicular right leg pain  Acute on chronic bilateral sacral wing fractures  History of left superior and inferior pubic rami fractures  Chronic back pain and foraminal stenosis, saw Dr. Maravilla (ortho spine) a few months ago, recommended EFRAIN which was delayed due to a fall on 9/13/20. Acquired sacral and pelvic fractures was in TCU for a month, had improvement in ambulation and pain. Discharged and had EFRAIN on 11/2/20. Now progressively worsening sciatic-like pain for the past week.    X-ray of the lumbar spine demonstrates grade 1 spondylolisthesis at L5-S1 likely degenerative, minimal retrolisthesis at L3-L4 and L2-L3.  Moderate multilevel degenerative disc and facet disease.  X-ray of the right hip demonstrates subacute left pubic rami fractures without acute right hip fracture or dislocation.  CT of the lumbar spine demonstrates an acute on chronic bilateral sacral wing fractures, likely insufficiency fractures.  There is diffuse osseous demineralization and multilevel degenerative disc disease and facet arthropathy of the lumbar spine.    Patient's daughter is concerned her acute worsening over the past week may be related to her recent EFRAIN on 11/2/20, requests discussion or recommendations from Dr. Maravilla.    - Orthopedic surgery consulted 11/8, see note from Dr. Miranda (non-surgical pelvic / sacral fractures, outpatient follow-up with Dr. Maravilla for spinal foraminal stenosis)  - Pain control with scheduled acetaminophen, OxyContin, oxycodone, and IV Dilaudid.  -  Continue prior to admission gabapentin.  - Occupational and physical therapy consults  - Patient will require TCU placement, appreciate  assistance finding placement    ADDENDUM 4:45 PM   Discussed case with ortho spine, who had reviewed imaging previously. Does not feel it is likely that the EFRAIN caused current worsening pain, thought the sacral fracture may be putting pressure on the nerves causing the increased pain. If pain control is poor, could consider oral or IV steroids in addition to pain control options already utilized. [facility] does not do inpatient spine injections, so that is not an option during this hospital stay.     Continues to have a lot of pain with minimal movement. Will start prednisone taper. Discussed with pt and daughter.    Mild intermittent asthma  No evidence for acute exacerbation.    -Continue prior to admission Flovent and as needed albuterol.     GERD  -Continue prior to admission pantoprazole.     Hyperlipidemia  -Continue prior to admission simvastatin.     Hypertension  -Continue prior to admission atenolol and chlorthalidone.     Rectal cancer  Patient presented in November 2018 with colonic obstruction due to a rectal mass.  The patient underwent a emergency diverting colostomy.  Pathology demonstrated adenocarcinoma.  She has invasion of the superior vagina and posterior margin of the urinary bladder.  Patient was treated with palliative Xeloda and radiation in February 2020.  She takes demeclocycline for radiation proctitis.  Restaging pelvic MRI in June 2020 demonstrated significant interval decrease in size of the residual distal rectal mass.  The patient is followed by Dr. Diggs.  -Xeloda evening 11/7/2020 completed a 7-day cycle.  - Routine colostomy cares    Dispo  To TCU in AM       Diet: Regular Diet Adult    DVT Prophylaxis: Low Risk/Ambulatory with no VTE prophylaxis indicated  Clayton Catheter: not present  Code Status: No CPR- Do NOT Intubate            Disposition Plan   Expected discharge: likely tomorrow, recommended to transitional care unit once safe disposition plan/ TCU bed available and and follow-up plan established.  Entered: Kiara Flannery MD 11/10/2020, 3:06 PM       The patient's care was discussed with the Attending Physician, Dr. Kiara Flannery, Patient and Patient's Family.    Kiara Flannery MD  Hospitalist Service  [facility]   Contact information available via [facility] Paging/Directory    ______________________________________________________________________    Interval History   Continues to have pain with movement. Pt excited to start prednisone taper.     Data reviewed today: I reviewed all medications, new labs and imaging results over the last 24 hours. I personally reviewed no images or EKG's today.    Physical Exam   Vital Signs: Temp: 97.9  F (36.6  C) Temp src: Oral BP: 114/68 Pulse: 68   Resp: 18 SpO2: 98 % O2 Device: None (Room air)    Weight: 107 lbs 2.3 oz    GENERAL APPEARANCE: healthy, alert and no distress  EYES: conjunctiva clear, eyes grossly normal  HENT: external ears and nose normal   NECK: supple, no masses or adenopathy  RESP: lungs clear to auscultation - no rales, rhonchi or wheezes  CV: regular rate and rhythm, normal S1 S2, no S3 or S4 and no murmur, click or rub   ABDOMEN: soft, nontender, no HSM or masses and bowel sounds normal  MS: no clubbing, cyanosis; no edema  SKIN: clear without significant rashes or lesions  NEURO: -non-focal moves all 4 extr      Data   Recent Labs   Lab 11/09/20  0455 11/08/20  0448   WBC 8.9 7.0   HGB 10.7* 10.5*   * 104*    282    140   POTASSIUM 3.8 4.0   CHLORIDE 112* 108   CO2 28 28   BUN 20 19   CR 1.06* 1.09*   ANIONGAP 3 4   DANIEL 9.0 8.6   GLC 94 94     No results found for this or any previous visit (from the past 24 hour(s)).  Medications       acetaminophen  975 mg Oral Q8H     aspirin  81 mg Oral Daily     atenolol  12.5 mg Oral Daily      dicyclomine  10 mg Oral BID w/meals     fluticasone  1 puff Inhalation Daily     gabapentin  100 mg Oral BID     gabapentin  200 mg Oral At Bedtime     oxyCODONE  10 mg Oral Q12H     pantoprazole  40 mg Oral QAM AC     polyethylene glycol  17 g Oral BID     polyethylene glycol  17 g Oral Daily     potassium chloride ER  40 mEq Oral BID     predniSONE  40 mg Oral Daily     senna-docusate  1 tablet Oral BID    Or     senna-docusate  2 tablet Oral BID     simvastatin  40 mg Oral At Bedtime

## 2020-11-10 NOTE — PLAN OF CARE
Major Shift Events:  Patient rested between cares. Reported increase in gas and gas-related discomfort. Reported adequate pain control with current medication regimen. Blanchable redness on sacrum-mepilex in place.   Plan: Continue to monitor and notify MD of any changes.   For vital signs and complete assessments, please see documentation flowsheets.

## 2020-11-10 NOTE — PROGRESS NOTES
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  Spoke with patient and daughter, Karine via phone.  Confirmed plans to discharge to TCU when ready with both patient and Karine.      Patient has been accepted at [facility] (Phone: [phone] Fax: [phone]) for discharge today.  Karine states she prefers [facility].      Spoke with admission team multiple times @ [facility] (Admissions phone: [phone] Main phone: [phone] Fax: [phone]).  They are currently working to confirm if SNF is able to bill medicare for stay.  Rosalie @ [facility] states they have all clinical documents needed to make determination.      Discussed above with MD.  Discharge deferred to 11/11 per Dr. Flannery.      Family will plan to transport patient upon discharge.      Care Transitions staff discussed current COVID 19 pandemic and Medicare waiver of the traditional 3 day stay requirement. Referred patient to medicare.gov, insurance provider, and admissions department at accepting facility for further questions or concerns on coverage for SNF stay.    1. This patient has been evacuated from a nursing home in the emergency area? no  2. This patient is being discharged from a hospital (in the emergency area) in order to provide care to more seriously ill patients? yes  3. This patient  needs SNF care as a result of the emergency, regardless of whether he/she was in a hospital/nursing home prior to this stay? no      Maci HERNANDEZN RN  Inpatient Care Coordinator  [facility] [phone]  [facility] [phone]

## 2020-11-10 NOTE — TELEPHONE ENCOUNTER
Called ICU and spoke with nurse regarding adding on CEA to labs. Nurse will add on.     Elzbieta Ybarra RN BSN    Note from 11/9/2020  Spoke with patient's daughter, Karine. Writer was informed patient is in the hospital and will be going to a TCU today or tomorrow and will not be making her appointment with Dr. Diggs on Tuesday, 11/10/2020. Patient's daughter was wondering if there were labs that should be drawn since she will be missing her lab appointment as well. Writer told daughter to give us a call back when things get settled and when the patient is feeling better and back home.      Writer sent a message to Dr. Diggs asking about labs.      Elzbieta Ybarra RN BSN

## 2020-11-10 NOTE — PLAN OF CARE
Physical Therapy Discharge Summary    Reason for therapy discharge:    Discharged to transitional care facility.    Progress towards therapy goal(s). See goals on Care Plan in [facility] electronic health record for goal details.  Goals partially met.  Barriers to achieving goals:   discharge from facility.    Therapy recommendation(s):    Continued therapy is recommended.  Rationale/Recommendations:  continued PT at TCU to improve pt's mobility.

## 2020-11-10 NOTE — UTILIZATION REVIEW
Admission Status; Secondary Review Determination   11/7/2020 12:20 PM    Under the authority of the Utilization Management Committee, the utilization review process indicated a secondary review on the above patient. The review outcome is based on review of the medical records, discussions with staff, and applying clinical experience noted on the date of the review.     (x) Inpatient Status Appropriate - This patient's medical care is consistent with medical management for inpatient care and reasonable inpatient medical practice.     RATIONALE FOR DETERMINATION     89 year old female admitted on 11/7/2020. She presents with worsening low back pain radiating down her right leg. Ortho consulted and felt sacral fracture may be putting pressure on the nerves causing the increased pain. Patient continuing to have pain despite pain medications requiring further treatment.  Started on steroids today to help further with pain.    The severity of illness, intensity of service provided, expected LOS and risk for adverse outcome make the care complex, high risk and appropriate for hospital admission.     At the time of admission with the information available to the attending physician, more than 2 nights Hospital complex care was anticipated, based on patient risk of adverse outcome if treated as outpatient and complex care required.  Inpatient admission is appropriate based on the Medicare guidelines.  The information on this document is developed by the utilization review team in order for the business office to ensure compliance. This only denotes the appropriateness of proper admission status and does not reflect the quality of care rendered.   The definitions of Inpatient Status and Observation Status used in making the determination above are those provided in the CMS Coverage Manual, Chapter 1 and Chapter 6, section 70.4.   Sincerely,   Ana Laura Dean MD  Physician Advisor  [facility]

## 2020-11-10 NOTE — TELEPHONE ENCOUNTER
Karine pt's daughter requesting to speak with Dr Urbina regarding her mothers declining health since she had her injection. She can be reached at [phone].      Ashlee ESTES    [facility] [place]

## 2020-11-11 ENCOUNTER — PATIENT OUTREACH (OUTPATIENT)
Dept: CARE COORDINATION | Facility: CLINIC | Age: 85
End: 2020-11-11

## 2020-11-11 ENCOUNTER — APPOINTMENT (OUTPATIENT)
Dept: PHYSICAL THERAPY | Facility: CLINIC | Age: 85
DRG: 543 | End: 2020-11-11
Payer: MEDICARE

## 2020-11-11 VITALS
WEIGHT: 107.14 LBS | HEART RATE: 71 BPM | RESPIRATION RATE: 16 BRPM | BODY MASS INDEX: 21.6 KG/M2 | DIASTOLIC BLOOD PRESSURE: 89 MMHG | SYSTOLIC BLOOD PRESSURE: 130 MMHG | OXYGEN SATURATION: 96 % | TEMPERATURE: 97.7 F | HEIGHT: 59 IN
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
## 2020-11-11 PROBLEM — Z20.828 EXPOSURE TO SARS-ASSOCIATED CORONAVIRUS: Status: ACTIVE | Noted: 2020-11-11

## 2020-11-11 PROBLEM — M54.41 ACUTE BACK PAIN WITH SCIATICA, RIGHT: Status: ACTIVE | Noted: 2020-11-11

## 2020-11-11 PROBLEM — M54.41 ACUTE RIGHT-SIDED LOW BACK PAIN WITH RIGHT-SIDED SCIATICA: Status: ACTIVE | Noted: 2020-11-11

## 2020-11-11 PROBLEM — S32.502D: Status: ACTIVE | Noted: 2020-11-11

## 2020-11-11 LAB — CEA SERPL-MCNC: 2.6 UG/L (ref 0–2.5)

## 2020-11-11 PROCEDURE — 250N000013 HC RX MED GY IP 250 OP 250 PS 637: Performed by: INTERNAL MEDICINE

## 2020-11-11 PROCEDURE — 99239 HOSP IP/OBS DSCHRG MGMT >30: CPT | Performed by: INTERNAL MEDICINE

## 2020-11-11 PROCEDURE — 250N000012 HC RX MED GY IP 250 OP 636 PS 637: Performed by: INTERNAL MEDICINE

## 2020-11-11 PROCEDURE — 97530 THERAPEUTIC ACTIVITIES: CPT | Mod: GP | Performed by: PHYSICAL THERAPIST

## 2020-11-11 PROCEDURE — 250N000013 HC RX MED GY IP 250 OP 250 PS 637: Performed by: FAMILY MEDICINE

## 2020-11-11 PROCEDURE — G0378 HOSPITAL OBSERVATION PER HR: HCPCS

## 2020-11-11 PROCEDURE — 120N000004 HC R&B MS OVERFLOW

## 2020-11-11 RX ORDER — OXYCODONE HCL 10 MG/1
10 TABLET, FILM COATED, EXTENDED RELEASE ORAL EVERY 12 HOURS
Qty: 60 TABLET | Refills: 0 | Status: SHIPPED | OUTPATIENT
Start: 2020-11-11 | End: 2021-09-09

## 2020-11-11 RX ORDER — TIZANIDINE 2 MG/1
2-4 TABLET ORAL EVERY 6 HOURS PRN
DISCHARGE
Start: 2020-11-11 | End: 2021-03-31

## 2020-11-11 RX ORDER — AMOXICILLIN 250 MG
2 CAPSULE ORAL 2 TIMES DAILY PRN
DISCHARGE
Start: 2020-11-11 | End: 2020-12-10

## 2020-11-11 RX ORDER — ONDANSETRON 4 MG/1
4 TABLET, ORALLY DISINTEGRATING ORAL EVERY 6 HOURS PRN
DISCHARGE
Start: 2020-11-11 | End: 2022-01-01

## 2020-11-11 RX ORDER — ACETAMINOPHEN 325 MG/1
975 TABLET ORAL EVERY 8 HOURS
DISCHARGE
Start: 2020-11-11

## 2020-11-11 RX ORDER — OXYCODONE HYDROCHLORIDE 5 MG/1
5 TABLET ORAL EVERY 4 HOURS PRN
Qty: 12 TABLET | Refills: 0 | Status: SHIPPED | OUTPATIENT
Start: 2020-11-11 | End: 2020-12-07

## 2020-11-11 RX ORDER — PREDNISONE 10 MG/1
TABLET ORAL
Qty: 20 TABLET | Refills: 0 | DISCHARGE
Start: 2020-11-11 | End: 2020-12-10

## 2020-11-11 RX ORDER — POLYETHYLENE GLYCOL 3350 17 G/17G
17 POWDER, FOR SOLUTION ORAL 2 TIMES DAILY
DISCHARGE
Start: 2020-11-11

## 2020-11-11 RX ADMIN — DICYCLOMINE HYDROCHLORIDE 10 MG: 10 CAPSULE ORAL at 09:37

## 2020-11-11 RX ADMIN — ACETAMINOPHEN 975 MG: 325 TABLET, FILM COATED ORAL at 05:18

## 2020-11-11 RX ADMIN — POLYETHYLENE GLYCOL 3350 17 G: 17 POWDER, FOR SOLUTION ORAL at 08:22

## 2020-11-11 RX ADMIN — DOCUSATE SODIUM AND SENNOSIDES 1 TABLET: 8.6; 5 TABLET ORAL at 08:23

## 2020-11-11 RX ADMIN — PREDNISONE 40 MG: 20 TABLET ORAL at 08:23

## 2020-11-11 RX ADMIN — ASPIRIN 81 MG: 81 TABLET ORAL at 08:23

## 2020-11-11 RX ADMIN — GABAPENTIN 100 MG: 100 CAPSULE ORAL at 08:22

## 2020-11-11 RX ADMIN — PANTOPRAZOLE SODIUM 40 MG: 40 TABLET, DELAYED RELEASE ORAL at 08:23

## 2020-11-11 RX ADMIN — POTASSIUM CHLORIDE 40 MEQ: 20 TABLET, EXTENDED RELEASE ORAL at 08:22

## 2020-11-11 RX ADMIN — OXYCODONE HYDROCHLORIDE 10 MG: 10 TABLET, FILM COATED, EXTENDED RELEASE ORAL at 08:23

## 2020-11-11 RX ADMIN — TIZANIDINE 2 MG: 2 TABLET ORAL at 07:04

## 2020-11-11 RX ADMIN — OXYCODONE HYDROCHLORIDE 10 MG: 5 TABLET ORAL at 02:55

## 2020-11-11 RX ADMIN — FLUTICASONE FUROATE 1 PUFF: 200 POWDER RESPIRATORY (INHALATION) at 08:39

## 2020-11-11 RX ADMIN — Medication 12.5 MG: at 09:37

## 2020-11-11 RX ADMIN — OXYCODONE HYDROCHLORIDE 5 MG: 5 TABLET ORAL at 10:39

## 2020-11-11 ASSESSMENT — ACTIVITIES OF DAILY LIVING (ADL)
ADLS_ACUITY_SCORE: 18
DEPENDENT_IADLS:: TRANSPORTATION

## 2020-11-11 NOTE — TELEPHONE ENCOUNTER
Call back to Pt.    Injection 1 week ago Monday.      Since injection Pt got worse and worse and on Saturday she has not been able to stand or walk.  Pt was admitted to hospital for pain and placed on prednisone, CT done, no bleed, no infection, normal WBC.  Pt is now going to transitional care due to pain and unable to walk.      Pt's daughter asking for a call from Dr. Urbina to discuss.  She is hoping for some insight that would help her.      Writer discussed that flairs can happen after injection but would discuss with Dr. Urbina and hopefully Dr. Urbina  Can call daughter.    Manohar Galaviz, RN  Care Coordinator   [facility]

## 2020-11-11 NOTE — DISCHARGE SUMMARY
[facility] Hospitalist Discharge Summary    Pam Pierce MRN# [id number]   Age: 89 year old YOB: 1930     Date of Admission:  11/7/2020  Date of Discharge::  11/11/2020  Admitting Physician:  Kiara Flannery MD  Discharge Physician:  Kiara Flannery MD  Primary Physician: Reagan Thakkar  Transferring Facility: N/A     Home clinic: [facility]          Admission Diagnoses:   Rectal cancer (H) [C20]  Bilateral sacral insufficiency fracture, initial encounter [M84.48XA]  Closed fracture of pubic ramus, unspecified laterality, initial encounter (H) [S32.599A]  Acute right-sided low back pain with right-sided sciatica [M54.41]          Discharge Diagnosis:     Principle diagnosis: Chronic low back pain  Radicular right leg pain  Acute on chronic bilateral sacral wing fractures  History of left superior and inferior pubic rami fx  Secondary diagnoses:  Patient Active Problem List   Diagnosis     Essential hypertension     IMPAIRED FASTING GLUCOSE     Hyperlipidemia LDL goal <100     GERD (gastroesophageal reflux disease)     Mild intermittent asthma in adult without complication     Rectal cancer (H)     Pubic ramus fracture (H)     Intractable back pain     Bilateral sacral insufficiency fracture, initial encounter     Age related osteoporosis     Exposure to SARS-associated coronavirus     Accidental fall, subsequent encounter     Acute back pain with sciatica, right     Acute right-sided low back pain with right-sided sciatica     Closed fracture of left pubis with routine healing, unspecified portion of pubis, subsequent encounter          Brief History of Presenting Illness:   As per admit hx  Pam Pierce is a 89 year old female who presents with worsening low back pain and right leg radicular pain.  The patient was hospitalized from September 13-14 with fractures of the left sacral ala, left superior pubic rami, and left inferior pubic rami.  She had gotten up in the middle the  night to change her colostomy bag and was ambulating without the use of her cane.  She tripped and landed on her left hip.  The patient was discharged to a transitional care unit on oxycodone and acetaminophen for pain control.  The patient was discharged to her daughter's home on October 13.  Unfortunately, patient ran out of pain medication on November 2.  The patient had been taking oxycodone 5 mg every 3 hours as well as Tylenol and ibuprofen.  A prescription for OxyContin 10 mg twice daily was sent to the patient's local pharmacy.  Also on November 2, the patient underwent a caudal epidural steroid injection.  Patient states that she was unable to lie on her stomach for the procedure and so it was done while she was lying on her side.  The patient continued to have pain following the procedure and believes that it was not successfully injected into the right location.  Patient presented the emergency room today with severe uncontrolled pain in her right sacrum and radiating down right leg to her foot.  Patient states she has numbness in her right foot but no weakness.         Hospital Course:   Chronic low back pain  Radicular right leg pain  Acute on chronic bilateral sacral wing fractures  History of left superior and inferior pubic rami fractures  Chronic back pain and foraminal stenosis, saw Dr. Maravilla (ortho spine) a few months ago, recommended EFRAIN which was delayed due to a fall on 9/13/20. Acquired sacral and pelvic fractures was in TCU for a month, had improvement in ambulation and pain. Discharged and had EFRAIN on 11/2/20. Now progressively worsening sciatic-like pain for the past week.     X-ray of the lumbar spine demonstrates grade 1 spondylolisthesis at L5-S1 likely degenerative, minimal retrolisthesis at L3-L4 and L2-L3.  Moderate multilevel degenerative disc and facet disease.  X-ray of the right hip demonstrates subacute left pubic rami fractures without acute right hip fracture or dislocation.   CT of the lumbar spine demonstrates an acute on chronic bilateral sacral wing fractures, likely insufficiency fractures.  There is diffuse osseous demineralization and multilevel degenerative disc disease and facet arthropathy of the lumbar spine.     Patient's daughter is concerned her acute worsening over the past week may be related to her recent EFRAIN on 11/2/20, requests discussion or recommendations from Dr. Maravilla.   Orthopedic surgery consulted 11/8, see note from Dr. Miranda (non-surgical pelvic / sacral fractures, outpatient follow-up with Dr. Maravilla for spinal foraminal stenosis)  -will discharge on  scheduled acetaminophen, OxyContin, oxycodone, prednisone taper started 11/10-x 10 days. Pt says pain is better today-prednisone likely helping.   - Continue prior to admission gabapentin.       Mild intermittent asthma  No evidence for acute exacerbation.    -Continue prior to admission Flovent and as needed albuterol.     GERD  -Continue prior to admission pantoprazole.     Hyperlipidemia  -Continue prior to admission simvastatin.     Hypertension  -Continue prior to admission atenolol and chlorthalidone.     Rectal cancer  Patient presented in November 2018 with colonic obstruction due to a rectal mass.  The patient underwent a emergency diverting colostomy.  Pathology demonstrated adenocarcinoma.  She has invasion of the superior vagina and posterior margin of the urinary bladder.  Patient was treated with palliative Xeloda and radiation in February 2020.  She takes demeclocycline for radiation proctitis.  Restaging pelvic MRI in June 2020 demonstrated significant interval decrease in size of the residual distal rectal mass.  The patient is followed by Dr. Diggs.  -Xeloda evening 11/7/2020 completed a 7-day cycle.  - Routine colostomy cares     Dispo  To TCU today             Procedures:   No procedures performed during this admission         Allergies:      Allergies   Allergen Reactions     Eggs  [Chicken-Derived Products (Egg)]      Flu Virus Vaccine Other (See Comments)     With egg base             Medications Prior to Admission:     Medications Prior to Admission   Medication Sig Dispense Refill Last Dose     acetaminophen (TYLENOL) 325 MG tablet Take 2 tablets (650 mg) by mouth every 4 hours as needed for mild pain   11/7/2020 at 1200     albuterol (PROAIR HFA) 108 (90 Base) MCG/ACT inhaler Inhale 2 puffs into the lungs every 4 hours as needed 1 Inhaler 11 11/6/2020 at pm     aspirin 81 MG EC tablet Take 81 mg by mouth daily   11/7/2020 at am     atenolol (TENORMIN) 25 MG tablet Take 0.5 tablets (12.5 mg) by mouth daily 90 tablet 3 11/7/2020 at am     calcium carbonate (OS-DANIEL) 1500 (600 Ca) MG tablet Take 1,200 mg by mouth daily   11/7/2020 at am     capecitabine (XELODA) 500 MG tablet Take 1 gram po bid. 7 days on, 7 days off. 56 tablet 1 11/7/2020 at pm last dose for this order     Cholecalciferol (VITAMIN D-3 PO) Take 1 capsule by mouth daily   11/7/2020 at am     dicyclomine (BENTYL) 10 MG capsule Take 1 capsule (10 mg) by mouth 4 times daily as needed (abdominal pain/cramping) Please fill at patient's request (Patient taking differently: Take 10 mg by mouth daily Please fill at patient's request) 120 capsule 3 11/7/2020 at am     FLOVENT  MCG/ACT inhaler INHALE TWO PUFFS BY MOUTH TWICE A DAY 12 g 11 11/7/2020 at am     gabapentin (NEURONTIN) 100 MG capsule Increase gabapentin to 200 mg q hs, and 100 mg po bid. 120 capsule 1 11/7/2020 at am     magnesium oxide (MAG-OX) 400 MG tablet Take 400 mg by mouth 2 times daily    11/7/2020 at am     melatonin 3 MG tablet Take 3 mg by mouth At Bedtime Also 1 tab overnight as needed   11/6/2020 at hs     MULTI-DAY VITAMINS OR Take 1 tablet by mouth daily    11/7/2020 at am     pantoprazole (PROTONIX) 40 MG EC tablet TAKE ONE TABLET BY MOUTH EVERY MORNING BEFORE BREAKFAST (Patient taking differently: Take 40 mg by mouth daily before breakfast ) 90 tablet  1 11/7/2020 at am     potassium chloride ER (KLOR-CON M) 20 MEQ CR tablet Take 2 tablets (40 mEq) by mouth 2 times daily 120 tablet 3 11/7/2020 at am     simvastatin (ZOCOR) 40 MG tablet TAKE ONE TABLET BY MOUTH AT BEDTIME (Patient taking differently: Take 40 mg by mouth At Bedtime ) 90 tablet 3 11/6/2020 at hs     ondansetron 4 MG PO ODT tab Take 1 tablet (4 mg) by mouth every 6 hours as needed for nausea or vomiting 30 tablet 1 never used     [DISCONTINUED] oxyCODONE (OXYCONTIN) 10 MG 12 hr tablet Take 1 tablet (10 mg) by mouth every 12 hours maximum 2 tablet(s) per day 60 tablet 0 11/7/2020 at 0900     [DISCONTINUED] polyethylene glycol (MIRALAX) powder Take 1 capful by mouth 2 times daily 8.5 grams daily in the morning    11/7/2020 at am             Discharge Medications:     Current Discharge Medication List      START taking these medications    Details   !! acetaminophen (TYLENOL) 325 MG tablet Take 3 tablets (975 mg) by mouth every 8 hours  Qty:      Associated Diagnoses: Acute right-sided low back pain with right-sided sciatica      !! melatonin 1 MG TABS tablet Take 1 tablet (1 mg) by mouth nightly as needed for sleep  Qty:      Associated Diagnoses: Insomnia, unspecified type      !! ondansetron (ZOFRAN-ODT) 4 MG ODT tab Take 1 tablet (4 mg) by mouth every 6 hours as needed for nausea or vomiting  Qty:      Associated Diagnoses: Acute right-sided low back pain with right-sided sciatica      oxyCODONE (ROXICODONE) 5 MG tablet Take 1 tablet (5 mg) by mouth every 4 hours as needed for breakthrough pain or moderate to severe pain  Qty: 12 tablet, Refills: 0    Associated Diagnoses: Acute right-sided low back pain with right-sided sciatica      predniSONE (DELTASONE) 10 MG tablet 4 tabs daily for 2 days, then 3 tabs daily for 2 days, then 2 tabs daily for 2 days, then 1 tab daily for 2 days, then stop  Qty: 20 tablet, Refills: 0    Associated Diagnoses: Acute right-sided low back pain with right-sided sciatica       senna-docusate (SENOKOT-S/PERICOLACE) 8.6-50 MG tablet Take 2 tablets by mouth 2 times daily as needed for constipation  Qty:      Associated Diagnoses: Acute right-sided low back pain with right-sided sciatica      tiZANidine (ZANAFLEX) 2 MG tablet Take 1-2 tablets (2-4 mg) by mouth every 6 hours as needed for muscle spasms  Qty:      Associated Diagnoses: Acute right-sided low back pain with right-sided sciatica       !! - Potential duplicate medications found. Please discuss with provider.      CONTINUE these medications which have CHANGED    Details   oxyCODONE (OXYCONTIN) 10 MG 12 hr tablet Take 1 tablet (10 mg) by mouth every 12 hours maximum 2 tablet(s) per day  Qty: 60 tablet, Refills: 0    Associated Diagnoses: Closed displaced fracture of pelvis with routine healing, unspecified part of pelvis, subsequent encounter      polyethylene glycol (MIRALAX) 17 g packet Take 17 g by mouth 2 times daily  Qty:      Associated Diagnoses: Acute right-sided low back pain with right-sided sciatica         CONTINUE these medications which have NOT CHANGED    Details   !! acetaminophen (TYLENOL) 325 MG tablet Take 2 tablets (650 mg) by mouth every 4 hours as needed for mild pain  Qty:      Associated Diagnoses: Closed fracture of pubic ramus, unspecified laterality, initial encounter (H)      albuterol (PROAIR HFA) 108 (90 Base) MCG/ACT inhaler Inhale 2 puffs into the lungs every 4 hours as needed  Qty: 1 Inhaler, Refills: 11    Associated Diagnoses: Mild intermittent asthma without complication      aspirin 81 MG EC tablet Take 81 mg by mouth daily      atenolol (TENORMIN) 25 MG tablet Take 0.5 tablets (12.5 mg) by mouth daily  Qty: 90 tablet, Refills: 3    Associated Diagnoses: Essential hypertension      calcium carbonate (OS-DANIEL) 1500 (600 Ca) MG tablet Take 1,200 mg by mouth daily      capecitabine (XELODA) 500 MG tablet Take 1 gram po bid. 7 days on, 7 days off.  Qty: 56 tablet, Refills: 1    Associated  "Diagnoses: Rectal cancer (H)      Cholecalciferol (VITAMIN D-3 PO) Take 1 capsule by mouth daily      dicyclomine (BENTYL) 10 MG capsule Take 1 capsule (10 mg) by mouth 4 times daily as needed (abdominal pain/cramping) Please fill at patient's request  Qty: 120 capsule, Refills: 3      FLOVENT  MCG/ACT inhaler INHALE TWO PUFFS BY MOUTH TWICE A DAY  Qty: 12 g, Refills: 11    Associated Diagnoses: Mild intermittent asthma without complication      gabapentin (NEURONTIN) 100 MG capsule Increase gabapentin to 200 mg q hs, and 100 mg po bid.  Qty: 120 capsule, Refills: 1    Associated Diagnoses: Rectal cancer (H); Neuropathy      magnesium oxide (MAG-OX) 400 MG tablet Take 400 mg by mouth 2 times daily       !! melatonin 3 MG tablet Take 3 mg by mouth At Bedtime Also 1 tab overnight as needed      MULTI-DAY VITAMINS OR Take 1 tablet by mouth daily       pantoprazole (PROTONIX) 40 MG EC tablet TAKE ONE TABLET BY MOUTH EVERY MORNING BEFORE BREAKFAST  Qty: 90 tablet, Refills: 1    Associated Diagnoses: Epigastric pain      potassium chloride ER (KLOR-CON M) 20 MEQ CR tablet Take 2 tablets (40 mEq) by mouth 2 times daily  Qty: 120 tablet, Refills: 3    Associated Diagnoses: Hypokalemia      simvastatin (ZOCOR) 40 MG tablet TAKE ONE TABLET BY MOUTH AT BEDTIME  Qty: 90 tablet, Refills: 3    Associated Diagnoses: Hyperlipidemia LDL goal <100      !! ondansetron 4 MG PO ODT tab Take 1 tablet (4 mg) by mouth every 6 hours as needed for nausea or vomiting  Qty: 30 tablet, Refills: 1    Associated Diagnoses: Adenocarcinoma, colon (H); Nausea; Rectal cancer (H)       !! - Potential duplicate medications found. Please discuss with provider.                Consultations:   Ortho consultations were requested during this admission            Discharge Exam:   Blood pressure 130/89, pulse 71, temperature 97.7  F (36.5  C), temperature source Oral, resp. rate 16, height 1.499 m (4' 11\"), weight 48.6 kg (107 lb 2.3 oz), SpO2 96 %, " not currently breastfeeding.  GENERAL APPEARANCE: healthy, alert and no distress  EYES: conjunctiva clear, eyes grossly normal  HENT: external ears and nose normal   NECK: supple, no masses or adenopathy  RESP: lungs clear to auscultation - no rales, rhonchi or wheezes  CV: regular rate and rhythm, normal S1 S2, no S3 or S4 and no murmur, click or rub   ABDOMEN: soft, nontender, no HSM or masses and bowel sounds normal  MS: no clubbing, cyanosis; no edema  SKIN: clear without significant rashes or lesions  NEURO: Normal strength and tone, sensory exam grossly normal, mentation intact and speech normal    Unresulted Labs Ordered in the Past 30 Days of this Admission     No orders found from 10/8/2020 to 11/8/2020.          No results found for this or any previous visit (from the past 24 hour(s)).         Pending Tests at Discharge:   None         Discharge Instructions and Follow-Up:     Discharge diet: Regular   Discharge activity: Activity as tolerated   Discharge follow-up: F/u NINOSKA MACIEL on rounds           Discharge Disposition:     Discharged to short-term care facility      Attestation:  I have reviewed today's vital signs, notes, medications, labs and imaging.    Time Spent on this Encounter   I, Kiara Flannery MD, personally saw the patient today and spent greater than 30 minutes discharging this patient.    Kiara Flannery MD

## 2020-11-11 NOTE — TELEPHONE ENCOUNTER
Message received from daughter (Karine). She would like to know if her mother is to skip this cycle of chemotherapy while staying at the TCU? Or resume while at the TCU?  If she is to resume it while at the TCU, the medication will have to be shipped to daughter Karine's home for her to hand deliver to the TCU.    Karine would like a call back tomorrow 11.12.20.  Routed to Dr. Diggs for clarification. Akilah Carranza RN on 11/11/2020 at 5:10 PM

## 2020-11-11 NOTE — PROGRESS NOTES
Clinic Care Coordination Contact  Care Coordination Transition Communication    Referral Source: IP Handoff    Clinical Data: Patient was hospitalized at [facility] from 11/7 to 11/11/20 with diagnoses of chronic low back pain, acute on chronic bilateral sacral wing fractures.     Transition to Facility:              Facility Name: [facility] for [facility]   Main phone: [phone]   Fax: [phone]    Plan: Clinic Care Coordination will review chart to monitor for discharge planning every 4 weeks and as needed and outreach to patient once appropriate.     AMAIRANI Patel, RN   [facility]  - Clinic Care Coordinator  Phone: [phone]

## 2020-11-11 NOTE — PLAN OF CARE
"Patient is A/O x4. C/O pain to lower back; prn Oxy given=5 mg. She is much better today and has increase ease of movement. Only rolling from side to side. Family was hoping PT and OT would work with pt today; writer placed call, they worked with pt yesterday. Looking forward to discharging tomorrow and hopes it will be [facility] but will accept Carranza if that does not work out. Daughter Karine will  and transport if it is before 1400; if later son will transport.     Vital signs:  Temp: 98.2  F (36.8  C) Temp src: Oral BP: (!) 134/95 Pulse: 75   Resp: 16 SpO2: 96 % O2 Device: None (Room air)   Height: 149.9 cm (4' 11\") Weight: 48.6 kg (107 lb 2.3 oz)  Estimated body mass index is 21.64 kg/m  as calculated from the following:    Height as of this encounter: 1.499 m (4' 11\").    Weight as of this encounter: 48.6 kg (107 lb 2.3 oz).        "

## 2020-11-11 NOTE — PLAN OF CARE
Physical Therapy Discharge Summary    Reason for therapy discharge:    Discharged to transitional care facility.    Progress towards therapy goal(s). See goals on Care Plan in [facility] electronic health record for goal details.  Goals partially met.  Barriers to achieving goals:   discharge from facility.   Pt seen for an additional session per nursing request as pt has not transferred OOB- Daughter present-  . Pt required min. assistance w/ bed mobility, tolerated dangling at EOB. Transferred  bed> wc w/ own RW and min. assistance moving to the LEft- prefers to pull up / lean on wide RW tray .  Pain present, but appears controlled    Therapy recommendation(s):    Continued therapy is recommended.  Rationale/Recommendations:  ,. continued PT at TCU to improve pt's mobility.

## 2020-11-11 NOTE — PROGRESS NOTES
Care Management Discharge Note    Discharge Date: 11/11/20     Discharge Disposition: Transitional Care    Discharge Transportation: family or friend will provide    Private pay costs discussed: Not applicable    PAS Confirmation Code: [id number]  Patient/family educated on Medicare website which has current facility and service quality ratings: yes    Education Provided on the Discharge Plan:  yes  Persons Notified of Discharge Plans: Karine Vang  Patient/Family in Agreement with the Plan: yes    Handoff Referral Completed: Yes-done    Additional Information:  [place] [facility] [place] phone: [phone] Main phone: [phone] Fax: [phone]).  Spoke with Rosalie in admissions.  She confirmed SNF is able to bill medicare for TCU stay.  Discussed with daughterKarine and patient.  They are in agreement with facility.      Karine and her brother will transport patient to TCU upon discharge, planning a mid-morning discharge, approximately 1030.  Notified MD and nursing team of plan.      Maci Lopez BSN RN  Inpatient Care Coordinator  [facility] [phone]  [facility] [phone]

## 2020-11-11 NOTE — TELEPHONE ENCOUNTER
Spoke with daughter this morning.    We discussed that while the injection was technically challenging due to patient positioning and discomfort, I do not feel there was complication.    She has CT imaging without bleeding, no signs of infection in the hospital, sensation in her legs most of the time, and bladder control (she has oostomy for bowel), all suggesting against spinal canal complication.    She has continued fractures that don't show complete healing, and I think this is likely contributing to her severe pain.  We discussed weakness, and that I can be both due to true weakness and pain causing weakness.    I let her know I would let Dr. Ashford know, and we are happy to help if we can. I recognized on the day of her procedure the level of pain she was in, and I am glad to see limited information showing a spinal complication (mostly the weakness, which could be related to the pain)    Rosa Urbina MD  [facility]

## 2020-11-11 NOTE — PROGRESS NOTES
"Pt is having a hard time with pain control, she describes it as a \"constant sharp pain in her lower back that goes down her right leg, in fact the leg and ankle hurt more then her back.\"  Pt has had her scheduled Tylenol and Oxycontin 12hr, plus prn oxycodone and she feels her pain is still not \"controlled\"  Applied a Aqua-K to her right leg/ankle in which she feels it has helped.  Pt has not gotten out of bed, she asks for the bed pan to void, pt states \"it's too painful to move.\"  Will continue to monitor close for changes.     "

## 2020-11-11 NOTE — PLAN OF CARE
[facility] DISCHARGE NOTE    Patient discharged to transitional care unit at 10:50 AM via wheel chair. Accompanied by daughter and staff. Discharge instructions reviewed with caregiver and admissions at [facility] , opportunity offered to ask questions. Prescriptions - None ordered for discharge. All belongings sent with patient.    Lashay Viveros RN

## 2020-11-13 ENCOUNTER — COMMUNICATION - HEALTHEAST (OUTPATIENT)
Dept: GERIATRICS | Facility: CLINIC | Age: 85
End: 2020-11-13

## 2020-11-13 ENCOUNTER — TRANSFERRED RECORDS (OUTPATIENT)
Dept: HEALTH INFORMATION MANAGEMENT | Facility: CLINIC | Age: 85
End: 2020-11-13

## 2020-11-13 ENCOUNTER — OFFICE VISIT - HEALTHEAST (OUTPATIENT)
Dept: GERIATRICS | Facility: CLINIC | Age: 85
End: 2020-11-13

## 2020-11-13 DIAGNOSIS — I35.0 MODERATE AORTIC STENOSIS: ICD-10-CM

## 2020-11-13 DIAGNOSIS — C20 RECTAL CANCER (H): Primary | ICD-10-CM

## 2020-11-13 DIAGNOSIS — E87.6 HYPOKALEMIA: ICD-10-CM

## 2020-11-13 DIAGNOSIS — R10.2 PELVIC PAIN IN FEMALE: ICD-10-CM

## 2020-11-13 DIAGNOSIS — F11.20 UNCOMPLICATED OPIOID DEPENDENCE (H): ICD-10-CM

## 2020-11-13 RX ORDER — CAPECITABINE 500 MG/1
TABLET, FILM COATED ORAL
Qty: 56 TABLET | Refills: 1 | Status: SHIPPED | OUTPATIENT
Start: 2020-11-13 | End: 2021-12-29

## 2020-11-15 ENCOUNTER — NURSE TRIAGE (OUTPATIENT)
Dept: NURSING | Facility: CLINIC | Age: 85
End: 2020-11-15

## 2020-11-15 ENCOUNTER — RECORDS - HEALTHEAST (OUTPATIENT)
Dept: LAB | Facility: CLINIC | Age: 85
End: 2020-11-15

## 2020-11-15 NOTE — TELEPHONE ENCOUNTER
"Addis/nurse calling from TCU at [facility] regarding patients RX   capecitabine (XELODA) 500 MG tablet 56 tablet 1 11/13/2020  --   Sig: Take 1 gram po bid. 7 days on, 7 days off.     \"I need clarification orders . It just says 7 on, 7 off. The patient says she is to start taking it today. Can you page the on call doctor?\"  Paged on call Dr. Renee Morin([facility]) through page op at 2:10 pm to call FNA.  Per MD\" Yes per MD and hospital notes  She should begin RX today, 7 days on, then 7 days off.\" AM dose was not given today, MD notified of this.  Spoke to Addis at TCU with MD order and clarification.  No triage was needed.  Mayra Patterson RN [facility]          Reason for Disposition    Caller has urgent medication question about med that PCP prescribed and triager unable to answer question    Protocols used: MEDICATION QUESTION CALL-A-AH      "

## 2020-11-16 ENCOUNTER — COMMUNICATION - HEALTHEAST (OUTPATIENT)
Dept: GERIATRICS | Facility: CLINIC | Age: 85
End: 2020-11-16

## 2020-11-16 ENCOUNTER — OFFICE VISIT - HEALTHEAST (OUTPATIENT)
Dept: GERIATRICS | Facility: CLINIC | Age: 85
End: 2020-11-16

## 2020-11-16 ENCOUNTER — TRANSFERRED RECORDS (OUTPATIENT)
Dept: HEALTH INFORMATION MANAGEMENT | Facility: CLINIC | Age: 85
End: 2020-11-16

## 2020-11-16 DIAGNOSIS — I10 ESSENTIAL HYPERTENSION: ICD-10-CM

## 2020-11-16 DIAGNOSIS — F11.20 UNCOMPLICATED OPIOID DEPENDENCE (H): ICD-10-CM

## 2020-11-16 DIAGNOSIS — R10.2 PELVIC PAIN IN FEMALE: ICD-10-CM

## 2020-11-16 DIAGNOSIS — S32.599S CLOSED FRACTURE OF PUBIC RAMUS, UNSPECIFIED LATERALITY, SEQUELA: ICD-10-CM

## 2020-11-16 DIAGNOSIS — M54.41 ACUTE RIGHT-SIDED LOW BACK PAIN WITH RIGHT-SIDED SCIATICA: ICD-10-CM

## 2020-11-16 DIAGNOSIS — M84.48XS SACRAL INSUFFICIENCY FRACTURE, SEQUELA: ICD-10-CM

## 2020-11-16 DIAGNOSIS — C20 RECTAL CANCER (H): ICD-10-CM

## 2020-11-16 DIAGNOSIS — Z93.3 S/P COLOSTOMY (H): ICD-10-CM

## 2020-11-16 DIAGNOSIS — M54.16 LUMBAR RADICULOPATHY: ICD-10-CM

## 2020-11-16 LAB — 25(OH)D3 SERPL-MCNC: 40 NG/ML (ref 30–80)

## 2020-11-16 RX ORDER — POTASSIUM CHLORIDE 1500 MG/1
40 TABLET, EXTENDED RELEASE ORAL 2 TIMES DAILY
Qty: 120 TABLET | Refills: 3 | OUTPATIENT
Start: 2020-11-16

## 2020-11-16 ASSESSMENT — MIFFLIN-ST. JEOR: SCORE: 785.11

## 2020-11-19 ENCOUNTER — OFFICE VISIT - HEALTHEAST (OUTPATIENT)
Dept: GERIATRICS | Facility: CLINIC | Age: 85
End: 2020-11-19

## 2020-11-19 ENCOUNTER — TRANSFERRED RECORDS (OUTPATIENT)
Dept: HEALTH INFORMATION MANAGEMENT | Facility: CLINIC | Age: 85
End: 2020-11-19

## 2020-11-19 DIAGNOSIS — R53.81 PHYSICAL DECONDITIONING: ICD-10-CM

## 2020-11-19 DIAGNOSIS — R52 PAIN: ICD-10-CM

## 2020-11-19 DIAGNOSIS — M54.41 ACUTE RIGHT-SIDED LOW BACK PAIN WITH RIGHT-SIDED SCIATICA: ICD-10-CM

## 2020-11-19 DIAGNOSIS — M54.16 LUMBAR RADICULOPATHY: ICD-10-CM

## 2020-11-19 ASSESSMENT — MIFFLIN-ST. JEOR: SCORE: 791.46

## 2020-11-20 ENCOUNTER — TRANSFERRED RECORDS (OUTPATIENT)
Dept: HEALTH INFORMATION MANAGEMENT | Facility: CLINIC | Age: 85
End: 2020-11-20

## 2020-11-20 ENCOUNTER — OFFICE VISIT - HEALTHEAST (OUTPATIENT)
Dept: GERIATRICS | Facility: CLINIC | Age: 85
End: 2020-11-20

## 2020-11-20 DIAGNOSIS — F11.20 UNCOMPLICATED OPIOID DEPENDENCE (H): ICD-10-CM

## 2020-11-23 ENCOUNTER — OFFICE VISIT - HEALTHEAST (OUTPATIENT)
Dept: GERIATRICS | Facility: CLINIC | Age: 85
End: 2020-11-23

## 2020-11-23 ENCOUNTER — COMMUNICATION - HEALTHEAST (OUTPATIENT)
Dept: GERIATRICS | Facility: CLINIC | Age: 85
End: 2020-11-23

## 2020-11-23 ENCOUNTER — TRANSFERRED RECORDS (OUTPATIENT)
Dept: HEALTH INFORMATION MANAGEMENT | Facility: CLINIC | Age: 85
End: 2020-11-23

## 2020-11-23 DIAGNOSIS — R53.81 PHYSICAL DECONDITIONING: ICD-10-CM

## 2020-11-23 DIAGNOSIS — E87.6 HYPOKALEMIA: ICD-10-CM

## 2020-11-23 DIAGNOSIS — F11.20 UNCOMPLICATED OPIOID DEPENDENCE (H): ICD-10-CM

## 2020-11-23 DIAGNOSIS — S32.592S CLOSED BILATERAL FRACTURE OF PUBIC RAMI, SEQUELA: ICD-10-CM

## 2020-11-23 DIAGNOSIS — S32.591S CLOSED BILATERAL FRACTURE OF PUBIC RAMI, SEQUELA: ICD-10-CM

## 2020-11-23 DIAGNOSIS — M54.41 ACUTE RIGHT-SIDED LOW BACK PAIN WITH RIGHT-SIDED SCIATICA: ICD-10-CM

## 2020-11-23 DIAGNOSIS — M54.16 LUMBAR RADICULOPATHY: ICD-10-CM

## 2020-11-23 RX ORDER — POTASSIUM CHLORIDE 1500 MG/1
40 TABLET, EXTENDED RELEASE ORAL 2 TIMES DAILY
Qty: 120 TABLET | Refills: 3 | Status: SHIPPED | OUTPATIENT
Start: 2020-11-23 | End: 2020-12-10

## 2020-11-23 ASSESSMENT — MIFFLIN-ST. JEOR: SCORE: 791.46

## 2020-11-27 ENCOUNTER — TRANSFERRED RECORDS (OUTPATIENT)
Dept: HEALTH INFORMATION MANAGEMENT | Facility: CLINIC | Age: 85
End: 2020-11-27

## 2020-11-27 ENCOUNTER — OFFICE VISIT - HEALTHEAST (OUTPATIENT)
Dept: GERIATRICS | Facility: CLINIC | Age: 85
End: 2020-11-27

## 2020-11-27 DIAGNOSIS — R53.81 PHYSICAL DECONDITIONING: ICD-10-CM

## 2020-11-27 DIAGNOSIS — R52 PAIN: ICD-10-CM

## 2020-11-27 DIAGNOSIS — M54.16 LUMBAR RADICULOPATHY: ICD-10-CM

## 2020-11-27 DIAGNOSIS — M54.41 ACUTE RIGHT-SIDED LOW BACK PAIN WITH RIGHT-SIDED SCIATICA: ICD-10-CM

## 2020-11-30 ENCOUNTER — OFFICE VISIT - HEALTHEAST (OUTPATIENT)
Dept: GERIATRICS | Facility: CLINIC | Age: 85
End: 2020-11-30

## 2020-11-30 DIAGNOSIS — Z93.3 S/P COLOSTOMY (H): ICD-10-CM

## 2020-11-30 DIAGNOSIS — F11.20 UNCOMPLICATED OPIOID DEPENDENCE (H): ICD-10-CM

## 2020-11-30 DIAGNOSIS — M54.41 ACUTE RIGHT-SIDED LOW BACK PAIN WITH RIGHT-SIDED SCIATICA: ICD-10-CM

## 2020-11-30 DIAGNOSIS — I10 ESSENTIAL HYPERTENSION: ICD-10-CM

## 2020-11-30 DIAGNOSIS — C20 RECTAL CANCER (H): ICD-10-CM

## 2020-11-30 DIAGNOSIS — R10.2 PELVIC PAIN IN FEMALE: ICD-10-CM

## 2020-11-30 DIAGNOSIS — M54.16 LUMBAR RADICULOPATHY: ICD-10-CM

## 2020-11-30 ASSESSMENT — MIFFLIN-ST. JEOR: SCORE: 793.28

## 2020-12-01 ENCOUNTER — TELEPHONE (OUTPATIENT)
Dept: ONCOLOGY | Facility: CLINIC | Age: 85
End: 2020-12-01

## 2020-12-01 NOTE — TELEPHONE ENCOUNTER
Received call from patient's daughter asking about who will be ordering medications for this patient and when they should be seen. Advised patient we will have covering providers that will help determine medications and any changes in treatment. Patient's daughter verbalized understanding and stated they need an appointment made.    Sent to scheduling to set up appointment ASAP.    Elzbieta Ybarra RN BSN

## 2020-12-01 NOTE — PROGRESS NOTES
Hematology/Oncology Telephone Visit  December 1, 2020  Oncology care team: Dr. Diggs    Due to the concerns around COVID-19 and adhering to social distancing we conducted this visit via video visit.     Reason for visit: Follow-up rectal cancer    Oncology history/HPI: Pam Pierce is a 89 year old female with rectal cancer. Please see Dr. Diggs's note from 9/15 for details of diagnosis and past treatment. In short, she was diagnosed November 2019 with an advanced T4bN2 rectal cancer. She had diverting colostomy but not resection, She had palliative RT 2/2020 and proceeded with palliative xeloda Feb 2020.  She has had treatment response and Xeloda dose was reduced due to leg pain. In September she had a pelvic fracture and has been in a TCU.     Since her last visit, she was hospitalized 11/7-11/11 for worsening low back pain with radicular pain in right leg. She has severe spinal stenosis at L5/S1. Pain was managed with narcotics and prednisone taper. She was discharged to a TCU. Pain today 3/10. OxyContin 10 mg BID, oxycodone 2-3 times a day. Gabapentin 100 mg am, lunch and 200 mg at PM.  Now at home, live by self. Karine does her laundry. Started new Xeloda on Sunday, 1g BID. Tolerating well. Dry skin, uses urea cream.  No nausea/emesis. No abdominal pain. No change in colostomy output. No rectal pain. Swelling in legs improved..     ROS: 12 pt ROS otherwise negative    Past Medical History:   Diagnosis Date     Abdominal pain 02/16/2020     Actinic keratosis      Basal cell carcinoma      Other and unspecified hyperlipidemia      Unspecified asthma(493.90)      Unspecified essential hypertension      Urethral stricture due to infection 10/03/2005    ureteral stone         Current Outpatient Medications:      acetaminophen (TYLENOL) 325 MG tablet, Take 3 tablets (975 mg) by mouth every 8 hours, Disp:  , Rfl:      acetaminophen (TYLENOL) 325 MG tablet, Take 2 tablets (650 mg) by mouth every 4 hours as needed for mild  pain, Disp:  , Rfl:      albuterol (PROAIR HFA) 108 (90 Base) MCG/ACT inhaler, Inhale 2 puffs into the lungs every 4 hours as needed, Disp: 1 Inhaler, Rfl: 11     aspirin 81 MG EC tablet, Take 81 mg by mouth daily, Disp: , Rfl:      atenolol (TENORMIN) 25 MG tablet, Take 0.5 tablets (12.5 mg) by mouth daily, Disp: 90 tablet, Rfl: 3     calcium carbonate (OS-DANIEL) 1500 (600 Ca) MG tablet, Take 1,200 mg by mouth daily, Disp: , Rfl:      capecitabine (XELODA) 500 MG tablet, Take 1 gram po bid. 7 days on, 7 days off., Disp: 56 tablet, Rfl: 1     capecitabine (XELODA) 500 MG tablet, Take 1 gram po bid. 7 days on, 7 days off., Disp: 56 tablet, Rfl: 1     Cholecalciferol (VITAMIN D-3 PO), Take 1 capsule by mouth daily, Disp: , Rfl:      dicyclomine (BENTYL) 10 MG capsule, Take 1 capsule (10 mg) by mouth 4 times daily as needed (abdominal pain/cramping) Please fill at patient's request (Patient taking differently: Take 10 mg by mouth daily Please fill at patient's request), Disp: 120 capsule, Rfl: 3     FLOVENT  MCG/ACT inhaler, INHALE TWO PUFFS BY MOUTH TWICE A DAY, Disp: 12 g, Rfl: 11     gabapentin (NEURONTIN) 100 MG capsule, Increase gabapentin to 200 mg q hs, and 100 mg po bid., Disp: 120 capsule, Rfl: 1     magnesium oxide (MAG-OX) 400 MG tablet, Take 400 mg by mouth 2 times daily , Disp: , Rfl:      melatonin 1 MG TABS tablet, Take 1 tablet (1 mg) by mouth nightly as needed for sleep, Disp:  , Rfl:      melatonin 3 MG tablet, Take 3 mg by mouth At Bedtime Also 1 tab overnight as needed, Disp: , Rfl:      MULTI-DAY VITAMINS OR, Take 1 tablet by mouth daily , Disp: , Rfl:      ondansetron (ZOFRAN-ODT) 4 MG ODT tab, Take 1 tablet (4 mg) by mouth every 6 hours as needed for nausea or vomiting, Disp:  , Rfl:      ondansetron 4 MG PO ODT tab, Take 1 tablet (4 mg) by mouth every 6 hours as needed for nausea or vomiting, Disp: 30 tablet, Rfl: 1     oxyCODONE (OXYCONTIN) 10 MG 12 hr tablet, Take 1 tablet (10 mg) by  mouth every 12 hours maximum 2 tablet(s) per day, Disp: 60 tablet, Rfl: 0     oxyCODONE (ROXICODONE) 5 MG tablet, Take 1 tablet (5 mg) by mouth every 4 hours as needed for breakthrough pain or moderate to severe pain, Disp: 12 tablet, Rfl: 0     pantoprazole (PROTONIX) 40 MG EC tablet, TAKE ONE TABLET BY MOUTH EVERY MORNING BEFORE BREAKFAST (Patient taking differently: Take 40 mg by mouth daily before breakfast ), Disp: 90 tablet, Rfl: 1     polyethylene glycol (MIRALAX) 17 g packet, Take 17 g by mouth 2 times daily, Disp:  , Rfl:      potassium chloride ER (KLOR-CON M) 20 MEQ CR tablet, Take 2 tablets (40 mEq) by mouth 2 times daily, Disp: 120 tablet, Rfl: 3     predniSONE (DELTASONE) 10 MG tablet, 4 tabs daily for 2 days, then 3 tabs daily for 2 days, then 2 tabs daily for 2 days, then 1 tab daily for 2 days, then stop, Disp: 20 tablet, Rfl: 0     senna-docusate (SENOKOT-S/PERICOLACE) 8.6-50 MG tablet, Take 2 tablets by mouth 2 times daily as needed for constipation, Disp:  , Rfl:      simvastatin (ZOCOR) 40 MG tablet, TAKE ONE TABLET BY MOUTH AT BEDTIME (Patient taking differently: Take 40 mg by mouth At Bedtime ), Disp: 90 tablet, Rfl: 3     tiZANidine (ZANAFLEX) 2 MG tablet, Take 1-2 tablets (2-4 mg) by mouth every 6 hours as needed for muscle spasms, Disp:  , Rfl:         Allergies   Allergen Reactions     Eggs [Chicken-Derived Products (Egg)]      Flu Virus Vaccine Other (See Comments)     With egg base        Social history: Lives alone    Physical exam  Voice is clear and strong. No audible stridor, wheezing, or respiratory distress. The remainder of PE was deferred due to PHE.       Labs:  Reviewed recent labs from hospitalization.   Results for MICHELLE REEVES (MRN [id number]) as of 12/2/2020 12:56   Ref. Range 11/9/2020 04:55   WBC Latest Ref Range: 4.0 - 11.0 10e9/L 8.9   Hemoglobin Latest Ref Range: 11.7 - 15.7 g/dL 10.7 (L)   Hematocrit Latest Ref Range: 35.0 - 47.0 % 33.4 (L)   Platelet Count Latest  Ref Range: 150 - 450 10e9/L 277   RBC Count Latest Ref Range: 3.8 - 5.2 10e12/L 3.20 (L)   MCV Latest Ref Range: 78 - 100 fl 104 (H)   MCH Latest Ref Range: 26.5 - 33.0 pg 33.4 (H)   MCHC Latest Ref Range: 31.5 - 36.5 g/dL 32.0   RDW Latest Ref Range: 10.0 - 15.0 % 15.3 (H)     Results for MICHELLE REEVES (MRN [id number]) as of 12/2/2020 12:56   Ref. Range 11/10/2020 15:00   CEA Latest Ref Range: 0 - 2.5 ug/L 2.6 (H)   No LFTs since June    Imaging: Reviewed imaging from hospital. CT abdomen/pelvis 11/7 doesn't show any new abdominal findings.     Assessment and plan:    1. Locally advanced rectal cancer s/p palliative RT and Xeloda. She has been on Xeloda for nearly a year and tolerating the current dose well, 1 g BID one week on, 1 off. She denies any new cancer sx and recent abdominal CT had no new findings. Her CEA is up a bit. It has been nearly one year since last full scan. She has had increasing pain lately but seems to be related to her spinal stenosis which now pain is well controlled.   -Continue Xeloda 1 g BID, one week on and 1 week off  - PET/CT in Jan or February with repeat labs including LFTs and CEA.   -We discussed that Dr. Diggs is leaving and she will be getting a new oncologist.     Over 50% of 15 min visit was spent counseling and coordinating care    Marcela Hernandez PA-C

## 2020-12-02 ENCOUNTER — AMBULATORY - HEALTHEAST (OUTPATIENT)
Dept: GERIATRICS | Facility: CLINIC | Age: 85
End: 2020-12-02

## 2020-12-02 ENCOUNTER — VIRTUAL VISIT (OUTPATIENT)
Dept: ONCOLOGY | Facility: CLINIC | Age: 85
End: 2020-12-02
Attending: INTERNAL MEDICINE
Payer: MEDICARE

## 2020-12-02 VITALS — HEIGHT: 59 IN | BODY MASS INDEX: 20.16 KG/M2 | WEIGHT: 100 LBS

## 2020-12-02 DIAGNOSIS — C20 RECTAL CANCER (H): Primary | ICD-10-CM

## 2020-12-02 PROCEDURE — 999N001193 HC VIDEO/TELEPHONE VISIT; NO CHARGE

## 2020-12-02 PROCEDURE — 99442 PR PHYSICIAN TELEPHONE EVALUATION 11-20 MIN: CPT | Performed by: PHYSICIAN ASSISTANT

## 2020-12-02 ASSESSMENT — MIFFLIN-ST. JEOR: SCORE: 779.23

## 2020-12-02 ASSESSMENT — PAIN SCALES - GENERAL: PAINLEVEL: NO PAIN (0)

## 2020-12-02 NOTE — PROGRESS NOTES
"Pam Pierce is a 89 year old female who is being evaluated via a billable telephone visit.      The patient has been notified of following:     \"This telephone visit will be conducted via a call between you and your physician/provider. We have found that certain health care needs can be provided without the need for a physical exam.  This service lets us provide the care you need with a short phone conversation.  If a prescription is necessary we can send it directly to your pharmacy.  If lab work is needed we can place an order for that and you can then stop by our lab to have the test done at a later time.    Telephone visits are billed at different rates depending on your insurance coverage. During this emergency period, for some insurers they may be billed the same as an in-person visit.  Please reach out to your insurance provider with any questions.    If during the course of the call the physician/provider feels a telephone visit is not appropriate, you will not be charged for this service.\"    Patient has given verbal consent for Telephone visit?  Yes  Daughter Karine is available to assist.     What phone number would you like to be contacted at? [phone].    How would you like to obtain your AVS? Mail a copy      Lulu Givens CMA      "

## 2020-12-02 NOTE — PATIENT INSTRUCTIONS
Please call patient to schedule  PET/CT Jan/Feb and Follow-up with whomever covering  Labs at next refill of Xeloda (CBC, CMP, CEA)

## 2020-12-04 ENCOUNTER — PATIENT OUTREACH (OUTPATIENT)
Dept: CARE COORDINATION | Facility: CLINIC | Age: 85
End: 2020-12-04

## 2020-12-04 NOTE — PROGRESS NOTES
Clinic Care Coordination Contact  [facility]/Voicemail       Clinical Data: Care Coordinator Outreach    Per chart review, patient discharged from TCU on 12/1/20.    Outreach attempted x 2.  Left message on daughter's voicemail with call back information and requested return call.     PCP follow up appt is scheduled for 12/7/20.    Plan:  Care Coordinator will do no further outreaches at this time.

## 2020-12-07 ENCOUNTER — OFFICE VISIT (OUTPATIENT)
Dept: FAMILY MEDICINE | Facility: CLINIC | Age: 85
End: 2020-12-07
Payer: MEDICARE

## 2020-12-07 VITALS
SYSTOLIC BLOOD PRESSURE: 102 MMHG | HEART RATE: 82 BPM | RESPIRATION RATE: 16 BRPM | OXYGEN SATURATION: 97 % | HEIGHT: 59 IN | DIASTOLIC BLOOD PRESSURE: 60 MMHG | BODY MASS INDEX: 21.37 KG/M2 | WEIGHT: 106 LBS | TEMPERATURE: 98.9 F

## 2020-12-07 DIAGNOSIS — G62.9 NEUROPATHY: ICD-10-CM

## 2020-12-07 DIAGNOSIS — Z09 HOSPITAL DISCHARGE FOLLOW-UP: Primary | ICD-10-CM

## 2020-12-07 DIAGNOSIS — M54.41 ACUTE RIGHT-SIDED LOW BACK PAIN WITH RIGHT-SIDED SCIATICA: ICD-10-CM

## 2020-12-07 DIAGNOSIS — G89.29 OTHER CHRONIC PAIN: ICD-10-CM

## 2020-12-07 DIAGNOSIS — C20 RECTAL CANCER (H): ICD-10-CM

## 2020-12-07 PROCEDURE — 99214 OFFICE O/P EST MOD 30 MIN: CPT | Performed by: FAMILY MEDICINE

## 2020-12-07 RX ORDER — OXYCODONE HCL 10 MG/1
10 TABLET, FILM COATED, EXTENDED RELEASE ORAL EVERY 12 HOURS
Qty: 60 TABLET | Refills: 0 | Status: SHIPPED | OUTPATIENT
Start: 2021-01-07 | End: 2021-02-06

## 2020-12-07 RX ORDER — OXYCODONE HYDROCHLORIDE 5 MG/1
5 TABLET ORAL 3 TIMES DAILY PRN
Qty: 90 TABLET | Refills: 0 | Status: SHIPPED | OUTPATIENT
Start: 2020-12-07 | End: 2021-02-11

## 2020-12-07 RX ORDER — GABAPENTIN 100 MG/1
CAPSULE ORAL
Qty: 360 CAPSULE | Refills: 3 | Status: SHIPPED | OUTPATIENT
Start: 2020-12-07 | End: 2021-02-11

## 2020-12-07 RX ORDER — OXYCODONE HCL 10 MG/1
10 TABLET, FILM COATED, EXTENDED RELEASE ORAL EVERY 12 HOURS
Qty: 60 TABLET | Refills: 0 | Status: SHIPPED | OUTPATIENT
Start: 2020-12-07 | End: 2021-01-06

## 2020-12-07 ASSESSMENT — MIFFLIN-ST. JEOR: SCORE: 806.44

## 2020-12-07 NOTE — PROGRESS NOTES
Subjective     Pam Pierce is a 89 year old female who presents to clinic today for the following health issues:    HPI   Patient is a 89-year-old female who presents today for hospital follow-up.  She was admitted for uncontrolled  pain related to bilateral sacral fracture.  Of note patient has a past medical history significant for aortic stenosis rectal cancer diagnosed recently and has a colostomy on palliative Xeloda, GERD, hypertension, hyperlipidemia, lumbar degenerative disease and recent multiple pelvic fractures.      She has had several hospitalizations since September and had some complications to sacral fractures.  She appears to be doing well and has been receiving physical therapy.  She was accompanied today with her daughter and they wanted to discuss her pain management.  She is presently on 10 mg of OxyContin twice a day with oxycodone as needed as much as 3 times a day.  I told the patient that she would need to taper this medicine at some point.  She is also taking gabapentin 100 mg in the morning 1 in the afternoon and 2 at night.  She appears to be doing well and her pain is tolerable presently.            Hospital Follow-up Visit:    Hospital/Nursing Home/IP Rehab Facility: [facility]  Date of Admission: 11/7/2020  Date of Discharge: 11/11/2020  Reason(s) for Admission:       Was your hospitalization related to COVID-19? No   Problems taking medications regularly:  None  Medication changes since discharge:   Problems adhering to non-medication therapy:  None    Summary of hospitalization:  [facility] discharge summary reviewed  Diagnostic Tests/Treatments reviewed.  Follow up needed: none  Other Healthcare Providers Involved in Patient s Care:         None  Update since discharge: improved.       Post Discharge Medication Reconciliation: discharge medications reconciled, continue medications without change.  Plan of care communicated with patient                  Review  "of Systems   Constitutional, HEENT, cardiovascular, pulmonary, gi and gu systems are negative, except as otherwise noted.      Objective    /60   Pulse 82   Temp 98.9  F (37.2  C) (Tympanic)   Resp 16   Ht 1.499 m (4' 11\")   Wt 48.1 kg (106 lb)   SpO2 97%   BMI 21.41 kg/m    Body mass index is 21.41 kg/m .  Physical Exam   GENERAL: healthy, alert and no distress  EYES: Eyes grossly normal to inspection, PERRL and conjunctivae and sclerae normal  HENT: ear canals and TM's normal, nose and mouth without ulcers or lesions  NECK: no adenopathy, no asymmetry, masses, or scars and thyroid normal to palpation  RESP: lungs clear to auscultation - no rales, rhonchi or wheezes  CV: regular rate and rhythm, normal S1 S2, no S3 or S4, no murmur, click or rub, no peripheral edema and peripheral pulses strong  ABDOMEN: soft, nontender, no hepatosplenomegaly, no masses and bowel sounds normal  MS: no gross musculoskeletal defects noted, no edema    No results found for this or any previous visit (from the past 24 hour(s)).        Assessment & Plan     Pam was seen today for hospital f/u.    Diagnoses and all orders for this visit:    Hospital discharge follow-up  89 yr old female here for hospital follow up. Appears to be doing better . Pain medication faxed.  Acute right-sided low back pain with right-sided sciatica  Comments:  back pain multiple possible causes including - lumbar degeneration with radiculopathy, SI joint dys due to recent fractures.  I suspect the pain is worse due to switching to oxycontin.  would recommend continuing this, but breakthrough pain meds with oxycodone;.  recommend follow-up rivas asencio and DR. Diggs as well as palliative care.  Orders:  -     oxyCODONE (ROXICODONE) 5 MG tablet; Take 1 tablet (5 mg) by mouth 3 times daily as needed for breakthrough pain or moderate to severe pain    Other chronic pain  -     oxyCODONE (OXYCONTIN) 10 MG 12 hr tablet; Take 1 tablet (10 mg) by " mouth every 12 hours maximum 2 tablet(s) per day  -     naloxone (NARCAN) 4 MG/0.1ML nasal spray; Spray 1 spray (4 mg) into one nostril alternating nostrils once as needed for opioid reversal every 2-3 minutes until assistance arrives  -     oxyCODONE (OXYCONTIN) 10 MG 12 hr tablet; Take 1 tablet (10 mg) by mouth every 12 hours maximum 2 tablet(s) per day    Rectal cancer (H)  -     gabapentin (NEURONTIN) 100 MG capsule; Take one in the morning , one at lunch and two at bedtime.    Neuropathy  -     gabapentin (NEURONTIN) 100 MG capsule; Take one in the morning , one at lunch and two at bedtime.            FUTURE APPOINTMENTS:       - Follow-up visit in one month or sooner as needed.    Return in about 4 weeks (around 1/4/2021) for Follow up.    Reagan Thakkar MD  [facility]

## 2020-12-08 ENCOUNTER — TELEPHONE (OUTPATIENT)
Dept: FAMILY MEDICINE | Facility: CLINIC | Age: 85
End: 2020-12-08

## 2020-12-08 NOTE — TELEPHONE ENCOUNTER
Did a PT SOC and client passed our tests and is moving well with her walker, she is not wanting to advance to a SE cane and or do stairs other then out of home with her family but for safety she can leave on own if needed. 1 x only PT SOC visit , Daughter Karine agrees.    Med interactions that you need to okay or change   Diclomine interacts with her Klor-CON    Omprozole interacts with herXeloda    Please advise

## 2020-12-17 ENCOUNTER — HOSPITAL ENCOUNTER (OUTPATIENT)
Dept: LAB | Facility: CLINIC | Age: 85
Discharge: HOME | End: 2020-12-17
Attending: INTERNAL MEDICINE | Admitting: PHYSICIAN ASSISTANT
Payer: MEDICARE

## 2020-12-17 DIAGNOSIS — C20 RECTAL CANCER (H): Primary | ICD-10-CM

## 2020-12-17 LAB
ALBUMIN SERPL-MCNC: 3 G/DL (ref 3.4–5)
ALP SERPL-CCNC: 130 U/L (ref 40–150)
ALT SERPL W P-5'-P-CCNC: 18 U/L (ref 0–50)
ANION GAP SERPL CALCULATED.3IONS-SCNC: <1 MMOL/L (ref 3–14)
AST SERPL W P-5'-P-CCNC: 19 U/L (ref 0–45)
BASOPHILS # BLD AUTO: 0.1 10E9/L (ref 0–0.2)
BASOPHILS NFR BLD AUTO: 0.6 %
BILIRUB SERPL-MCNC: 0.2 MG/DL (ref 0.2–1.3)
BUN SERPL-MCNC: 19 MG/DL (ref 7–30)
CALCIUM SERPL-MCNC: 8.3 MG/DL (ref 8.5–10.1)
CEA SERPL-MCNC: 2.2 UG/L (ref 0–2.5)
CHLORIDE SERPL-SCNC: 111 MMOL/L (ref 94–109)
CO2 SERPL-SCNC: 33 MMOL/L (ref 20–32)
CREAT SERPL-MCNC: 0.78 MG/DL (ref 0.52–1.04)
DIFFERENTIAL METHOD BLD: ABNORMAL
EOSINOPHIL # BLD AUTO: 0.9 10E9/L (ref 0–0.7)
EOSINOPHIL NFR BLD AUTO: 8.8 %
ERYTHROCYTE [DISTWIDTH] IN BLOOD BY AUTOMATED COUNT: 16.3 % (ref 10–15)
GFR SERPL CREATININE-BSD FRML MDRD: 66 ML/MIN/{1.73_M2}
GLUCOSE SERPL-MCNC: 125 MG/DL (ref 70–99)
HCT VFR BLD AUTO: 36 % (ref 35–47)
HGB BLD-MCNC: 11.5 G/DL (ref 11.7–15.7)
IMM GRANULOCYTES # BLD: 0 10E9/L (ref 0–0.4)
IMM GRANULOCYTES NFR BLD: 0.4 %
LYMPHOCYTES # BLD AUTO: 1.3 10E9/L (ref 0.8–5.3)
LYMPHOCYTES NFR BLD AUTO: 12.5 %
MCH RBC QN AUTO: 34.2 PG (ref 26.5–33)
MCHC RBC AUTO-ENTMCNC: 31.9 G/DL (ref 31.5–36.5)
MCV RBC AUTO: 107 FL (ref 78–100)
MONOCYTES # BLD AUTO: 0.7 10E9/L (ref 0–1.3)
MONOCYTES NFR BLD AUTO: 7.1 %
NEUTROPHILS # BLD AUTO: 7.2 10E9/L (ref 1.6–8.3)
NEUTROPHILS NFR BLD AUTO: 70.6 %
NRBC # BLD AUTO: 0 10*3/UL
NRBC BLD AUTO-RTO: 0 /100
PLATELET # BLD AUTO: 240 10E9/L (ref 150–450)
POTASSIUM SERPL-SCNC: 4.1 MMOL/L (ref 3.4–5.3)
PROT SERPL-MCNC: 6 G/DL (ref 6.8–8.8)
RBC # BLD AUTO: 3.36 10E12/L (ref 3.8–5.2)
SODIUM SERPL-SCNC: 143 MMOL/L (ref 133–144)
WBC # BLD AUTO: 10.3 10E9/L (ref 4–11)

## 2020-12-17 PROCEDURE — 82378 CARCINOEMBRYONIC ANTIGEN: CPT | Performed by: PHYSICIAN ASSISTANT

## 2020-12-17 PROCEDURE — 36415 COLL VENOUS BLD VENIPUNCTURE: CPT | Performed by: PHYSICIAN ASSISTANT

## 2020-12-17 PROCEDURE — 80053 COMPREHEN METABOLIC PANEL: CPT | Performed by: PHYSICIAN ASSISTANT

## 2020-12-17 PROCEDURE — 85025 COMPLETE CBC W/AUTO DIFF WBC: CPT | Performed by: PHYSICIAN ASSISTANT

## 2020-12-18 ENCOUNTER — TELEPHONE (OUTPATIENT)
Dept: ONCOLOGY | Facility: CLINIC | Age: 85
End: 2020-12-18

## 2020-12-18 DIAGNOSIS — C20 RECTAL CANCER (H): Primary | ICD-10-CM

## 2020-12-18 RX ORDER — CAPECITABINE 500 MG/1
TABLET, FILM COATED ORAL
Qty: 56 TABLET | Refills: 1 | Status: SHIPPED | OUTPATIENT
Start: 2020-12-18 | End: 2021-02-02

## 2020-12-18 NOTE — TELEPHONE ENCOUNTER
Spoke with patient's daughter, Karine regarding patient's lab results from yesterday. Advised patient's daughter the labs look overall good, levels are rising and there are no concerns. Patient's daughter verbalized understanding and had no further questions or concerns.     Elzbieta Ybarra RN BSN

## 2020-12-21 DIAGNOSIS — Z53.9 DIAGNOSIS NOT YET DEFINED: Primary | ICD-10-CM

## 2020-12-21 PROCEDURE — G0180 MD CERTIFICATION HHA PATIENT: HCPCS | Performed by: FAMILY MEDICINE

## 2021-01-07 ENCOUNTER — TELEPHONE (OUTPATIENT)
Dept: FAMILY MEDICINE | Facility: CLINIC | Age: 86
End: 2021-01-07

## 2021-01-12 ENCOUNTER — TELEPHONE (OUTPATIENT)
Dept: FAMILY MEDICINE | Facility: CLINIC | Age: 86
End: 2021-01-12

## 2021-01-13 NOTE — TELEPHONE ENCOUNTER
LA for daughter Karine Angeles to care for patient received, completed, and routed to DR. Thakkar for review and signature.

## 2021-01-21 ENCOUNTER — TELEPHONE (OUTPATIENT)
Dept: FAMILY MEDICINE | Facility: CLINIC | Age: 86
End: 2021-01-21

## 2021-01-21 NOTE — TELEPHONE ENCOUNTER
Reason for Call:  Other call back    Detailed comments: Daughter is calling asking if she should get the covid vaccine with her chemo.    Phone Number Patient can be reached at: Karine  [phone]  Best Time: any    Can we leave a detailed message on this number? YES    Call taken on 1/21/2021 at 2:11 PM by Carolina Gould

## 2021-01-21 NOTE — TELEPHONE ENCOUNTER
Would patient be eligible to receive the COVID-19 vaccine while on Chemotherapy when we move to this group to vaccinate?    Provider please review and advise. Thank you.

## 2021-01-22 DIAGNOSIS — K21.9 GASTROESOPHAGEAL REFLUX DISEASE WITHOUT ESOPHAGITIS: Primary | ICD-10-CM

## 2021-01-22 NOTE — TELEPHONE ENCOUNTER
Received a message requesting a refill for omeprazole. This prescription was discontinued 2/21/20. Left message on daughter Karine's personal answering machine to return call. Direct line provided.  Need to find out if this is an automatic refill or if this is requested by patient/family.  Jamaica Jules RN

## 2021-01-22 NOTE — TELEPHONE ENCOUNTER
Spoke with patient's daughter. She states that patient ran out about 2 weeks ago. Daughter thought that patient possibly had it filled by another provider as she has been in and out of transitional care facilities and the hospital. This is not on her current medication list and it was discontinued on 2/21/20. Routed to provider. They would like script to be sent to [facility]. Script pended.  Jamaica Jules RN

## 2021-01-28 ENCOUNTER — PATIENT OUTREACH (OUTPATIENT)
Dept: ONCOLOGY | Facility: CLINIC | Age: 86
End: 2021-01-28

## 2021-01-28 ENCOUNTER — HOSPITAL ENCOUNTER (OUTPATIENT)
Dept: PET IMAGING | Facility: CLINIC | Age: 86
End: 2021-01-28
Attending: PHYSICIAN ASSISTANT
Payer: MEDICARE

## 2021-01-28 ENCOUNTER — HOSPITAL ENCOUNTER (OUTPATIENT)
Dept: LAB | Facility: CLINIC | Age: 86
End: 2021-01-28
Attending: INTERNAL MEDICINE
Payer: MEDICARE

## 2021-01-28 DIAGNOSIS — C20 RECTAL CANCER (H): ICD-10-CM

## 2021-01-28 DIAGNOSIS — C20 RECTAL CANCER (H): Primary | ICD-10-CM

## 2021-01-28 LAB
ALBUMIN SERPL-MCNC: 3.2 G/DL (ref 3.4–5)
ALP SERPL-CCNC: 128 U/L (ref 40–150)
ALT SERPL W P-5'-P-CCNC: 43 U/L (ref 0–50)
ANION GAP SERPL CALCULATED.3IONS-SCNC: <1 MMOL/L (ref 3–14)
AST SERPL W P-5'-P-CCNC: 28 U/L (ref 0–45)
BASOPHILS # BLD AUTO: 0 10E9/L (ref 0–0.2)
BASOPHILS NFR BLD AUTO: 0.4 %
BILIRUB SERPL-MCNC: 0.3 MG/DL (ref 0.2–1.3)
BUN SERPL-MCNC: 23 MG/DL (ref 7–30)
CALCIUM SERPL-MCNC: 9.3 MG/DL (ref 8.5–10.1)
CEA SERPL-MCNC: 2 UG/L (ref 0–2.5)
CHLORIDE SERPL-SCNC: 112 MMOL/L (ref 94–109)
CO2 SERPL-SCNC: 32 MMOL/L (ref 20–32)
CREAT SERPL-MCNC: 0.74 MG/DL (ref 0.52–1.04)
DIFFERENTIAL METHOD BLD: ABNORMAL
EOSINOPHIL # BLD AUTO: 0.4 10E9/L (ref 0–0.7)
EOSINOPHIL NFR BLD AUTO: 5.5 %
ERYTHROCYTE [DISTWIDTH] IN BLOOD BY AUTOMATED COUNT: 16 % (ref 10–15)
GFR SERPL CREATININE-BSD FRML MDRD: 71 ML/MIN/{1.73_M2}
GLUCOSE SERPL-MCNC: 96 MG/DL (ref 70–99)
HCT VFR BLD AUTO: 38.3 % (ref 35–47)
HGB BLD-MCNC: 12.1 G/DL (ref 11.7–15.7)
LYMPHOCYTES # BLD AUTO: 1.2 10E9/L (ref 0.8–5.3)
LYMPHOCYTES NFR BLD AUTO: 17.1 %
MCH RBC QN AUTO: 34.3 PG (ref 26.5–33)
MCHC RBC AUTO-ENTMCNC: 31.6 G/DL (ref 31.5–36.5)
MCV RBC AUTO: 109 FL (ref 78–100)
MONOCYTES # BLD AUTO: 0.6 10E9/L (ref 0–1.3)
MONOCYTES NFR BLD AUTO: 7.6 %
NEUTROPHILS # BLD AUTO: 5.1 10E9/L (ref 1.6–8.3)
NEUTROPHILS NFR BLD AUTO: 69.4 %
PLATELET # BLD AUTO: 203 10E9/L (ref 150–450)
POTASSIUM SERPL-SCNC: 5 MMOL/L (ref 3.4–5.3)
PROT SERPL-MCNC: 6.4 G/DL (ref 6.8–8.8)
RBC # BLD AUTO: 3.53 10E12/L (ref 3.8–5.2)
SODIUM SERPL-SCNC: 143 MMOL/L (ref 133–144)
WBC # BLD AUTO: 7.3 10E9/L (ref 4–11)

## 2021-01-28 PROCEDURE — 250N000011 HC RX IP 250 OP 636: Performed by: PHYSICIAN ASSISTANT

## 2021-01-28 PROCEDURE — 80053 COMPREHEN METABOLIC PANEL: CPT | Performed by: INTERNAL MEDICINE

## 2021-01-28 PROCEDURE — 36415 COLL VENOUS BLD VENIPUNCTURE: CPT | Performed by: INTERNAL MEDICINE

## 2021-01-28 PROCEDURE — G1004 CDSM NDSC: HCPCS | Mod: GC

## 2021-01-28 PROCEDURE — 82378 CARCINOEMBRYONIC ANTIGEN: CPT | Performed by: INTERNAL MEDICINE

## 2021-01-28 PROCEDURE — A9552 F18 FDG: HCPCS | Performed by: PHYSICIAN ASSISTANT

## 2021-01-28 PROCEDURE — 85025 COMPLETE CBC W/AUTO DIFF WBC: CPT | Performed by: INTERNAL MEDICINE

## 2021-01-28 PROCEDURE — 78816 PET IMAGE W/CT FULL BODY: CPT | Mod: 26

## 2021-01-28 PROCEDURE — 343N000001 HC RX 343: Performed by: PHYSICIAN ASSISTANT

## 2021-01-28 PROCEDURE — 71260 CT THORAX DX C+: CPT | Mod: ME

## 2021-01-28 RX ORDER — IOPAMIDOL 755 MG/ML
10-135 INJECTION, SOLUTION INTRAVASCULAR ONCE
Status: COMPLETED | OUTPATIENT
Start: 2021-01-28 | End: 2021-01-28

## 2021-01-28 RX ADMIN — IOPAMIDOL 65 ML: 755 INJECTION, SOLUTION INTRAVENOUS at 12:32

## 2021-01-28 RX ADMIN — FLUDEOXYGLUCOSE F-18 13.45 MCI.: 500 INJECTION, SOLUTION INTRAVENOUS at 12:28

## 2021-02-01 NOTE — PROGRESS NOTES
Diagnosis  Rectal cancer    Interval History    She's had issues with pains in her leg.  An epidural steroid injection was attempted but due to her colostomy, she couldn't be optimally positioned and after the injection she lost ground and couldn't walk for days and wound up in TCU.    She's back at home now.  She does get low dose gabapentin from her PCP.    She also has been experiencing some numbness in her hands.  This builds up during the week on Xeloda, then improves during the week off.    Apart from those problems, she's doing well.  Her daughter (a nurse) is with her for the video visit and comes over several times per week.    __________________________      Per Lucille Diggs MD: Sep 15, 2020     YOB: 1930   REASON FOR VISIT/CC:    At age 89, dx in 11/2019 rectal cancer  S/p Palliative RT  On oral palliative Xeloda now    HISTORY OF ONCOLOGY ILLNESS:    At age 89, she presented with years duration of abdominal pain.      CT 11/2019 found Circumferential rectosigmoid mass causing relative colonic obstruction with the more proximal colon distended with fecal debris. Colorectal mass extends beyond the serosa of the bowel wall with multiple small probable metastatic mesorectal lymph nodes. No enlarged pelvic sidewall or retroperitoneal lymph nodes. No evidence of metastatic liver disease on the current exam.    She was transferred to [facility], had emergency diverting colostomy bag, and biopsy of the colorectal mass 11/2019.    Rectal biopsy pathology found Adenocarcinoma arising in a tubular adenoma, focal signet ring cell features identified.    Further staging with PET, MRI found advanced T4b-N2 tumor of the low and mid rectum with invasion through the superior vagina and to the posterior margin of the urinary bladder.  Likely enhancing meningioma versus mets on brain MRI.   She made informed decision to proceed with palliative RT for palliation done in 2/2020.     She then made  informed decision to proceed oral Xeloda in earely 2/2020 for palliation purposes.  She was admitted late Feb 2020 for RT colitis. S/p 3 doses of dex helped her symptoms tremendously. She was discharged on Dicyclomine for cramping and pain four times daily, and  prn oxycodone.    Restaging pelvic MRI 6/2020 found significant interval decrease in size of the mid to distal rectal mass since 1/16/2020 indicating improvement.  Xeloda tx is continued. Dose was reduced to 1 g po bid in Aug due to complains of increasing legs pain.       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   She has pelvic fracture over the weekend.   She is in TCU now.       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        SOCIAL HISTORY:    She lives with her daughter,   fully functional her kids are close by.  Smoking denies alcohol abuse.      FAMILY HISTORY:    FH + for breast, colon, ovarian, thymic, melanoma.       REVIEW OF SYSTEMS:   She has baseline 8-10 times empty of her colostomy bag on Metamucil. She is also using Dicyclomine (antispasmodic and anticholinergic agent).  Is trying to keep her stool on the liquid side so it is easier for her to clean colostomy bag.    Reports new right leg pain from right hip, pain goes all the way down to foot. Left leg pain from knee down, not as bad. MRI L spine found lots of DJD, she is getting steroid injection.     _______________________________________     Allergies   Allergen Reactions     Eggs [Chicken-Derived Products (Egg)]      Flu Virus Vaccine Other (See Comments)     With egg base     Current Outpatient Medications   Medication     acetaminophen (TYLENOL) 325 MG tablet     albuterol (PROAIR HFA) 108 (90 Base) MCG/ACT inhaler     aspirin 81 MG EC tablet     atenolol (TENORMIN) 25 MG tablet     calcium carbonate (OS-DANIEL)  1500 (600 Ca) MG tablet     capecitabine (XELODA) 500 MG tablet     Cholecalciferol (VITAMIN D-3 PO)     FLOVENT  MCG/ACT inhaler     gabapentin (NEURONTIN) 100 MG capsule     magnesium oxide (MAG-OX) 400 MG tablet     melatonin 3 MG tablet     MULTI-DAY VITAMINS OR     omeprazole (PRILOSEC) 20 MG DR capsule     oxyCODONE (ROXICODONE) 5 MG tablet     polyethylene glycol (MIRALAX) 17 g packet     acetaminophen (TYLENOL) 325 MG tablet     capecitabine (XELODA) 500 MG tablet     dicyclomine (BENTYL) 10 MG capsule     naloxone (NARCAN) 4 MG/0.1ML nasal spray     ondansetron (ZOFRAN-ODT) 4 MG ODT tab     oxyCODONE (OXYCONTIN) 10 MG 12 hr tablet     oxyCODONE (OXYCONTIN) 10 MG 12 hr tablet     tiZANidine (ZANAFLEX) 2 MG tablet     No current facility-administered medications for this visit.      Past Medical History:   Diagnosis Date     Abdominal pain 02/16/2020     Actinic keratosis      Basal cell carcinoma      Other and unspecified hyperlipidemia      Unspecified asthma(493.90)      Unspecified essential hypertension      Urethral stricture due to infection 10/03/2005    ureteral stone       Social History     Socioeconomic History     Marital status:      Spouse name: Not on file     Number of children: Not on file     Years of education: Not on file     Highest education level: Not on file   Occupational History     Employer: RETIRED   Social Needs     Financial resource strain: Not on file     Food insecurity     Worry: Not on file     Inability: Not on file     Transportation needs     Medical: Not on file     Non-medical: Not on file   Tobacco Use     Smoking status: Never Smoker     Smokeless tobacco: Never Used   Substance and Sexual Activity     Alcohol use: Yes     Frequency: Monthly or less     Comment: wine ocass     Drug use: No     Sexual activity: Yes     Partners: Male   Lifestyle     Physical activity     Days per week: Not on file     Minutes per session: Not on file     Stress: Not on  "file   Relationships     Social connections     Talks on phone: Not on file     Gets together: Not on file     Attends [employer] service: Not on file     Active member of club or organization: Not on file     Attends meetings of clubs or organizations: Not on file     Relationship status: Not on file     Intimate partner violence     Fear of current or ex partner: Not on file     Emotionally abused: Not on file     Physically abused: Not on file     Forced sexual activity: Not on file   Other Topics Concern     Parent/sibling w/ CABG, MI or angioplasty before 65F 55M? No   Social History Narrative    February 18, 2020:   to [place] for 68 years and with whom she had 3 sons and  1 daughter.  She worked a variety of jobs:  Xray tech, for the [email or web address], then \"odd jobs\" after the kids were grown and out of the home.  She now lives in [place] with her .  She never smoked and rarely has a glass of wine.  B[employer]ism.  Still drives, locally only.      Ovidio Turner (Ann), CNP    [facility]         Patient Active Problem List   Diagnosis     Essential hypertension     IMPAIRED FASTING GLUCOSE     Hyperlipidemia LDL goal <100     GERD (gastroesophageal reflux disease)     Mild intermittent asthma in adult without complication     Rectal cancer (H)     Pubic ramus fracture (H)     Intractable back pain     Bilateral sacral insufficiency fracture, initial encounter     Age related osteoporosis     Exposure to SARS-associated coronavirus     Accidental fall, subsequent encounter     Acute back pain with sciatica, right     Acute right-sided low back pain with right-sided sciatica     Closed fracture of left pubis with routine healing, unspecified portion of pubis, subsequent encounter     Review Of Systems  Skin: negative  Eyes: negative  Ears/Nose/Throat: negative  Respiratory: No shortness of breath, dyspnea on exertion, cough, or hemoptysis  Cardiovascular: negative  Gastrointestinal: " "negative  Genitourinary: negative  Musculoskeletal: negative  Neurologic: 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  No discharge or erythema, or obvious scleral/conjunctival abnormalities.  RESP: No audible wheeze, cough, or visible cyanosis.  No visible retractions or increased work of breathing.    SKIN: Visible skin clear. No significant rash, abnormal pigmentation or lesions.  NEURO: Cranial nerves grossly intact.  Mentation and speech appropriate for age.  PSYCH: Mentation appears normal, affect normal/bright, judgement and insight intact, normal speech and appearance well-groomed.    Recent Results (from the past 720 hour(s))   CEA    Collection Time: 01/28/21 11:49 AM   Result Value Ref Range    CEA 2.0 0 - 2.5 ug/L   Comprehensive metabolic panel    Collection Time: 01/28/21 11:49 AM   Result Value Ref Range    Sodium 143 133 - 144 mmol/L    Potassium 5.0 3.4 - 5.3 mmol/L    Chloride 112 (H) 94 - 109 mmol/L    Carbon Dioxide 32 20 - 32 mmol/L    Anion Gap <1 (L) 3 - 14 mmol/L    Glucose 96 70 - 99 mg/dL    Urea Nitrogen 23 7 - 30 mg/dL    Creatinine 0.74 0.52 - 1.04 mg/dL    GFR Estimate 71 >60 mL/min/[1.73_m2]    GFR Estimate If Black 82 >60 mL/min/[1.73_m2]    Calcium 9.3 8.5 - 10.1 mg/dL    Bilirubin Total 0.3 0.2 - 1.3 mg/dL    Albumin 3.2 (L) 3.4 - 5.0 g/dL    Protein Total 6.4 (L) 6.8 - 8.8 g/dL    Alkaline Phosphatase 128 40 - 150 U/L    ALT 43 0 - 50 U/L    AST 28 0 - 45 U/L   CBC with platelets differential    Collection Time: 01/28/21 11:49 AM   Result Value Ref Range    WBC 7.3 4.0 - 11.0 10e9/L    RBC Count 3.53 (L) 3.8 - 5.2 10e12/L    Hemoglobin 12.1 11.7 - 15.7 g/dL    Hematocrit 38.3 35.0 - 47.0 %     (H) 78 - 100 fl    MCH 34.3 (H) 26.5 - 33.0 pg    MCHC 31.6 31.5 - 36.5 " g/dL    RDW 16.0 (H) 10.0 - 15.0 %    Platelet Count 203 150 - 450 10e9/L    Diff Method Automated Method     % Neutrophils 69.4 %    % Lymphocytes 17.1 %    % Monocytes 7.6 %    % Eosinophils 5.5 %    % Basophils 0.4 %    Absolute Neutrophil 5.1 1.6 - 8.3 10e9/L    Absolute Lymphocytes 1.2 0.8 - 5.3 10e9/L    Absolute Monocytes 0.6 0.0 - 1.3 10e9/L    Absolute Eosinophils 0.4 0.0 - 0.7 10e9/L    Absolute Basophils 0.0 0.0 - 0.2 10e9/L     Recent Results (from the past 744 hour(s))   PET Oncology Whole Body    Narrative    Combined Report of: PET and CT on 1/28/2021 1:58 PM:    1. PET of the neck, chest, abdomen, and pelvis.  2. PET CT Fusion for Attenuation Correction and Anatomical  Localization  3. Diagnostic CT scan of the chest, abdomen, and pelvis with  intravenous contrast for interpretation.  4. 3D MIP and PET-CT fused images were processed on an independent  workstation and archived to PACS and reviewed by a radiologist.    Technique:  1. PET: The patient received 13.45 mCi of F-18-FDG; the serum glucose  was 81 prior to administration, body weight was 48.1 kg. Images  acquired from the vertex to the feet. UPTAKE WAS MEASURED AT 60  MINUTES.   2. CT: CT images obtained through the chest, abdomen, and pelvis. The  patient received 65 mL of Isovue 370 intravenously for the  examination. High resolution images of the neck were obtained with  multiple oblique projection reformats.    INDICATION: Rectal cancer, recurrence; Rectal cancer (H)    Rectal cancer diagnosed November 2019 with an advanced T4bN2 rectal  cancer. She had diverting colostomy but not resection, She had  palliative RT 2/2020 and proceeded with palliative xeloda?Feb 2020.  ?She has had treatment response and Xeloda dose was reduced due to leg  pain. In September she had a pelvic fracture and has been in a TCU.     CEA on 12/17 is 2.2, down from 2.6 on 11/10, 1.7 on 6/17, 3.5 on 2/11    COMPARISON: CT AP 11/7/2020. PET CT 1/16/2020.      FINDINGS:     HEAD/NECK:  Increased FDG associated with the right lobe of the thyroid with an  SUV max of 4.33. Thyroid is nodular and irregular appearing.    Ventricles are symmetric. No midline shift. Orbits are unremarkable.  Paranasal sinuses and mastoid air cells are clear. No FDG avid or  enlarged lymph nodes in the neck.    CHEST:  There is no suspicious FDG uptake in the chest.     Ectatic ascending aorta measuring up to 4.2 cm. Prominent calcific  atherosclerotic disease of the aorta. Moderate to heavy coronary  artery calcifications. No pericardial effusion. No FDG avid or  enlarged mediastinal, hilar or thoracic lymph nodes..    No new or enlarging pulmonary nodules.    ABDOMEN AND PELVIS:    Cholelithiasis. Minimal intrahepatic biliary ductal dilation. Liver is  otherwise unremarkable. Pancreas and spleen unremarkable. Adrenal  glands are within normal limits. No focal renal lesion. FDG avidity of  the left lower quadrant ileostomy and associated bowel FDG avidity of  upstream bowel bleeding into the suresh hepatis.    Mass-like area between the rectum and the vagina is now non-FDG avid  without definable mass. There is still prominent FDG activity  throughout the lumen of the rectum and sigmoid colon with moderate  wall thickening of the rectum and distal sigmoid. SUV max of the  contents of the rectum is 19.83. Diverticulosis without evidence of  diverticulitis.    No FDG avid or enlarged lymph nodes in the abdomen or pelvis.    BONES:   Chronic-appearing fracture of the superior and inferior left pubic  ramus with FDG avidity, similar to exam 11/7/2020. There is also FDG  avid chronic bilateral sacral insufficiency fracture with some bony  remodeling since 11/7/2020.    Prominent degenerative changes of the shoulders.      Impression    IMPRESSION: In this patient with a history of rectal cancer status  post palliative radiation therapy and on Xeloda:    1. Although there has been significant  response to therapy of the  rectal mass, there is wall thickening of the distal sigmoid colon and  rectum as well as scattered prominent intraluminal FDG avidity. These  findings likely are sequela from physiological secretions and  radiation changes we cannot exclude residual malignancy. Endoscopy may  be necessary to confirm status of disease within the bowel.    2. No new lymphadenopathy or evidence for distant metastatic disease.  3. Bilateral sacral insufficiency fractures, left anterior pubic  insufficiency fracture and left superior and inferior pubic ramus  fractures. Findings are similar to CT 11/7/2020.  4. Left lower quadrant diverting colostomy without evidence for  obstruction.   5. Cholelithiasis without evidence for cholecystitis.  6. FDG avid thyroid nodule with a gland that is irregular in shape.  Consider thyroid ultrasound.    I have personally reviewed the examination and initial interpretation  and I agree with the findings.    SANDRA REDDY MD       Assessment and Plan    Rectal cancer is reasonably stable on capecitabine but she has two kinds ot neuropathy both of which are impairing quality of life:  Numbness in her hands that's interfering with doing puzzles, and pain in her leg (mostly right) probably mechanical from spine.    We discussed decreasing her capecitabine so she'll be taking it 6 days on and 8 days off every 2 weeks.  Dose per day will remain at 1000 mg BID.    We discussed increasing her gabapentin.  Currently she takes 100 mg morning and midday, then 200 mg at bedtime.  She'll add one more 100 mg pill midday so the schedule will be 100-200-200.    Next MD visit in 2 months.

## 2021-02-02 ENCOUNTER — VIRTUAL VISIT (OUTPATIENT)
Dept: ONCOLOGY | Facility: CLINIC | Age: 86
End: 2021-02-02
Attending: INTERNAL MEDICINE
Payer: MEDICARE

## 2021-02-02 DIAGNOSIS — C20 RECTAL CANCER (H): ICD-10-CM

## 2021-02-02 PROCEDURE — 99214 OFFICE O/P EST MOD 30 MIN: CPT | Mod: 95 | Performed by: INTERNAL MEDICINE

## 2021-02-02 PROCEDURE — 999N001193 HC VIDEO/TELEPHONE VISIT; NO CHARGE

## 2021-02-02 RX ORDER — CAPECITABINE 500 MG/1
TABLET, FILM COATED ORAL
Qty: 56 TABLET | Refills: 1 | Status: SHIPPED | OUTPATIENT
Start: 2021-02-02 | End: 2021-12-29

## 2021-02-02 RX ORDER — DICYCLOMINE HYDROCHLORIDE 10 MG/1
CAPSULE ORAL
COMMUNITY
Start: 2020-11-02 | End: 2021-09-10

## 2021-02-02 NOTE — LETTER
2/2/2021         RE: Pam Pierce  [address] [place] [id number]        Dear Colleague,    Thank you for referring your patient, aPm Pierce, to the [facility]. Please see a copy of my visit note below.    Diagnosis  Rectal cancer    Interval History    She's had issues with pains in her leg.  An epidural steroid injection was attempted but due to her colostomy, she couldn't be optimally positioned and after the injection she lost ground and couldn't walk for days and wound up in TCU.    She's back at home now.  She does get low dose gabapentin from her PCP.    She also has been experiencing some numbness in her hands.  This builds up during the week on Xeloda, then improves during the week off.    Apart from those problems, she's doing well.  Her daughter (a nurse) is with her for the video visit and comes over several times per week.    __________________________      Per Lucille Diggs MD: Sep 15, 2020     YOB: 1930   REASON FOR VISIT/CC:    At age 89, dx in 11/2019 rectal cancer  S/p Palliative RT  On oral palliative Xeloda now    HISTORY OF ONCOLOGY ILLNESS:    At age 89, she presented with years duration of abdominal pain.      CT 11/2019 found Circumferential rectosigmoid mass causing relative colonic obstruction with the more proximal colon distended with fecal debris. Colorectal mass extends beyond the serosa of the bowel wall with multiple small probable metastatic mesorectal lymph nodes. No enlarged pelvic sidewall or retroperitoneal lymph nodes. No evidence of metastatic liver disease on the current exam.    She was transferred to [facility], had emergency diverting colostomy bag, and biopsy of the colorectal mass 11/2019.    Rectal biopsy pathology found Adenocarcinoma arising in a tubular adenoma, focal signet ring cell features identified.    Further staging with PET, MRI found advanced T4b-N2 tumor of the low and mid  rectum with invasion through the superior vagina and to the posterior margin of the urinary bladder.  Likely enhancing meningioma versus mets on brain MRI.   She made informed decision to proceed with palliative RT for palliation done in 2/2020.     She then made informed decision to proceed oral Xeloda in earely 2/2020 for palliation purposes.  She was admitted late Feb 2020 for RT colitis. S/p 3 doses of dex helped her symptoms tremendously. She was discharged on Dicyclomine for cramping and pain four times daily, and  prn oxycodone.    Restaging pelvic MRI 6/2020 found significant interval decrease in size of the mid to distal rectal mass since 1/16/2020 indicating improvement.  Xeloda tx is continued. Dose was reduced to 1 g po bid in Aug due to complains of increasing legs pain.       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   She has pelvic fracture over the weekend.   She is in TCU now.       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        SOCIAL HISTORY:    She lives with her daughter,   fully functional her kids are close by.  Smoking denies alcohol abuse.      FAMILY HISTORY:    FH + for breast, colon, ovarian, thymic, melanoma.       REVIEW OF SYSTEMS:   She has baseline 8-10 times empty of her colostomy bag on Metamucil. She is also using Dicyclomine (antispasmodic and anticholinergic agent).  Is trying to keep her stool on the liquid side so it is easier for her to clean colostomy bag.    Reports new right leg pain from right hip, pain goes all the way down to foot. Left leg pain from knee down, not as bad. MRI L spine found lots of DJD, she is getting steroid injection.     _______________________________________     Allergies   Allergen Reactions     Eggs [Chicken-Derived Products (Egg)]      Flu Virus Vaccine Other (See  Comments)     With egg base     Current Outpatient Medications   Medication     acetaminophen (TYLENOL) 325 MG tablet     albuterol (PROAIR HFA) 108 (90 Base) MCG/ACT inhaler     aspirin 81 MG EC tablet     atenolol (TENORMIN) 25 MG tablet     calcium carbonate (OS-DANIEL) 1500 (600 Ca) MG tablet     capecitabine (XELODA) 500 MG tablet     Cholecalciferol (VITAMIN D-3 PO)     FLOVENT  MCG/ACT inhaler     gabapentin (NEURONTIN) 100 MG capsule     magnesium oxide (MAG-OX) 400 MG tablet     melatonin 3 MG tablet     MULTI-DAY VITAMINS OR     omeprazole (PRILOSEC) 20 MG DR capsule     oxyCODONE (ROXICODONE) 5 MG tablet     polyethylene glycol (MIRALAX) 17 g packet     acetaminophen (TYLENOL) 325 MG tablet     capecitabine (XELODA) 500 MG tablet     dicyclomine (BENTYL) 10 MG capsule     naloxone (NARCAN) 4 MG/0.1ML nasal spray     ondansetron (ZOFRAN-ODT) 4 MG ODT tab     oxyCODONE (OXYCONTIN) 10 MG 12 hr tablet     oxyCODONE (OXYCONTIN) 10 MG 12 hr tablet     tiZANidine (ZANAFLEX) 2 MG tablet     No current facility-administered medications for this visit.      Past Medical History:   Diagnosis Date     Abdominal pain 02/16/2020     Actinic keratosis      Basal cell carcinoma      Other and unspecified hyperlipidemia      Unspecified asthma(493.90)      Unspecified essential hypertension      Urethral stricture due to infection 10/03/2005    ureteral stone       Social History     Socioeconomic History     Marital status:      Spouse name: Not on file     Number of children: Not on file     Years of education: Not on file     Highest education level: Not on file   Occupational History     Employer: RETIRED   Social Needs     Financial resource strain: Not on file     Food insecurity     Worry: Not on file     Inability: Not on file     Transportation needs     Medical: Not on file     Non-medical: Not on file   Tobacco Use     Smoking status: Never Smoker     Smokeless tobacco: Never Used   Substance and  "Sexual Activity     Alcohol use: Yes     Frequency: Monthly or less     Comment: wine ocass     Drug use: No     Sexual activity: Yes     Partners: Male   Lifestyle     Physical activity     Days per week: Not on file     Minutes per session: Not on file     Stress: Not on file   Relationships     Social connections     Talks on phone: Not on file     Gets together: Not on file     Attends [employer]ism service: Not on file     Active member of club or organization: Not on file     Attends meetings of clubs or organizations: Not on file     Relationship status: Not on file     Intimate partner violence     Fear of current or ex partner: Not on file     Emotionally abused: Not on file     Physically abused: Not on file     Forced sexual activity: Not on file   Other Topics Concern     Parent/sibling w/ CABG, MI or angioplasty before 65F 55M? No   Social History Narrative    February 18, 2020:   to [place] for 68 years and with whom she had 3 sons and  1 daughter.  She worked a variety of jobs:  Xray tech, for the [employer], then \"odd jobs\" after the kids were grown and out of the home.  She now lives in [place] with her .  She never smoked and rarely has a glass of wine.  [place].  Still drives, locally only.      Ovidio Turner (Ann), CNP    [facility]         Patient Active Problem List   Diagnosis     Essential hypertension     IMPAIRED FASTING GLUCOSE     Hyperlipidemia LDL goal <100     GERD (gastroesophageal reflux disease)     Mild intermittent asthma in adult without complication     Rectal cancer (H)     Pubic ramus fracture (H)     Intractable back pain     Bilateral sacral insufficiency fracture, initial encounter     Age related osteoporosis     Exposure to SARS-associated coronavirus     Accidental fall, subsequent encounter     Acute back pain with sciatica, right     Acute right-sided low back pain with right-sided sciatica     Closed fracture of left pubis with " "routine healing, unspecified portion of pubis, subsequent encounter     Review Of Systems  Skin: negative  Eyes: negative  Ears/Nose/Throat: negative  Respiratory: No shortness of breath, dyspnea on exertion, cough, or hemoptysis  Cardiovascular: negative  Gastrointestinal: negative  Genitourinary: negative  Musculoskeletal: negative  Neurologic: 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  No discharge or erythema, or obvious scleral/conjunctival abnormalities.  RESP: No audible wheeze, cough, or visible cyanosis.  No visible retractions or increased work of breathing.    SKIN: Visible skin clear. No significant rash, abnormal pigmentation or lesions.  NEURO: Cranial nerves grossly intact.  Mentation and speech appropriate for age.  PSYCH: Mentation appears normal, affect normal/bright, judgement and insight intact, normal speech and appearance well-groomed.    Recent Results (from the past 720 hour(s))   CEA    Collection Time: 01/28/21 11:49 AM   Result Value Ref Range    CEA 2.0 0 - 2.5 ug/L   Comprehensive metabolic panel    Collection Time: 01/28/21 11:49 AM   Result Value Ref Range    Sodium 143 133 - 144 mmol/L    Potassium 5.0 3.4 - 5.3 mmol/L    Chloride 112 (H) 94 - 109 mmol/L    Carbon Dioxide 32 20 - 32 mmol/L    Anion Gap <1 (L) 3 - 14 mmol/L    Glucose 96 70 - 99 mg/dL    Urea Nitrogen 23 7 - 30 mg/dL    Creatinine 0.74 0.52 - 1.04 mg/dL    GFR Estimate 71 >60 mL/min/[1.73_m2]    GFR Estimate If Black 82 >60 mL/min/[1.73_m2]    Calcium 9.3 8.5 - 10.1 mg/dL    Bilirubin Total 0.3 0.2 - 1.3 mg/dL    Albumin 3.2 (L) 3.4 - 5.0 g/dL    Protein Total 6.4 (L) 6.8 - 8.8 g/dL    Alkaline Phosphatase 128 40 - 150 U/L    ALT 43 0 - 50 U/L    AST 28 0 - 45 U/L   CBC with platelets differential    " Collection Time: 01/28/21 11:49 AM   Result Value Ref Range    WBC 7.3 4.0 - 11.0 10e9/L    RBC Count 3.53 (L) 3.8 - 5.2 10e12/L    Hemoglobin 12.1 11.7 - 15.7 g/dL    Hematocrit 38.3 35.0 - 47.0 %     (H) 78 - 100 fl    MCH 34.3 (H) 26.5 - 33.0 pg    MCHC 31.6 31.5 - 36.5 g/dL    RDW 16.0 (H) 10.0 - 15.0 %    Platelet Count 203 150 - 450 10e9/L    Diff Method Automated Method     % Neutrophils 69.4 %    % Lymphocytes 17.1 %    % Monocytes 7.6 %    % Eosinophils 5.5 %    % Basophils 0.4 %    Absolute Neutrophil 5.1 1.6 - 8.3 10e9/L    Absolute Lymphocytes 1.2 0.8 - 5.3 10e9/L    Absolute Monocytes 0.6 0.0 - 1.3 10e9/L    Absolute Eosinophils 0.4 0.0 - 0.7 10e9/L    Absolute Basophils 0.0 0.0 - 0.2 10e9/L     Recent Results (from the past 744 hour(s))   PET Oncology Whole Body    Narrative    Combined Report of: PET and CT on 1/28/2021 1:58 PM:    1. PET of the neck, chest, abdomen, and pelvis.  2. PET CT Fusion for Attenuation Correction and Anatomical  Localization  3. Diagnostic CT scan of the chest, abdomen, and pelvis with  intravenous contrast for interpretation.  4. 3D MIP and PET-CT fused images were processed on an independent  workstation and archived to PACS and reviewed by a radiologist.    Technique:  1. PET: The patient received 13.45 mCi of F-18-FDG; the serum glucose  was 81 prior to administration, body weight was 48.1 kg. Images  acquired from the vertex to the feet. UPTAKE WAS MEASURED AT 60  MINUTES.   2. CT: CT images obtained through the chest, abdomen, and pelvis. The  patient received 65 mL of Isovue 370 intravenously for the  examination. High resolution images of the neck were obtained with  multiple oblique projection reformats.    INDICATION: Rectal cancer, recurrence; Rectal cancer (H)    Rectal cancer diagnosed November 2019 with an advanced T4bN2 rectal  cancer. She had diverting colostomy but not resection, She had  palliative RT 2/2020 and proceeded with palliative xeloda?Feb  2020.  ?She has had treatment response and Xeloda dose was reduced due to leg  pain. In September she had a pelvic fracture and has been in a TCU.     CEA on 12/17 is 2.2, down from 2.6 on 11/10, 1.7 on 6/17, 3.5 on 2/11    COMPARISON: CT AP 11/7/2020. PET CT 1/16/2020.     FINDINGS:     HEAD/NECK:  Increased FDG associated with the right lobe of the thyroid with an  SUV max of 4.33. Thyroid is nodular and irregular appearing.    Ventricles are symmetric. No midline shift. Orbits are unremarkable.  Paranasal sinuses and mastoid air cells are clear. No FDG avid or  enlarged lymph nodes in the neck.    CHEST:  There is no suspicious FDG uptake in the chest.     Ectatic ascending aorta measuring up to 4.2 cm. Prominent calcific  atherosclerotic disease of the aorta. Moderate to heavy coronary  artery calcifications. No pericardial effusion. No FDG avid or  enlarged mediastinal, hilar or thoracic lymph nodes..    No new or enlarging pulmonary nodules.    ABDOMEN AND PELVIS:    Cholelithiasis. Minimal intrahepatic biliary ductal dilation. Liver is  otherwise unremarkable. Pancreas and spleen unremarkable. Adrenal  glands are within normal limits. No focal renal lesion. FDG avidity of  the left lower quadrant ileostomy and associated bowel FDG avidity of  upstream bowel bleeding into the suresh hepatis.    Mass-like area between the rectum and the vagina is now non-FDG avid  without definable mass. There is still prominent FDG activity  throughout the lumen of the rectum and sigmoid colon with moderate  wall thickening of the rectum and distal sigmoid. SUV max of the  contents of the rectum is 19.83. Diverticulosis without evidence of  diverticulitis.    No FDG avid or enlarged lymph nodes in the abdomen or pelvis.    BONES:   Chronic-appearing fracture of the superior and inferior left pubic  ramus with FDG avidity, similar to exam 11/7/2020. There is also FDG  avid chronic bilateral sacral insufficiency fracture with  some bony  remodeling since 11/7/2020.    Prominent degenerative changes of the shoulders.      Impression    IMPRESSION: In this patient with a history of rectal cancer status  post palliative radiation therapy and on Xeloda:    1. Although there has been significant response to therapy of the  rectal mass, there is wall thickening of the distal sigmoid colon and  rectum as well as scattered prominent intraluminal FDG avidity. These  findings likely are sequela from physiological secretions and  radiation changes we cannot exclude residual malignancy. Endoscopy may  be necessary to confirm status of disease within the bowel.    2. No new lymphadenopathy or evidence for distant metastatic disease.  3. Bilateral sacral insufficiency fractures, left anterior pubic  insufficiency fracture and left superior and inferior pubic ramus  fractures. Findings are similar to CT 11/7/2020.  4. Left lower quadrant diverting colostomy without evidence for  obstruction.   5. Cholelithiasis without evidence for cholecystitis.  6. FDG avid thyroid nodule with a gland that is irregular in shape.  Consider thyroid ultrasound.    I have personally reviewed the examination and initial interpretation  and I agree with the findings.    SANDRA REDDY MD       Assessment and Plan    Rectal cancer is reasonably stable on capecitabine but she has two kinds ot neuropathy both of which are impairing quality of life:  Numbness in her hands that's interfering with doing puzzles, and pain in her leg (mostly right) probably mechanical from spine.    We discussed decreasing her capecitabine so she'll be taking it 6 days on and 8 days off every 2 weeks.  Dose per day will remain at 1000 mg BID.    We discussed increasing her gabapentin.  Currently she takes 100 mg morning and midday, then 200 mg at bedtime.  She'll add one more 100 mg pill midday so the schedule will be 100-200-200.    Next MD visit in 2 months.    Video Visit - Use [employer]-  "Oncology Rooming Note-  Call Daughter Sandoval'  phone [phone]    February 2, 2021 10:33 AM   Pam Pierce is a 89 year old female who presents for:    Chief Complaint   Patient presents with     Oncology Clinic Visit     Return Rectal cancer, review Labs, PET and Xeloda     Initial Vitals: There were no vitals taken for this visit. Estimated body mass index is 21.41 kg/m  as calculated from the following:    Height as of 12/7/20: 1.499 m (4' 11\").    Weight as of 12/7/20: 48.1 kg (106 lb). There is no height or weight on file to calculate BSA.  Data Unavailable Comment: Data Unavailable   No LMP recorded. Patient has had a hysterectomy.  Allergies reviewed: Yes  Medications reviewed: Yes    Medications: MEDICATION REFILLS NEEDED TODAY. Provider was notified.  Pharmacy name entered into [facility]:      [facility] [place] - [place], [place] - [address]  [facility] - [place], [place] - [address]     Clinical concerns: Return Rectal cancer, review Labs, PET and Xeloda.   Has questions about taking tiZANidine (ZANAFLEX) for legs. Also interested in taking increased dose of Neurontin for Abram, leg & foot Neuropathy. Worse at night.      Johanny Hunter CMA                  Again, thank you for allowing me to participate in the care of your patient.        Sincerely,        Zeynep Narayanan MD    "

## 2021-02-02 NOTE — LETTER
2/2/2021         RE: Pam Pierce  [address] [place] [id number]        Dear Colleague,    Thank you for referring your patient, Pam Pierce, to the [facility] [place]. Please see a copy of my visit note below.    Diagnosis  Rectal cancer    Interval History    She's had issues with pains in her leg.  An epidural steroid injection was attempted but due to her colostomy, she couldn't be optimally positioned and after the injection she lost ground and couldn't walk for days and wound up in TCU.    She's back at home now.  She does get low dose gabapentin from her PCP.    She also has been experiencing some numbness in her hands.  This builds up during the week on Xeloda, then improves during the week off.    Apart from those problems, she's doing well.  Her daughter (a nurse) is with her for the video visit and comes over several times per week.    __________________________      Per Lucille Diggs MD: Sep 15, 2020     YOB: 1930   REASON FOR VISIT/CC:    At age 89, dx in 11/2019 rectal cancer  S/p Palliative RT  On oral palliative Xeloda now    HISTORY OF ONCOLOGY ILLNESS:    At age 89, she presented with years duration of abdominal pain.      CT 11/2019 found Circumferential rectosigmoid mass causing relative colonic obstruction with the more proximal colon distended with fecal debris. Colorectal mass extends beyond the serosa of the bowel wall with multiple small probable metastatic mesorectal lymph nodes. No enlarged pelvic sidewall or retroperitoneal lymph nodes. No evidence of metastatic liver disease on the current exam.    She was transferred to [facility], had emergency diverting colostomy bag, and biopsy of the colorectal mass 11/2019.    Rectal biopsy pathology found Adenocarcinoma arising in a tubular adenoma, focal signet ring cell features identified.    Further staging with PET, MRI found advanced T4b-N2 tumor of the low and mid  rectum with invasion through the superior vagina and to the posterior margin of the urinary bladder.  Likely enhancing meningioma versus mets on brain MRI.   She made informed decision to proceed with palliative RT for palliation done in 2/2020.     She then made informed decision to proceed oral Xeloda in earely 2/2020 for palliation purposes.  She was admitted late Feb 2020 for RT colitis. S/p 3 doses of dex helped her symptoms tremendously. She was discharged on Dicyclomine for cramping and pain four times daily, and  prn oxycodone.    Restaging pelvic MRI 6/2020 found significant interval decrease in size of the mid to distal rectal mass since 1/16/2020 indicating improvement.  Xeloda tx is continued. Dose was reduced to 1 g po bid in Aug due to complains of increasing legs pain.       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   She has pelvic fracture over the weekend.   She is in TCU now.       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        SOCIAL HISTORY:    She lives with her daughter,   fully functional her kids are close by.  Smoking denies alcohol abuse.      FAMILY HISTORY:    FH + for breast, colon, ovarian, thymic, melanoma.       REVIEW OF SYSTEMS:   She has baseline 8-10 times empty of her colostomy bag on Metamucil. She is also using Dicyclomine (antispasmodic and anticholinergic agent).  Is trying to keep her stool on the liquid side so it is easier for her to clean colostomy bag.    Reports new right leg pain from right hip, pain goes all the way down to foot. Left leg pain from knee down, not as bad. MRI L spine found lots of DJD, she is getting steroid injection.     _______________________________________     Allergies   Allergen Reactions     Eggs [Chicken-Derived Products (Egg)]      Flu Virus Vaccine Other (See  Comments)     With egg base     Current Outpatient Medications   Medication     acetaminophen (TYLENOL) 325 MG tablet     albuterol (PROAIR HFA) 108 (90 Base) MCG/ACT inhaler     aspirin 81 MG EC tablet     atenolol (TENORMIN) 25 MG tablet     calcium carbonate (OS-DANIEL) 1500 (600 Ca) MG tablet     capecitabine (XELODA) 500 MG tablet     Cholecalciferol (VITAMIN D-3 PO)     FLOVENT  MCG/ACT inhaler     gabapentin (NEURONTIN) 100 MG capsule     magnesium oxide (MAG-OX) 400 MG tablet     melatonin 3 MG tablet     MULTI-DAY VITAMINS OR     omeprazole (PRILOSEC) 20 MG DR capsule     oxyCODONE (ROXICODONE) 5 MG tablet     polyethylene glycol (MIRALAX) 17 g packet     acetaminophen (TYLENOL) 325 MG tablet     capecitabine (XELODA) 500 MG tablet     dicyclomine (BENTYL) 10 MG capsule     naloxone (NARCAN) 4 MG/0.1ML nasal spray     ondansetron (ZOFRAN-ODT) 4 MG ODT tab     oxyCODONE (OXYCONTIN) 10 MG 12 hr tablet     oxyCODONE (OXYCONTIN) 10 MG 12 hr tablet     tiZANidine (ZANAFLEX) 2 MG tablet     No current facility-administered medications for this visit.      Past Medical History:   Diagnosis Date     Abdominal pain 02/16/2020     Actinic keratosis      Basal cell carcinoma      Other and unspecified hyperlipidemia      Unspecified asthma(493.90)      Unspecified essential hypertension      Urethral stricture due to infection 10/03/2005    ureteral stone       Social History     Socioeconomic History     Marital status:      Spouse name: Not on file     Number of children: Not on file     Years of education: Not on file     Highest education level: Not on file   Occupational History     Employer: RETIRED   Social Needs     Financial resource strain: Not on file     Food insecurity     Worry: Not on file     Inability: Not on file     Transportation needs     Medical: Not on file     Non-medical: Not on file   Tobacco Use     Smoking status: Never Smoker     Smokeless tobacco: Never Used   Substance and  "Sexual Activity     Alcohol use: Yes     Frequency: Monthly or less     Comment: wine ocass     Drug use: No     Sexual activity: Yes     Partners: Male   Lifestyle     Physical activity     Days per week: Not on file     Minutes per session: Not on file     Stress: Not on file   Relationships     Social connections     Talks on phone: Not on file     Gets together: Not on file     Attends Advent service: Not on file     Active member of club or organization: Not on file     Attends meetings of clubs or organizations: Not on file     Relationship status: Not on file     Intimate partner violence     Fear of current or ex partner: Not on file     Emotionally abused: Not on file     Physically abused: Not on file     Forced sexual activity: Not on file   Other Topics Concern     Parent/sibling w/ CABG, MI or angioplasty before 65F 55M? No   Social History Narrative    February 18, 2020:   to [place] for 68 years and with whom she had 3 sons and  1 daughter.  She worked a variety of jobs:  Xray tech, for the [employer], then \"odd jobs\" after the kids were grown and out of the home.  She now lives in [place] with her .  She never smoked and rarely has a glass of wine.  [place].  Still drives, locally only.      Ovidio Turner (Ann), CNP    [facility]         Patient Active Problem List   Diagnosis     Essential hypertension     IMPAIRED FASTING GLUCOSE     Hyperlipidemia LDL goal <100     GERD (gastroesophageal reflux disease)     Mild intermittent asthma in adult without complication     Rectal cancer (H)     Pubic ramus fracture (H)     Intractable back pain     Bilateral sacral insufficiency fracture, initial encounter     Age related osteoporosis     Exposure to SARS-associated coronavirus     Accidental fall, subsequent encounter     Acute back pain with sciatica, right     Acute right-sided low back pain with right-sided sciatica     Closed fracture of left pubis with " "routine healing, unspecified portion of pubis, subsequent encounter     Review Of Systems  Skin: negative  Eyes: negative  Ears/Nose/Throat: negative  Respiratory: No shortness of breath, dyspnea on exertion, cough, or hemoptysis  Cardiovascular: negative  Gastrointestinal: negative  Genitourinary: negative  Musculoskeletal: negative  Neurologic: 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  No discharge or erythema, or obvious scleral/conjunctival abnormalities.  RESP: No audible wheeze, cough, or visible cyanosis.  No visible retractions or increased work of breathing.    SKIN: Visible skin clear. No significant rash, abnormal pigmentation or lesions.  NEURO: Cranial nerves grossly intact.  Mentation and speech appropriate for age.  PSYCH: Mentation appears normal, affect normal/bright, judgement and insight intact, normal speech and appearance well-groomed.    Recent Results (from the past 720 hour(s))   CEA    Collection Time: 01/28/21 11:49 AM   Result Value Ref Range    CEA 2.0 0 - 2.5 ug/L   Comprehensive metabolic panel    Collection Time: 01/28/21 11:49 AM   Result Value Ref Range    Sodium 143 133 - 144 mmol/L    Potassium 5.0 3.4 - 5.3 mmol/L    Chloride 112 (H) 94 - 109 mmol/L    Carbon Dioxide 32 20 - 32 mmol/L    Anion Gap <1 (L) 3 - 14 mmol/L    Glucose 96 70 - 99 mg/dL    Urea Nitrogen 23 7 - 30 mg/dL    Creatinine 0.74 0.52 - 1.04 mg/dL    GFR Estimate 71 >60 mL/min/[1.73_m2]    GFR Estimate If Black 82 >60 mL/min/[1.73_m2]    Calcium 9.3 8.5 - 10.1 mg/dL    Bilirubin Total 0.3 0.2 - 1.3 mg/dL    Albumin 3.2 (L) 3.4 - 5.0 g/dL    Protein Total 6.4 (L) 6.8 - 8.8 g/dL    Alkaline Phosphatase 128 40 - 150 U/L    ALT 43 0 - 50 U/L    AST 28 0 - 45 U/L   CBC with platelets differential    " Collection Time: 01/28/21 11:49 AM   Result Value Ref Range    WBC 7.3 4.0 - 11.0 10e9/L    RBC Count 3.53 (L) 3.8 - 5.2 10e12/L    Hemoglobin 12.1 11.7 - 15.7 g/dL    Hematocrit 38.3 35.0 - 47.0 %     (H) 78 - 100 fl    MCH 34.3 (H) 26.5 - 33.0 pg    MCHC 31.6 31.5 - 36.5 g/dL    RDW 16.0 (H) 10.0 - 15.0 %    Platelet Count 203 150 - 450 10e9/L    Diff Method Automated Method     % Neutrophils 69.4 %    % Lymphocytes 17.1 %    % Monocytes 7.6 %    % Eosinophils 5.5 %    % Basophils 0.4 %    Absolute Neutrophil 5.1 1.6 - 8.3 10e9/L    Absolute Lymphocytes 1.2 0.8 - 5.3 10e9/L    Absolute Monocytes 0.6 0.0 - 1.3 10e9/L    Absolute Eosinophils 0.4 0.0 - 0.7 10e9/L    Absolute Basophils 0.0 0.0 - 0.2 10e9/L     Recent Results (from the past 744 hour(s))   PET Oncology Whole Body    Narrative    Combined Report of: PET and CT on 1/28/2021 1:58 PM:    1. PET of the neck, chest, abdomen, and pelvis.  2. PET CT Fusion for Attenuation Correction and Anatomical  Localization  3. Diagnostic CT scan of the chest, abdomen, and pelvis with  intravenous contrast for interpretation.  4. 3D MIP and PET-CT fused images were processed on an independent  workstation and archived to PACS and reviewed by a radiologist.    Technique:  1. PET: The patient received 13.45 mCi of F-18-FDG; the serum glucose  was 81 prior to administration, body weight was 48.1 kg. Images  acquired from the vertex to the feet. UPTAKE WAS MEASURED AT 60  MINUTES.   2. CT: CT images obtained through the chest, abdomen, and pelvis. The  patient received 65 mL of Isovue 370 intravenously for the  examination. High resolution images of the neck were obtained with  multiple oblique projection reformats.    INDICATION: Rectal cancer, recurrence; Rectal cancer (H)    Rectal cancer diagnosed November 2019 with an advanced T4bN2 rectal  cancer. She had diverting colostomy but not resection, She had  palliative RT 2/2020 and proceeded with palliative xeloda?Feb  2020.  ?She has had treatment response and Xeloda dose was reduced due to leg  pain. In September she had a pelvic fracture and has been in a TCU.     CEA on 12/17 is 2.2, down from 2.6 on 11/10, 1.7 on 6/17, 3.5 on 2/11    COMPARISON: CT AP 11/7/2020. PET CT 1/16/2020.     FINDINGS:     HEAD/NECK:  Increased FDG associated with the right lobe of the thyroid with an  SUV max of 4.33. Thyroid is nodular and irregular appearing.    Ventricles are symmetric. No midline shift. Orbits are unremarkable.  Paranasal sinuses and mastoid air cells are clear. No FDG avid or  enlarged lymph nodes in the neck.    CHEST:  There is no suspicious FDG uptake in the chest.     Ectatic ascending aorta measuring up to 4.2 cm. Prominent calcific  atherosclerotic disease of the aorta. Moderate to heavy coronary  artery calcifications. No pericardial effusion. No FDG avid or  enlarged mediastinal, hilar or thoracic lymph nodes..    No new or enlarging pulmonary nodules.    ABDOMEN AND PELVIS:    Cholelithiasis. Minimal intrahepatic biliary ductal dilation. Liver is  otherwise unremarkable. Pancreas and spleen unremarkable. Adrenal  glands are within normal limits. No focal renal lesion. FDG avidity of  the left lower quadrant ileostomy and associated bowel FDG avidity of  upstream bowel bleeding into the suresh hepatis.    Mass-like area between the rectum and the vagina is now non-FDG avid  without definable mass. There is still prominent FDG activity  throughout the lumen of the rectum and sigmoid colon with moderate  wall thickening of the rectum and distal sigmoid. SUV max of the  contents of the rectum is 19.83. Diverticulosis without evidence of  diverticulitis.    No FDG avid or enlarged lymph nodes in the abdomen or pelvis.    BONES:   Chronic-appearing fracture of the superior and inferior left pubic  ramus with FDG avidity, similar to exam 11/7/2020. There is also FDG  avid chronic bilateral sacral insufficiency fracture with  some bony  remodeling since 11/7/2020.    Prominent degenerative changes of the shoulders.      Impression    IMPRESSION: In this patient with a history of rectal cancer status  post palliative radiation therapy and on Xeloda:    1. Although there has been significant response to therapy of the  rectal mass, there is wall thickening of the distal sigmoid colon and  rectum as well as scattered prominent intraluminal FDG avidity. These  findings likely are sequela from physiological secretions and  radiation changes we cannot exclude residual malignancy. Endoscopy may  be necessary to confirm status of disease within the bowel.    2. No new lymphadenopathy or evidence for distant metastatic disease.  3. Bilateral sacral insufficiency fractures, left anterior pubic  insufficiency fracture and left superior and inferior pubic ramus  fractures. Findings are similar to CT 11/7/2020.  4. Left lower quadrant diverting colostomy without evidence for  obstruction.   5. Cholelithiasis without evidence for cholecystitis.  6. FDG avid thyroid nodule with a gland that is irregular in shape.  Consider thyroid ultrasound.    I have personally reviewed the examination and initial interpretation  and I agree with the findings.    SANDRA REDDY MD       Assessment and Plan    Rectal cancer is reasonably stable on capecitabine but she has two kinds ot neuropathy both of which are impairing quality of life:  Numbness in her hands that's interfering with doing puzzles, and pain in her leg (mostly right) probably mechanical from spine.    We discussed decreasing her capecitabine so she'll be taking it 6 days on and 8 days off every 2 weeks.  Dose per day will remain at 1000 mg BID.    We discussed increasing her gabapentin.  Currently she takes 100 mg morning and midday, then 200 mg at bedtime.  She'll add one more 100 mg pill midday so the schedule will be 100-200-200.    Next MD visit in 2 months.    Video Visit - Use Doximity-  "Oncology Rooming Note-  Call Daughter Sandoval'  phone [phone]    February 2, 2021 10:33 AM   Pam Pierce is a 89 year old female who presents for:    Chief Complaint   Patient presents with     Oncology Clinic Visit     Return Rectal cancer, review Labs, PET and Xeloda     Initial Vitals: There were no vitals taken for this visit. Estimated body mass index is 21.41 kg/m  as calculated from the following:    Height as of 12/7/20: 1.499 m (4' 11\").    Weight as of 12/7/20: 48.1 kg (106 lb). There is no height or weight on file to calculate BSA.  Data Unavailable Comment: Data Unavailable   No LMP recorded. Patient has had a hysterectomy.  Allergies reviewed: Yes  Medications reviewed: Yes    Medications: MEDICATION REFILLS NEEDED TODAY. Provider was notified.  Pharmacy name entered into [facility]:      [facility] - [place], [place] - [address]  [facility] - [place], [place] - [address]     Clinical concerns: Return Rectal cancer, review Labs, PET and Xeloda.   Has questions about taking tiZANidine (ZANAFLEX) for legs. Also interested in taking increased dose of Neurontin for Abram, leg & foot Neuropathy. Worse at night.      Johanny Hunter CMA                  Again, thank you for allowing me to participate in the care of your patient.        Sincerely,        Zeynep Narayanan MD    "

## 2021-02-02 NOTE — PATIENT INSTRUCTIONS
Patient instructed to reduce capecitabine to 6 days in a row (from 7) at the same dose (1 gram BID).  She'll be OFF capecitabine 8 days every 2 weeks, rather than 7 days every 2 weeks.      Increase gabapentin from current schedule (100 mg am and midday, 200 mg pm) to 100 mg am and 200 mg midday and pm.    MD follow up visit in 2 months with labs per chemo orders.

## 2021-02-02 NOTE — PROGRESS NOTES
"Video Visit - Use Doximity- Oncology Rooming Note-  Call Daughter Sandoval'  phone [phone]    February 2, 2021 10:33 AM   Pam Pierce is a 89 year old female who presents for:    Chief Complaint   Patient presents with     Oncology Clinic Visit     Return Rectal cancer, review Labs, PET and Xeloda     Initial Vitals: There were no vitals taken for this visit. Estimated body mass index is 21.41 kg/m  as calculated from the following:    Height as of 12/7/20: 1.499 m (4' 11\").    Weight as of 12/7/20: 48.1 kg (106 lb). There is no height or weight on file to calculate BSA.  Data Unavailable Comment: Data Unavailable   No LMP recorded. Patient has had a hysterectomy.  Allergies reviewed: Yes  Medications reviewed: Yes    Medications: MEDICATION REFILLS NEEDED TODAY. Provider was notified.  Pharmacy name entered into [facility]:      [facility] - [place], [place] - [address]  [facility] - [place], [place] - [address]    Clinical concerns: Return Rectal cancer, review Labs, PET and Xeloda.   Has questions about taking tiZANidine (ZANAFLEX) for legs. Also interested in taking increased dose of Neurontin for Abram, leg & foot Neuropathy. Worse at night.      Johanny Hunter, GIANA              "

## 2021-02-04 ENCOUNTER — TELEPHONE (OUTPATIENT)
Dept: FAMILY MEDICINE | Facility: CLINIC | Age: 86
End: 2021-02-04

## 2021-02-04 NOTE — TELEPHONE ENCOUNTER
No sting barrier film colostomy supplies signed and faxed back.  aCrolina Gould on 2/4/2021 at 7:29 AM

## 2021-02-08 NOTE — RESULT ENCOUNTER NOTE
Will have covering provider, Dr. Narayanan review results.     Elzbieta Ybarra RN on 2/8/2021 at 8:05 AM

## 2021-02-11 ENCOUNTER — VIRTUAL VISIT (OUTPATIENT)
Dept: FAMILY MEDICINE | Facility: CLINIC | Age: 86
End: 2021-02-11
Payer: MEDICARE

## 2021-02-11 DIAGNOSIS — M54.41 ACUTE RIGHT-SIDED LOW BACK PAIN WITH RIGHT-SIDED SCIATICA: ICD-10-CM

## 2021-02-11 DIAGNOSIS — C20 RECTAL CANCER (H): ICD-10-CM

## 2021-02-11 DIAGNOSIS — G62.9 NEUROPATHY: ICD-10-CM

## 2021-02-11 PROCEDURE — 99441 PR PHYSICIAN TELEPHONE EVALUATION 5-10 MIN: CPT | Mod: 95 | Performed by: FAMILY MEDICINE

## 2021-02-11 RX ORDER — GABAPENTIN 100 MG/1
CAPSULE ORAL
Qty: 360 CAPSULE | Refills: 3 | Status: SHIPPED | OUTPATIENT
Start: 2021-02-11 | End: 2021-11-22

## 2021-02-11 RX ORDER — OXYCODONE HYDROCHLORIDE 5 MG/1
5 TABLET ORAL DAILY PRN
Qty: 60 TABLET | Refills: 0 | Status: SHIPPED | OUTPATIENT
Start: 2021-02-11 | End: 2021-03-31

## 2021-02-11 NOTE — PROGRESS NOTES
Pam is a 89 year old who is being evaluated via a billable telephone visit.      What phone number would you like to be contacted at? [phone]- Daughter Karine is helping patient with the visit  How would you like to obtain your AVS? Mail a copy    Assessment & Plan     Acute right-sided low back pain with right-sided sciatica  Medication faxed  - oxyCODONE (ROXICODONE) 5 MG tablet; Take 1 tablet (5 mg) by mouth daily as needed for breakthrough pain or moderate to severe pain    Rectal cancer (H)  Medication faxed  - gabapentin (NEURONTIN) 100 MG capsule; Take one in the morning , two in the afternoon, three at night    Neuropathy  Medication faxed. Adjusted dose.   - gabapentin (NEURONTIN) 100 MG capsule; Take one in the morning , two in the afternoon, three at night    [id number]}     FUTURE APPOINTMENTS:       - Follow-up visit in one month or sooner as needed.    Return in about 4 weeks (around 3/11/2021) for Follow up.    Reagan Thakkar MD  [facility]    Subjective   Pam is a 89 year old who presents for the following health issues  accompanied by her daughter :    HPI   89 yr old female here for medication refills. She has a past medical history significant for rectal carcinoma. She has had chronic pain associated with the cancer. She is requesting refills on her pain medication. She has been able to wean herself to one tablet a day.She reports severe leg cramping especially at night. I adjusted her gabapentin.     Follow- up since reducing narcotics and still having leg numbness with it being cold and tingling.Gabapentin was increased by Onc about a week ago      Review of Systems   Constitutional, HEENT, cardiovascular, pulmonary, gi and gu systems are negative, except as otherwise noted.      Objective           Vitals:  No vitals were obtained today due to virtual visit.    Physical Exam   healthy, alert and no distress  PSYCH: Alert and oriented times 3; coherent  speech, normal   rate and volume, able to articulate logical thoughts, able   to abstract reason, no tangential thoughts, no hallucinations   or delusions  Her affect is normal  RESP: No cough, no audible wheezing, able to talk in full sentences  Remainder of exam unable to be completed due to telephone visits                Phone call duration: 7  minutes

## 2021-03-10 ENCOUNTER — TELEPHONE (OUTPATIENT)
Dept: FAMILY MEDICINE | Facility: CLINIC | Age: 86
End: 2021-03-10

## 2021-03-10 NOTE — TELEPHONE ENCOUNTER
Dr. Thakkar,    Patient is asking for a letter for the postal service to deliver mail to her door as she is using a walker to get around.  Letter started for your completion. Kaci MARES RN

## 2021-03-10 NOTE — TELEPHONE ENCOUNTER
Reason for Call:  Other call back    Detailed comments: Daughter Karine is calling stating that the patient is requesting that her mail to be brought right to her door so she does not have to go to the end of driveway. The post office is requesting a letter stating that she has a disability and is unable to walk down to the end of there driveway. Patient uses a walker to get around. Mail letter to patients home, the address in chart.    Phone Number Patient can be reached at: Home number on file [phone] (home)    Best Time: any    Can we leave a detailed message on this number? YES    Call taken on 3/10/2021 at 11:17 AM by Carolina Gould

## 2021-03-10 NOTE — TELEPHONE ENCOUNTER
Covering for PCP (out of clinic today):  Letter signed electronically.    Thanks,  Brady Amezcua MD

## 2021-03-30 ENCOUNTER — HOSPITAL ENCOUNTER (OUTPATIENT)
Dept: LAB | Facility: CLINIC | Age: 86
Discharge: HOME | End: 2021-03-30
Attending: INTERNAL MEDICINE | Admitting: INTERNAL MEDICINE
Payer: MEDICARE

## 2021-03-30 DIAGNOSIS — C20 RECTAL CANCER (H): ICD-10-CM

## 2021-03-30 LAB
ALBUMIN SERPL-MCNC: 3.4 G/DL (ref 3.4–5)
ALP SERPL-CCNC: 114 U/L (ref 40–150)
ALT SERPL W P-5'-P-CCNC: 59 U/L (ref 0–50)
ANION GAP SERPL CALCULATED.3IONS-SCNC: 3 MMOL/L (ref 3–14)
AST SERPL W P-5'-P-CCNC: 41 U/L (ref 0–45)
BASOPHILS # BLD AUTO: 0.1 10E9/L (ref 0–0.2)
BASOPHILS NFR BLD AUTO: 0.8 %
BILIRUB SERPL-MCNC: 0.7 MG/DL (ref 0.2–1.3)
BUN SERPL-MCNC: 26 MG/DL (ref 7–30)
CALCIUM SERPL-MCNC: 9.4 MG/DL (ref 8.5–10.1)
CEA SERPL-MCNC: 2.3 UG/L (ref 0–2.5)
CHLORIDE SERPL-SCNC: 106 MMOL/L (ref 94–109)
CO2 SERPL-SCNC: 32 MMOL/L (ref 20–32)
CREAT SERPL-MCNC: 0.87 MG/DL (ref 0.52–1.04)
DIFFERENTIAL METHOD BLD: ABNORMAL
EOSINOPHIL # BLD AUTO: 0.5 10E9/L (ref 0–0.7)
EOSINOPHIL NFR BLD AUTO: 8.3 %
ERYTHROCYTE [DISTWIDTH] IN BLOOD BY AUTOMATED COUNT: 15.3 % (ref 10–15)
GFR SERPL CREATININE-BSD FRML MDRD: 58 ML/MIN/{1.73_M2}
GLUCOSE SERPL-MCNC: 91 MG/DL (ref 70–99)
HCT VFR BLD AUTO: 38.9 % (ref 35–47)
HGB BLD-MCNC: 12.9 G/DL (ref 11.7–15.7)
IMM GRANULOCYTES # BLD: 0 10E9/L (ref 0–0.4)
IMM GRANULOCYTES NFR BLD: 0.5 %
LYMPHOCYTES # BLD AUTO: 1.4 10E9/L (ref 0.8–5.3)
LYMPHOCYTES NFR BLD AUTO: 21.3 %
MCH RBC QN AUTO: 35.8 PG (ref 26.5–33)
MCHC RBC AUTO-ENTMCNC: 33.2 G/DL (ref 31.5–36.5)
MCV RBC AUTO: 108 FL (ref 78–100)
MONOCYTES # BLD AUTO: 0.6 10E9/L (ref 0–1.3)
MONOCYTES NFR BLD AUTO: 8.9 %
NEUTROPHILS # BLD AUTO: 3.9 10E9/L (ref 1.6–8.3)
NEUTROPHILS NFR BLD AUTO: 60.2 %
NRBC # BLD AUTO: 0 10*3/UL
NRBC BLD AUTO-RTO: 0 /100
PLATELET # BLD AUTO: 238 10E9/L (ref 150–450)
POTASSIUM SERPL-SCNC: 4.5 MMOL/L (ref 3.4–5.3)
PROT SERPL-MCNC: 6.7 G/DL (ref 6.8–8.8)
RBC # BLD AUTO: 3.6 10E12/L (ref 3.8–5.2)
SODIUM SERPL-SCNC: 141 MMOL/L (ref 133–144)
WBC # BLD AUTO: 6.5 10E9/L (ref 4–11)

## 2021-03-30 PROCEDURE — 80053 COMPREHEN METABOLIC PANEL: CPT | Performed by: INTERNAL MEDICINE

## 2021-03-30 PROCEDURE — 85025 COMPLETE CBC W/AUTO DIFF WBC: CPT | Performed by: INTERNAL MEDICINE

## 2021-03-30 PROCEDURE — 36415 COLL VENOUS BLD VENIPUNCTURE: CPT | Performed by: INTERNAL MEDICINE

## 2021-03-30 PROCEDURE — 82378 CARCINOEMBRYONIC ANTIGEN: CPT | Performed by: INTERNAL MEDICINE

## 2021-03-31 ENCOUNTER — VIRTUAL VISIT (OUTPATIENT)
Dept: FAMILY MEDICINE | Facility: CLINIC | Age: 86
End: 2021-03-31
Payer: MEDICARE

## 2021-03-31 ENCOUNTER — TELEPHONE (OUTPATIENT)
Dept: FAMILY MEDICINE | Facility: CLINIC | Age: 86
End: 2021-03-31

## 2021-03-31 DIAGNOSIS — M79.605 PAIN IN BOTH LOWER EXTREMITIES: ICD-10-CM

## 2021-03-31 DIAGNOSIS — M54.41 ACUTE RIGHT-SIDED LOW BACK PAIN WITH RIGHT-SIDED SCIATICA: ICD-10-CM

## 2021-03-31 DIAGNOSIS — M79.604 PAIN IN BOTH LOWER EXTREMITIES: ICD-10-CM

## 2021-03-31 DIAGNOSIS — C20 RECTAL CANCER (H): Primary | ICD-10-CM

## 2021-03-31 PROCEDURE — 99441 PR PHYSICIAN TELEPHONE EVALUATION 5-10 MIN: CPT | Mod: 95 | Performed by: FAMILY MEDICINE

## 2021-03-31 RX ORDER — TIZANIDINE 2 MG/1
2-4 TABLET ORAL EVERY 6 HOURS PRN
Qty: 90 TABLET | Refills: 0 | Status: SHIPPED | OUTPATIENT
Start: 2021-03-31 | End: 2021-06-10

## 2021-03-31 RX ORDER — OXYCODONE HYDROCHLORIDE 5 MG/1
5 TABLET ORAL DAILY PRN
Qty: 60 TABLET | Refills: 0 | Status: SHIPPED | OUTPATIENT
Start: 2021-03-31 | End: 2021-09-09

## 2021-03-31 NOTE — TELEPHONE ENCOUNTER
[facility] requestiong medical records of documentaion for the Ostomy supplies. Patient has a history of Rectal Cancer she needs these supplies. I faxed the signed form from PCP and the last two OV notes 2/11/2021 and 12/7/2020.    Saved in files.    Carolina Ambriz CSS on 3/31/2021 at 12:30 PM

## 2021-03-31 NOTE — PROGRESS NOTES
Pam is a 89 year old who is being evaluated via a billable telephone visit.      What phone number would you like to be contacted at? [phone]  How would you like to obtain your AVS? [facility]    Assessment & Plan     Rectal cancer (H)  Ostomy supplies forms filled out and signed.    Acute right-sided low back pain with right-sided sciatica  - oxyCODONE (ROXICODONE) 5 MG tablet; Take 1 tablet (5 mg) by mouth daily as needed for breakthrough pain or moderate to severe pain    Pain in both lower extremities  Patient's medication is refilled.Patient is encouraged to continue to use the oxycodone as sparingly as possible.  - tiZANidine (ZANAFLEX) 2 MG tablet; Take 1-2 tablets (2-4 mg) by mouth every 6 hours as needed for muscle spasms  - oxyCODONE (ROXICODONE) 5 MG tablet; Take 1 tablet (5 mg) by mouth daily as needed for breakthrough pain or moderate to severe pain    0956}     FUTURE APPOINTMENTS:       - Follow-up visit in one month or sooner as needed.    Return in about 4 weeks (around 4/28/2021) for Follow up.    Reagan Thakkar MD  [facility]    Subjective   Pam is a 89 year old who presents for the following health issues  accompanied by her Daughter (Karine):    HPI   89 yr old female here for medication refills. Has a history of rectal cancer. Patient needs colostomy supplies renewed for a year.   Also c/o of leg pain worse at night prevents her from sleeping. She reports that R>L. No swelling. She has been on oxycodone on and off for a couple of months. She says she sometimes needs to take one before she can sleep. We have been trying to wean patient off her pain medication. She will like to try muscle relaxants instead of the oxycodone.     Chief Complaint   Patient presents with     Progress     Pt needs doceumention on colostomy supplies for insurance.      Recheck Medication     Oxycodone refill. Discuss Zanaflex.        Chronic Pain Follow-Up    Where in your body  do you have pain? Leg pain   How has your pain affected your ability to work? Not applicable  Which of these pain treatments have you tried since your last clinic visit? Other: no  How well are you sleeping? Fair, oxy helps with sleep (twitching)   How has your mood been since your last visit? Better  Have you had a significant life event? No  Other aggravating factors: pain is there at night  Taking medication as directed? Yes    No flowsheet data found.  No flowsheet data found.  No flowsheet data found.  Encounter-Level CSA:    There are no encounter-level csa.     Patient-Level CSA:    There are no patient-level csa.         How many servings of fruits and vegetables do you eat daily?  2-3    On average, how many sweetened beverages do you drink each day (Examples: soda, juice, sweet tea, etc.  Do NOT count diet or artificially sweetened beverages)?   1 orange juice     How many days per week do you exercise enough to make your heart beat faster? 6    How many minutes a day do you exercise enough to make your heart beat faster? 9 or less    How many days per week do you miss taking your medication? 0        Review of Systems   Constitutional, HEENT, cardiovascular, pulmonary, gi and gu systems are negative, except as otherwise noted.      Objective           Vitals:  No vitals were obtained today due to virtual visit.    Physical Exam   healthy, alert and no distress  PSYCH: Alert and oriented times 3; coherent speech, normal   rate and volume, able to articulate logical thoughts, able   to abstract reason, no tangential thoughts, no hallucinations   or delusions  Her affect is normal  RESP: No cough, no audible wheezing, able to talk in full sentences  Remainder of exam unable to be completed due to telephone visits                Phone call duration: 6 minutes

## 2021-04-01 DIAGNOSIS — E87.6 HYPOKALEMIA: Primary | ICD-10-CM

## 2021-04-01 RX ORDER — POTASSIUM CHLORIDE 1500 MG/1
TABLET, EXTENDED RELEASE ORAL
Qty: 120 TABLET | Refills: 3 | OUTPATIENT
Start: 2021-04-01

## 2021-04-02 NOTE — PROGRESS NOTES
[facility] Hematology and Oncology Progress Note    Patient: Pam Pierce  MRN: [id number]        Reason for Visit    1. Rectal cancer    Telephone visit    _____________________________________________________________________________    History of Present Illness/ Interval History    Ms. Pam Pierce  is a 89 year old on palliative Xeloda (x 1 year) for advanced rectal cancer. Karine, daughter, joined visit.     At her last visit 2 mths ago, her Xeloda was modified to 6 days on/8 days off for hand neuropathy during the later part of week on Xeloda. This is doing better now. 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 Otherwise, tolerating it very well. Empties colostomy 8-10 times a day on Metamucil (she prefers to keep on liquid side). No new pain. No nausea. No skin toxicity.    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 A steroid injection hasn't been feasible. Her PCP is starting a muscle relaxant for bedtime.      Oncology History/Treatment  Diagnosis/Stage:   11/2019: rectal cancer T4b-N2  -presented with years duration abd pain (age 89)  -CT: rectosigmoid mass causing colonic obstruction with proximal colon distended with fecal debris. Mass extends beyond serosa of bowel wall with multiple small probable metastatic mesorectal LNs. No evident liver mets.  -mass biopsy: adenoca arising in tubular adenoma, focal signet ring cell features  -PET: advanced T4b-N2 tumor of low and mid-rectum with invasion through superior vagina and to posterior margin of urinary bladder  -Brain MRI: likely meningioma vs brain met    Treatment:  11/2019: emergency diverting colostomy and biopsy of mass    2/2020: palliative RT    2/2020: palliative Xeloda with response  -dose-reduced 8/2020 to 1 g twice daily 7 days on/7 days off for leg neuropathy pain (+/- radicular spine pain with  DJD)  -dose reduced 2/2020 to 1 g twice daily 6 days on/8 days off for hand neuropathy      Physical Exam    GENERAL: Alert and oriented to time place and person. Daughter, Karine, assisted on speaker phone.      Lab Results    CEA: 2.3  CBC: unremarkable  CMP: ALT 59, rest LFTs normal    Imaging    1/28/21 PET: ongoing response in rectal mass, no mets    Assessment/Plan  1. Locally advanced rectal cancer  Clinically, stable.  Tolerating current Xeloda dosing (1 g twice daily 6 days on/8 days off). Hand neuropathy better.    Plan:  -Continue Xeloda at current dosing 1 g twice daily 6 days/8 days off  -Return in 2-3 months, labs (including CEA), CT abd/pelv and visit    2.   Peripheral neuropathy  Xeloda was affecting her hand neuropathy a bit more in Jan-Feb. With dose-modification, this is better in hands. Persistent in legs related to lower back problems. On gabapentin 100 mg AM/200 mg afternoon/300 mg bedtime daily.     Plan:  -Continue Xeloda dosing/frequency, as is  -Continue gabapentin    3. Spine DJD/insufficiency fractures  Will be starting a muscle relaxant in lieu of Percocet, through PCP.      Billing  telephone start: 1002;  End: 1015    Signed by: Ria Ortiz NP

## 2021-04-05 ENCOUNTER — VIRTUAL VISIT (OUTPATIENT)
Dept: ONCOLOGY | Facility: CLINIC | Age: 86
End: 2021-04-05
Attending: NURSE PRACTITIONER
Payer: MEDICARE

## 2021-04-05 DIAGNOSIS — C20 RECTAL CANCER (H): Primary | ICD-10-CM

## 2021-04-05 PROCEDURE — 99442 PR PHYSICIAN TELEPHONE EVALUATION 11-20 MIN: CPT | Performed by: NURSE PRACTITIONER

## 2021-04-05 RX ORDER — CAPECITABINE 500 MG/1
TABLET, FILM COATED ORAL
Qty: 56 TABLET | Refills: 1 | Status: SHIPPED | OUTPATIENT
Start: 2021-04-05 | End: 2021-12-29

## 2021-04-05 NOTE — LETTER
4/5/2021         RE: Pam Pierce  [address] [address]        Dear Colleague,    Thank you for referring your patient, Pam Pierce, to the [facility]. Please see a copy of my visit note below.    [facility] Hematology and Oncology Progress Note    Patient: Pam Pierce  MRN: [id number]        Reason for Visit    1. Rectal cancer    Telephone visit    _____________________________________________________________________________    History of Present Illness/ Interval History    Ms. Pam Pierce  is a 89 year old on palliative Xeloda (x 1 year) for advanced rectal cancer. Karine, daughter, joined visit.     At her last visit 2 mths ago, her Xeloda was modified to 6 days on/8 days off for hand neuropathy during the later part of week on Xeloda. This is doing better now. 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 Otherwise, tolerating it very well. Empties colostomy 8-10 times a day on Metamucil (she prefers to keep on liquid side). No new pain. No nausea. No skin toxicity.    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 A steroid injection hasn't been feasible. Her PCP is starting a muscle relaxant for bedtime.      Oncology History/Treatment  Diagnosis/Stage:   11/2019: rectal cancer T4b-N2  -presented with years duration abd pain (age 89)  -CT: rectosigmoid mass causing colonic obstruction with proximal colon distended with fecal debris. Mass extends beyond serosa of bowel wall with multiple small probable metastatic mesorectal LNs. No evident liver mets.  -mass biopsy: adenoca arising in tubular adenoma, focal signet ring cell features  -PET: advanced T4b-N2 tumor of low and mid-rectum with invasion through superior vagina and to posterior margin of urinary bladder  -Brain MRI: likely meningioma vs brain  "met    Treatment:  11/2019: emergency diverting colostomy and biopsy of mass    2/2020: palliative RT    2/2020: palliative Xeloda with response  -dose-reduced 8/2020 to 1 g twice daily 7 days on/7 days off for leg neuropathy pain (+/- radicular spine pain with DJD)  -dose reduced 2/2020 to 1 g twice daily 6 days on/8 days off for hand neuropathy      Physical Exam    GENERAL: Alert and oriented to time place and person. Daughter, Karine, assisted on speaker phone.      Lab Results    CEA: 2.3  CBC: unremarkable  CMP: ALT 59, rest LFTs normal    Imaging    1/28/21 PET: ongoing response in rectal mass, no mets    Assessment/Plan  1. Locally advanced rectal cancer  Clinically, stable.  Tolerating current Xeloda dosing (1 g twice daily 6 days on/8 days off). Hand neuropathy better.    Plan:  -Continue Xeloda at current dosing 1 g twice daily 6 days/8 days off  -Return in 2-3 months, labs (including CEA), CT abd/pelv and visit    2.   Peripheral neuropathy  Xeloda was affecting her hand neuropathy a bit more in Jan-Feb. With dose-modification, this is better in hands. Persistent in legs related to lower back problems. On gabapentin 100 mg AM/200 mg afternoon/300 mg bedtime daily.     Plan:  -Continue Xeloda dosing/frequency, as is  -Continue gabapentin    3. Spine DJD/insufficiency fractures  Will be starting a muscle relaxant in lieu of Percocet, through PCP.      Billing  telephone start: 1002;  End: 1015    Signed by: Ria Ortiz NP      Oncology Rooming Note    April 5, 2021 8:07 AM   Pam Pierce is a 89 year old female who presents for:    Chief Complaint   Patient presents with     Oncology Clinic Visit     2 month follow up rectal cancer. Review lab results and oral chemotherapy.      Initial Vitals: There were no vitals taken for this visit. Estimated body mass index is 21.41 kg/m  as calculated from the following:    Height as of 12/7/20: 1.499 m (4' 11\").    Weight as of 12/7/20: 48.1 kg (106 lb). There " is no height or weight on file to calculate BSA.  Data Unavailable Comment: Data Unavailable   No LMP recorded. Patient has had a hysterectomy.  Allergies reviewed: Yes  Medications reviewed: Yes    Medications: Medication refills not needed today.  Pharmacy name entered into WeStudy.In:    [facility] - [place], [place] - [address] [facility] [place], [place] - [address] [address] [facility] - [place], [place] - [address]    Clinical concerns: No new concerns, better than been in the past. Ria Ortiz was NOT notified.    Call at [phone], daughter Karine's number.     Elzbieta Ybarra, RN                  Again, thank you for allowing me to participate in the care of your patient.        Sincerely,        Ria Ortiz NP

## 2021-04-05 NOTE — LETTER
4/5/2021         RE: Pam Pierce  [address] [address]        Dear Colleague,    Thank you for referring your patient, Pam Pierce, to the [facility]. Please see a copy of my visit note below.    [facility] Hematology and Oncology Progress Note    Patient: Pam Pierce  MRN: [id number]        Reason for Visit    1. Rectal cancer    Telephone visit    _____________________________________________________________________________    History of Present Illness/ Interval History    Ms. Pam Pierce  is a 89 year old on palliative Xeloda (x 1 year) for advanced rectal cancer. Karine, daughter, joined visit.     At her last visit 2 mths ago, her Xeloda was modified to 6 days on/8 days off for hand neuropathy during the later part of week on Xeloda. This is doing better now. 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 Otherwise, tolerating it very well. Empties colostomy 8-10 times a day on Metamucil (she prefers to keep on liquid side). No new pain. No nausea. No skin toxicity.    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 A steroid injection hasn't been feasible. Her PCP is starting a muscle relaxant for bedtime.      Oncology History/Treatment  Diagnosis/Stage:   11/2019: rectal cancer T4b-N2  -presented with years duration abd pain (age 89)  -CT: rectosigmoid mass causing colonic obstruction with proximal colon distended with fecal debris. Mass extends beyond serosa of bowel wall with multiple small probable metastatic mesorectal LNs. No evident liver mets.  -mass biopsy: adenoca arising in tubular adenoma, focal signet ring cell features  -PET: advanced T4b-N2 tumor of low and mid-rectum with invasion through superior vagina and to posterior margin of urinary bladder  -Brain MRI: likely meningioma vs brain  "met    Treatment:  11/2019: emergency diverting colostomy and biopsy of mass    2/2020: palliative RT    2/2020: palliative Xeloda with response  -dose-reduced 8/2020 to 1 g twice daily 7 days on/7 days off for leg neuropathy pain (+/- radicular spine pain with DJD)  -dose reduced 2/2020 to 1 g twice daily 6 days on/8 days off for hand neuropathy      Physical Exam    GENERAL: Alert and oriented to time place and person. Daughter, Karine, assisted on speaker phone.      Lab Results    CEA: 2.3  CBC: unremarkable  CMP: ALT 59, rest LFTs normal    Imaging    1/28/21 PET: ongoing response in rectal mass, no mets    Assessment/Plan  1. Locally advanced rectal cancer  Clinically, stable.  Tolerating current Xeloda dosing (1 g twice daily 6 days on/8 days off). Hand neuropathy better.    Plan:  -Continue Xeloda at current dosing 1 g twice daily 6 days/8 days off  -Return in 2-3 months, labs (including CEA), CT abd/pelv and visit    2.   Peripheral neuropathy  Xeloda was affecting her hand neuropathy a bit more in Jan-Feb. With dose-modification, this is better in hands. Persistent in legs related to lower back problems. On gabapentin 100 mg AM/200 mg afternoon/300 mg bedtime daily.     Plan:  -Continue Xeloda dosing/frequency, as is  -Continue gabapentin    3. Spine DJD/insufficiency fractures  Will be starting a muscle relaxant in lieu of Percocet, through PCP.      Billing  telephone start: 1002;  End: 1015    Signed by: Ria Ortiz NP      Oncology Rooming Note    April 5, 2021 8:07 AM   Pam Pierce is a 89 year old female who presents for:    Chief Complaint   Patient presents with     Oncology Clinic Visit     2 month follow up rectal cancer. Review lab results and oral chemotherapy.      Initial Vitals: There were no vitals taken for this visit. Estimated body mass index is 21.41 kg/m  as calculated from the following:    Height as of 12/7/20: 1.499 m (4' 11\").    Weight as of 12/7/20: 48.1 kg (106 lb). There " is no height or weight on file to calculate BSA.  Data Unavailable Comment: Data Unavailable   No LMP recorded. Patient has had a hysterectomy.  Allergies reviewed: Yes  Medications reviewed: Yes    Medications: Medication refills not needed today.  Pharmacy name entered into Mindmancer:    [facility] - [place], [place] - [address] [facility] [place] - [place], [place] - [address] [address] [facility] - [place], [place] - [address]    Clinical concerns: No new concerns, better than been in the past. Ria Ortiz was NOT notified.    Call at [phone], daughter Karine's number.     Elzbieta Ybarra, RN                  Again, thank you for allowing me to participate in the care of your patient.        Sincerely,        Ria Ortiz NP

## 2021-04-05 NOTE — PATIENT INSTRUCTIONS
Continue Xeloda at same dose/frequency (signed next 2 cycles in treatment plan)    Labs, CT and provider visit in clinic in 2.5-3 mths

## 2021-04-05 NOTE — PROGRESS NOTES
"Oncology Rooming Note    April 5, 2021 8:07 AM   Pam Pierce is a 89 year old female who presents for:    Chief Complaint   Patient presents with     Oncology Clinic Visit     2 month follow up rectal cancer. Review lab results and oral chemotherapy.      Initial Vitals: There were no vitals taken for this visit. Estimated body mass index is 21.41 kg/m  as calculated from the following:    Height as of 12/7/20: 1.499 m (4' 11\").    Weight as of 12/7/20: 48.1 kg (106 lb). There is no height or weight on file to calculate BSA.  Data Unavailable Comment: Data Unavailable   No LMP recorded. Patient has had a hysterectomy.  Allergies reviewed: Yes  Medications reviewed: Yes    Medications: Medication refills not needed today.  Pharmacy name entered into InCytu:    [facility] - [place], [place] - [address] [facility] [place] [place] - [address] [facility] - [place], [place] - [address]    Clinical concerns: No new concerns, better than been in the past. Ria Ortiz was NOT notified.    Call at [phone], daughter Karine's number.     Elzbieta Ybarra RN              "

## 2021-04-07 NOTE — TELEPHONE ENCOUNTER
Patient did not get a call about why it was refused. She is asking why it was? Please call patient about this.  [phone]  Carolina Ambriz CSS on 4/7/2021 at 10:26 AM

## 2021-04-08 ENCOUNTER — TELEPHONE (OUTPATIENT)
Dept: FAMILY MEDICINE | Facility: CLINIC | Age: 86
End: 2021-04-08

## 2021-04-08 DIAGNOSIS — E87.6 HYPOKALEMIA: Primary | ICD-10-CM

## 2021-04-08 RX ORDER — POTASSIUM CHLORIDE 1500 MG/1
40 TABLET, EXTENDED RELEASE ORAL 2 TIMES DAILY
Qty: 120 TABLET | Refills: 3 | Status: SHIPPED | OUTPATIENT
Start: 2021-04-08 | End: 2021-04-08

## 2021-04-08 RX ORDER — POTASSIUM CHLORIDE 1500 MG/1
40 TABLET, EXTENDED RELEASE ORAL 2 TIMES DAILY
Qty: 120 TABLET | Status: CANCELLED | OUTPATIENT
Start: 2021-04-08

## 2021-04-08 RX ORDER — POTASSIUM CHLORIDE 1500 MG/1
40 TABLET, EXTENDED RELEASE ORAL 2 TIMES DAILY
Qty: 120 TABLET | Refills: 3 | Status: SHIPPED | OUTPATIENT
Start: 2021-04-08 | End: 2021-08-03

## 2021-04-08 NOTE — TELEPHONE ENCOUNTER
I called pt.  She does not drive and needs this done ASAP.  Pharmacy is only open until 5 today.    Pt read her bottle back to me and it reads potassium chloride Chiara, ER, 20 meq, takes 2 tabs twice a day.    Pt's son can go get it for her if we send it now.    She has been out for 5 days.  Pt has low potassium.  She has colostomy.    Lucille Bocanegra RN

## 2021-04-08 NOTE — TELEPHONE ENCOUNTER
I spoke with both pt and her daughter.  Dr Thakkar sent the prescription for potassium to [facility] since they are open till 6.  But, family cannot go to [place] to pick it up.    They ask that it be re-sent to [facility] pharmacy so it can be picked up in the morning.    Lucille Bocanegra RN

## 2021-04-08 NOTE — TELEPHONE ENCOUNTER
"I am not sure who \"refused \" the medication refill. Her last potassium was within normal limits.  "

## 2021-04-08 NOTE — TELEPHONE ENCOUNTER
Patient wants to know why Potassium was refused.      Potassium Chloride Chiara ER 20 MEQ TAKE TWO TABLETS BY MOUTH TWICE A DAY     Protocol Details    Routing refill request to provider for review/approval because:  Drug not active on patient's medication list    Looks like script was Discontinued 12/10/2020,     Should patient still be taking the potassium?  Potassium   Date Value Ref Range Status   03/30/2021 4.5 3.4 - 5.3 mmol/L Final     Provider please review and advise. Thank you.

## 2021-04-16 ENCOUNTER — TELEPHONE (OUTPATIENT)
Dept: FAMILY MEDICINE | Facility: CLINIC | Age: 86
End: 2021-04-16

## 2021-04-16 NOTE — TELEPHONE ENCOUNTER
Forms for Lubrication deodorant signed and faxed back.  Carolina Ambriz CSS on 4/16/2021 at 2:18 PM

## 2021-05-06 DIAGNOSIS — C20 RECTAL CANCER (H): Primary | ICD-10-CM

## 2021-05-06 RX ORDER — CAPECITABINE 500 MG/1
TABLET, FILM COATED ORAL
Qty: 56 TABLET | Refills: 1 | Status: SHIPPED | OUTPATIENT
Start: 2021-05-06 | End: 2021-12-29

## 2021-05-25 ENCOUNTER — RECORDS - HEALTHEAST (OUTPATIENT)
Dept: ADMINISTRATIVE | Facility: CLINIC | Age: 86
End: 2021-05-25

## 2021-05-26 ENCOUNTER — TELEPHONE (OUTPATIENT)
Dept: FAMILY MEDICINE | Facility: CLINIC | Age: 86
End: 2021-05-26

## 2021-05-27 ENCOUNTER — RECORDS - HEALTHEAST (OUTPATIENT)
Dept: ADMINISTRATIVE | Facility: CLINIC | Age: 86
End: 2021-05-27

## 2021-05-27 VITALS
TEMPERATURE: 98.4 F | HEART RATE: 68 BPM | SYSTOLIC BLOOD PRESSURE: 130 MMHG | DIASTOLIC BLOOD PRESSURE: 69 MMHG | RESPIRATION RATE: 18 BRPM

## 2021-06-03 NOTE — ANESTHESIA CARE TRANSFER NOTE
Last vitals:   B/P:  125/55  MAP 85  Heart Rate:15  SpO2: 100      RR 16  Fio2 50  Pt ambu'd to ICU and report given to ICU RN per MDA.  Patient's level of consciousness is drowsy  Spontaneous respirations: no, pt ambu'd to ICU  Maintains airway independently: No OET in place  Dentition unchanged: yes  Oropharynx: OET present    QCDR Measures:  ASA# 20 - Surgical Safety Checklist: WHO surgical safety checklist completed prior to induction    PQRS# 430 - Adult PONV Prevention: 4558F - Pt received => 2 anti-emetic agents (different classes) preop & intraop  ASA# 8 - Peds PONV Prevention: NA - Not pediatric patient, not GA or 2 or more risk factors NOT present  PQRS# 424 - Beverly-op Temp Management: 4559F-8P - Body temp not recorded within timeframe  PQRS# 426 - PACU Transfer Protocol: - Transfer of care checklist used  ASA# 14 - Acute Post-op Pain: ASA14B - Patient did NOT experience pain >= 7 out of 10

## 2021-06-03 NOTE — ANESTHESIA PREPROCEDURE EVALUATION
Anesthesia Evaluation      Patient summary reviewed   No history of anesthetic complications     Airway   Mallampati: II  Neck ROM: limited   Pulmonary     breath sounds clear to auscultation  (+) asthma  mild,  well controlled,   (-) sleep apnea, not a smoker                         Cardiovascular   Exercise tolerance: > or = 4 METS  (+) valvular problems/murmurs AS, ,     Rhythm: regular  Rate: normal,      ROS comment: Echo 11/19/2019:    Left ventricle ejection fraction is increased. The calculated left ventricular ejection fraction is 80%.    Normal right ventricular size and systolic function.    Mild mitral regurgitation.    Mild tricuspid valve regurgitation.    The aortic valve is tricuspid. There is calcification with reduced excursion of the aortic valve present. Moderate aortic stenosis. Valve area 1.1 cm2. Mild aortic regurgitation.     Neuro/Psych - negative ROS   (-) no seizures, no CVA    Endo/Other    (-) no diabetes, hypothyroidism     GI/Hepatic/Renal    (-) GERD    Comments: Colonic mass  Bowel obstruction     Other findings: Labs 11/20  Hbg 10.3  Plt 227,000  Na 142  K 3.6  BUN/Cr 20/0.72      Dental      Comment: Fair dentition  No loose or removable teeth                        Anesthesia Plan  Planned anesthetic: general endotracheal  GETA (GlideScope)  1 PIV  Arterial line pre-induction  Induction w/ ketamine 0.5 mg/kg + propofol  Sugammadex for reversal  Dexamethasone 4 mg, ondansetron 4 mg  No benzodiazepine  ASA 3 - emergent   Induction: intravenous   Anesthetic plan and risks discussed with: patient and child/children  Anesthesia plan special considerations: video-assisted, rapid sequence induction, antiemetics,   Post-op plan: routine recovery  DNR/DNI status   DNR/DNI status discussed with patient.  Plan is for suspension of DNR

## 2021-06-03 NOTE — ANESTHESIA PROCEDURE NOTES
Arterial Line  Reason for Procedure: hemodynamic monitoring  Patient location during procedure: OR pre-induction  Start time: 11/20/2019 4:05 PM  End time: 11/20/2019 4:36 PM  Staffing:  Performing  Anesthesiologist: Gayle Mckay MD  Sterile Precautions:  sterile barriers used during insertion: cap, mask, sterile gloves, large sheet, and hand hygiene used.  Arterial Line:     Laterality: left  Location: brachial  Prepped with: ChloroPrep    Needle gauge: 20 G  Number of Attempts: 3  Secured with: tape and transparent dressing  Flushed with: saline  1% lidocaine local anesthesia used for skin prep.   See MAR for additional medications given.  Ultrasound evaluation of access site: yes  Vessel patent by US exam        Additional Notes:  Initial arterial line placement under ultrasound guidance attempted on right radial artery. Unfortunately right radial artery was incredibly small caliber with significant calcifications - unable to thread soft wire. Attempted second placement on left radial artery - again small caliber vessel and unable to thread soft wire. Third attempt at arterial access was performed via left brachial artery - successful placement. Minor hematomas over left and right radial arteries.

## 2021-06-03 NOTE — TELEPHONE ENCOUNTER
Form completed, signed, and faxed along with chart notes from 3/31/2021 office visit to [phone].  Copy of form sent to scan and chart notes and form placed in basket.

## 2021-06-04 VITALS — BODY MASS INDEX: 20.07 KG/M2 | WEIGHT: 102.2 LBS | HEIGHT: 60 IN

## 2021-06-04 NOTE — PROGRESS NOTES
OUTPATIENT OSTOMY ASSESSMENT    Patients mode of arrival: Ambulatory    INTAKE  Type of Stoma: Permanent Colostomy  Anticipated date of takedown: NA    Diagnosis Pertinent to Stoma:Colon/Rectal Cancer Date of Diagnosis: 2019  Type of Surgery: Colostomy    Surgery Date: 11/20/19  Surgeon: Tiffanie     [facility]: [facility]    Purpose of this visit:Checkup:post-op    Present for Teaching Session: Patient and Family Member   Present: NA    Pertinent Information: Patient presents for routine follow up.     Current Equipment:    Product # Brand Description   Pouch [address] Flat CTF   Flange      Paste [address] Barrier ring   Powder      Deodorizer                    Pouch Change Frequency: twice weekly  Provider of Care: Emptying: self Pouch Change: TCU staff    ASSESSMENT  Stoma Size: Oval 1-1/4x1-3/4 inches  Protrusion: 2cm  Vascularity: Pink  Mucocutaneous Juncture: Intact- slight erythema at sutures at 5-7 o'clock  Peristomal Skin: Intact    Wound: No  Current Wound Care: NA    TREATMENT  Applied: stoma powder and cavilon no-sting skin barrier    INTERVENTIONS  Refitting done, Educated on peristomal skin treatment and Educated on pouch change procedure  Miscellaneous Interventions: Signed up for Coloplast Care or Secure Start    INSTRUCTIONS GIVEN  WOC Role, Anatomy, Bathing: Ostomy supplies are waterproof., Clothing, Diet, Fluids: Drink 6-8 glasses of fluids daily , Pouching Products: Showed pouching system , clinic follow up, Ordering supplies and Support Group    PLAN  Change in Supplies: See Outpatient Ostomy Instructions and New Pouching Procedure: See Outpatient Ostomy Instructions      Total Time Spent with Patient: 45 minutes.  Education time: 30 minutes.  Wound care: 0 minutes.

## 2021-06-04 NOTE — PATIENT INSTRUCTIONS - HE
"OUTPATIENT OSTOMY INSTRUCTIONS    Type of Stoma: Colostomy    Supplier: [employer] [phone]    Equipment:   Product # Brand Description   Pouch 65849 Coloplast Ari Flat Cut to fit   Flange      Paste 63085 Coloplast 1-3/8\" protective seal   Powder 24499 Coloplast Stoma powder   Adhesive remover 604692 Coloplast Adhesive remover   Skin Barrier 3344 3M Cavilon no-sting   Lubricating Deodorant 63545       Procedure:  Close the bottom of the pouch.  If needed cut the opening of your pouch to the correct size and then remove backings from adhesive surfaces.  Remove the soiled pouch and discard.  Wash skin with water and dry.  Then apply stoma powder if needed, brush away extra, seal in place with Cavilon no-sting skin barrier  Apply ring: Around stoma  Then: Apply pouching system to stoma site and hold in place for 2-5 minutes.    Wound Care (not under pouching system): NA  _____________________________________________________________________    [facility] Nurse Specialist: Frannie Hill RN CWOCN   Questions: [phone]      I have received a copy of these instructions, have had an opportunity to ask questions, and have had them answered to my satisfaction.    ________________________________________________________________  Patient Signature and Date    Follow-up Appointment: 3-6 months  To schedule an appointment call [facility] Scheduling at [phone]     "

## 2021-06-05 VITALS
RESPIRATION RATE: 22 BRPM | TEMPERATURE: 98 F | HEART RATE: 75 BPM | SYSTOLIC BLOOD PRESSURE: 128 MMHG | HEIGHT: 59 IN | DIASTOLIC BLOOD PRESSURE: 62 MMHG | WEIGHT: 103.8 LBS | OXYGEN SATURATION: 98 % | BODY MASS INDEX: 20.92 KG/M2

## 2021-06-05 VITALS
DIASTOLIC BLOOD PRESSURE: 70 MMHG | OXYGEN SATURATION: 98 % | HEIGHT: 59 IN | HEART RATE: 74 BPM | BODY MASS INDEX: 21 KG/M2 | WEIGHT: 104.2 LBS | SYSTOLIC BLOOD PRESSURE: 118 MMHG | TEMPERATURE: 98.7 F | RESPIRATION RATE: 18 BRPM

## 2021-06-05 VITALS
DIASTOLIC BLOOD PRESSURE: 78 MMHG | OXYGEN SATURATION: 98 % | SYSTOLIC BLOOD PRESSURE: 132 MMHG | HEIGHT: 59 IN | RESPIRATION RATE: 16 BRPM | WEIGHT: 103.8 LBS | BODY MASS INDEX: 20.92 KG/M2 | TEMPERATURE: 97.7 F | HEART RATE: 78 BPM

## 2021-06-05 VITALS
HEART RATE: 76 BPM | WEIGHT: 109.4 LBS | TEMPERATURE: 98 F | OXYGEN SATURATION: 99 % | HEIGHT: 59 IN | SYSTOLIC BLOOD PRESSURE: 119 MMHG | DIASTOLIC BLOOD PRESSURE: 73 MMHG | BODY MASS INDEX: 22.05 KG/M2 | RESPIRATION RATE: 16 BRPM

## 2021-06-05 VITALS
RESPIRATION RATE: 19 BRPM | HEIGHT: 59 IN | OXYGEN SATURATION: 99 % | DIASTOLIC BLOOD PRESSURE: 81 MMHG | SYSTOLIC BLOOD PRESSURE: 127 MMHG | WEIGHT: 102.4 LBS | TEMPERATURE: 97.5 F | HEART RATE: 77 BPM | BODY MASS INDEX: 20.64 KG/M2

## 2021-06-08 DIAGNOSIS — M79.604 PAIN IN BOTH LOWER EXTREMITIES: ICD-10-CM

## 2021-06-08 DIAGNOSIS — M79.605 PAIN IN BOTH LOWER EXTREMITIES: ICD-10-CM

## 2021-06-10 RX ORDER — TIZANIDINE 2 MG/1
2-4 TABLET ORAL EVERY 6 HOURS PRN
Qty: 90 TABLET | Refills: 0 | Status: SHIPPED | OUTPATIENT
Start: 2021-06-10 | End: 2021-08-23

## 2021-06-11 NOTE — TELEPHONE ENCOUNTER
[facility] Nurse Triage Telephone Note      Provider: TRINH Mccray  Facility: [facility]    Facility Type: TCU    Caller: Zaina  Call Back Number:  [phone]    Allergies: Flu vac 2015 (65 up)-mf59c(pf)    Reason for call: Pt has Colostomy & has order for Miralax 8/5Gm daily. Pt states she prefers her stool runny for easier cleaning & wants it to be 17gm two times a day as she took at home. 9/13 K 3.1, 9/14 K 3.0. Is on K 40 two times a day.    Verbal Order/Direction given by Provider: Nursing document colostomy output consistancy, & I will discuss this with pt Thurs.    Provider giving order: TRINH Mccray    Verbal order given to: Zaina Thompson RN

## 2021-06-11 NOTE — PROGRESS NOTES
"Code Status:  DNR/DNI  Visit Type: Review Of Multiple Medical Conditions (Fall, left pubic rami fracture, hypokalemia)     Facility:  [facility] [[id number]]      Facility Type: SNF (Skilled Nursing Facility, TCU)    History of Present Illness:   Hospital Admission Date: 9/13/2020  Hospital Discharge Date: 9/14/2020    Tamy Pierce is a 89 y.o. female who has a past medical history for rectal CA s/p colostomy 2/2 colonic obstruction (s/p palliative radiation and Xeloda on hold during TCU stay), HTN, lumbar DDD, HLD, GERD, mild intermittent asthma.  She was recently hospitalized after a mechanical fall at home in which she sustained a closed fracture of left pubic rami and left sacral ala. Orthopedic was consulted and noted there was no need for surgery with the plan for rehab and pain management.  She also had hypokalemia and is on chlorthalidone and 20meq KCL at home so her KCL was increased to 40meq however orders at the TCU show KCL 40meq two times a day.     Recheck K yesterday was 3.9 and so I did decrease her KCL to 40meq daily with a recheck next week.      Today, patient endorses \"soreness\" but overall pain is controlled. Good CMS to LE and no calf tenderness.   She is requesting that her Miralax be put back to 17gm BID as this is what she takes at home and she doesn't want to have any debris stuck in her colostomy.  I am guessing this was decreased due to hypokalemia.      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 Treated with palliative Xeloda and radiation in February 2020.  Take demeclocycline for radiation proctitis.  Restaging pelvic MRI in June 2020 demonstrated significant interval decrease in size of the residual distal rectal mass.  Recommendations for follow up with Dr. Diggs     No past medical history on file.  Past Surgical History:   Procedure Laterality Date     COLOSTOMY N/A 11/20/2019    " Procedure: Colostomy Formation;  Surgeon: Demarcus Moreno MD;  Location: [facility];  Service: General     PICC AND MIDLINE TEAM LINE INSERTION  11/21/2019          PROCTOSCOPY N/A 11/20/2019    Procedure: PROCTOSCOPY WITH BIOPSY;  Surgeon: Demarcus Moreno MD;  Location: [facility];  Service: General     No family history on file.  Social History     Socioeconomic History     Marital status:      Spouse name: Not on file     Number of children: Not on file     Years of education: Not on file     Highest education level: Not on file   Occupational History     Not on file   Social Needs     Financial resource strain: Not on file     Food insecurity     Worry: Not on file     Inability: Not on file     Transportation needs     Medical: Not on file     Non-medical: Not on file   Tobacco Use     Smoking status: Never Smoker     Smokeless tobacco: Never Used   Substance and Sexual Activity     Alcohol use: Yes     Frequency: Monthly or less     Drinks per session: 1 or 2     Binge frequency: Never     Comment: <1     Drug use: Never     Sexual activity: Not on file   Lifestyle     Physical activity     Days per week: Not on file     Minutes per session: Not on file     Stress: Not on file   Relationships     Social connections     Talks on phone: Not on file     Gets together: Not on file     Attends Alevism service: Not on file     Active member of club or organization: Not on file     Attends meetings of clubs or organizations: Not on file     Relationship status: Not on file     Intimate partner violence     Fear of current or ex partner: Not on file     Emotionally abused: Not on file     Physically abused: Not on file     Forced sexual activity: Not on file   Other Topics Concern     Not on file   Social History Narrative     Not on file       Current Outpatient Medications   Medication Sig Dispense Refill     atenoloL (TENORMIN) 25 MG tablet Take 25 mg by mouth daily.       calcium carbonate  (OS-DANIEL) 600 mg calcium (1,500 mg) tablet Take 1,200 mg by mouth daily.       capecitabine (XELODA) 500 MG tablet Take by mouth 2 (two) times a day.       chlorthalidone (HYGROTEN) 25 MG tablet Take 25 mg by mouth daily.       dicyclomine (BENTYL) 10 MG capsule Take 10 mg by mouth 4 (four) times a day as needed.       gabapentin (NEURONTIN) 100 MG capsule Take 100 mg by mouth 2 (two) times a day.       magnesium oxide (MAG-OX) 400 mg (241.3 mg magnesium) tablet Take 400 mg by mouth daily.       melatonin 3 mg Tab tablet Take 3 mg by mouth at bedtime.       oxyCODONE (ROXICODONE) 5 MG immediate release tablet Take 2.5-5 mg by mouth every 4 (four) hours as needed for pain.       pantoprazole (PROTONIX) 40 MG tablet Take 40 mg by mouth daily.       polyethylene glycol (MIRALAX) 17 gram packet Take 17 g by mouth 2 (two) times a day.       acetaminophen (TYLENOL) 325 MG tablet Take 2 tablets (650 mg total) by mouth every 6 (six) hours. pain (Patient taking differently: Take 650 mg by mouth every 4 (four) hours as needed. pain) 30 tablet 0     albuterol (PROAIR HFA;PROVENTIL HFA;VENTOLIN HFA) 90 mcg/actuation inhaler Inhale 2 puffs every 6 (six) hours as needed for wheezing (has on hand, uses rarely). 1 Inhaler 0     fluticasone propionate (FLOVENT HFA) 110 mcg/actuation inhaler Inhale 2 puffs 2 (two) times a day. asthma 1 Inhaler 12     multivitamin therapeutic tablet Take 1 tablet by mouth daily.       potassium chloride (K-DUR,KLOR-CON) 20 MEQ tablet Take 40 mEq by mouth daily.       simvastatin (ZOCOR) 40 MG tablet Take 1 tablet (40 mg total) by mouth at bedtime. Lipids 30 tablet 0     No current facility-administered medications for this visit.      Allergies   Allergen Reactions     Flu Vac 2015 (65 Up)-Mf59c(Pf)      Years ago, caused syncope, falling.  Has been able to take vaccine which is not egg based     Immunization History   Administered Date(s) Administered     INFLUENZA,RECOMBINANT,INJ,PF QUADRIVALENT  18+YRS 10/02/2017, 10/14/2019       Post Discharge Medication Reconciliation Status: discharge medications reconciled and changed, per note/orders    Review of Systems   Patient denies fever, chills, headache, lightheadedness, dizziness, rhinorrhea, cough, congestion, shortness of breath, chest pain, palpitations, abdominal pain, n/v, diarrhea, constipation, change in appetite, dysuria, frequency, burning or pain with urination.  Other than stated in HPI all other review of systems is negative.         Physical Exam   In order to maintain social distancing during the COVID pandemic, a visual exam was completed.     Vitals: /59, HR 63, resp 16, temp 98.0    Patient is thin, frail elderly woman and no acute distress.  Head is AT/NC, EOM intact. Respiratory effort normal. No visible LE edema. Good CMS BLE. Alert and oriented, face symmetric. No visible skin issues or rashes. Mood euthymic        Labs:    Recent Results (from the past 240 hour(s))   COVID-19 VIRUS PCR MRF    Specimen: Nasopharyngeal   Result Value Ref Range    COVID-19 VIRUS SPECIMEN SOURCE Nasopharyngeal     2019-nCOV       Test received-See reflex to IDDL test SARS CoV2 (COVID-19) Virus RT-PCR   SARS-COV-2 (COVID-19) RT-PCR-IDDL    Specimen: Nasopharyngeal   Result Value Ref Range    SARS-CoV-2 Virus Specimen Source Nasopharyngeal     SARS-CoV-2 PCR Result NEGATIVE     SARS-COV-2 PCR COMMENT       Testing was performed using the Xpert Xpress SARS-CoV-2 Assay on the Cepheid Gene-Xpert   Instrument Systems. Additional information about this Emergency Use Authorization (EUA)   assay can be found via the Lab Guide.   Potassium   Result Value Ref Range    Potassium 3.9 3.5 - 5.0 mmol/L         Assessment:  1. Closed bilateral fracture of pubic rami, sequela     2. Pain management     3. Rectal cancer (H)     4. Hypokalemia     5. Lumbar radiculopathy     6. Colonic mass     7. S/P colostomy (H)     8. Mild intermittent asthma without complication      9. Essential hypertension         Plan:     Pubic rami fracture: pain controlled with APAP and Oxycodone. Therapies    Pain management: APAP prn and oxycodone prn.  If pain increased consider scheduling APAP first.  Counseled patient on the importance of ambulation in pain improvement in this type of fracture.     Hypokalemia: In good range, will keep a close eye on.  Recheck 9/21.  Continue KCL 40meq daily.     Lumbar radiculopathy: continue gabapentin.  She was to have a EFRAIN injection on 9/16 but she is not able to get in and out of a care easily so this was rescheduled for 3 weeks from now.      Rectal Cancer and Colonic Mass: follow up with Dr. Diggs.  Holding Xeloda. On Bentyl    S/p colostomy: increase miralax to 17gm two times a day per home dosing.  Monitor hypokalmia.     Asthma: albuterol    HTN: acceptable BPs, continue with chlorthalidone and Atenolol.         Electronically signed by: Emi Wong, CNP

## 2021-06-11 NOTE — PROGRESS NOTES
[facility]    Facility:  [facility] [[id number]]    Code Status:  DNRDNI    Chief Complaint   Patient presents with     H & P   :                    Patient Active Problem List   Diagnosis     Bowel obstruction (H)     Colonic mass     Pelvic pain in female     Hypokalemia     Other partial intestinal obstruction (H)     Moderate aortic stenosis     Acute encephalopathy     Acute respiratory failure, unspecified whether with hypoxia or hypercapnia (H)     Cardiopulmonary arrest with successful resuscitation (H)     Transaminitis     Electrolyte abnormality     S/P colostomy (H)     Mild intermittent asthma without complication     Bradycardia     Complete heart block (H)     Hyperchloremia     Palliative care patient     Goals of care, counseling/discussion       History:  Tamy Pierce  is a 89 year old female with history of moderate aortic stenosis, cardiopulmonary arrest with successful resuscitation, rectal cancer with diverting colostomy November 2018 on palliative immunotherapy/Xeloda (has also had radiation treatment), mild intermittent asthma, GERD, lipidemia, hypertension, lumbar degenerative disc disease with right sided radiculopathy seen for admission to TCU   Hospital Course: Patient was hospitalized September 13 through September 14 after suffering a fall at home.  She tells me she was up to the bathroom but 2 in the morning changing her colostomy bag and without her cane when she fell.  She was william that her  is able to manage to get help from her son who lives 3 houses down the street.    She was unable to get up until she got help.  Work-up in the emergency room revealed sacral hoa fracture left, left superior pubic ramus fracture, left inferior pubic ramus fracture but no surgical fractures.  Patient was treated with pain control including oxycodone.    Hospitalization otherwise remarkable for hypokalemia which was replaced and her prior to admission KCl  "was increased to 40 mEq daily    Subjective/ROS:    -augmented by discussion with facility staff involved in direct care      I had a terrible night last night\".  Patient important explain the very confused wandering resident ending up in her room not once but twice turning on her television sitting down to watch it.  Is been quite unsettling for her.    Otherwise she says things have been going relatively well.  She has pain but feels like the current pain program is appropriate.  She does not feel overly sedated or constipated.  Her right leg radiculopathy is unchanged.  She was supposed to have a EFRAIN on September 16 which has now been delayed due to this injury.  She denies headaches change in vision speaking swallowing or hearing.  She does not choke or gag when she swallows.  She feels like her weight has been stable in the last few weeks.  She is not coughing shortness of breath having chest pain abdominal pain nausea or vomiting.  Her colostomy is working well.  She says she has had an excellent response to the Xeloda which is backed up by chart review.  She denies other falls or injuries, skin rashes, lymphadenopathy, fever sweats chills, remainder is negative    Past Medical History:   Diagnosis Date     Actinic keratosis      Basal cell carcinoma      Fracture of left inferior pubic ramus (H)      Fracture of left superior pubic ramus (H)      Gait instability      GERD (gastroesophageal reflux disease)      HLD (hyperlipidemia)      HTN (hypertension)      Lumbar radiculopathy      Mild intermittent asthma      Rectal cancer (H)      Urethral stricture      Past Surgical History:   Procedure Laterality Date     COLOSTOMY N/A 11/20/2019    Procedure: Colostomy Formation;  Surgeon: Demarcus Moreno MD;  Location: [facility] [place];  Service: General     ESOPHAGOGASTRODUODENOSCOPY  02/20/2020     HYSTERECTOMY       LITHOTRIPSY       PICC AND MIDLINE TEAM LINE INSERTION  11/21/2019          PROCTOSCOPY N/A " 2019    Procedure: PROCTOSCOPY WITH BIOPSY;  Surgeon: Demarcus Moreno MD;  Location: [facility] OR;  Service: General          Family History   Problem Relation Age of Onset     Breast cancer Mother      Cancer Mother         colorectal      Coronary artery disease Father      COPD Brother    :   Her son Abelardo  at age 49 of near cancer in his chest.  He was exposed to chemical stripping agents in his [employer] business    Social History     Socioeconomic History     Marital status:      Spouse name: Not on file     Number of children: Not on file     Years of education: Not on file     Highest education level: Not on file   Occupational History     Not on file   Social Needs     Financial resource strain: Not on file     Food insecurity     Worry: Not on file     Inability: Not on file     Transportation needs     Medical: Not on file     Non-medical: Not on file   Tobacco Use     Smoking status: Never Smoker     Smokeless tobacco: Never Used   Substance and Sexual Activity     Alcohol use: Yes     Frequency: Monthly or less     Drinks per session: 1 or 2     Binge frequency: Never     Comment: <1     Drug use: Never     Sexual activity: Not on file   Lifestyle     Physical activity     Days per week: Not on file     Minutes per session: Not on file     Stress: Not on file   Relationships     Social connections     Talks on phone: Not on file     Gets together: Not on file     Attends Oriental orthodox service: Not on file     Active member of club or organization: Not on file     Attends meetings of clubs or organizations: Not on file     Relationship status: Not on file     Intimate partner violence     Fear of current or ex partner: Not on file     Emotionally abused: Not on file     Physically abused: Not on file     Forced sexual activity: Not on file   Other Topics Concern     Not on file   Social History Narrative     Not on file   :    Patient is .  Her spouse has dementia probably about 3  years ago.  They live in [place].  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  Her family is helping him out now.  She feels pressure to return home to return to her usual duties.  Her son lives 3 houses down from her.  She has 3 living children.  Her daughter is an RN in obstetrics at [employer].   Patient says that she and her  plan to live in their home as long as possible.  She says her children have been supportive of that plan.   We have a good time discussing common connections through [employer] [address] which was her son's business    Current Outpatient Medications on File Prior to Visit   Medication Sig Dispense Refill     trolamine salicylate (ASPERCREME) 10 % cream Apply topically 3 (three) times a day.       acetaminophen (TYLENOL) 325 MG tablet Take 2 tablets (650 mg total) by mouth every 6 (six) hours. pain (Patient taking differently: Take 650 mg by mouth every 4 (four) hours as needed. pain) 30 tablet 0     albuterol (PROAIR HFA;PROVENTIL HFA;VENTOLIN HFA) 90 mcg/actuation inhaler Inhale 2 puffs every 6 (six) hours as needed for wheezing (has on hand, uses rarely). (Patient taking differently: Inhale 2 puffs every 4 (four) hours as needed for wheezing (has on hand, uses rarely). ) 1 Inhaler 0     atenoloL (TENORMIN) 25 MG tablet Take 25 mg by mouth daily.       calcium carbonate (OS-DANIEL) 600 mg calcium (1,500 mg) tablet Take 1,200 mg by mouth daily.       capecitabine (XELODA) 500 MG tablet Take by mouth 2 (two) times a day. take for 7 days on, 7 days off       chlorthalidone (HYGROTEN) 25 MG tablet Take 25 mg by mouth daily.       dicyclomine (BENTYL) 10 MG capsule Take 10 mg by mouth 4 (four) times a day as needed.       fluticasone propionate (FLOVENT HFA) 110 mcg/actuation inhaler Inhale 2 puffs 2 (two) times a day. asthma 1 Inhaler 12     gabapentin (NEURONTIN) 100 MG capsule Take 100 mg by mouth 2 (two) times a day. And 200 mg at bedtime       magnesium oxide (MAG-OX) 400 mg  (241.3 mg magnesium) tablet Take 400 mg by mouth daily.       melatonin 3 mg Tab tablet Take 3 mg by mouth at bedtime. And 1 additional dose overnight as needed       multivitamin therapeutic tablet Take 1 tablet by mouth daily.       ondansetron (ZOFRAN-ODT) 4 MG disintegrating tablet Take 4 mg by mouth every 6 (six) hours as needed for nausea.       oxyCODONE (ROXICODONE) 5 MG immediate release tablet Take 2.5-5 mg by mouth every 4 (four) hours as needed for pain.       pantoprazole (PROTONIX) 40 MG tablet Take 40 mg by mouth daily.       polyethylene glycol (MIRALAX) 17 gram packet Take 17 g by mouth 2 (two) times a day.       potassium chloride (K-DUR,KLOR-CON) 20 MEQ tablet Take 40 mEq by mouth daily.       simvastatin (ZOCOR) 40 MG tablet Take 1 tablet (40 mg total) by mouth at bedtime. Lipids 30 tablet 0     No current facility-administered medications on file prior to visit.    :      ALLERGIES:  Egg and Flu vac 2015 (65 up)-mf59c(pf)    Vitals: Blood pressure 130/66 respiration 16 temperature 98.9 heart rate 84 O2 sats 100% weight is 110 pounds  Physical exam:  Thin elderly female who is alert oriented pleasant and normally conversant  Normocephalic/atraumatic sclera clear EOMI gaze is conjugate demonstrates good range of motion of her neck with rotation of head.  She shows purposeful movement of all 4 extremities.  She is breathing comfortably without accessory muscle use or tachypnea.  She has no bruising in the pelvic area she has no significant pain with gentle pelvic rock CMS is intact to the toe tips.  No edema excellent perfusion skin is clear and visualized portions    Due to the 2020 Covid 19 pandemic, except as noted above, the patient was visually observed at a 6 foot plus distance.  An observational exam was performed in an effort to keep patient safe from Covid 19 and other communicable diseases.   Labs:  Lab Results   Component Value Date    WBC 9.9 12/03/2019    HGB 10.2 (L) 12/03/2019    HCT  31.4 (L) 12/03/2019    MCV 98 12/03/2019     12/03/2019     Results for orders placed or performed in visit on 12/05/19   Basic Metabolic Panel   Result Value Ref Range    Sodium 140 136 - 145 mmol/L    Potassium 4.1 3.5 - 5.0 mmol/L    Chloride 106 98 - 107 mmol/L    CO2 29 22 - 31 mmol/L    Anion Gap, Calculation 5 5 - 18 mmol/L    Glucose 91 70 - 125 mg/dL    Calcium 8.6 8.5 - 10.5 mg/dL    BUN 23 8 - 28 mg/dL    Creatinine 0.67 0.60 - 1.10 mg/dL    GFR MDRD Af Amer >60 >60 mL/min/1.73m2    GFR MDRD Non Af Amer >60 >60 mL/min/1.73m2         No results found for: TSH  No results found for: HGBA1C  [unfilled]  No results found for: HJOVPROX14  No results found for: BNP  [unfilled]  Most Recent EKG     Units 11/23/19  0609   VENTRATE BPM 96   ATRIALRATE BPM 96   QRSDURATION ms 72   QTINTERVAL ms 328   QTCCALC ms 414   P Axis degrees 21   RAXIS degrees 8   TAXIS degrees 208   MUSEDX  Sinus rhythm with Premature supraventricular complexes  Nonspecific T wave abnormality  Abnormal ECG  When compared with ECG of 20-NOV-2019 19:28,  Premature supraventricular complexes are now Present  Vent. rate has increased BY  36 BPM  QT has shortened  Confirmed by BONY MACIEL, AUGUST LOC:[facility] ([id number]) on 11/23/2019 4:16:08 PM         Pelvic CT demonstrates an acute oblique fracture involving the far lateral aspect of the left superior pubic ramus at its junction with the anterior wall of the acetabulum. No evidence for significant displacement or angulation. Oblique and very minimally displaced fracture involving the middle to anterior third of the left inferior pubic ramus. No evidence for entrance into an intact symphysis pubis. No other superior or inferior pubic rami fractures bilaterally. No evidence for acute displaced femoral fracture. No hip dislocation. Oblique nondisplaced fracture involving the left sacral ala inferiorly, distal lateral aspect of the left S1 and S2 neural foramina. No evidence for significant  entrance into an intact left SI joint. Minimal to moderate degenerative changes both SI joints. No other fractures evident. Marked degenerative disc disease lower lumbar spine. Marked lower lumbar facet arthropathy.     Assessment/Plan:      ICD-10-CM    1. S/P colostomy (H)  Z93.3    2. Acute respiratory failure, unspecified whether with hypoxia or hypercapnia (H)  J96.00    3. Moderate aortic stenosis  I35.0        Fall with injury  Left superior pubic ramus fracture  Left inferior pubic ramus fracture  Left sacral hoa fracture   Nonsurgical fractures.  Pain control and mobility are the primary focus points.  -She is satisfied with oxycodone 2.5-5 mg every 4 hours  -Has bowel program in place reports good output from her colostomy  Acetaminophen  I added trolamine 3 times daily    Presumed osteoporosis   I find no DEXA scan on record.  She does not think she has had one.  May not be helpful at this point anyway.  She remains ambulatory and may be a bisphosphonate candidate?  -Recommend calcium/vitamin D  -Recommend follow-up with primary MD to discuss bone health following discharge    Rectal cancer  Diverting colostomy November 2018  Xeloda palliative treatment  History of radiation treatment   Xeloda is on hold while at the TCU.  She will follow-up with oncology following discharge to reestablish.  Dr. Diggs is her oncologist    Radiation proctitis   Demeclocycline, dicyclomine for cramping    Hypertension   Blood pressure looks quite good here.  Continue chlorthalidone, atenolol without change check BMP CBC on September 21    Lumbar degenerative disc disease  Right-sided lumbar radiculopathy   EFRAIN originally planned for September 16 will need to be rescheduled as she had missed that appointment.  Continue gabapentin as current    Hyperlipidemia   On simvastatin    GERD   Chronic Protonix          Case discussed with:  P  Facility staff         Alfredo Sarah MD

## 2021-06-11 NOTE — PROGRESS NOTES
Code Status:  DNR/DNI  Visit Type: Follow Up (bowels, hypokalemia, HTN)     Facility:  [facility] [[id number]]      Facility Type: SNF (Skilled Nursing Facility, TCU)    History of Present Illness:   Hospital Admission Date: 9/13/2020  Hospital Discharge Date: 9/14/2020    Tamy Pierce is a 89 y.o. female who has a past medical history for rectal CA s/p colostomy 2/2 colonic obstruction (s/p palliative radiation and Xeloda on hold during TCU stay), HTN, lumbar DDD, HLD, GERD, mild intermittent asthma.  She was recently hospitalized after a mechanical fall at home in which she sustained a closed fracture of left pubic rami and left sacral ala. Orthopedic was consulted and noted there was no need for surgery with the plan for rehab and pain management.  She also had hypokalemia and is on chlorthalidone and 20meq KCL at home so her KCL was increased.     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 Treated with palliative Xeloda and radiation in February 2020.  Take demeclocycline for radiation proctitis.  Restaging pelvic MRI in June 2020 demonstrated significant interval decrease in size of the residual distal rectal mass.  Recommendations for follow up with Dr. Diggs       Today, reports her stool is the consistency of what it is at home after increasing MiraLAX to 1-1/2 scoops twice daily.  She is happy with her care plan as of now.  Therapy reports she is improving in strength and anticipates discharge to home with services on 10/8/2020.    Recheck potassium on 9/24 was in good range at 4.0 after increasing her potassium to 40 mEq twice daily.    Blood pressures have been running a little on the soft side with SBP's in the 90s to low 100s.  She denies any dizziness or headaches.  She has no lower extremity edema.      Current Outpatient Medications   Medication Sig Dispense Refill     acetaminophen (TYLENOL) 325 MG  tablet Take 2 tablets (650 mg total) by mouth every 6 (six) hours. pain (Patient taking differently: Take 650 mg by mouth every 4 (four) hours as needed. pain) 30 tablet 0     albuterol (PROAIR HFA;PROVENTIL HFA;VENTOLIN HFA) 90 mcg/actuation inhaler Inhale 2 puffs every 6 (six) hours as needed for wheezing (has on hand, uses rarely). (Patient taking differently: Inhale 2 puffs every 4 (four) hours as needed for wheezing (has on hand, uses rarely). ) 1 Inhaler 0     atenoloL (TENORMIN) 25 MG tablet Take 25 mg by mouth daily.       calcium carbonate (OS-DANIEL) 600 mg calcium (1,500 mg) tablet Take 1,200 mg by mouth daily.       capecitabine (XELODA) 500 MG tablet Take by mouth 2 (two) times a day. take for 7 days on, 7 days off       dicyclomine (BENTYL) 10 MG capsule Take 10 mg by mouth 4 (four) times a day as needed.       fluticasone propionate (FLOVENT HFA) 110 mcg/actuation inhaler Inhale 2 puffs 2 (two) times a day. asthma 1 Inhaler 12     gabapentin (NEURONTIN) 100 MG capsule Take 100 mg by mouth 2 (two) times a day. And 200 mg at bedtime       magnesium chloride (SLOW-MAG) 64 mg TbEC delayed-release tablet Take 128 mg by mouth daily.       melatonin 3 mg Tab tablet Take 3 mg by mouth at bedtime. And 1 additional dose overnight as needed       multivitamin therapeutic tablet Take 1 tablet by mouth daily.       ondansetron (ZOFRAN-ODT) 4 MG disintegrating tablet Take 4 mg by mouth every 6 (six) hours as needed for nausea.       oxyCODONE (ROXICODONE) 5 MG immediate release tablet Take 0.5-1 tablets (2.5-5 mg total) by mouth every 4 (four) hours as needed for pain. 60 tablet 0     pantoprazole (PROTONIX) 40 MG tablet Take 40 mg by mouth daily.       polyethylene glycol (MIRALAX) 17 gram packet Take 25.5 g by mouth 2 (two) times a day.        potassium chloride (K-DUR,KLOR-CON) 20 MEQ tablet Take 40 mEq by mouth 2 (two) times a day.        simvastatin (ZOCOR) 40 MG tablet Take 1 tablet (40 mg total) by mouth at  bedtime. Lipids 30 tablet 0     trolamine salicylate (ASPERCREME) 10 % cream Apply topically 3 (three) times a day.       No current facility-administered medications for this visit.          Review of Systems   Patient denies fever, chills, headache, lightheadedness, dizziness, rhinorrhea, cough, congestion, shortness of breath, chest pain, palpitations, abdominal pain, n/v, change in appetite, dysuria, frequency, burning or pain with urination.  Other than stated in HPI all other review of systems is negative.         Physical Exam   In order to maintain social distancing during the COVID pandemic, a visual exam was completed.     Vitals: /61, heart rate 72, respirations 18, temp 98.9.    Patient is thin, frail elderly woman and no acute distress.  Head is AT/NC, EOM intact. Respiratory effort normal. No visible LE edema. Good CMS BLE, no calf tenderness. Colostomy producing liquid brown stool. Alert and oriented, face symmetric. No visible skin issues or rashes. Mood euthymic        Labs:    Recent Results (from the past 240 hour(s))   Basic Metabolic Panel   Result Value Ref Range    Sodium 139 136 - 145 mmol/L    Potassium 3.3 (L) 3.5 - 5.0 mmol/L    Chloride 101 98 - 107 mmol/L    CO2 29 22 - 31 mmol/L    Anion Gap, Calculation 9 5 - 18 mmol/L    Glucose 94 70 - 125 mg/dL    Calcium 9.2 8.5 - 10.5 mg/dL    BUN 20 8 - 28 mg/dL    Creatinine 0.69 0.60 - 1.10 mg/dL    GFR MDRD Af Amer >60 >60 mL/min/1.73m2    GFR MDRD Non Af Amer >60 >60 mL/min/1.73m2   HM2(CBC w/o Differential)   Result Value Ref Range    WBC 5.6 4.0 - 11.0 thou/uL    RBC 3.11 (L) 3.80 - 5.40 mill/uL    Hemoglobin 10.9 (L) 12.0 - 16.0 g/dL    Hematocrit 32.7 (L) 35.0 - 47.0 %     (H) 80 - 100 fL    MCH 35.0 (H) 27.0 - 34.0 pg    MCHC 33.3 32.0 - 36.0 g/dL    RDW 14.6 (H) 11.0 - 14.5 %    Platelets 267 140 - 440 thou/uL    MPV 8.7 8.5 - 12.5 fL   Magnesium   Result Value Ref Range    Magnesium 1.7 (L) 1.8 - 2.6 mg/dL   Potassium    Result Value Ref Range    Potassium 4.0 3.5 - 5.0 mmol/L         Assessment:  1. S/P colostomy (H)     2. Hypokalemia     3. Essential hypertension     4. Closed bilateral fracture of pubic rami, sequela         Plan:   That is post colostomy: Stools are the consistency of her liking so we will continue with MiraLAX 1-1/2 scoops twice daily.    Hypokalemia: Improved with potassium supplementation.  Will recheck on 10/1 and also check magnesium.  Continue with KCl 40 mEq twice daily.    Hypertension: Blood pressures on the soft side will discontinue chlorthalidone as it is potassium wasting.  Continue with atenolol and monitor for hypotension.    Pubic rami fracture: Pain is improved only taking 1 2.5 mg of oxycodone once a day every other day or so.  We will continue with this order and APAP as needed.  Planning to be discharged in the next 10 days or so and hopefully will be able to wean her off of oxycodone.          Electronically signed by: Emi Wong CNP

## 2021-06-11 NOTE — PROGRESS NOTES
"Code Status:  DNR/DNI  Visit Type: Follow Up (hypokalemia, colostomy pain)     Facility:  [facility] [[id number]]      Facility Type: SNF (Skilled Nursing Facility, TCU)    History of Present Illness:   Hospital Admission Date: 9/13/2020  Hospital Discharge Date: 9/14/2020    Tamy Pierce is a 89 y.o. female who has a past medical history for rectal CA s/p colostomy 2/2 colonic obstruction (s/p palliative radiation and Xeloda on hold during TCU stay), HTN, lumbar DDD, HLD, GERD, mild intermittent asthma.  She was recently hospitalized after a mechanical fall at home in which she sustained a closed fracture of left pubic rami and left sacral ala. Orthopedic was consulted and noted there was no need for surgery with the plan for rehab and pain management.  She also had hypokalemia and is on chlorthalidone and 20meq KCL at home so her KCL was increased.     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 Treated with palliative Xeloda and radiation in February 2020.  Take demeclocycline for radiation proctitis.  Restaging pelvic MRI in June 2020 demonstrated significant interval decrease in size of the residual distal rectal mass.  Recommendations for follow up with Dr. Diggs       Today, Her K is 3.3 today.   She is frustrated due her colostomy bag falling off 3x this past weekend.  She states this is due to \"thicker stool\".  She reports that she likes to keep her stool watery and is concerned that she is not taking as much Miralax as she did at home. She tells me that at home she heaps the capful of the Miralax two times a day.     hgb stable at 10.9.     Pain is stable and she denies any calf tenderness.  She has good LE CMS.      Mag also slightly low at 1.7      Current Outpatient Medications   Medication Sig Dispense Refill     acetaminophen (TYLENOL) 325 MG tablet Take 2 tablets (650 mg total) by mouth every 6 (six) " hours. pain (Patient taking differently: Take 650 mg by mouth every 4 (four) hours as needed. pain) 30 tablet 0     albuterol (PROAIR HFA;PROVENTIL HFA;VENTOLIN HFA) 90 mcg/actuation inhaler Inhale 2 puffs every 6 (six) hours as needed for wheezing (has on hand, uses rarely). (Patient taking differently: Inhale 2 puffs every 4 (four) hours as needed for wheezing (has on hand, uses rarely). ) 1 Inhaler 0     atenoloL (TENORMIN) 25 MG tablet Take 25 mg by mouth daily.       calcium carbonate (OS-DANIEL) 600 mg calcium (1,500 mg) tablet Take 1,200 mg by mouth daily.       capecitabine (XELODA) 500 MG tablet Take by mouth 2 (two) times a day. take for 7 days on, 7 days off       chlorthalidone (HYGROTEN) 25 MG tablet Take 12.5 mg by mouth daily.        dicyclomine (BENTYL) 10 MG capsule Take 10 mg by mouth 4 (four) times a day as needed.       fluticasone propionate (FLOVENT HFA) 110 mcg/actuation inhaler Inhale 2 puffs 2 (two) times a day. asthma 1 Inhaler 12     gabapentin (NEURONTIN) 100 MG capsule Take 100 mg by mouth 2 (two) times a day. And 200 mg at bedtime       magnesium oxide (MAG-OX) 400 mg (241.3 mg magnesium) tablet Take 400 mg by mouth daily.       melatonin 3 mg Tab tablet Take 3 mg by mouth at bedtime. And 1 additional dose overnight as needed       multivitamin therapeutic tablet Take 1 tablet by mouth daily.       ondansetron (ZOFRAN-ODT) 4 MG disintegrating tablet Take 4 mg by mouth every 6 (six) hours as needed for nausea.       oxyCODONE (ROXICODONE) 5 MG immediate release tablet Take 2.5-5 mg by mouth every 4 (four) hours as needed for pain.       pantoprazole (PROTONIX) 40 MG tablet Take 40 mg by mouth daily.       polyethylene glycol (MIRALAX) 17 gram packet Take 25.5 g by mouth 2 (two) times a day.        potassium chloride (K-DUR,KLOR-CON) 20 MEQ tablet Take 40 mEq by mouth 2 (two) times a day.        simvastatin (ZOCOR) 40 MG tablet Take 1 tablet (40 mg total) by mouth at bedtime. Lipids 30  tablet 0     trolamine salicylate (ASPERCREME) 10 % cream Apply topically 3 (three) times a day.       No current facility-administered medications for this visit.          Review of Systems   Patient denies fever, chills, headache, lightheadedness, dizziness, rhinorrhea, cough, congestion, shortness of breath, chest pain, palpitations, abdominal pain, n/v, change in appetite, dysuria, frequency, burning or pain with urination.  Other than stated in HPI all other review of systems is negative.         Physical Exam   In order to maintain social distancing during the COVID pandemic, a visual exam was completed.     Vitals: /58, Hr 72, resp 16, temp 98.2    Patient is thin, frail elderly woman and no acute distress.  Head is AT/NC, EOM intact. Respiratory effort normal. No visible LE edema. Good CMS BLE, no calf tenderness. Colostomy producing soft brown stool. Alert and oriented, face symmetric. No visible skin issues or rashes. Mood euthymic        Labs:    Recent Results (from the past 240 hour(s))   COVID-19 VIRUS PCR MRF    Specimen: Nasopharyngeal   Result Value Ref Range    COVID-19 VIRUS SPECIMEN SOURCE Nasopharyngeal     2019-nCOV       Test received-See reflex to IDDL test SARS CoV2 (COVID-19) Virus RT-PCR   SARS-COV-2 (COVID-19) RT-PCR-IDDL    Specimen: Nasopharyngeal   Result Value Ref Range    SARS-CoV-2 Virus Specimen Source Nasopharyngeal     SARS-CoV-2 PCR Result NEGATIVE     SARS-COV-2 PCR COMMENT       Testing was performed using the Xpert Xpress SARS-CoV-2 Assay on the Cepheid Gene-Xpert   Instrument Systems. Additional information about this Emergency Use Authorization (EUA)   assay can be found via the Lab Guide.   Potassium   Result Value Ref Range    Potassium 3.9 3.5 - 5.0 mmol/L   Basic Metabolic Panel   Result Value Ref Range    Sodium 139 136 - 145 mmol/L    Potassium 3.3 (L) 3.5 - 5.0 mmol/L    Chloride 101 98 - 107 mmol/L    CO2 29 22 - 31 mmol/L    Anion Gap, Calculation 9 5 - 18  mmol/L    Glucose 94 70 - 125 mg/dL    Calcium 9.2 8.5 - 10.5 mg/dL    BUN 20 8 - 28 mg/dL    Creatinine 0.69 0.60 - 1.10 mg/dL    GFR MDRD Af Amer >60 >60 mL/min/1.73m2    GFR MDRD Non Af Amer >60 >60 mL/min/1.73m2   HM2(CBC w/o Differential)   Result Value Ref Range    WBC 5.6 4.0 - 11.0 thou/uL    RBC 3.11 (L) 3.80 - 5.40 mill/uL    Hemoglobin 10.9 (L) 12.0 - 16.0 g/dL    Hematocrit 32.7 (L) 35.0 - 47.0 %     (H) 80 - 100 fL    MCH 35.0 (H) 27.0 - 34.0 pg    MCHC 33.3 32.0 - 36.0 g/dL    RDW 14.6 (H) 11.0 - 14.5 %    Platelets 267 140 - 440 thou/uL    MPV 8.7 8.5 - 12.5 fL   Magnesium   Result Value Ref Range    Magnesium 1.7 (L) 1.8 - 2.6 mg/dL         Assessment:  1. Hypokalemia     2. Hypomagnesemia     3. S/P colostomy (H)     4. Essential hypertension     5. Closed bilateral fracture of pubic rami, sequela         Plan:     Hypokalemia: likely related to bowel wasting and diuretic.  Counseled patient on the effects of increasing Miralax.  Counseled on effects of hypokalemia.  Will increase KCL to 40meq two times a day.     Hypomagnesemia: slightly low but may help with increasing K will change her to Slow Mag 2 tabs daily.     S/P colostomy: Patient likes to keep stool watery for appliance preference.  I did explain the concern of wasting potassium.  However, my guess is she will increase her Miralax at home and continue to have hypokalemia and so I am willing to see if we can get a balance while she is here in order to make her more comfortable.  Will increase Miralax to 1 1/2 scoops two times a day.  Increase KCL 40meq two times a day and check K on 9/24.  Will give additional 60meq today to improve K.  Decrease Chlorthalidone as her BPs are on the lower end of normal and could likely discontinue in the near future.      HTN: BPs are lower end normal with SBPs 110s and DP 50-60s.  Decrease Chlorthalidone to 12.5mg to assist with improving K wasting.  May be able to discontinue in the future.  Continue on atenolol.         Electronically signed by: Emi Wong, HERMES

## 2021-06-11 NOTE — TELEPHONE ENCOUNTER
[facility] Nurse Triage Telephone Note      Provider: TRINH Mccray  Facility: [facility]    Facility Type: TCU    Caller: Adriana  Call Back Number:  [phone]    Allergies: Flu vac 2015 (65 up)-[id number](pf)    Reason for call: Nurse reporting patient's potassium level.  Notable meds:  Potassium 40meq two times a day, Chlorthalidone 25mg daily, Atenolol 25mg daily.       Verbal Order/Direction given by Provider: Decrease potassium to 40meq daily.  Check potassium level on 9/21/20.      Provider giving order: TRINH Mccray    Verbal order given to: Adriana Claros RN

## 2021-06-12 NOTE — PROGRESS NOTES
Code Status:  DNR/DNI  Visit Type: Follow Up (pubis fracture, rehab, hyperkalemia)     Facility:  [facility]      Facility Type: SNF (Skilled Nursing Facility, TCU)    History of Present Illness:   Hospital Admission Date: 9/13/2020  Hospital Discharge Date: 9/14/2020    Tamy Pierce is a 89 y.o. female who has a past medical history for rectal CA s/p colostomy 2/2 colonic obstruction (s/p palliative radiation and Xeloda on hold during TCU stay), HTN, lumbar DDD, HLD, GERD, mild intermittent asthma.  She was recently hospitalized after a mechanical fall at home in which she sustained a closed fracture of left pubic rami and left sacral ala. Orthopedic was consulted and noted there was no need for surgery with the plan for rehab and pain management.  She also had hypokalemia and is on chlorthalidone and 20meq KCL at home so her KCL was increased.     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 Treated with palliative Xeloda and radiation in February 2020. Continues with Xeloda.  Takes demeclocycline for radiation proctitis.  Restaging pelvic MRI in June 2020 demonstrated significant interval decrease in size of the residual distal rectal mass.  Recommendations for follow up with Dr. Diggs     Today, patient states she is a little sore today after attempting to put on her compression socks.  Edema of left LE is less.  Both LE have mild soft nonpitting edema. She is planning to be discharged to home early next week. Pain is stable and she will go home with low dose oxycodone.     K recheck today is good at 3.9 on 40 meq two times a day of KCL.     Current Outpatient Medications   Medication Sig Dispense Refill     acetaminophen (TYLENOL) 325 MG tablet Take 2 tablets (650 mg total) by mouth every 6 (six) hours. pain (Patient taking differently: Take 650 mg by mouth every 4 (four) hours as needed. pain) 30  tablet 0     albuterol (PROAIR HFA;PROVENTIL HFA;VENTOLIN HFA) 90 mcg/actuation inhaler Inhale 2 puffs every 6 (six) hours as needed for wheezing (has on hand, uses rarely). (Patient taking differently: Inhale 2 puffs every 4 (four) hours as needed for wheezing (has on hand, uses rarely). ) 1 Inhaler 0     atenoloL (TENORMIN) 25 MG tablet Take 12.5 mg by mouth daily.        calcium carbonate (OS-DANIEL) 600 mg calcium (1,500 mg) tablet Take 1,200 mg by mouth daily.       capecitabine (XELODA) 500 MG tablet Take by mouth 2 (two) times a day. take for 7 days on, 7 days off       dicyclomine (BENTYL) 10 MG capsule Take 10 mg by mouth 4 (four) times a day as needed.       fluticasone propionate (FLOVENT HFA) 110 mcg/actuation inhaler Inhale 2 puffs 2 (two) times a day. asthma 1 Inhaler 12     gabapentin (NEURONTIN) 100 MG capsule Take 100 mg by mouth 2 (two) times a day. And 200 mg at bedtime       magnesium chloride (SLOW-MAG) 64 mg TbEC delayed-release tablet Take 128 mg by mouth daily.       melatonin 3 mg Tab tablet Take 3 mg by mouth at bedtime. And 1 additional dose overnight as needed       multivitamin therapeutic tablet Take 1 tablet by mouth daily.       ondansetron (ZOFRAN-ODT) 4 MG disintegrating tablet Take 4 mg by mouth every 6 (six) hours as needed for nausea.       oxyCODONE (ROXICODONE) 5 MG immediate release tablet Take 0.5-1 tablets (2.5-5 mg total) by mouth every 4 (four) hours as needed for pain. (Patient taking differently: Take 2.5 mg by mouth every 4 (four) hours as needed for pain. ) 60 tablet 0     pantoprazole (PROTONIX) 40 MG tablet Take 40 mg by mouth daily.       polyethylene glycol (MIRALAX) 17 gram packet Take 25.5 g by mouth 2 (two) times a day.        potassium chloride (K-DUR,KLOR-CON) 20 MEQ tablet Take 40 mEq by mouth 2 (two) times a day.        simvastatin (ZOCOR) 40 MG tablet Take 1 tablet (40 mg total) by mouth at bedtime. Lipids 30 tablet 0     trolamine salicylate (ASPERCREME) 10 %  cream Apply topically 3 (three) times a day.       No current facility-administered medications for this visit.          Review of Systems   Patient denies fever, chills, headache, lightheadedness, dizziness, rhinorrhea, cough, congestion, shortness of breath, chest pain, palpitations, abdominal pain, n/v, change in appetite, dysuria, frequency, burning or pain with urination.  Other than stated in HPI all other review of systems is negative.     Physical Exam   In order to maintain social distancing during the COVID pandemic, a visual exam was completed.     Vitals: /68, HR 70, resp 17, temp 97.6    Patient is thin, frail elderly woman and no acute distress.  Head is AT/NC, EOM intact. Respiratory effort normal. No visible LE edema. Good CMS BLE, no calf tenderness. Minimal soft nonpitting ankle edema. Alert and oriented, face symmetric. No visible skin issues or rashes. Mood euthymic        Labs:    Recent Results (from the past 240 hour(s))   Magnesium   Result Value Ref Range    Magnesium 1.7 (L) 1.8 - 2.6 mg/dL   Potassium   Result Value Ref Range    Potassium 3.9 3.5 - 5.0 mmol/L   Potassium   Result Value Ref Range    Potassium 3.9 3.5 - 5.0 mmol/L         Assessment:  1. Closed bilateral fracture of pubic rami, sequela     2. Lower extremity edema     3. Lumbar radiculopathy     4. Hypokalemia         Plan:   pubi rami fracture: continue therapies, pain is controlled with minimal pain meds.      Edema: improving with compression stockings    Lumbar radiculopathy: she attempted to have spine injection this week however it was contraindicated due to acute fracture.  She will not be able to have this for 6-8 more weeks.     Hypokalemia: resolved, due to increased miralax use.  Continue KCL 40meq two times a day.         Electronically signed by: Emi Wong CNP

## 2021-06-12 NOTE — PROGRESS NOTES
Code Status:  DNR/DNI  Visit Type: Discharge Summary     Facility:  [facility] [[id number]]          PCP:  Reagan Thakkar MD  [phone]       Admission Date to our Facility: 9/14/20  Discharge Date from our Facility: 10/14/20    Discharge Diagnosis:    1. Closed bilateral fracture of pubic rami, sequela     2. Lumbar radiculopathy     3. Hypokalemia     4. Hypomagnesemia     5. S/P colostomy (H)     6. Essential hypertension     7. Rectal cancer (H)          History of Present Illness: Tamy Pierce is a 89 y.o. female who has a past medical history for rectal CA s/p colostomy 2/2 colonic obstruction (s/p palliative radiation and Xeloda on hold during TCU stay), HTN, lumbar DDD, HLD, GERD, mild intermittent asthma.  She was recently hospitalized after a mechanical fall at home in which she sustained a closed fracture of left pubic rami and left sacral ala. Orthopedic was consulted and noted there was no need for surgery with the plan for rehab and pain management.  She also had hypokalemia and is on chlorthalidone and 20meq KCL at home so her KCL was increased.      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 Treated with palliative Xeloda and radiation in February 2020. Continues with Xeloda.  Takes demeclocycline for radiation proctitis.  Restaging pelvic MRI in June 2020 demonstrated significant interval decrease in size of the residual distal rectal mass.  Recommendations for follow up with Dr. Diggs     Skilled Nursing Facility Course: She did advance with therapy to ambulating independently with a walker.  She will need ongoing therapy for endurance and strengthening at home.  During her stay she did have persistent issues with hypokalemia secondary to increased MiraLAX use.  At home she was using MiraLAX approximately 1-1/2 capfuls twice daily and this is likely the cause of her hypokalemia in  the hospital.  I did increase her MiraLAX as she was having difficulty with her a bag not adhering well.  With that, I did increase her KCl to 40 mEq twice daily and this maintained to her potassium in good range.    Her pain did improve and she uses PRN APAP and oxycodone.  I am going to send her home with low-dose oxycodone as she has been using it approximately 1 time a day.  I did advise her that by next week to discontinue oxycodone altogether as that will be approximately 1 month from her fracture which is typical for pain management.    I would recommend follow-up potassium and magnesium to help adjust for her electrolytes and minerals.    Also, during her stay she did have soft blood pressures and so her chlorthalidone was discontinued altogether due to hypokalemia.  I did decrease her atenolol to 12.5 mg daily.  Counseled patient on these changes and the need to follow-up with her primary for ongoing monitoring and recommendations.    For her lumbar radiculopathy she did attempt to have a spinal injection last week however due to her fracture it was not recommended by her spine provider and will need to reschedule in approximately 6 weeks.        Discharge Medications:    Current Outpatient Medications   Medication Sig Dispense Refill     acetaminophen (TYLENOL) 325 MG tablet Take 2 tablets (650 mg total) by mouth every 6 (six) hours. pain (Patient taking differently: Take 650 mg by mouth every 4 (four) hours as needed. pain) 30 tablet 0     albuterol (PROAIR HFA;PROVENTIL HFA;VENTOLIN HFA) 90 mcg/actuation inhaler Inhale 2 puffs every 6 (six) hours as needed for wheezing (has on hand, uses rarely). (Patient taking differently: Inhale 2 puffs every 4 (four) hours as needed for wheezing (has on hand, uses rarely). ) 1 Inhaler 0     atenoloL (TENORMIN) 25 MG tablet Take 12.5 mg by mouth daily.        calcium carbonate (OS-DANIEL) 600 mg calcium (1,500 mg) tablet Take 1,200 mg by mouth daily.       capecitabine  (XELODA) 500 MG tablet Take by mouth 2 (two) times a day. take for 7 days on, 7 days off       dicyclomine (BENTYL) 10 MG capsule Take 10 mg by mouth 4 (four) times a day as needed.       fluticasone propionate (FLOVENT HFA) 110 mcg/actuation inhaler Inhale 2 puffs 2 (two) times a day. asthma 1 Inhaler 12     gabapentin (NEURONTIN) 100 MG capsule Take 100 mg by mouth 2 (two) times a day. And 200 mg at bedtime       magnesium chloride (SLOW-MAG) 64 mg TbEC delayed-release tablet Take 128 mg by mouth daily.       melatonin 3 mg Tab tablet Take 3 mg by mouth at bedtime. And 1 additional dose overnight as needed       multivitamin therapeutic tablet Take 1 tablet by mouth daily.       ondansetron (ZOFRAN-ODT) 4 MG disintegrating tablet Take 4 mg by mouth every 6 (six) hours as needed for nausea.       oxyCODONE (ROXICODONE) 5 MG immediate release tablet Take 0.5-1 tablets (2.5-5 mg total) by mouth every 4 (four) hours as needed for pain. (Patient taking differently: Take 2.5 mg by mouth every 4 (four) hours as needed for pain. ) 60 tablet 0     pantoprazole (PROTONIX) 40 MG tablet Take 40 mg by mouth daily.       polyethylene glycol (MIRALAX) 17 gram packet Take 25.5 g by mouth 2 (two) times a day.        potassium chloride (K-DUR,KLOR-CON) 20 MEQ tablet Take 40 mEq by mouth 2 (two) times a day.        simvastatin (ZOCOR) 40 MG tablet Take 1 tablet (40 mg total) by mouth at bedtime. Lipids 30 tablet 0     trolamine salicylate (ASPERCREME) 10 % cream Apply topically 3 (three) times a day.       No current facility-administered medications for this visit.        For most current and accurate medication list, please contact the skilled nursing facility that this patient visit took place at.      Discharge Plan: Patient is stable to discharge to home with home care services.  She will need to follow-up with orthopedics in regards to her fracture, oncology in regards to her adenocarcinoma, and her primary in regards to her  electrolyte mineral balance with her current medications and supplements.    Review of Systems   Patient denies fever, chills, headache, lightheadedness, dizziness, rhinorrhea, cough, congestion, shortness of breath, chest pain, palpitations, abdominal pain, n/v, diarrhea, constipation, change in appetite, dysuria, frequency, burning or pain with urination.  Other than stated in HPI all other review of systems is negative.       Physical Exam   In order to maintain social distancing during the COVID pandemic, a visual exam was completed.     Vitals:    10/12/20 0954   BP: 119/73   Pulse: 76   Resp: 16   Temp: 98  F (36.7  C)   SpO2: 99%         Patient is well nourished and no acute distress.  Head is AT/NC, EOM intact. Respiratory effort normal. No visible LE edema. Alert and oriented, face symmetric. No visible skin issues or rashes. Mood euthymic      Labs:    Recent Results (from the past 240 hour(s))   Potassium   Result Value Ref Range    Potassium 3.9 3.5 - 5.0 mmol/L         Assessment:  1. Closed bilateral fracture of pubic rami, sequela     2. Lumbar radiculopathy     3. Hypokalemia     4. Hypomagnesemia     5. S/P colostomy (H)     6. Essential hypertension     7. Rectal cancer (H)         MEDICAL EQUIPMENT NEEDS:  none      DISCHARGE PLAN/FACE TO FACE:  I certify that services are/were furnished while this patient was under the care of a physician and that a physician or an allowed non-physician practitioner (NPP), had a face-to-face encounter that meets the physician face-to-face encounter requirements. The encounter was in whole, or in part, related to the primary reason for home health. The patient is confined to his/her home and needs intermittent skilled nursing, physical therapy, speech-language pathology, or the continued need for occupational therapy. A plan of care has been established by a physician and is periodically reviewed by a physician.    I certify that this patient is under my care and  that I, or a nurse practitioner or physician's assistant working with me, had a face-to-face encounter that meets the physician face-to-face encounter requirements with this patient.   Date of Face-to-Face Encounter: 10/13/2020    I certify that, based on my findings, the following services are medically necessary home health services: RN, PT/OT    My clinical findings support the need for the above skilled services because: RN for medication management, PT/OT for ongoing strengthening and endurance.    This patient is homebound because: Patient requires maximum effort in order to get out into the community on a regular basis.    The patient is, or has been, under my care and I have initiated the establishment of the plan of care. This patient will be followed by a physician who will periodically review the plan of care.    35 total minutes spent with 25 minutes spent face-to-face with patient counseling coordination of her discharge plan including her medications and  follow-up.    Electronically signed by: Emi Wong CNP

## 2021-06-12 NOTE — PROGRESS NOTES
Code Status:  DNR/DNI  Visit Type: Follow Up (LE edema, pain management, )     Facility:  [facility] [[id number]]      Facility Type: SNF (Skilled Nursing Facility, TCU)    History of Present Illness:   Hospital Admission Date: 9/13/2020  Hospital Discharge Date: 9/14/2020    Tamy Pierce is a 89 y.o. female who has a past medical history for rectal CA s/p colostomy 2/2 colonic obstruction (s/p palliative radiation and Xeloda on hold during TCU stay), HTN, lumbar DDD, HLD, GERD, mild intermittent asthma.  She was recently hospitalized after a mechanical fall at home in which she sustained a closed fracture of left pubic rami and left sacral ala. Orthopedic was consulted and noted there was no need for surgery with the plan for rehab and pain management.  She also had hypokalemia and is on chlorthalidone and 20meq KCL at home so her KCL was increased.     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 Treated with palliative Xeloda and radiation in February 2020.  Take demeclocycline for radiation proctitis.  Restaging pelvic MRI in June 2020 demonstrated significant interval decrease in size of the residual distal rectal mass.  Recommendations for follow up with Dr. Diggs     Today, patient states she is doing generally well.  Pain is minimal and she is using oxycodone only approximately 1 x daily.  BPs had continued to be low last week even after discontinuing chlorthalidone and so I did decrease the atenolol which has been better for her BPs.  She does complain of bilateral LE edema.  It is minimal soft ankle edema.  She states she has not had this before.     Current Outpatient Medications   Medication Sig Dispense Refill     acetaminophen (TYLENOL) 325 MG tablet Take 2 tablets (650 mg total) by mouth every 6 (six) hours. pain (Patient taking differently: Take 650 mg by mouth every 4 (four) hours as needed. pain) 30  tablet 0     albuterol (PROAIR HFA;PROVENTIL HFA;VENTOLIN HFA) 90 mcg/actuation inhaler Inhale 2 puffs every 6 (six) hours as needed for wheezing (has on hand, uses rarely). (Patient taking differently: Inhale 2 puffs every 4 (four) hours as needed for wheezing (has on hand, uses rarely). ) 1 Inhaler 0     atenoloL (TENORMIN) 25 MG tablet Take 12.5 mg by mouth daily.        calcium carbonate (OS-DANIEL) 600 mg calcium (1,500 mg) tablet Take 1,200 mg by mouth daily.       capecitabine (XELODA) 500 MG tablet Take by mouth 2 (two) times a day. take for 7 days on, 7 days off       dicyclomine (BENTYL) 10 MG capsule Take 10 mg by mouth 4 (four) times a day as needed.       fluticasone propionate (FLOVENT HFA) 110 mcg/actuation inhaler Inhale 2 puffs 2 (two) times a day. asthma 1 Inhaler 12     gabapentin (NEURONTIN) 100 MG capsule Take 100 mg by mouth 2 (two) times a day. And 200 mg at bedtime       magnesium chloride (SLOW-MAG) 64 mg TbEC delayed-release tablet Take 128 mg by mouth daily.       melatonin 3 mg Tab tablet Take 3 mg by mouth at bedtime. And 1 additional dose overnight as needed       multivitamin therapeutic tablet Take 1 tablet by mouth daily.       ondansetron (ZOFRAN-ODT) 4 MG disintegrating tablet Take 4 mg by mouth every 6 (six) hours as needed for nausea.       oxyCODONE (ROXICODONE) 5 MG immediate release tablet Take 0.5-1 tablets (2.5-5 mg total) by mouth every 4 (four) hours as needed for pain. (Patient taking differently: Take 2.5 mg by mouth every 4 (four) hours as needed for pain. ) 60 tablet 0     pantoprazole (PROTONIX) 40 MG tablet Take 40 mg by mouth daily.       polyethylene glycol (MIRALAX) 17 gram packet Take 25.5 g by mouth 2 (two) times a day.        potassium chloride (K-DUR,KLOR-CON) 20 MEQ tablet Take 40 mEq by mouth 2 (two) times a day.        simvastatin (ZOCOR) 40 MG tablet Take 1 tablet (40 mg total) by mouth at bedtime. Lipids 30 tablet 0     trolamine salicylate (ASPERCREME) 10 %  cream Apply topically 3 (three) times a day.       No current facility-administered medications for this visit.          Review of Systems   Patient denies fever, chills, headache, lightheadedness, dizziness, rhinorrhea, cough, congestion, shortness of breath, chest pain, palpitations, abdominal pain, n/v, change in appetite, dysuria, frequency, burning or pain with urination.  Other than stated in HPI all other review of systems is negative.         Physical Exam   In order to maintain social distancing during the COVID pandemic, a visual exam was completed.     Vitals: /88, HR 76, resp 15, temp 98.0    Patient is thin, frail elderly woman and no acute distress.  Head is AT/NC, EOM intact. Respiratory effort normal. No visible LE edema. Good CMS BLE, no calf tenderness. Minimal soft nonpitting ankle edema.  Colostomy producing liquid brown stool. Alert and oriented, face symmetric. No visible skin issues or rashes. Mood euthymic        Labs:    Recent Results (from the past 240 hour(s))   Magnesium   Result Value Ref Range    Magnesium 1.7 (L) 1.8 - 2.6 mg/dL   Potassium   Result Value Ref Range    Potassium 3.9 3.5 - 5.0 mmol/L         Assessment:  1. Essential hypertension     2. Hypokalemia     3. Closed bilateral fracture of pubic rami, sequela     4. Lower extremity edema         Plan:   HTN: BPs in better range after decreasing atenolol and dcing chlorthalidone. Will continue with current dose of atenolol.     Hypokalmia: chlorthalidone dc'd due to K wasting and high doses of miralax.  Last K on 3.9 and Mag 1.7.  Will recheck K this week.     Pubic rami fracture: pain controlled, decreased oxycodone to 2.5mg daily.  Planning to dc to home next week.     Edema: like due to dcing chlorthalidone.  Counseled on compression.  Will add AUGUST hose for now.         Electronically signed by: Emi Wong CNP

## 2021-06-13 NOTE — PROGRESS NOTES
Code Status:  DNR  Visit Type: Review Of Multiple Medical Conditions (Pain, recent bilateral sacral fracture with closed fracture pubic ramus bilateral, rectal cancer)     Facility:  [facility] [[id number]]      Facility Type: SNF (Skilled Nursing Facility, TCU)    History of Present Illness:   Hospital Admission Date: 11/7/2020 [facility]   Hospital Discharge Date: 11/11/2020  Facility Admission Date: 11/12/2020    Tamy Pierce is a 89 y.o. female with a past medical history for aortic stenosis, rectal cancer with a diverting colostomy on palliative Xeloda,, GERD, hyperlipidemia, HTN, lumbar DDD with right-sided radiculopathy, CKD, multiple pelvic fractures with presumed osteoporosis.  She was hospitalized 9/13/2020 for a left sacral hoa, left superior pubic rami and left inferior pubic rami fractures.  She was transition to TCU in rehab.  We discharged her here on 10/14 with good pain management and the expectation that she would follow-up with spine clinic.  She had increased pain and was started on OxyContin 10 mg twice daily in addition to her oxycodone.  She underwent a caudal epidural steroid injection.  However during the procedure she was unable to lie on her stomach and she lied on her side.  Postprocedure she did have increased severe uncontrolled pain in her right sacrum which required her to be seen in the emergency room.  She was hospitalized for her chronic low back pain, radicular right leg pain.  X-ray of lumbar spine demonstrated a grade 1 spondylolithiasis of the L5-S1 and retrolisthesis of L3-L4 and L2-L3 with degenerative disc disease.  X-ray of her right hip demonstrated subacute left pubic rami fractures without acute right hip fracture or dislocation.  CT of lumbar spine showed acute on chronic bilateral sacral wing fractures and demineralization.  Goal was for pain management which improved with scheduled APAP and OxyContin, as needed oxycodone and a prednisone taper x10 days.   Still continued on her prior to admission gabapentin.    Today, she reports she had a difficult morning with extreme pain however nursing is adjusting her dosing of her oxycodone and giving it early in the morning to her getting up.  Continues to have radiculopathy with numbness and aching pain down her right leg.  She has good CMS with positive pedal pulses bilaterally.  Dr. Sarah did order for a vitamin D draw which was in good range at 40.    She does endorse concerned and stressed regarding her  with severe dementia who is transitioning to a memory care unit.  Reports her falls were related to stress and worry of being the primary caregiver for her .      Past Medical History:   Diagnosis Date     Actinic keratosis      Basal cell carcinoma      Fracture of left inferior pubic ramus (H)      Fracture of left superior pubic ramus (H)      Gait instability      GERD (gastroesophageal reflux disease)      HLD (hyperlipidemia)      HTN (hypertension)      Lumbar radiculopathy      Mild intermittent asthma      Rectal cancer (H)      Urethral stricture      Past Surgical History:   Procedure Laterality Date     COLOSTOMY N/A 11/20/2019    Procedure: Colostomy Formation;  Surgeon: Demarcus Moreno MD;  Location: [facility];  Service: General     ESOPHAGOGASTRODUODENOSCOPY  02/20/2020     HYSTERECTOMY       LITHOTRIPSY       PICC AND MIDLINE TEAM LINE INSERTION  11/21/2019          PROCTOSCOPY N/A 11/20/2019    Procedure: PROCTOSCOPY WITH BIOPSY;  Surgeon: Demarcus Moreno MD;  Location: [facility];  Service: General     Family History   Problem Relation Age of Onset     Breast cancer Mother      Cancer Mother         colorectal      Coronary artery disease Father      COPD Brother      Social History     Socioeconomic History     Marital status:      Spouse name: Not on file     Number of children: Not on file     Years of education: Not on file     Highest education level: Not  on file   Occupational History     Not on file   Social Needs     Financial resource strain: Not on file     Food insecurity     Worry: Not on file     Inability: Not on file     Transportation needs     Medical: Not on file     Non-medical: Not on file   Tobacco Use     Smoking status: Never Smoker     Smokeless tobacco: Never Used   Substance and Sexual Activity     Alcohol use: Yes     Frequency: Monthly or less     Drinks per session: 1 or 2     Binge frequency: Never     Comment: <1     Drug use: Never     Sexual activity: Not on file   Lifestyle     Physical activity     Days per week: Not on file     Minutes per session: Not on file     Stress: Not on file   Relationships     Social connections     Talks on phone: Not on file     Gets together: Not on file     Attends Orthodoxy service: Not on file     Active member of club or organization: Not on file     Attends meetings of clubs or organizations: Not on file     Relationship status: Not on file     Intimate partner violence     Fear of current or ex partner: Not on file     Emotionally abused: Not on file     Physically abused: Not on file     Forced sexual activity: Not on file   Other Topics Concern     Not on file   Social History Narrative     Not on file       Current Outpatient Medications   Medication Sig Dispense Refill     acetaminophen (TYLENOL) 325 MG tablet Take 2 tablets (650 mg total) by mouth every 6 (six) hours. pain (Patient taking differently: Take 650 mg by mouth every 4 (four) hours as needed. pain) 30 tablet 0     albuterol (PROAIR HFA;PROVENTIL HFA;VENTOLIN HFA) 90 mcg/actuation inhaler Inhale 2 puffs every 6 (six) hours as needed for wheezing (has on hand, uses rarely). (Patient taking differently: Inhale 2 puffs every 4 (four) hours as needed for wheezing (has on hand, uses rarely). ) 1 Inhaler 0     aspirin 81 MG EC tablet Take 81 mg by mouth daily.       atenoloL (TENORMIN) 25 MG tablet Take 12.5 mg by mouth daily.         calcitonin, salmon, (MIACALCIN) 200 unit/actuation nasal spray Apply 1 spray into alternating nostrils daily.       calcium carbonate (OS-DANIEL) 600 mg calcium (1,500 mg) tablet Take 1,200 mg by mouth daily.       capecitabine (XELODA) 500 MG tablet Take by mouth 2 (two) times a day. take for 7 days on, 7 days off       dicyclomine (BENTYL) 10 MG capsule Take 10 mg by mouth 4 (four) times a day as needed.       fluticasone propionate (FLOVENT HFA) 110 mcg/actuation inhaler Inhale 2 puffs 2 (two) times a day. asthma 1 Inhaler 12     gabapentin (NEURONTIN) 100 MG capsule Take 100 mg by mouth 2 (two) times a day. And 200 mg at bedtime       magnesium oxide (MAG-OX) 400 mg (241.3 mg magnesium) tablet Take 400 mg by mouth 2 (two) times a day.       melatonin 3 mg Tab tablet Take 3 mg by mouth at bedtime. And 1 additional dose overnight as needed       multivitamin therapeutic tablet Take 1 tablet by mouth daily.       ondansetron (ZOFRAN-ODT) 4 MG disintegrating tablet Take 4 mg by mouth every 6 (six) hours as needed for nausea.       oxyCODONE (OXYCONTIN) 10 mg 12 hr tablet Take 10 mg by mouth every 12 (twelve) hours.       pantoprazole (PROTONIX) 40 MG tablet Take 40 mg by mouth daily.       polyethylene glycol (MIRALAX) 17 gram packet Take 25.5 g by mouth 2 (two) times a day.        potassium chloride (K-DUR,KLOR-CON) 20 MEQ tablet Take 40 mEq by mouth 2 (two) times a day.        predniSONE (DELTASONE) 10 mg tablet Take by mouth daily Give 40 mg by mouth one time a day for acute Back pain w/ sciatica until 11/13/2020 23:59 x2 days, give with food AND Give 30 mg by mouth one time a day for acute Back pain w/ sciatica until 11/15/2020 23:59 x2 days, give with food AND Give 20 mg by mouth one time a day for acute Back pain w/ sciatica until 11/17/2020 23:59 x2 days, give with food AND Give 10 mg by mouth one time a day for acute Back pain w/ sciatica until 11/19/2020 23:59 x2 days, give with food .       senna-docusate  (PERICOLACE) 8.6-50 mg tablet Take 2 tablets by mouth 2 (two) times a day as needed.        simvastatin (ZOCOR) 40 MG tablet Take 1 tablet (40 mg total) by mouth at bedtime. Lipids 30 tablet 0     tiZANidine (ZANAFLEX) 2 MG tablet Take 2 mg by mouth every 6 (six) hours as needed.       oxyCODONE (ROXICODONE) 5 MG immediate release tablet Take 1 tablet (5 mg total) by mouth every 4 (four) hours as needed for pain. 30 tablet 0     No current facility-administered medications for this visit.      Allergies   Allergen Reactions     Egg      Flu Vac 2015 (65 Up)-Mf59c(Pf)      Years ago, caused syncope, falling.  Has been able to take vaccine which is not egg based     Immunization History   Administered Date(s) Administered     INFLUENZA,RECOMBINANT,INJ,PF QUADRIVALENT 18+YRS 10/02/2017, 10/14/2019       Post Discharge Medication Reconciliation Status: discharge medications reconciled, continue medications without change    Review of Systems   Patient denies fever, chills, headache, lightheadedness, dizziness, rhinorrhea, cough, congestion, shortness of breath, chest pain, palpitations, abdominal pain, n/v, diarrhea, constipation, change in appetite, dysuria, frequency, burning or pain with urination.  Other than stated in HPI all other review of systems is negative.         Physical Exam   Vitals:    11/16/20 1144   BP: 127/81   Pulse: 77   Resp: 19   Temp: 97.5  F (36.4  C)   SpO2: 99%       GENERAL APPEARANCE: Thin, frail elderly woman, in no acute distress.  HEENT: normocephalic, atraumatic  PERRL, sclerae anicteric, conjunctivae clear and moist, EOM intact  LUNGS: Lung sounds CTA, no adventitious sounds, respiratory effort normal.  CARD: RRR, S1, S2, without murmurs, gallops, rubs,   ABD: Ostomy draining brown stool  MSK: Muscle strength and tone bilaterally equal  EXTREMITIES: No cyanosis, clubbing or edema.  NEURO: Alert and oriented x 3.Face is symmetric.  SKIN: Inspection of the skin reveals no rashes,  ulcerations or petechiae.  PSYCH: euthymic            Labs:    Recent Results (from the past 240 hour(s))   COVID-19 VIRUS PCR MRF    Specimen: Nasopharyngeal   Result Value Ref Range    COVID-19 VIRUS SPECIMEN SOURCE Nasopharyngeal     2019-nCOV       Test received-See reflex to IDDL test SARS CoV2 (COVID-19) Virus RT-PCR   SARS-COV-2 (COVID-19) RT-PCR-IDDL    Specimen: Nasopharyngeal   Result Value Ref Range    SARS-CoV-2 Virus Specimen Source Nasopharyngeal     SARS-CoV-2 PCR Result NEGATIVE     SARS-COV-2 PCR COMMENT       Testing was performed using the Aptima SARS-CoV-2 Assay on the [employer] Instrument System.   Additional information about this Emergency Use Authorization (EUA) assay can be found via   the Lab Guide.   Vitamin D, Total (25-Hydroxy)   Result Value Ref Range    Vitamin D, Total (25-Hydroxy) 40.0 30.0 - 80.0 ng/mL         Assessment:  1. Lumbar radiculopathy     2. Uncomplicated opioid dependence (H)     3. Sacral insufficiency fracture, sequela     4. Closed fracture of pubic ramus, unspecified laterality, sequela     5. Acute right-sided low back pain with right-sided sciatica     6. Rectal cancer (H)     7. S/P colostomy (H)     8. Essential hypertension         Plan:   Lumbar radiculopathy: Will need to follow-up with the spine clinic however patient unsure if she will do this due to poor results from her injection.  At this time continue with prednisone taper.  Also has as needed Zanaflex    Opioid dependence: Likely will need opiates long-term.  Currently will continue with OxyContin and oxycodone as needed with the goal to wean oxycodone.    Fractures: Continue with therapy.  We will continue with calcitonin in efforts to improve her pain.  Continue with vitamin D.  Anticipate she will need a biphosphonate however will defer to PCP.    Rectal cancer: On palliative Xeloda    Status post colostomy: Patient likes to keep her stools very loose and so is on MiraLAX twice daily.  On her last  visit she did have issues with hypokalemia which we were able to supplement the balance with her loose stools.  Continue with KCl and MiraLAX as ordered.  Continue with Bentyl    Hypertension: On her last visit she did have issues with hypotension and so I did wean her off chlorthalidone.  Continue with current dose of atenolol at 12.5 mg daily.        Electronically signed by: Emi Wong CNP

## 2021-06-13 NOTE — PROGRESS NOTES
Code Status:  DNR  Visit Type: Follow-up (Rehab, pain)     Facility:  [facility] [[id number]]        Facility Type: SNF (Skilled Nursing Facility, TCU)    History of Present Illness: Tamy Pierce is a 89 y.o. female with a past medical history for aortic stenosis, rectal cancer with a diverting colostomy on palliative Xeloda,, GERD, hyperlipidemia, HTN, lumbar DDD with right-sided radiculopathy, CKD, multiple pelvic fractures with presumed osteoporosis.  She was hospitalized 9/13/2020 for a left sacral hoa, left superior pubic rami and left inferior pubic rami fractures.  She was transition to TCU in rehab.  We discharged her here on 10/14 with good pain management and the expectation that she would follow-up with spine clinic.  She had increased pain and was started on OxyContin 10 mg twice daily in addition to her oxycodone.  She underwent a caudal epidural steroid injection.  However during the procedure she was unable to lie on her stomach and she lied on her side.  Postprocedure she did have increased severe uncontrolled pain in her right sacrum which required her to be seen in the emergency room.  She was hospitalized for her chronic low back pain, radicular right leg pain.  X-ray of lumbar spine demonstrated a grade 1 spondylolithiasis of the L5-S1 and retrolisthesis of L3-L4 and L2-L3 with degenerative disc disease.  X-ray of her right hip demonstrated subacute left pubic rami fractures without acute right hip fracture or dislocation.  CT of lumbar spine showed acute on chronic bilateral sacral wing fractures and demineralization.  Goal was for pain management which improved with scheduled APAP and OxyContin, as needed oxycodone and a prednisone taper x10 days.  Still continued on her prior to admission gabapentin.    Today, she reports pain continued to gradually improve.  She reports that she was able to sit up easier this morning and has been using her oxycodone less.  Therapy reports that the  plan is to progress her to a walking with a walker independently this week and planning a discharge early next week.    Review of Systems   Patient denies fever, chills, headache, lightheadedness, dizziness, rhinorrhea, cough, congestion, shortness of breath, chest pain, palpitations, abdominal pain, n/v, diarrhea, constipation, change in appetite, dysuria, frequency, burning or pain with urination.  Other than stated in HPI all other review of systems is negative.         Physical Exam   In order to maintain social distancing during the COVID pandemic, a visual exam was completed.     Vitals:    11/23/20 1123   BP: 132/78   Pulse: 78   Resp: 16   Temp: 97.7  F (36.5  C)   SpO2: 98%        Patient is thin, elderly female in no acute distress.  Head is AT/NC, EOM intact. Respiratory effort normal. No visible LE edema. Alert and oriented, face symmetric. No visible skin issues or rashes. Mood euthymic      Labs:    Recent Results (from the past 240 hour(s))   Vitamin D, Total (25-Hydroxy)   Result Value Ref Range    Vitamin D, Total (25-Hydroxy) 40.0 30.0 - 80.0 ng/mL           Assessment:  1. Lumbar radiculopathy     2. Acute right-sided low back pain with right-sided sciatica     3. Closed bilateral fracture of pubic rami, sequela     4. Physical deconditioning     5. Uncomplicated opioid dependence (H)         Plan:   Lumbar radiculopathy: Patient reports is not improving continue with current gabapentin.    Low back pain: Pain is improving, will discontinue tizanidine, continue with APAP, OxyContin, and oxycodone.  Rx left in the facility.    History of pubic rami fractures: Continues to need therapy for ongoing weakness and discomfort.    Physical conditioning: PT/OT    Opioid dependence: Counseled patient on the likelihood of weaning back her pain meds in the next month.  Patient voices concern of not having pain meds when she discharges to home.  Assured patient that she will be given pain meds for 2 weeks  after discharge and then would recommend she follow-up with the spine clinic for ongoing prescriptions.      Electronically signed by: Emi Wong CNP

## 2021-06-13 NOTE — TELEPHONE ENCOUNTER
[facility] Nurse Triage Telephone Note      Provider: TRINH Mccray  Facility: [facility]    Facility Type: TCU    Caller: Maria Luisa  Call Back Number:  [phone]    Allergies: Egg and Flu vac 2015 (65 up)-mf59c(pf)    Reason for call: Pt says she's trying to ignore her pain. She has order for  Dicyclomine four times a day PRN-but hasn't even used any.    Verbal Order/Direction given by Provider: Try using the PRN.    Provider giving order: TRINH Mccray    Verbal order given to: Maria Luisa Thompson RN

## 2021-06-13 NOTE — PROGRESS NOTES
" [facility]    Facility:  [facility] [[id number]]    Code Status:  DNR    Chief Complaint   Patient presents with     H & P   :                    Patient Active Problem List   Diagnosis     Bowel obstruction (H)     Colonic mass     Pelvic pain in female     Hypokalemia     Other partial intestinal obstruction (H)     Moderate aortic stenosis     Acute encephalopathy     Acute respiratory failure, unspecified whether with hypoxia or hypercapnia (H)     Cardiopulmonary arrest with successful resuscitation (H)     Transaminitis     Electrolyte abnormality     S/P colostomy (H)     Mild intermittent asthma without complication     Bradycardia     Complete heart block (H)     Hyperchloremia     Palliative care patient     Goals of care, counseling/discussion     Acute on chronic back pain with radiculopathy and opiate dependence (H)       History:Tamy Pierce  is a 89 year old female with history of moderate aortic stenosis, cardiopulmonary arrest with successful resuscitation, rectal cancer with diverting colostomy November 2018 on palliative immunotherapy/Xeloda (has also had radiation treatment), mild intermittent asthma, GERD, hyper lipidemia, hypertension, lumbar degenerative disc disease with right sided radiculopathy , CKD 3, IFG, urethral stricture, presumed osteoporosis with history of multiple pelvic fractures      Hospital Course: Patient was hospitalized November 7-11 for acute on chronic low back pain with right-sided radiculopathy.  She actually spent the night of November 11/12 at [facility] before she figured out that she was not getting get any therapy there and demanded transfer here so that she could resume therapy.      This is a similar presentation to her prior TCU stay there back in September.  She reports that by the time she left the TCU she \"felt so good\" with great pain improvement and improved function.  All however in the weeks and especially the " "last few days prior to hospital admission her back pain and specifically the right radiculopathy had gotten a lot worse.  There were a couple of reasons for this.  She had run out of her short acting oxycodone tablets transiently.  More so on her mind was an epidural injection attempt on November 2.  This was originally scheduled during her last TCU stay and was delayed until November 2.  She says \"I was doing so well until then\".  She reports that the injection \"went into the wrong place\" and says it was because she could not lay on her stomach due to her colostomy bag.  She said within hours of that attempt her pain became severe and unrelenting forcing her back to the emergency room.    Work-up in the hospital revealed that she has additional fractures.  Recall in September she had superior and inferior left pubic rami fracture and probably left sacral insufficiency fracture.  This time she was diagnosed with acute on chronic left sacral insufficiency fracture, acute right sacral insufficiency fracture, and foraminal stenosis.    In the hospital her pain plan was changed to include OxyContin 10 mg twice daily with ongoing short acting oxycodone 5 mg every 3 hours, acetaminophen, ibuprofen, gabapentin.  She also had a prednisone taper.  Tizanidine has been added since that time.  Patient reports that her pain was never well controlled although the oxycodone makes sense to her and she thinks it is helped get her out of the very worst of her pain.    Subjective/ROS:    -augmented by discussion with facility staff involved in direct care      -Patient says her pain is tolerable lying still but movement will increase her discomfort.  She feels the vast majority of the pain in the right buttock hip thigh and knee area.  She feels like she is got some numbness in the foot but has not noticed weakness in the leg.  -Patient is already worked with a therapist and says she \"feels better already\".  This is largely due to her " "confidence in the therapist who helped her so much last time.  -Patient describes situational grief as her  has had progression of his dementia and she and her family have decided to place him in a memory care unit which is going to happen in a few days.  She says it was very distressing in the first place and now she cannot be there as he leaves the house for the last time.  She also feels guilt over sending him off with a plan to stay at home herself.  She explains that he sleeps all day and does not really talk anymore and she cannot face \"just sitting there staring at the wall\".  She says that all her family and friends support her decision but that it is still very painful.  They will be  69 years this month and says they have had a wonderful marriage and that he has been a wonderful father.  Fortunately they have a very strong loving family support system.  Her daughter Karine lives just a couple of houses away.  -Patient denies headaches change in vision speaking swallowing hearing.  She says her appetite has been decreased with the pain but is coming back a little bit now.  She describes no chest pain cough shortness of breath orthopnea PND abdominal pain she has had a bit of nausea with the worst of the pain but no vomiting.  She is not feeling constipated with her MiraLAX.  She is not feeling any GERD with her omeprazole.  She does have Zofran when she needs it.  She says her sleep has improved with melatonin.    We had talked about DEXA scan/osteoporosis treatment at her last TCU stay and she says she never followed up for that.    Remainder ROS negative    Past Medical History:   Diagnosis Date     Actinic keratosis      Basal cell carcinoma      Fracture of left inferior pubic ramus (H)      Fracture of left superior pubic ramus (H)      Gait instability      GERD (gastroesophageal reflux disease)      HLD (hyperlipidemia)      HTN (hypertension)      Lumbar radiculopathy      Mild " intermittent asthma      Rectal cancer (H)      Urethral stricture      Past Surgical History:   Procedure Laterality Date     COLOSTOMY N/A 11/20/2019    Procedure: Colostomy Formation;  Surgeon: Demarcus Moreno MD;  Location: [facility];  Service: General     ESOPHAGOGASTRODUODENOSCOPY  02/20/2020     HYSTERECTOMY       LITHOTRIPSY       PICC AND MIDLINE TEAM LINE INSERTION  11/21/2019          PROCTOSCOPY N/A 11/20/2019    Procedure: PROCTOSCOPY WITH BIOPSY;  Surgeon: Demarcus Moreno MD;  Location: [facility];  Service: General          Family History   Problem Relation Age of Onset     Breast cancer Mother      Cancer Mother         colorectal      Coronary artery disease Father      COPD Brother    :       Social History     Socioeconomic History     Marital status:      Spouse name: Not on file     Number of children: Not on file     Years of education: Not on file     Highest education level: Not on file   Occupational History     Not on file   Social Needs     Financial resource strain: Not on file     Food insecurity     Worry: Not on file     Inability: Not on file     Transportation needs     Medical: Not on file     Non-medical: Not on file   Tobacco Use     Smoking status: Never Smoker     Smokeless tobacco: Never Used   Substance and Sexual Activity     Alcohol use: Yes     Frequency: Monthly or less     Drinks per session: 1 or 2     Binge frequency: Never     Comment: <1     Drug use: Never     Sexual activity: Not on file   Lifestyle     Physical activity     Days per week: Not on file     Minutes per session: Not on file     Stress: Not on file   Relationships     Social connections     Talks on phone: Not on file     Gets together: Not on file     Attends Worship service: Not on file     Active member of club or organization: Not on file     Attends meetings of clubs or organizations: Not on file     Relationship status: Not on file     Intimate partner violence     Fear  of current or ex partner: Not on file     Emotionally abused: Not on file     Physically abused: Not on file     Forced sexual activity: Not on file   Other Topics Concern     Not on file   Social History Narrative     Not on file   :        Current Outpatient Medications on File Prior to Visit   Medication Sig Dispense Refill     acetaminophen (TYLENOL) 325 MG tablet Take 2 tablets (650 mg total) by mouth every 6 (six) hours. pain (Patient taking differently: Take 650 mg by mouth every 4 (four) hours as needed. pain) 30 tablet 0     albuterol (PROAIR HFA;PROVENTIL HFA;VENTOLIN HFA) 90 mcg/actuation inhaler Inhale 2 puffs every 6 (six) hours as needed for wheezing (has on hand, uses rarely). (Patient taking differently: Inhale 2 puffs every 4 (four) hours as needed for wheezing (has on hand, uses rarely). ) 1 Inhaler 0     aspirin 81 MG EC tablet Take 81 mg by mouth daily.       atenoloL (TENORMIN) 25 MG tablet Take 12.5 mg by mouth daily.        calcitonin, salmon, (MIACALCIN) 200 unit/actuation nasal spray Apply 1 spray into alternating nostrils daily.       calcium carbonate (OS-DANIEL) 600 mg calcium (1,500 mg) tablet Take 1,200 mg by mouth daily.       capecitabine (XELODA) 500 MG tablet Take by mouth 2 (two) times a day. take for 7 days on, 7 days off       dicyclomine (BENTYL) 10 MG capsule Take 10 mg by mouth 4 (four) times a day as needed.       fluticasone propionate (FLOVENT HFA) 110 mcg/actuation inhaler Inhale 2 puffs 2 (two) times a day. asthma 1 Inhaler 12     gabapentin (NEURONTIN) 100 MG capsule Take 100 mg by mouth 2 (two) times a day. And 200 mg at bedtime       magnesium oxide (MAG-OX) 400 mg (241.3 mg magnesium) tablet Take 400 mg by mouth 2 (two) times a day.       melatonin 3 mg Tab tablet Take 3 mg by mouth at bedtime. And 1 additional dose overnight as needed       multivitamin therapeutic tablet Take 1 tablet by mouth daily.       ondansetron (ZOFRAN-ODT) 4 MG disintegrating tablet Take 4 mg  by mouth every 6 (six) hours as needed for nausea.       oxyCODONE (OXYCONTIN) 10 mg 12 hr tablet Take 10 mg by mouth every 12 (twelve) hours.       oxyCODONE (ROXICODONE) 5 MG immediate release tablet Take 1 tablet (5 mg total) by mouth every 4 (four) hours as needed for pain. 30 tablet 0     pantoprazole (PROTONIX) 40 MG tablet Take 40 mg by mouth daily.       polyethylene glycol (MIRALAX) 17 gram packet Take 25.5 g by mouth 2 (two) times a day.        potassium chloride (K-DUR,KLOR-CON) 20 MEQ tablet Take 40 mEq by mouth 2 (two) times a day.        senna-docusate (PERICOLACE) 8.6-50 mg tablet Take 2 tablets by mouth 2 (two) times a day.       simvastatin (ZOCOR) 40 MG tablet Take 1 tablet (40 mg total) by mouth at bedtime. Lipids 30 tablet 0     tiZANidine (ZANAFLEX) 2 MG tablet Take 2 mg by mouth every 6 (six) hours as needed.       magnesium chloride (SLOW-MAG) 64 mg TbEC delayed-release tablet Take 128 mg by mouth daily.       trolamine salicylate (ASPERCREME) 10 % cream Apply topically 3 (three) times a day.       No current facility-administered medications on file prior to visit.    :      ALLERGIES:  Egg and Flu vac 2015 (65 up)-mf59c(pf)    Vitals:  There were no vitals taken for this visit. except as noted below    Vital signs: Blood pressure 110/50 respirations 16-97.7 heart rate 80 O2 sats 99 weight 105 pounds (patient was 110 pounds when I met her mid September 2020)   Physical exam:    Thin elderly female who is in no acute distress.  She is alert with smiles very pleasant and conversant.  Normocephalic atraumatic sclera clear gaze is conjugate good range of motion of her neck she has good range of motion of the shoulders she can lift her arms above her head.  She demonstrates good dexterity of both hands.  She is breathing comfortably without accessory muscle use or tachypnea her abdomen is soft.  She has some tenderness with palpation of her iliac crests bilaterally she demonstrates 4+ out of 5  strength although flexion of her right hip is painful      Due to the 2020 Covid 19 pandemic, except as noted above, the patient was visually observed at a 6 foot plus distance.  An observational exam was performed in an effort to keep patient safe from Covid 19 and other communicable diseases.   Labs:  Lab Results   Component Value Date    WBC 5.6 09/21/2020    HGB 10.9 (L) 09/21/2020    HCT 32.7 (L) 09/21/2020     (H) 09/21/2020     09/21/2020     Results for orders placed or performed in visit on 09/21/20   Basic Metabolic Panel   Result Value Ref Range    Sodium 139 136 - 145 mmol/L    Potassium 3.3 (L) 3.5 - 5.0 mmol/L    Chloride 101 98 - 107 mmol/L    CO2 29 22 - 31 mmol/L    Anion Gap, Calculation 9 5 - 18 mmol/L    Glucose 94 70 - 125 mg/dL    Calcium 9.2 8.5 - 10.5 mg/dL    BUN 20 8 - 28 mg/dL    Creatinine 0.69 0.60 - 1.10 mg/dL    GFR MDRD Af Amer >60 >60 mL/min/1.73m2    GFR MDRD Non Af Amer >60 >60 mL/min/1.73m2         No results found for: TSH  No results found for: HGBA1C  [unfilled]  No results found for: [id number]  No results found for: BNP  [unfilled]  Most Recent EKG     Units 11/23/19  0609   VENTRATE BPM 96   ATRIALRATE BPM 96   QRSDURATION ms 72   QTINTERVAL ms 328   QTCCALC ms 414   P Axis degrees 21   RAXIS degrees 8   TAXIS degrees 208   MUSEDX  Sinus rhythm with Premature supraventricular complexes  Nonspecific T wave abnormality  Abnormal ECG  When compared with ECG of 20-NOV-2019 19:28,  Premature supraventricular complexes are now Present  Vent. rate has increased BY  36 BPM  QT has shortened  Confirmed by BONY MACIEL, AUGUST LOC: ([id number]) on 11/23/2019 4:16:08 PM           Assessment/Plan:      ICD-10-CM    1. Moderate aortic stenosis  I35.0    2. Uncomplicated opioid dependence (H)  F11.20        Acute on chronic lumbar back pain  Foraminal stenosis with radiculopathy right-sided  New pelvic fractures of the sacral alae  Old pelvic fractures left sacral hoa, left  inferior and superior pubic rami   Patient attributes the majority of her pain to the failed epidural injections that she was doing pretty well until that point.  -Oxycodone extended release 10 mg twice daily has been added and patient feels it has been helpful  -Oxycodone short acting 5 mg every 3 hours  -Acetaminophen  -She has used some ibuprofen in the past but we are not going to do that here  -Tizanidine 2 mg every 6 hours  -Gabapentin 100 mg twice daily  -Add calcitonin nasal spray for potential pain help rather than osteoporosis treatment for 21 days  -Zofran for nausea associated with pain  -PT, OT, social service involvement  -Notably patient has finished her prednisone taper she is not sure if it helped out    Presumed osteoporosis   Patient never followed up after last TCU stay.  Has not had DEXA scan.  Has not had vitamin D level.  -Vitamin D level ordered replacement dose will depend on that level  -Calcium carbonate 1200 mg daily for bone health is already in place.  -Calcitonin nasal spray has been added for pain control not likely to have significant effect on osteoporosis  -Recommend outpatient follow-up with primary MD DEXA scanning, probable bisphosphonate    Rectal cancer  Diverting colostomy November 2018  Xeloda palliative treatment  History of radiation treatment              Xeloda is on hold while at the TCU.  She will follow-up with oncology following discharge to reestablish.  Dr. Diggs is her oncologist. scanning did show some local progression, though patient has not been symptomatically worse in any way that she can tell.    Radiation proctitis              Demeclocycline, dicyclomine for cramping     Hypertension              Blood pressure is adequately controlled so far though she has been here for a short time.  She is on less medication than she was a couple of months ago, atenolol 12.5 mg daily is her only antihypertensives at this time     Hyperlipidemia              On  simvastatin     GERD              Chronic Protonix     CKD 3   Looks good now with creatinine 1.69, avoid nephrotoxins    Constipation   A bigger concern with long-acting opiates added onto her short acting opiates.  Nonetheless she does well on MiraLAX 17 g twice daily which was not changed.    Asthma   At stable baseline with Flovent and as needed albuterol    Situational grief   Good talk today.  Could consider ACP consult but I did not order that today.  She is on melatonin for sleep.  She has strong family support.    Hyperlipidemia   Simvastatin continues for now          Case discussed with:    Facility staff       Alfredo Sarah MD

## 2021-06-13 NOTE — PROGRESS NOTES
Code Status:  DNR  Visit Type: Follow-up (pain, therapy )     Facility:  [facility] [[id number]]        Facility Type: SNF (Skilled Nursing Facility, TCU)    History of Present Illness: Tamy Pierce is a 89 y.o. female with a past medical history for aortic stenosis, rectal cancer with a diverting colostomy on palliative Xeloda,, GERD, hyperlipidemia, HTN, lumbar DDD with right-sided radiculopathy, CKD, multiple pelvic fractures with presumed osteoporosis.  She was hospitalized 9/13/2020 for a left sacral hoa, left superior pubic rami and left inferior pubic rami fractures.  She was transition to TCU in rehab.  We discharged her here on 10/14 with good pain management and the expectation that she would follow-up with spine clinic.  She had increased pain and was started on OxyContin 10 mg twice daily in addition to her oxycodone.  She underwent a caudal epidural steroid injection.  However during the procedure she was unable to lie on her stomach and she lied on her side.  Postprocedure she did have increased severe uncontrolled pain in her right sacrum which required her to be seen in the emergency room.  She was hospitalized for her chronic low back pain, radicular right leg pain.  X-ray of lumbar spine demonstrated a grade 1 spondylolithiasis of the L5-S1 and retrolisthesis of L3-L4 and L2-L3 with degenerative disc disease.  X-ray of her right hip demonstrated subacute left pubic rami fractures without acute right hip fracture or dislocation.  CT of lumbar spine showed acute on chronic bilateral sacral wing fractures and demineralization.  Goal was for pain management which improved with scheduled APAP and OxyContin, as needed oxycodone and a prednisone taper x10 days.  Still continued on her prior to admission gabapentin.    Today, she reports that overall her pain is improving.  She reports that the OxyContin and oxycodone are the things that help her the best and most of the time its needed right away  in the morning.  She states she is sleeping well and eating well.    Review of Systems   Patient denies fever, chills, headache, lightheadedness, dizziness, rhinorrhea, cough, congestion, shortness of breath, chest pain, palpitations, abdominal pain, n/v, diarrhea, constipation, change in appetite, dysuria, frequency, burning or pain with urination.  Other than stated in HPI all other review of systems is negative.         Physical Exam   In order to maintain social distancing during the COVID pandemic, a visual exam was completed.     Vitals:    11/19/20 1003   BP: 128/62   Pulse: 75   Resp: 22   Temp: 98  F (36.7  C)   SpO2: 98%        Patient is then, elderly female in no acute distress.  Head is AT/NC, EOM intact. Respiratory effort normal. No visible LE edema. Alert and oriented, face symmetric. No visible skin issues or rashes. Mood euthymic      Labs:    Recent Results (from the past 240 hour(s))   Vitamin D, Total (25-Hydroxy)   Result Value Ref Range    Vitamin D, Total (25-Hydroxy) 40.0 30.0 - 80.0 ng/mL         Assessment:  1. Lumbar radiculopathy     2. Acute right-sided low back pain with right-sided sciatica     3. Pain     4. Physical deconditioning         Plan:     Lumbar radiculopathy: Follow-up with spine clinic.  Prednisone taper to be completed today.  Continue with therapies.    Low back pain: Continue with scheduled OxyContin and as needed oxycodone.  Continue with calcitonin gabapentin.  Will start weaning tizanidine however she is not using it that often.    Physical deconditioning: Continue with therapies.        Electronically signed by: Emi Wong CNP

## 2021-06-13 NOTE — PROGRESS NOTES
Code Status:  DNR  Visit Type: Discharge Summary     Facility:  [facility] [[id number]]          PCP:  Reagan Thakkar MD  [phone]       Admission Date to our Facility: 11/12/2020  Discharge Date from our Facility: 12/1/2020    Discharge Diagnosis:    1. Lumbar radiculopathy     2. Acute right-sided low back pain with right-sided sciatica     3. Pelvic pain in female  oxyCODONE (OXYCONTIN) 10 mg 12 hr tablet    oxyCODONE (ROXICODONE) 5 MG immediate release tablet   4. Uncomplicated opioid dependence (H)     5. Essential hypertension     6. Rectal cancer (H)     7. S/P colostomy (H)          History of Present Illness: Tamy Pierce is a 89 y.o. female with a past medical history for aortic stenosis, rectal cancer with a diverting colostomy on palliative Xeloda,, GERD, hyperlipidemia, HTN, lumbar DDD with right-sided radiculopathy, CKD, multiple pelvic fractures with presumed osteoporosis.  She was hospitalized 9/13/2020 for a left sacral hoa, left superior pubic rami and left inferior pubic rami fractures.  She was transition to TCU in rehab.  We discharged her here on 10/14 with good pain management and the expectation that she would follow-up with spine clinic.  She had increased pain and was started on OxyContin 10 mg twice daily in addition to her oxycodone.  She underwent a caudal epidural steroid injection.  However, during the procedure she was unable to lie on her stomach and she lied on her side.  Postprocedure she did have increased severe uncontrolled pain in her right sacrum which required her to be seen in the emergency room.  She was hospitalized for her chronic low back pain, radicular right leg pain.  X-ray of lumbar spine demonstrated a grade 1 spondylolithiasis of the L5-S1 and retrolisthesis of L3-L4 and L2-L3 with degenerative disc disease.  X-ray of her right hip demonstrated subacute left pubic rami fractures without acute right hip fracture or dislocation.  CT of lumbar  spine showed acute on chronic bilateral sacral wing fractures and demineralization.  Goal was for pain management which improved with scheduled APAP and OxyContin, as needed oxycodone and a prednisone taper x10 days.  Still continued on her prior to admission gabapentin.    Skilled Nursing Facility Course: While at the TCU she has improved her strength with therapy and is now capable of ambulating independently with a walker.  Her pain is better controlled and she has been using scheduled OxyContin every 12 hours and as needed oxycodone.  Last week she did have a few episodes of extreme pain and I did discuss with her to take her breakthrough medication prior to pain getting out of hand in order to stay ahead of it.  She will likely need ongoing pain medication and management until her pain is better managed.  She will need to follow-up with the spine center however she states she is unsure that she will do that.  She will also need to follow-up with her PCP and oncologist.          Current Outpatient Medications   Medication Sig Dispense Refill     oxyCODONE (OXYCONTIN) 10 mg 12 hr tablet Take 1 tablet (10 mg total) by mouth every 12 (twelve) hours. 28 tablet 0     acetaminophen (TYLENOL) 325 MG tablet Take 2 tablets (650 mg total) by mouth every 6 (six) hours. pain (Patient taking differently: Take 650 mg by mouth every 4 (four) hours as needed. pain) 30 tablet 0     albuterol (PROAIR HFA;PROVENTIL HFA;VENTOLIN HFA) 90 mcg/actuation inhaler Inhale 2 puffs every 6 (six) hours as needed for wheezing (has on hand, uses rarely). (Patient taking differently: Inhale 2 puffs every 4 (four) hours as needed for wheezing (has on hand, uses rarely). ) 1 Inhaler 0     aspirin 81 MG EC tablet Take 81 mg by mouth daily.       atenoloL (TENORMIN) 25 MG tablet Take 12.5 mg by mouth daily.        calcitonin, salmon, (MIACALCIN) 200 unit/actuation nasal spray Apply 1 spray into alternating nostrils daily.       calcium carbonate  (OS-DANIEL) 600 mg calcium (1,500 mg) tablet Take 1,200 mg by mouth daily.       capecitabine (XELODA) 500 MG tablet Take by mouth 2 (two) times a day. take for 7 days on, 7 days off       dicyclomine (BENTYL) 10 MG capsule Take 10 mg by mouth 4 (four) times a day as needed.       fluticasone propionate (FLOVENT HFA) 110 mcg/actuation inhaler Inhale 2 puffs 2 (two) times a day. asthma 1 Inhaler 12     gabapentin (NEURONTIN) 100 MG capsule Take 100 mg by mouth 2 (two) times a day. And 200 mg at bedtime       magnesium oxide (MAG-OX) 400 mg (241.3 mg magnesium) tablet Take 400 mg by mouth 2 (two) times a day.       melatonin 3 mg Tab tablet Take 3 mg by mouth at bedtime. And 1 additional dose overnight as needed       multivitamin therapeutic tablet Take 1 tablet by mouth daily.       ondansetron (ZOFRAN-ODT) 4 MG disintegrating tablet Take 4 mg by mouth every 6 (six) hours as needed for nausea.       oxyCODONE (ROXICODONE) 5 MG immediate release tablet Take 1 tablet (5 mg total) by mouth every 4 (four) hours as needed for pain. 30 tablet 0     pantoprazole (PROTONIX) 40 MG tablet Take 40 mg by mouth daily.       polyethylene glycol (MIRALAX) 17 gram packet Take 25.5 g by mouth 2 (two) times a day.        potassium chloride (K-DUR,KLOR-CON) 20 MEQ tablet Take 40 mEq by mouth 2 (two) times a day.        senna-docusate (PERICOLACE) 8.6-50 mg tablet Take 2 tablets by mouth 2 (two) times a day as needed.        simvastatin (ZOCOR) 40 MG tablet Take 1 tablet (40 mg total) by mouth at bedtime. Lipids 30 tablet 0     No current facility-administered medications for this visit.        For most current and accurate medication list, please contact the skilled nursing facility that this patient visit took place at.      Discharge Plan: Patient is stable to discharge to home with home care services.  Follow-up with PCP and spine center as directed.    Review of Systems   Patient denies fever, chills, headache, lightheadedness,  dizziness, rhinorrhea, cough, congestion, shortness of breath, chest pain, palpitations, abdominal pain, n/v, diarrhea, constipation, change in appetite, dysuria, frequency, burning or pain with urination.  Other than stated in HPI all other review of systems is negative.         Physical Exam   In order to maintain social distancing during the COVID pandemic, a visual exam was completed.     Vitals:    11/30/20 1153   BP: 118/70   Pulse: 74   Resp: 18   Temp: 98.7  F (37.1  C)   SpO2: 98%        Patient is thin, elderly female in no acute distress.  Head is AT/NC, EOM intact. Respiratory effort normal. No visible LE edema. Alert and oriented, face symmetric. No visible skin issues or rashes. Mood euthymic      Labs:  No results found for this or any previous visit (from the past 240 hour(s)).      Assessment:  1. Lumbar radiculopathy     2. Acute right-sided low back pain with right-sided sciatica     3. Pelvic pain in female  oxyCODONE (OXYCONTIN) 10 mg 12 hr tablet    oxyCODONE (ROXICODONE) 5 MG immediate release tablet   4. Uncomplicated opioid dependence (H)     5. Essential hypertension     6. Rectal cancer (H)     7. S/P colostomy (H)         MEDICAL EQUIPMENT NEEDS:  NA      DISCHARGE PLAN/FACE TO FACE:  I certify that services are/were furnished while this patient was under the care of a physician and that a physician or an allowed non-physician practitioner (NPP), had a face-to-face encounter that meets the physician face-to-face encounter requirements. The encounter was in whole, or in part, related to the primary reason for home health. The patient is confined to his/her home and needs intermittent skilled nursing, physical therapy, speech-language pathology, or the continued need for occupational therapy. A plan of care has been established by a physician and is periodically reviewed by a physician.    I certify that this patient is under my care and that I, or a nurse practitioner or physician's  assistant working with me, had a face-to-face encounter that meets the physician face-to-face encounter requirements with this patient.   Date of Face-to-Face Encounter: 11/30/2020    I certify that, based on my findings, the following services are medically necessary home health services: RN, PT/OT    My clinical findings support the need for the above skilled services because: RN for medication management and pain management, PT/OT for ongoing endurance and strengthening.    This patient is homebound because: Patient requires maximum effort in order to get out into the community on a regular basis.    The patient is, or has been, under my care and I have initiated the establishment of the plan of care. This patient will be followed by a physician who will periodically review the plan of care.        Electronically signed by: Emi Wong CNP

## 2021-06-13 NOTE — PROGRESS NOTES
Code Status:  DNR  Visit Type: Follow Up (pain)     Facility:  [facility] [[id number]]        Facility Type: SNF (Skilled Nursing Facility, TCU)    History of Present Illness: Tamy Pierce is a 89 y.o. female with a past medical history for aortic stenosis, rectal cancer with a diverting colostomy on palliative Xeloda,, GERD, hyperlipidemia, HTN, lumbar DDD with right-sided radiculopathy, CKD, multiple pelvic fractures with presumed osteoporosis.  She was hospitalized 9/13/2020 for a left sacral hoa, left superior pubic rami and left inferior pubic rami fractures.  She was transition to TCU in rehab.  We discharged her here on 10/14 with good pain management and the expectation that she would follow-up with spine clinic.  She had increased pain and was started on OxyContin 10 mg twice daily in addition to her oxycodone.  She underwent a caudal epidural steroid injection.  However during the procedure she was unable to lie on her stomach and she lied on her side.  Postprocedure she did have increased severe uncontrolled pain in her right sacrum which required her to be seen in the emergency room.  She was hospitalized for her chronic low back pain, radicular right leg pain.  X-ray of lumbar spine demonstrated a grade 1 spondylolithiasis of the L5-S1 and retrolisthesis of L3-L4 and L2-L3 with degenerative disc disease.  X-ray of her right hip demonstrated subacute left pubic rami fractures without acute right hip fracture or dislocation.  CT of lumbar spine showed acute on chronic bilateral sacral wing fractures and demineralization.  Goal was for pain management which improved with scheduled APAP and OxyContin, as needed oxycodone and a prednisone taper x10 days.  Still continued on her prior to admission gabapentin.    Earlier this week, I received a call from nursing requesting that her pain meds be scheduled as she is waiting too long to ask for pain medication and then she has increased pain.  I  "instructed them to use the prns.  Today, I discuss this with the patient that she often time waits it out in hopes of it improving.  I counseled her to take the oxycodone when she has \"breakthrough\" pain as so she does not get behind on pain management.  Otherwise she feels are going well and overall the pain is improving.     Review of Systems   Patient denies fever, chills, headache, lightheadedness, dizziness, rhinorrhea, cough, congestion, shortness of breath, chest pain, palpitations, abdominal pain, n/v, diarrhea, constipation, change in appetite, dysuria, frequency, burning or pain with urination.  Other than stated in HPI all other review of systems is negative.         Physical Exam   In order to maintain social distancing during the COVID pandemic, a visual exam was completed.     Vitals:    11/27/20 1145   BP: 130/69   Pulse: 68   Resp: 18   Temp: 98.4  F (36.9  C)        Patient is thin, elderly female in no acute distress.  Head is AT/NC, EOM intact. Respiratory effort normal. No visible LE edema. Alert and oriented, face symmetric. No visible skin issues or rashes. Mood euthymic      Labs:    No results found for this or any previous visit (from the past 240 hour(s)).        Assessment:  1. Lumbar radiculopathy     2. Acute right-sided low back pain with right-sided sciatica     3. Pain     4. Physical deconditioning         Plan:   Lumbar radiculopathy: Continue with gabapentin    Low back pain: Pain is improving, counseled on timing of prn oxycodone.     Physical conditioning: PT/OT    Anticipate she is stable to discharge on Tuesday of next week.     Electronically signed by: Emi Wong CNP               "

## 2021-06-13 NOTE — PROGRESS NOTES
" [facility]    Facility:  [facility] [[id number]]    Code Status:  DNR    Chief Complaint   Patient presents with     Review Of Multiple Medical Conditions   :                    Patient Active Problem List   Diagnosis     Bowel obstruction (H)     Colonic mass     Pelvic pain in female     Hypokalemia     Other partial intestinal obstruction (H)     Moderate aortic stenosis     Acute encephalopathy     Acute respiratory failure, unspecified whether with hypoxia or hypercapnia (H)     Cardiopulmonary arrest with successful resuscitation (H)     Transaminitis     Electrolyte abnormality     S/P colostomy (H)     Mild intermittent asthma without complication     Bradycardia     Complete heart block (H)     Hyperchloremia     Palliative care patient     Goals of care, counseling/discussion     Acute on chronic back pain with radiculopathy and opiate dependence (H)       History:Tamy Pierce  is a 89 year old female with history of moderate aortic stenosis, cardiopulmonary arrest with successful resuscitation, rectal cancer with diverting colostomy November 2018 on palliative immunotherapy/Xeloda (has also had radiation treatment), mild intermittent asthma, GERD, hyper lipidemia, hypertension, lumbar degenerative disc disease with right sided radiculopathy , CKD 3, IFG, urethral stricture, presumed osteoporosis with history of multiple pelvic fractures      Hospital Course: Patient was hospitalized November 7-11 for acute on chronic low back pain with right-sided radiculopathy.  She actually spent the night of November 11/12 at [facility] before she figured out that she was not getting get any therapy there and demanded transfer here so that she could resume therapy.      This is a similar presentation to her prior TCU stay there back in September.  She reports that by the time she left the TCU she \"felt so good\" with great pain improvement and improved function.  All however " "in the weeks and especially the last few days prior to hospital admission her back pain and specifically the right radiculopathy had gotten a lot worse.  There were a couple of reasons for this.  She had run out of her short acting oxycodone tablets transiently.  More so on her mind was an epidural injection attempt on November 2.  This was originally scheduled during her last TCU stay and was delayed until November 2.  She says \"I was doing so well until then\".  She reports that the injection \"went into the wrong place\" and says it was because she could not lay on her stomach due to her colostomy bag.  She said within hours of that attempt her pain became severe and unrelenting forcing her back to the emergency room.    Work-up in the hospital revealed that she has additional fractures.  Recall in September she had superior and inferior left pubic rami fracture and probably left sacral insufficiency fracture.  This time she was diagnosed with acute on chronic left sacral insufficiency fracture, acute right sacral insufficiency fracture, and foraminal stenosis.    In the hospital her pain plan was changed to include OxyContin 10 mg twice daily with ongoing short acting oxycodone 5 mg every 3 hours, acetaminophen, ibuprofen, gabapentin.  She also had a prednisone taper.  Tizanidine has been added since that time.  Patient reports that her pain was never well controlled although the oxycodone makes sense to her and she thinks it is helped get her out of the very worst of her pain.    Subjective/ROS:    -augmented by discussion with facility staff involved in direct care    -Patient reports that she was doing far better than when we last met.  When I asked her about pain medication she says \"I think I am taking a lot  \".  Patient is on extended release oxycodone 10 mg every 12 hours as well as instant release 5 mg by mouth every 4 hours.  She is not taking the latter \"on the clock\" but needs it frequently.  The good " news is that she is up out of bed moving around working with physical therapy.  She says she can get up on her own and make it back and forth to the bathroom.  She is not feeling at all constipated despite the narcotics.  She says she is always taken MiraLAX due to her colostomy and likes to keep the consistency on the loose side anyway.  The pain itself is in the right upper lateral thigh.  There is less radiation down the leg, virtually none now.    -Patient reports her  is moving Monday.  As noted earlier it sounds are greatly but she is thankful for her children who are helping him      Patient denies chest pain shortness of breath cough orthopnea PND peripheral edema vomiting fever sweats chills.  Remainder negative.  She is sleeping okay with the melatonin.      We had talked about DEXA scan/osteoporosis treatment at her last TCU stay and she says she never followed up for that.    Remainder ROS negative    Past Medical History:   Diagnosis Date     Actinic keratosis      Basal cell carcinoma      Fracture of left inferior pubic ramus (H)      Fracture of left superior pubic ramus (H)      Gait instability      GERD (gastroesophageal reflux disease)      HLD (hyperlipidemia)      HTN (hypertension)      Lumbar radiculopathy      Mild intermittent asthma      Rectal cancer (H)      Urethral stricture      Past Surgical History:   Procedure Laterality Date     COLOSTOMY N/A 11/20/2019    Procedure: Colostomy Formation;  Surgeon: Demarcus Moreno MD;  Location: [facility];  Service: General     ESOPHAGOGASTRODUODENOSCOPY  02/20/2020     HYSTERECTOMY       LITHOTRIPSY       PICC AND MIDLINE TEAM LINE INSERTION  11/21/2019          PROCTOSCOPY N/A 11/20/2019    Procedure: PROCTOSCOPY WITH BIOPSY;  Surgeon: Demarcus Moreno MD;  Location: [facility];  Service: General          Family History   Problem Relation Age of Onset     Breast cancer Mother      Cancer Mother         colorectal       Coronary artery disease Father      COPD Brother    :       Social History     Socioeconomic History     Marital status:      Spouse name: Not on file     Number of children: Not on file     Years of education: Not on file     Highest education level: Not on file   Occupational History     Not on file   Social Needs     Financial resource strain: Not on file     Food insecurity     Worry: Not on file     Inability: Not on file     Transportation needs     Medical: Not on file     Non-medical: Not on file   Tobacco Use     Smoking status: Never Smoker     Smokeless tobacco: Never Used   Substance and Sexual Activity     Alcohol use: Yes     Frequency: Monthly or less     Drinks per session: 1 or 2     Binge frequency: Never     Comment: <1     Drug use: Never     Sexual activity: Not on file   Lifestyle     Physical activity     Days per week: Not on file     Minutes per session: Not on file     Stress: Not on file   Relationships     Social connections     Talks on phone: Not on file     Gets together: Not on file     Attends [employer]ism service: Not on file     Active member of club or organization: Not on file     Attends meetings of clubs or organizations: Not on file     Relationship status: Not on file     Intimate partner violence     Fear of current or ex partner: Not on file     Emotionally abused: Not on file     Physically abused: Not on file     Forced sexual activity: Not on file   Other Topics Concern     Not on file   Social History Narrative     Not on file   :        Current Outpatient Medications on File Prior to Visit   Medication Sig Dispense Refill     acetaminophen (TYLENOL) 325 MG tablet Take 2 tablets (650 mg total) by mouth every 6 (six) hours. pain (Patient taking differently: Take 650 mg by mouth every 4 (four) hours as needed. pain) 30 tablet 0     albuterol (PROAIR HFA;PROVENTIL HFA;VENTOLIN HFA) 90 mcg/actuation inhaler Inhale 2 puffs every 6 (six) hours as needed for wheezing (has  on hand, uses rarely). (Patient taking differently: Inhale 2 puffs every 4 (four) hours as needed for wheezing (has on hand, uses rarely). ) 1 Inhaler 0     aspirin 81 MG EC tablet Take 81 mg by mouth daily.       atenoloL (TENORMIN) 25 MG tablet Take 12.5 mg by mouth daily.        calcitonin, salmon, (MIACALCIN) 200 unit/actuation nasal spray Apply 1 spray into alternating nostrils daily.       calcium carbonate (OS-DANIEL) 600 mg calcium (1,500 mg) tablet Take 1,200 mg by mouth daily.       capecitabine (XELODA) 500 MG tablet Take by mouth 2 (two) times a day. take for 7 days on, 7 days off       dicyclomine (BENTYL) 10 MG capsule Take 10 mg by mouth 4 (four) times a day as needed.       fluticasone propionate (FLOVENT HFA) 110 mcg/actuation inhaler Inhale 2 puffs 2 (two) times a day. asthma 1 Inhaler 12     gabapentin (NEURONTIN) 100 MG capsule Take 100 mg by mouth 2 (two) times a day. And 200 mg at bedtime       magnesium oxide (MAG-OX) 400 mg (241.3 mg magnesium) tablet Take 400 mg by mouth 2 (two) times a day.       melatonin 3 mg Tab tablet Take 3 mg by mouth at bedtime. And 1 additional dose overnight as needed       multivitamin therapeutic tablet Take 1 tablet by mouth daily.       ondansetron (ZOFRAN-ODT) 4 MG disintegrating tablet Take 4 mg by mouth every 6 (six) hours as needed for nausea.       oxyCODONE (OXYCONTIN) 10 mg 12 hr tablet Take 10 mg by mouth every 12 (twelve) hours.       oxyCODONE (ROXICODONE) 5 MG immediate release tablet Take 1 tablet (5 mg total) by mouth every 4 (four) hours as needed for pain. 30 tablet 0     pantoprazole (PROTONIX) 40 MG tablet Take 40 mg by mouth daily.       polyethylene glycol (MIRALAX) 17 gram packet Take 25.5 g by mouth 2 (two) times a day.        potassium chloride (K-DUR,KLOR-CON) 20 MEQ tablet Take 40 mEq by mouth 2 (two) times a day.        predniSONE (DELTASONE) 10 mg tablet Take by mouth daily Give 40 mg by mouth one time a day for acute Back pain w/  sciatica until 11/13/2020 23:59 x2 days, give with food AND Give 30 mg by mouth one time a day for acute Back pain w/ sciatica until 11/15/2020 23:59 x2 days, give with food AND Give 20 mg by mouth one time a day for acute Back pain w/ sciatica until 11/17/2020 23:59 x2 days, give with food AND Give 10 mg by mouth one time a day for acute Back pain w/ sciatica until 11/19/2020 23:59 x2 days, give with food .       senna-docusate (PERICOLACE) 8.6-50 mg tablet Take 2 tablets by mouth 2 (two) times a day as needed.        simvastatin (ZOCOR) 40 MG tablet Take 1 tablet (40 mg total) by mouth at bedtime. Lipids 30 tablet 0     tiZANidine (ZANAFLEX) 2 MG tablet Take 2 mg by mouth every 6 (six) hours as needed.       No current facility-administered medications on file prior to visit.    :      ALLERGIES:  Egg and Flu vac 2015 (65 up)-mf59c(pf)    Vitals: Blood pressure 118/64 respirations 18 temperature 98 heart rate 66 O2 sats 96% on room air weight is 104 pounds and stable  Physical exam:    Patient looks much better and brighter than when I saw her last time.  She is smiles and repeatedly expresses her thankfulness to the staff for helping her improve.  Alert and oriented x3 she is breathing comfort without accessory muscle use or tachypnea.  Her speech is clear and she answers questions appropriately.  She has purposeful movement of all 4 extremities.  Skin is clear in visualized portions.          Due to the 2020 Covid 19 pandemic, except as noted above, the patient was visually observed at a 6 foot plus distance.  An observational exam was performed in an effort to keep patient safe from Covid 19 and other communicable diseases.   Labs:  Lab Results   Component Value Date    WBC 5.6 09/21/2020    HGB 10.9 (L) 09/21/2020    HCT 32.7 (L) 09/21/2020     (H) 09/21/2020     09/21/2020     Results for orders placed or performed in visit on 09/21/20   Basic Metabolic Panel   Result Value Ref Range    Sodium  139 136 - 145 mmol/L    Potassium 3.3 (L) 3.5 - 5.0 mmol/L    Chloride 101 98 - 107 mmol/L    CO2 29 22 - 31 mmol/L    Anion Gap, Calculation 9 5 - 18 mmol/L    Glucose 94 70 - 125 mg/dL    Calcium 9.2 8.5 - 10.5 mg/dL    BUN 20 8 - 28 mg/dL    Creatinine 0.69 0.60 - 1.10 mg/dL    GFR MDRD Af Amer >60 >60 mL/min/1.73m2    GFR MDRD Non Af Amer >60 >60 mL/min/1.73m2         No results found for: TSH  No results found for: HGBA1C  [unfilled]  No results found for: [id number]  No results found for: BNP  [unfilled]  Most Recent EKG     Units 11/23/19  0609   VENTRATE BPM 96   ATRIALRATE BPM 96   QRSDURATION ms 72   QTINTERVAL ms 328   QTCCALC ms 414   P Axis degrees 21   RAXIS degrees 8   TAXIS degrees 208   MUSEDX  Sinus rhythm with Premature supraventricular complexes  Nonspecific T wave abnormality  Abnormal ECG  When compared with ECG of 20-NOV-2019 19:28,  Premature supraventricular complexes are now Present  Vent. rate has increased BY  36 BPM  QT has shortened  Confirmed by BONY MACIEL, AUGUST LOC: ([id number]) on 11/23/2019 4:16:08 PM           Assessment/Plan:      ICD-10-CM    1. Uncomplicated opioid dependence (H)  F11.20        Acute on chronic lumbar back pain  Foraminal stenosis with radiculopathy right-sided  New pelvic fractures of the sacral alae  Old pelvic fractures left sacral hoa, left inferior and superior pubic rami   Patient has improved.  Her pain is more localized now in the upper lateral thigh.  -Oxycodone extended release 10 mg twice daily without change today  -Oxycodone short acting 5 mg every 3 hours, taper as able  -Acetaminophen  -Tizanidine 2 mg every 8 hours as needed.  May taper this as well.  -Gabapentin 100 mg twice daily likely long-term.  -Add calcitonin nasal spray for potential pain help rather than osteoporosis treatment for for an additional 14 days from this point at minimum.  -Zofran for nausea associated with pain, though her pain is better and so is her nausea.  -PT, OT,  social service involvement  -Notably patient has finished her prednisone taper--- she is not sure it was helpful.    Presumed osteoporosis   Patient never followed up after last TCU stay.  Has not had DEXA scan.  Has not had vitamin D level.  -Vitamin D level 40.  Recommend 1000 international units twice daily  -Calcium carbonate 1200 mg daily for bone health is already in place.  -Calcitonin nasal spray has been added for pain control not likely to have significant effect on osteoporosis  -Recommend outpatient follow-up with primary MD DEXA scanning, probable bisphosphonate    Rectal cancer  Diverting colostomy November 2018  Xeloda palliative treatment  History of radiation treatment              Xeloda is on hold while at the TCU.  She will follow-up with oncology following discharge to reestablish.  Dr. Diggs is her oncologist. scanning did show some local progression, though patient has not been symptomatically worse in any way that she can tell.    Radiation proctitis              Demeclocycline, dicyclomine for cramping     Hypertension             Blood pressures doing well here.  She is on less medication than she was a couple of months ago, atenolol 12.5 mg daily is her only antihypertensives at this time     Hyperlipidemia              On simvastatin     GERD              Chronic Protonix     CKD 3   Deteriorated since last fall in September her creatinine was 0.69.  Was recently 1.67.  Avoid nephrotoxins    Constipation   Doing well despite opiates.  Continue MiraLAX 17 g twice daily which was not changed.    Asthma   At stable baseline with Flovent and as needed albuterol    Situational grief   Good talk today.  Difficult situation but necessary as she is no longer able to provide the care and supervision her  requires.  She is on melatonin for sleep.  She has strong family support.    Hyperlipidemia   Simvastatin continues for now          Case discussed with:    Facility staff       Alfredo Mireles  MD [place]

## 2021-06-16 ENCOUNTER — TELEPHONE (OUTPATIENT)
Dept: PHARMACY | Facility: CLINIC | Age: 86
End: 2021-06-16

## 2021-06-16 DIAGNOSIS — C20 RECTAL CANCER (H): ICD-10-CM

## 2021-06-16 DIAGNOSIS — C20 RECTAL CANCER (H): Primary | ICD-10-CM

## 2021-06-16 RX ORDER — CAPECITABINE 500 MG/1
TABLET, FILM COATED ORAL
Qty: 56 TABLET | Refills: 1 | Status: SHIPPED | OUTPATIENT
Start: 2021-06-16 | End: 2021-06-16

## 2021-06-16 RX ORDER — CAPECITABINE 500 MG/1
TABLET, FILM COATED ORAL
Qty: 56 TABLET | Refills: 1 | Status: SHIPPED | OUTPATIENT
Start: 2021-06-16 | End: 2021-12-29

## 2021-06-20 NOTE — LETTER
Letter by Emi Wong CNP at      Author: Emi Wong CNP Service: -- Author Type: --    Filed:  Encounter Date: 10/8/2020 Status: (Other)         Patient: Tamy Pierce   MR Number: [id number]   YOB: 1930   Date of Visit: 10/8/2020     Code Status:  DNR/DNI  Visit Type: Follow Up (pubis fracture, rehab, hyperkalemia)     Facility:  [facility]SW[facility] ROOMING ACTIVITY QUESTION      Facility Type: SNF (Skilled Nursing Facility, TCU)    History of Present Illness:   Hospital Admission Date: 9/13/2020  Hospital Discharge Date: 9/14/2020    Tamy Pierce is a 89 y.o. female who has a past medical history for rectal CA s/p colostomy 2/2 colonic obstruction (s/p palliative radiation and Xeloda on hold during TCU stay), HTN, lumbar DDD, HLD, GERD, mild intermittent asthma.  She was recently hospitalized after a mechanical fall at home in which she sustained a closed fracture of left pubic rami and left sacral ala. Orthopedic was consulted and noted there was no need for surgery with the plan for rehab and pain management.  She also had hypokalemia and is on chlorthalidone and 20meq KCL at home so her KCL was increased.     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 Treated with palliative Xeloda and radiation in February 2020. Continues with Xeloda.  Takes demeclocycline for radiation proctitis.  Restaging pelvic MRI in June 2020 demonstrated significant interval decrease in size of the residual distal rectal mass.  Recommendations for follow up with Dr. Diggs     Today, patient states she is a little sore today after attempting to put on her compression socks.  Edema of left LE is less.  Both LE have mild soft nonpitting edema. She is planning to be discharged to home early next week. Pain is stable and she will go home with low dose oxycodone.     K recheck today is good at 3.9 on 40 meq two times a day of KCL.      Current Outpatient Medications   Medication Sig Dispense Refill   ? acetaminophen (TYLENOL) 325 MG tablet Take 2 tablets (650 mg total) by mouth every 6 (six) hours. pain (Patient taking differently: Take 650 mg by mouth every 4 (four) hours as needed. pain) 30 tablet 0   ? albuterol (PROAIR HFA;PROVENTIL HFA;VENTOLIN HFA) 90 mcg/actuation inhaler Inhale 2 puffs every 6 (six) hours as needed for wheezing (has on hand, uses rarely). (Patient taking differently: Inhale 2 puffs every 4 (four) hours as needed for wheezing (has on hand, uses rarely). ) 1 Inhaler 0   ? atenoloL (TENORMIN) 25 MG tablet Take 12.5 mg by mouth daily.      ? calcium carbonate (OS-DANIEL) 600 mg calcium (1,500 mg) tablet Take 1,200 mg by mouth daily.     ? capecitabine (XELODA) 500 MG tablet Take by mouth 2 (two) times a day. take for 7 days on, 7 days off     ? dicyclomine (BENTYL) 10 MG capsule Take 10 mg by mouth 4 (four) times a day as needed.     ? fluticasone propionate (FLOVENT HFA) 110 mcg/actuation inhaler Inhale 2 puffs 2 (two) times a day. asthma 1 Inhaler 12   ? gabapentin (NEURONTIN) 100 MG capsule Take 100 mg by mouth 2 (two) times a day. And 200 mg at bedtime     ? magnesium chloride (SLOW-MAG) 64 mg TbEC delayed-release tablet Take 128 mg by mouth daily.     ? melatonin 3 mg Tab tablet Take 3 mg by mouth at bedtime. And 1 additional dose overnight as needed     ? multivitamin therapeutic tablet Take 1 tablet by mouth daily.     ? ondansetron (ZOFRAN-ODT) 4 MG disintegrating tablet Take 4 mg by mouth every 6 (six) hours as needed for nausea.     ? oxyCODONE (ROXICODONE) 5 MG immediate release tablet Take 0.5-1 tablets (2.5-5 mg total) by mouth every 4 (four) hours as needed for pain. (Patient taking differently: Take 2.5 mg by mouth every 4 (four) hours as needed for pain. ) 60 tablet 0   ? pantoprazole (PROTONIX) 40 MG tablet Take 40 mg by mouth daily.     ? polyethylene glycol (MIRALAX) 17 gram packet Take 25.5 g by mouth 2  (two) times a day.      ? potassium chloride (K-DUR,KLOR-CON) 20 MEQ tablet Take 40 mEq by mouth 2 (two) times a day.      ? simvastatin (ZOCOR) 40 MG tablet Take 1 tablet (40 mg total) by mouth at bedtime. Lipids 30 tablet 0   ? trolamine salicylate (ASPERCREME) 10 % cream Apply topically 3 (three) times a day.       No current facility-administered medications for this visit.          Review of Systems   Patient denies fever, chills, headache, lightheadedness, dizziness, rhinorrhea, cough, congestion, shortness of breath, chest pain, palpitations, abdominal pain, n/v, change in appetite, dysuria, frequency, burning or pain with urination.  Other than stated in HPI all other review of systems is negative.     Physical Exam   In order to maintain social distancing during the COVID pandemic, a visual exam was completed.     Vitals: /68, HR 70, resp 17, temp 97.6    Patient is thin, frail elderly woman and no acute distress.  Head is AT/NC, EOM intact. Respiratory effort normal. No visible LE edema. Good CMS BLE, no calf tenderness. Minimal soft nonpitting ankle edema. Alert and oriented, face symmetric. No visible skin issues or rashes. Mood euthymic        Labs:    Recent Results (from the past 240 hour(s))   Magnesium   Result Value Ref Range    Magnesium 1.7 (L) 1.8 - 2.6 mg/dL   Potassium   Result Value Ref Range    Potassium 3.9 3.5 - 5.0 mmol/L   Potassium   Result Value Ref Range    Potassium 3.9 3.5 - 5.0 mmol/L         Assessment:  1. Closed bilateral fracture of pubic rami, sequela     2. Lower extremity edema     3. Lumbar radiculopathy     4. Hypokalemia         Plan:   pubi rami fracture: continue therapies, pain is controlled with minimal pain meds.      Edema: improving with compression stockings    Lumbar radiculopathy: she attempted to have spine injection this week however it was contraindicated due to acute fracture.  She will not be able to have this for 6-8 more weeks.     Hypokalemia:  resolved, due to increased miralax use.  Continue KCL 40meq two times a day.         Electronically signed by: Emi Wong CNP

## 2021-06-20 NOTE — LETTER
Letter by Emi Wong CNP at      Author: Emi Wong CNP Service: -- Author Type: --    Filed:  Encounter Date: 10/5/2020 Status: (Other)         Patient: Tamy Pierce   MR Number: [id number]   YOB: 1930   Date of Visit: 10/5/2020     Code Status:  DNR/DNI  Visit Type: Follow Up (LE edema, pain management, )     Facility:  [facility] [[id number]]      Facility Type: SNF (Skilled Nursing Facility, TCU)    History of Present Illness:   Hospital Admission Date: 9/13/2020  Hospital Discharge Date: 9/14/2020    Tamy Pierce is a 89 y.o. female who has a past medical history for rectal CA s/p colostomy 2/2 colonic obstruction (s/p palliative radiation and Xeloda on hold during TCU stay), HTN, lumbar DDD, HLD, GERD, mild intermittent asthma.  She was recently hospitalized after a mechanical fall at home in which she sustained a closed fracture of left pubic rami and left sacral ala. Orthopedic was consulted and noted there was no need for surgery with the plan for rehab and pain management.  She also had hypokalemia and is on chlorthalidone and 20meq KCL at home so her KCL was increased.     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 Treated with palliative Xeloda and radiation in February 2020.  Take demeclocycline for radiation proctitis.  Restaging pelvic MRI in June 2020 demonstrated significant interval decrease in size of the residual distal rectal mass.  Recommendations for follow up with Dr. Diggs     Today, patient states she is doing generally well.  Pain is minimal and she is using oxycodone only approximately 1 x daily.  BPs had continued to be low last week even after discontinuing chlorthalidone and so I did decrease the atenolol which has been better for her BPs.  She does complain of bilateral LE edema.  It is minimal soft ankle edema.  She states she has not had this before.      Current Outpatient Medications   Medication Sig Dispense Refill   ? acetaminophen (TYLENOL) 325 MG tablet Take 2 tablets (650 mg total) by mouth every 6 (six) hours. pain (Patient taking differently: Take 650 mg by mouth every 4 (four) hours as needed. pain) 30 tablet 0   ? albuterol (PROAIR HFA;PROVENTIL HFA;VENTOLIN HFA) 90 mcg/actuation inhaler Inhale 2 puffs every 6 (six) hours as needed for wheezing (has on hand, uses rarely). (Patient taking differently: Inhale 2 puffs every 4 (four) hours as needed for wheezing (has on hand, uses rarely). ) 1 Inhaler 0   ? atenoloL (TENORMIN) 25 MG tablet Take 12.5 mg by mouth daily.      ? calcium carbonate (OS-DANIEL) 600 mg calcium (1,500 mg) tablet Take 1,200 mg by mouth daily.     ? capecitabine (XELODA) 500 MG tablet Take by mouth 2 (two) times a day. take for 7 days on, 7 days off     ? dicyclomine (BENTYL) 10 MG capsule Take 10 mg by mouth 4 (four) times a day as needed.     ? fluticasone propionate (FLOVENT HFA) 110 mcg/actuation inhaler Inhale 2 puffs 2 (two) times a day. asthma 1 Inhaler 12   ? gabapentin (NEURONTIN) 100 MG capsule Take 100 mg by mouth 2 (two) times a day. And 200 mg at bedtime     ? magnesium chloride (SLOW-MAG) 64 mg TbEC delayed-release tablet Take 128 mg by mouth daily.     ? melatonin 3 mg Tab tablet Take 3 mg by mouth at bedtime. And 1 additional dose overnight as needed     ? multivitamin therapeutic tablet Take 1 tablet by mouth daily.     ? ondansetron (ZOFRAN-ODT) 4 MG disintegrating tablet Take 4 mg by mouth every 6 (six) hours as needed for nausea.     ? oxyCODONE (ROXICODONE) 5 MG immediate release tablet Take 0.5-1 tablets (2.5-5 mg total) by mouth every 4 (four) hours as needed for pain. (Patient taking differently: Take 2.5 mg by mouth every 4 (four) hours as needed for pain. ) 60 tablet 0   ? pantoprazole (PROTONIX) 40 MG tablet Take 40 mg by mouth daily.     ? polyethylene glycol (MIRALAX) 17 gram packet Take 25.5 g by mouth 2  (two) times a day.      ? potassium chloride (K-DUR,KLOR-CON) 20 MEQ tablet Take 40 mEq by mouth 2 (two) times a day.      ? simvastatin (ZOCOR) 40 MG tablet Take 1 tablet (40 mg total) by mouth at bedtime. Lipids 30 tablet 0   ? trolamine salicylate (ASPERCREME) 10 % cream Apply topically 3 (three) times a day.       No current facility-administered medications for this visit.          Review of Systems   Patient denies fever, chills, headache, lightheadedness, dizziness, rhinorrhea, cough, congestion, shortness of breath, chest pain, palpitations, abdominal pain, n/v, change in appetite, dysuria, frequency, burning or pain with urination.  Other than stated in HPI all other review of systems is negative.         Physical Exam   In order to maintain social distancing during the COVID pandemic, a visual exam was completed.     Vitals: /88, HR 76, resp 15, temp 98.0    Patient is thin, frail elderly woman and no acute distress.  Head is AT/NC, EOM intact. Respiratory effort normal. No visible LE edema. Good CMS BLE, no calf tenderness. Minimal soft nonpitting ankle edema.  Colostomy producing liquid brown stool. Alert and oriented, face symmetric. No visible skin issues or rashes. Mood euthymic        Labs:    Recent Results (from the past 240 hour(s))   Magnesium   Result Value Ref Range    Magnesium 1.7 (L) 1.8 - 2.6 mg/dL   Potassium   Result Value Ref Range    Potassium 3.9 3.5 - 5.0 mmol/L         Assessment:  1. Essential hypertension     2. Hypokalemia     3. Closed bilateral fracture of pubic rami, sequela     4. Lower extremity edema         Plan:   HTN: BPs in better range after decreasing atenolol and dcing chlorthalidone. Will continue with current dose of atenolol.     Hypokalmia: chlorthalidone dc'd due to K wasting and high doses of miralax.  Last K on 3.9 and Mag 1.7.  Will recheck K this week.     Pubic rami fracture: pain controlled, decreased oxycodone to 2.5mg daily.  Planning to dc to home  next week.     Edema: like due to dcing chlorthalidone.  Counseled on compression.  Will add AUGUST hose for now.         Electronically signed by: Emi Wong CNP

## 2021-06-20 NOTE — LETTER
Letter by Emi Wong CNP at      Author: Emi Wong CNP Service: -- Author Type: --    Filed:  Encounter Date: 10/12/2020 Status: (Other)         Patient: Tamy Pierce   MR Number: [id number]   YOB: 1930   Date of Visit: 10/12/2020     Code Status:  DNR/DNI  Visit Type: Discharge Summary     Facility:  [facility] [[id number]]          PCP:  Reagan Thakkar MD  [phone]       Admission Date to our Facility: 9/14/20  Discharge Date from our Facility: 10/14/20    Discharge Diagnosis:    1. Closed bilateral fracture of pubic rami, sequela     2. Lumbar radiculopathy     3. Hypokalemia     4. Hypomagnesemia     5. S/P colostomy (H)     6. Essential hypertension     7. Rectal cancer (H)          History of Present Illness: Tamy Pierce is a 89 y.o. female who has a past medical history for rectal CA s/p colostomy 2/2 colonic obstruction (s/p palliative radiation and Xeloda on hold during TCU stay), HTN, lumbar DDD, HLD, GERD, mild intermittent asthma.  She was recently hospitalized after a mechanical fall at home in which she sustained a closed fracture of left pubic rami and left sacral ala. Orthopedic was consulted and noted there was no need for surgery with the plan for rehab and pain management.  She also had hypokalemia and is on chlorthalidone and 20meq KCL at home so her KCL was increased.      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 Treated with palliative Xeloda and radiation in February 2020. Continues with Xeloda.  Takes demeclocycline for radiation proctitis.  Restaging pelvic MRI in June 2020 demonstrated significant interval decrease in size of the residual distal rectal mass.  Recommendations for follow up with Dr. Diggs     Skilled Nursing Facility Course: She did advance with therapy to ambulating independently with a walker.  She will need ongoing therapy for  endurance and strengthening at home.  During her stay she did have persistent issues with hypokalemia secondary to increased MiraLAX use.  At home she was using MiraLAX approximately 1-1/2 capfuls twice daily and this is likely the cause of her hypokalemia in the hospital.  I did increase her MiraLAX as she was having difficulty with her a bag not adhering well.  With that, I did increase her KCl to 40 mEq twice daily and this maintained to her potassium in good range.    Her pain did improve and she uses PRN APAP and oxycodone.  I am going to send her home with low-dose oxycodone as she has been using it approximately 1 time a day.  I did advise her that by next week to discontinue oxycodone altogether as that will be approximately 1 month from her fracture which is typical for pain management.    I would recommend follow-up potassium and magnesium to help adjust for her electrolytes and minerals.    Also, during her stay she did have soft blood pressures and so her chlorthalidone was discontinued altogether due to hypokalemia.  I did decrease her atenolol to 12.5 mg daily.  Counseled patient on these changes and the need to follow-up with her primary for ongoing monitoring and recommendations.    For her lumbar radiculopathy she did attempt to have a spinal injection last week however due to her fracture it was not recommended by her spine provider and will need to reschedule in approximately 6 weeks.        Discharge Medications:    Current Outpatient Medications   Medication Sig Dispense Refill   ? acetaminophen (TYLENOL) 325 MG tablet Take 2 tablets (650 mg total) by mouth every 6 (six) hours. pain (Patient taking differently: Take 650 mg by mouth every 4 (four) hours as needed. pain) 30 tablet 0   ? albuterol (PROAIR HFA;PROVENTIL HFA;VENTOLIN HFA) 90 mcg/actuation inhaler Inhale 2 puffs every 6 (six) hours as needed for wheezing (has on hand, uses rarely). (Patient taking differently: Inhale 2 puffs every 4  (four) hours as needed for wheezing (has on hand, uses rarely). ) 1 Inhaler 0   ? atenoloL (TENORMIN) 25 MG tablet Take 12.5 mg by mouth daily.      ? calcium carbonate (OS-DANIEL) 600 mg calcium (1,500 mg) tablet Take 1,200 mg by mouth daily.     ? capecitabine (XELODA) 500 MG tablet Take by mouth 2 (two) times a day. take for 7 days on, 7 days off     ? dicyclomine (BENTYL) 10 MG capsule Take 10 mg by mouth 4 (four) times a day as needed.     ? fluticasone propionate (FLOVENT HFA) 110 mcg/actuation inhaler Inhale 2 puffs 2 (two) times a day. asthma 1 Inhaler 12   ? gabapentin (NEURONTIN) 100 MG capsule Take 100 mg by mouth 2 (two) times a day. And 200 mg at bedtime     ? magnesium chloride (SLOW-MAG) 64 mg TbEC delayed-release tablet Take 128 mg by mouth daily.     ? melatonin 3 mg Tab tablet Take 3 mg by mouth at bedtime. And 1 additional dose overnight as needed     ? multivitamin therapeutic tablet Take 1 tablet by mouth daily.     ? ondansetron (ZOFRAN-ODT) 4 MG disintegrating tablet Take 4 mg by mouth every 6 (six) hours as needed for nausea.     ? oxyCODONE (ROXICODONE) 5 MG immediate release tablet Take 0.5-1 tablets (2.5-5 mg total) by mouth every 4 (four) hours as needed for pain. (Patient taking differently: Take 2.5 mg by mouth every 4 (four) hours as needed for pain. ) 60 tablet 0   ? pantoprazole (PROTONIX) 40 MG tablet Take 40 mg by mouth daily.     ? polyethylene glycol (MIRALAX) 17 gram packet Take 25.5 g by mouth 2 (two) times a day.      ? potassium chloride (K-DUR,KLOR-CON) 20 MEQ tablet Take 40 mEq by mouth 2 (two) times a day.      ? simvastatin (ZOCOR) 40 MG tablet Take 1 tablet (40 mg total) by mouth at bedtime. Lipids 30 tablet 0   ? trolamine salicylate (ASPERCREME) 10 % cream Apply topically 3 (three) times a day.       No current facility-administered medications for this visit.        For most current and accurate medication list, please contact the [facility] nursing [facility] that this  patient visit took place at.      Discharge Plan: Patient is stable to discharge to home with home care services.  She will need to follow-up with orthopedics in regards to her fracture, oncology in regards to her adenocarcinoma, and her primary in regards to her electrolyte mineral balance with her current medications and supplements.    Review of Systems   Patient denies fever, chills, headache, lightheadedness, dizziness, rhinorrhea, cough, congestion, shortness of breath, chest pain, palpitations, abdominal pain, n/v, diarrhea, constipation, change in appetite, dysuria, frequency, burning or pain with urination.  Other than stated in HPI all other review of systems is negative.       Physical Exam   In order to maintain social distancing during the COVID pandemic, a visual exam was completed.     Vitals:    10/12/20 0954   BP: 119/73   Pulse: 76   Resp: 16   Temp: 98  F (36.7  C)   SpO2: 99%         Patient is well nourished and no acute distress.  Head is AT/NC, EOM intact. Respiratory effort normal. No visible LE edema. Alert and oriented, face symmetric. No visible skin issues or rashes. Mood euthymic      Labs:    Recent Results (from the past 240 hour(s))   Potassium   Result Value Ref Range    Potassium 3.9 3.5 - 5.0 mmol/L         Assessment:  1. Closed bilateral fracture of pubic rami, sequela     2. Lumbar radiculopathy     3. Hypokalemia     4. Hypomagnesemia     5. S/P colostomy (H)     6. Essential hypertension     7. Rectal cancer (H)         MEDICAL EQUIPMENT NEEDS:  none      DISCHARGE PLAN/FACE TO FACE:  I certify that services are/were furnished while this patient was under the care of a physician and that a physician or an allowed non-physician practitioner (NPP), had a face-to-face encounter that meets the physician face-to-face encounter requirements. The encounter was in whole, or in part, related to the primary reason for home health. The patient is confined to his/her home and needs  intermittent skilled nursing, physical therapy, speech-language pathology, or the continued need for occupational therapy. A plan of care has been established by a physician and is periodically reviewed by a physician.    I certify that this patient is under my care and that I, or a nurse practitioner or physician's assistant working with me, had a face-to-face encounter that meets the physician face-to-face encounter requirements with this patient.   Date of Face-to-Face Encounter: 10/13/2020    I certify that, based on my findings, the following services are medically necessary home health services: RN, PT/OT    My clinical findings support the need for the above skilled services because: RN for medication management, PT/OT for ongoing strengthening and endurance.    This patient is homebound because: Patient requires maximum effort in order to get out into the community on a regular basis.    The patient is, or has been, under my care and I have initiated the establishment of the plan of care. This patient will be followed by a physician who will periodically review the plan of care.    35 total minutes spent with 25 minutes spent face-to-face with patient counseling coordination of her discharge plan including her medications and  follow-up.    Electronically signed by: Emi Wong CNP

## 2021-06-20 NOTE — LETTER
"Letter by Emi Wong CNP at      Author: Emi Wong CNP Service: -- Author Type: --    Filed:  Encounter Date: 9/17/2020 Status: (Other)         Patient: Tamy Pierce   MR Number: [id number]   YOB: 1930   Date of Visit: 9/17/2020     Code Status:  DNR/DNI  Visit Type: Review Of Multiple Medical Conditions (Fall, left pubic rami fracture, hypokalemia)     Facility:  [facility] [[id number]]      Facility Type: SNF (Skilled Nursing Facility, TCU)    History of Present Illness:   Hospital Admission Date: 9/13/2020  Hospital Discharge Date: 9/14/2020    Tamy Pierce is a 89 y.o. female who has a past medical history for rectal CA s/p colostomy 2/2 colonic obstruction (s/p palliative radiation and Xeloda on hold during TCU stay), HTN, lumbar DDD, HLD, GERD, mild intermittent asthma.  She was recently hospitalized after a mechanical fall at home in which she sustained a closed fracture of left pubic rami and left sacral ala. Orthopedic was consulted and noted there was no need for surgery with the plan for rehab and pain management.  She also had hypokalemia and is on chlorthalidone and 20meq KCL at home so her KCL was increased to 40meq however orders at the TCU show KCL 40meq two times a day.     Recheck K yesterday was 3.9 and so I did decrease her KCL to 40meq daily with a recheck next week.      Today, patient endorses \"soreness\" but overall pain is controlled. Good CMS to LE and no calf tenderness.   She is requesting that her Miralax be put back to 17gm BID as this is what she takes at home and she doesn't want to have any debris stuck in her colostomy.  I am guessing this was decreased due to hypokalemia.      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 Treated with palliative Xeloda and radiation in February 2020.  Take demeclocycline for radiation proctitis.  Restaging pelvic MRI in " June 2020 demonstrated significant interval decrease in size of the residual distal rectal mass.  Recommendations for follow up with Dr. Diggs     No past medical history on file.  Past Surgical History:   Procedure Laterality Date   ? COLOSTOMY N/A 11/20/2019    Procedure: Colostomy Formation;  Surgeon: Demarcus Moreno MD;  Location: [facility] [place];  Service: General   ? PICC AND MIDLINE TEAM LINE INSERTION  11/21/2019        ? PROCTOSCOPY N/A 11/20/2019    Procedure: PROCTOSCOPY WITH BIOPSY;  Surgeon: Demarcus Moreno MD;  Location: [facility] [place];  Service: General     No family history on file.  Social History     Socioeconomic History   ? Marital status:      Spouse name: Not on file   ? Number of children: Not on file   ? Years of education: Not on file   ? Highest education level: Not on file   Occupational History   ? Not on file   Social Needs   ? Financial resource strain: Not on file   ? Food insecurity     Worry: Not on file     Inability: Not on file   ? Transportation needs     Medical: Not on file     Non-medical: Not on file   Tobacco Use   ? Smoking status: Never Smoker   ? Smokeless tobacco: Never Used   Substance and Sexual Activity   ? Alcohol use: Yes     Frequency: Monthly or less     Drinks per session: 1 or 2     Binge frequency: Never     Comment: <1   ? Drug use: Never   ? Sexual activity: Not on file   Lifestyle   ? Physical activity     Days per week: Not on file     Minutes per session: Not on file   ? Stress: Not on file   Relationships   ? Social connections     Talks on phone: Not on file     Gets together: Not on file     Attends Anabaptism service: Not on file     Active member of club or organization: Not on file     Attends meetings of clubs or organizations: Not on file     Relationship status: Not on file   ? Intimate partner violence     Fear of current or ex partner: Not on file     Emotionally abused: Not on file     Physically abused: Not on file     Forced sexual  activity: Not on file   Other Topics Concern   ? Not on file   Social History Narrative   ? Not on file       Current Outpatient Medications   Medication Sig Dispense Refill   ? atenoloL (TENORMIN) 25 MG tablet Take 25 mg by mouth daily.     ? calcium carbonate (OS-DANIEL) 600 mg calcium (1,500 mg) tablet Take 1,200 mg by mouth daily.     ? capecitabine (XELODA) 500 MG tablet Take by mouth 2 (two) times a day.     ? chlorthalidone (HYGROTEN) 25 MG tablet Take 25 mg by mouth daily.     ? dicyclomine (BENTYL) 10 MG capsule Take 10 mg by mouth 4 (four) times a day as needed.     ? gabapentin (NEURONTIN) 100 MG capsule Take 100 mg by mouth 2 (two) times a day.     ? magnesium oxide (MAG-OX) 400 mg (241.3 mg magnesium) tablet Take 400 mg by mouth daily.     ? melatonin 3 mg Tab tablet Take 3 mg by mouth at bedtime.     ? oxyCODONE (ROXICODONE) 5 MG immediate release tablet Take 2.5-5 mg by mouth every 4 (four) hours as needed for pain.     ? pantoprazole (PROTONIX) 40 MG tablet Take 40 mg by mouth daily.     ? polyethylene glycol (MIRALAX) 17 gram packet Take 17 g by mouth 2 (two) times a day.     ? acetaminophen (TYLENOL) 325 MG tablet Take 2 tablets (650 mg total) by mouth every 6 (six) hours. pain (Patient taking differently: Take 650 mg by mouth every 4 (four) hours as needed. pain) 30 tablet 0   ? albuterol (PROAIR HFA;PROVENTIL HFA;VENTOLIN HFA) 90 mcg/actuation inhaler Inhale 2 puffs every 6 (six) hours as needed for wheezing (has on hand, uses rarely). 1 Inhaler 0   ? fluticasone propionate (FLOVENT HFA) 110 mcg/actuation inhaler Inhale 2 puffs 2 (two) times a day. asthma 1 Inhaler 12   ? multivitamin therapeutic tablet Take 1 tablet by mouth daily.     ? potassium chloride (K-DUR,KLOR-CON) 20 MEQ tablet Take 40 mEq by mouth daily.     ? simvastatin (ZOCOR) 40 MG tablet Take 1 tablet (40 mg total) by mouth at bedtime. Lipids 30 tablet 0     No current facility-administered medications for this visit.      Allergies    Allergen Reactions   ? Flu Vac 2015 (65 Up)-Mf59c(Pf)      Years ago, caused syncope, falling.  Has been able to take vaccine which is not egg based     Immunization History   Administered Date(s) Administered   ? INFLUENZA,RECOMBINANT,INJ,PF QUADRIVALENT 18+YRS 10/02/2017, 10/14/2019       Post Discharge Medication Reconciliation Status: discharge medications reconciled and changed, per note/orders    Review of Systems   Patient denies fever, chills, headache, lightheadedness, dizziness, rhinorrhea, cough, congestion, shortness of breath, chest pain, palpitations, abdominal pain, n/v, diarrhea, constipation, change in appetite, dysuria, frequency, burning or pain with urination.  Other than stated in HPI all other review of systems is negative.         Physical Exam   In order to maintain social distancing during the COVID pandemic, a visual exam was completed.     Vitals: /59, HR 63, resp 16, temp 98.0    Patient is thin, frail elderly woman and no acute distress.  Head is AT/NC, EOM intact. Respiratory effort normal. No visible LE edema. Good CMS BLE. Alert and oriented, face symmetric. No visible skin issues or rashes. Mood euthymic        Labs:    Recent Results (from the past 240 hour(s))   COVID-19 VIRUS PCR MRF    Specimen: Nasopharyngeal   Result Value Ref Range    COVID-19 VIRUS SPECIMEN SOURCE Nasopharyngeal     2019-nCOV       Test received-See reflex to IDDL test SARS CoV2 (COVID-19) Virus RT-PCR   SARS-COV-2 (COVID-19) RT-PCR-IDDL    Specimen: Nasopharyngeal   Result Value Ref Range    SARS-CoV-2 Virus Specimen Source Nasopharyngeal     SARS-CoV-2 PCR Result NEGATIVE     SARS-COV-2 PCR COMMENT       Testing was performed using the Xpert Xpress SARS-CoV-2 Assay on the Cepheid Gene-Xpert   Instrument Systems. Additional information about this Emergency Use Authorization (EUA)   assay can be found via the Lab Guide.   Potassium   Result Value Ref Range    Potassium 3.9 3.5 - 5.0 mmol/L          Assessment:  1. Closed bilateral fracture of pubic rami, sequela     2. Pain management     3. Rectal cancer (H)     4. Hypokalemia     5. Lumbar radiculopathy     6. Colonic mass     7. S/P colostomy (H)     8. Mild intermittent asthma without complication     9. Essential hypertension         Plan:     Pubic rami fracture: pain controlled with APAP and Oxycodone. Therapies    Pain management: APAP prn and oxycodone prn.  If pain increased consider scheduling APAP first.  Counseled patient on the importance of ambulation in pain improvement in this type of fracture.     Hypokalemia: In good range, will keep a close eye on.  Recheck 9/21.  Continue KCL 40meq daily.     Lumbar radiculopathy: continue gabapentin.  She was to have a EFRAIN injection on 9/16 but she is not able to get in and out of a care easily so this was rescheduled for 3 weeks from now.      Rectal Cancer and Colonic Mass: follow up with Dr. Diggs.  Holding Xeloda. On Bentyl    S/p colostomy: increase miralax to 17gm two times a day per home dosing.  Monitor hypokalmia.     Asthma: albuterol    HTN: acceptable BPs, continue with chlorthalidone and Atenolol.         Electronically signed by: Emi Wong CNP

## 2021-06-20 NOTE — LETTER
"Letter by Emi Wong CNP at      Author: Emi Wong CNP Service: -- Author Type: --    Filed:  Encounter Date: 9/21/2020 Status: (Other)         Patient: Tamy Pierce   MR Number: [id number]   YOB: 1930   Date of Visit: 9/21/2020     Code Status:  DNR/DNI  Visit Type: Follow Up (hypokalemia, colostomy pain)     Facility:  [facility] [[id number]]      Facility Type: SNF (Skilled Nursing Facility, TCU)    History of Present Illness:   Hospital Admission Date: 9/13/2020  Hospital Discharge Date: 9/14/2020    Tamy Pierce is a 89 y.o. female who has a past medical history for rectal CA s/p colostomy 2/2 colonic obstruction (s/p palliative radiation and Xeloda on hold during TCU stay), HTN, lumbar DDD, HLD, GERD, mild intermittent asthma.  She was recently hospitalized after a mechanical fall at home in which she sustained a closed fracture of left pubic rami and left sacral ala. Orthopedic was consulted and noted there was no need for surgery with the plan for rehab and pain management.  She also had hypokalemia and is on chlorthalidone and 20meq KCL at home so her KCL was increased.     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 Treated with palliative Xeloda and radiation in February 2020.  Take demeclocycline for radiation proctitis.  Restaging pelvic MRI in June 2020 demonstrated significant interval decrease in size of the residual distal rectal mass.  Recommendations for follow up with Dr. Diggs       Today, Her K is 3.3 today.   She is frustrated due her colostomy bag falling off 3x this past weekend.  She states this is due to \"thicker stool\".  She reports that she likes to keep her stool watery and is concerned that she is not taking as much Miralax as she did at home. She tells me that at home she heaps the capful of the Miralax two times a day.     hgb stable at 10.9.     Pain is " stable and she denies any calf tenderness.  She has good LE CMS.      Mag also slightly low at 1.7      Current Outpatient Medications   Medication Sig Dispense Refill   ? acetaminophen (TYLENOL) 325 MG tablet Take 2 tablets (650 mg total) by mouth every 6 (six) hours. pain (Patient taking differently: Take 650 mg by mouth every 4 (four) hours as needed. pain) 30 tablet 0   ? albuterol (PROAIR HFA;PROVENTIL HFA;VENTOLIN HFA) 90 mcg/actuation inhaler Inhale 2 puffs every 6 (six) hours as needed for wheezing (has on hand, uses rarely). (Patient taking differently: Inhale 2 puffs every 4 (four) hours as needed for wheezing (has on hand, uses rarely). ) 1 Inhaler 0   ? atenoloL (TENORMIN) 25 MG tablet Take 25 mg by mouth daily.     ? calcium carbonate (OS-DANIEL) 600 mg calcium (1,500 mg) tablet Take 1,200 mg by mouth daily.     ? capecitabine (XELODA) 500 MG tablet Take by mouth 2 (two) times a day. take for 7 days on, 7 days off     ? chlorthalidone (HYGROTEN) 25 MG tablet Take 12.5 mg by mouth daily.      ? dicyclomine (BENTYL) 10 MG capsule Take 10 mg by mouth 4 (four) times a day as needed.     ? fluticasone propionate (FLOVENT HFA) 110 mcg/actuation inhaler Inhale 2 puffs 2 (two) times a day. asthma 1 Inhaler 12   ? gabapentin (NEURONTIN) 100 MG capsule Take 100 mg by mouth 2 (two) times a day. And 200 mg at bedtime     ? magnesium oxide (MAG-OX) 400 mg (241.3 mg magnesium) tablet Take 400 mg by mouth daily.     ? melatonin 3 mg Tab tablet Take 3 mg by mouth at bedtime. And 1 additional dose overnight as needed     ? multivitamin therapeutic tablet Take 1 tablet by mouth daily.     ? ondansetron (ZOFRAN-ODT) 4 MG disintegrating tablet Take 4 mg by mouth every 6 (six) hours as needed for nausea.     ? oxyCODONE (ROXICODONE) 5 MG immediate release tablet Take 2.5-5 mg by mouth every 4 (four) hours as needed for pain.     ? pantoprazole (PROTONIX) 40 MG tablet Take 40 mg by mouth daily.     ? polyethylene glycol  (MIRALAX) 17 gram packet Take 25.5 g by mouth 2 (two) times a day.      ? potassium chloride (K-DUR,KLOR-CON) 20 MEQ tablet Take 40 mEq by mouth 2 (two) times a day.      ? simvastatin (ZOCOR) 40 MG tablet Take 1 tablet (40 mg total) by mouth at bedtime. Lipids 30 tablet 0   ? trolamine salicylate (ASPERCREME) 10 % cream Apply topically 3 (three) times a day.       No current facility-administered medications for this visit.          Review of Systems   Patient denies fever, chills, headache, lightheadedness, dizziness, rhinorrhea, cough, congestion, shortness of breath, chest pain, palpitations, abdominal pain, n/v, change in appetite, dysuria, frequency, burning or pain with urination.  Other than stated in HPI all other review of systems is negative.         Physical Exam   In order to maintain social distancing during the COVID pandemic, a visual exam was completed.     Vitals: /58, Hr 72, resp 16, temp 98.2    Patient is thin, frail elderly woman and no acute distress.  Head is AT/NC, EOM intact. Respiratory effort normal. No visible LE edema. Good CMS BLE, no calf tenderness. Colostomy producing soft brown stool. Alert and oriented, face symmetric. No visible skin issues or rashes. Mood euthymic        Labs:    Recent Results (from the past 240 hour(s))   COVID-19 VIRUS PCR MRF    Specimen: Nasopharyngeal   Result Value Ref Range    COVID-19 VIRUS SPECIMEN SOURCE Nasopharyngeal     2019-nCOV       Test received-See reflex to IDDL test SARS CoV2 (COVID-19) Virus RT-PCR   SARS-COV-2 (COVID-19) RT-PCR-IDDL    Specimen: Nasopharyngeal   Result Value Ref Range    SARS-CoV-2 Virus Specimen Source Nasopharyngeal     SARS-CoV-2 PCR Result NEGATIVE     SARS-COV-2 PCR COMMENT       Testing was performed using the Xpert Xpress SARS-CoV-2 Assay on the Cepheid Gene-Xpert   Instrument Systems. Additional information about this Emergency Use Authorization (EUA)   assay can be found via the Lab Guide.   Potassium    Result Value Ref Range    Potassium 3.9 3.5 - 5.0 mmol/L   Basic Metabolic Panel   Result Value Ref Range    Sodium 139 136 - 145 mmol/L    Potassium 3.3 (L) 3.5 - 5.0 mmol/L    Chloride 101 98 - 107 mmol/L    CO2 29 22 - 31 mmol/L    Anion Gap, Calculation 9 5 - 18 mmol/L    Glucose 94 70 - 125 mg/dL    Calcium 9.2 8.5 - 10.5 mg/dL    BUN 20 8 - 28 mg/dL    Creatinine 0.69 0.60 - 1.10 mg/dL    GFR MDRD Af Amer >60 >60 mL/min/1.73m2    GFR MDRD Non Af Amer >60 >60 mL/min/1.73m2   HM2(CBC w/o Differential)   Result Value Ref Range    WBC 5.6 4.0 - 11.0 thou/uL    RBC 3.11 (L) 3.80 - 5.40 mill/uL    Hemoglobin 10.9 (L) 12.0 - 16.0 g/dL    Hematocrit 32.7 (L) 35.0 - 47.0 %     (H) 80 - 100 fL    MCH 35.0 (H) 27.0 - 34.0 pg    MCHC 33.3 32.0 - 36.0 g/dL    RDW 14.6 (H) 11.0 - 14.5 %    Platelets 267 140 - 440 thou/uL    MPV 8.7 8.5 - 12.5 fL   Magnesium   Result Value Ref Range    Magnesium 1.7 (L) 1.8 - 2.6 mg/dL         Assessment:  1. Hypokalemia     2. Hypomagnesemia     3. S/P colostomy (H)     4. Essential hypertension     5. Closed bilateral fracture of pubic rami, sequela         Plan:     Hypokalemia: likely related to bowel wasting and diuretic.  Counseled patient on the effects of increasing Miralax.  Counseled on effects of hypokalemia.  Will increase KCL to 40meq two times a day.     Hypomagnesemia: slightly low but may help with increasing K will change her to Slow Mag 2 tabs daily.     S/P colostomy: Patient likes to keep stool watery for appliance preference.  I did explain the concern of wasting potassium.  However, my guess is she will increase her Miralax at home and continue to have hypokalemia and so I am willing to see if we can get a balance while she is here in order to make her more comfortable.  Will increase Miralax to 1 1/2 scoops two times a day.  Increase KCL 40meq two times a day and check K on 9/24.  Will give additional 60meq today to improve K.  Decrease Chlorthalidone as her  BPs are on the lower end of normal and could likely discontinue in the near future.      HTN: BPs are lower end normal with SBPs 110s and DP 50-60s.  Decrease Chlorthalidone to 12.5mg to assist with improving K wasting.  May be able to discontinue in the future. Continue on atenolol.         Electronically signed by: Emi Wong CNP

## 2021-06-20 NOTE — LETTER
Letter by Emi Wong CNP at      Author: Emi Wong CNP Service: -- Author Type: --    Filed:  Encounter Date: 9/28/2020 Status: (Other)         Patient: Tamy Pierce   MR Number: [id number]   YOB: 1930   Date of Visit: 9/28/2020     Code Status:  DNR/DNI  Visit Type: Follow Up (bowels, hypokalemia, HTN)     Facility:  [facility] [[id number]]      Facility Type: SNF (Skilled Nursing Facility, TCU)    History of Present Illness:   Hospital Admission Date: 9/13/2020  Hospital Discharge Date: 9/14/2020    Tamy Pierce is a 89 y.o. female who has a past medical history for rectal CA s/p colostomy 2/2 colonic obstruction (s/p palliative radiation and Xeloda on hold during TCU stay), HTN, lumbar DDD, HLD, GERD, mild intermittent asthma.  She was recently hospitalized after a mechanical fall at home in which she sustained a closed fracture of left pubic rami and left sacral ala. Orthopedic was consulted and noted there was no need for surgery with the plan for rehab and pain management.  She also had hypokalemia and is on chlorthalidone and 20meq KCL at home so her KCL was increased.     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 Treated with palliative Xeloda and radiation in February 2020.  Take demeclocycline for radiation proctitis.  Restaging pelvic MRI in June 2020 demonstrated significant interval decrease in size of the residual distal rectal mass.  Recommendations for follow up with Dr. Diggs       Today, reports her stool is the consistency of what it is at home after increasing MiraLAX to 1-1/2 scoops twice daily.  She is happy with her care plan as of now.  Therapy reports she is improving in strength and anticipates discharge to home with services on 10/8/2020.    Recheck potassium on 9/24 was in good range at 4.0 after increasing her potassium to 40 mEq twice daily.    Blood  pressures have been running a little on the soft side with SBP's in the 90s to low 100s.  She denies any dizziness or headaches.  She has no lower extremity edema.      Current Outpatient Medications   Medication Sig Dispense Refill   ? acetaminophen (TYLENOL) 325 MG tablet Take 2 tablets (650 mg total) by mouth every 6 (six) hours. pain (Patient taking differently: Take 650 mg by mouth every 4 (four) hours as needed. pain) 30 tablet 0   ? albuterol (PROAIR HFA;PROVENTIL HFA;VENTOLIN HFA) 90 mcg/actuation inhaler Inhale 2 puffs every 6 (six) hours as needed for wheezing (has on hand, uses rarely). (Patient taking differently: Inhale 2 puffs every 4 (four) hours as needed for wheezing (has on hand, uses rarely). ) 1 Inhaler 0   ? atenoloL (TENORMIN) 25 MG tablet Take 25 mg by mouth daily.     ? calcium carbonate (OS-DANIEL) 600 mg calcium (1,500 mg) tablet Take 1,200 mg by mouth daily.     ? capecitabine (XELODA) 500 MG tablet Take by mouth 2 (two) times a day. take for 7 days on, 7 days off     ? dicyclomine (BENTYL) 10 MG capsule Take 10 mg by mouth 4 (four) times a day as needed.     ? fluticasone propionate (FLOVENT HFA) 110 mcg/actuation inhaler Inhale 2 puffs 2 (two) times a day. asthma 1 Inhaler 12   ? gabapentin (NEURONTIN) 100 MG capsule Take 100 mg by mouth 2 (two) times a day. And 200 mg at bedtime     ? magnesium chloride (SLOW-MAG) 64 mg TbEC delayed-release tablet Take 128 mg by mouth daily.     ? melatonin 3 mg Tab tablet Take 3 mg by mouth at bedtime. And 1 additional dose overnight as needed     ? multivitamin therapeutic tablet Take 1 tablet by mouth daily.     ? ondansetron (ZOFRAN-ODT) 4 MG disintegrating tablet Take 4 mg by mouth every 6 (six) hours as needed for nausea.     ? oxyCODONE (ROXICODONE) 5 MG immediate release tablet Take 0.5-1 tablets (2.5-5 mg total) by mouth every 4 (four) hours as needed for pain. 60 tablet 0   ? pantoprazole (PROTONIX) 40 MG tablet Take 40 mg by mouth daily.     ?  polyethylene glycol (MIRALAX) 17 gram packet Take 25.5 g by mouth 2 (two) times a day.      ? potassium chloride (K-DUR,KLOR-CON) 20 MEQ tablet Take 40 mEq by mouth 2 (two) times a day.      ? simvastatin (ZOCOR) 40 MG tablet Take 1 tablet (40 mg total) by mouth at bedtime. Lipids 30 tablet 0   ? trolamine salicylate (ASPERCREME) 10 % cream Apply topically 3 (three) times a day.       No current facility-administered medications for this visit.          Review of Systems   Patient denies fever, chills, headache, lightheadedness, dizziness, rhinorrhea, cough, congestion, shortness of breath, chest pain, palpitations, abdominal pain, n/v, change in appetite, dysuria, frequency, burning or pain with urination.  Other than stated in HPI all other review of systems is negative.         Physical Exam   In order to maintain social distancing during the COVID pandemic, a visual exam was completed.     Vitals: /61, heart rate 72, respirations 18, temp 98.9.    Patient is thin, frail elderly woman and no acute distress.  Head is AT/NC, EOM intact. Respiratory effort normal. No visible LE edema. Good CMS BLE, no calf tenderness. Colostomy producing liquid brown stool. Alert and oriented, face symmetric. No visible skin issues or rashes. Mood euthymic        Labs:    Recent Results (from the past 240 hour(s))   Basic Metabolic Panel   Result Value Ref Range    Sodium 139 136 - 145 mmol/L    Potassium 3.3 (L) 3.5 - 5.0 mmol/L    Chloride 101 98 - 107 mmol/L    CO2 29 22 - 31 mmol/L    Anion Gap, Calculation 9 5 - 18 mmol/L    Glucose 94 70 - 125 mg/dL    Calcium 9.2 8.5 - 10.5 mg/dL    BUN 20 8 - 28 mg/dL    Creatinine 0.69 0.60 - 1.10 mg/dL    GFR MDRD Af Amer >60 >60 mL/min/1.73m2    GFR MDRD Non Af Amer >60 >60 mL/min/1.73m2   HM2(CBC w/o Differential)   Result Value Ref Range    WBC 5.6 4.0 - 11.0 thou/uL    RBC 3.11 (L) 3.80 - 5.40 mill/uL    Hemoglobin 10.9 (L) 12.0 - 16.0 g/dL    Hematocrit 32.7 (L) 35.0 - 47.0 %      (H) 80 - 100 fL    MCH 35.0 (H) 27.0 - 34.0 pg    MCHC 33.3 32.0 - 36.0 g/dL    RDW 14.6 (H) 11.0 - 14.5 %    Platelets 267 140 - 440 thou/uL    MPV 8.7 8.5 - 12.5 fL   Magnesium   Result Value Ref Range    Magnesium 1.7 (L) 1.8 - 2.6 mg/dL   Potassium   Result Value Ref Range    Potassium 4.0 3.5 - 5.0 mmol/L         Assessment:  1. S/P colostomy (H)     2. Hypokalemia     3. Essential hypertension     4. Closed bilateral fracture of pubic rami, sequela         Plan:   That is post colostomy: Stools are the consistency of her liking so we will continue with MiraLAX 1-1/2 scoops twice daily.    Hypokalemia: Improved with potassium supplementation.  Will recheck on 10/1 and also check magnesium.  Continue with KCl 40 mEq twice daily.    Hypertension: Blood pressures on the soft side will discontinue chlorthalidone as it is potassium wasting.  Continue with atenolol and monitor for hypotension.    Pubic rami fracture: Pain is improved only taking 1 2.5 mg of oxycodone once a day every other day or so.  We will continue with this order and APAP as needed.  Planning to be discharged in the next 10 days or so and hopefully will be able to wean her off of oxycodone.          Electronically signed by: Emi Wong CNP

## 2021-06-20 NOTE — LETTER
Letter by Alfredo Sarah MD at      Author: Alfredo Sarah MD Service: -- Author Type: --    Filed:  Encounter Date: 9/18/2020 Status: (Other)         Patient: Tamy Pierce   MR Number: [id number]   YOB: 1930   Date of Visit: 9/18/2020      [facility]    Facility:  [facility] [[id number]]    Code Status:  DNRDNI    Chief Complaint   Patient presents with   ? H & P   :                    Patient Active Problem List   Diagnosis   ? Bowel obstruction (H)   ? Colonic mass   ? Pelvic pain in female   ? Hypokalemia   ? Other partial intestinal obstruction (H)   ? Moderate aortic stenosis   ? Acute encephalopathy   ? Acute respiratory failure, unspecified whether with hypoxia or hypercapnia (H)   ? Cardiopulmonary arrest with successful resuscitation (H)   ? Transaminitis   ? Electrolyte abnormality   ? S/P colostomy (H)   ? Mild intermittent asthma without complication   ? Bradycardia   ? Complete heart block (H)   ? Hyperchloremia   ? Palliative care patient   ? Goals of care, counseling/discussion       History:  Tamy Pierce  is a 89 year old female with history of moderate aortic stenosis, cardiopulmonary arrest with successful resuscitation, rectal cancer with diverting colostomy November 2018 on palliative immunotherapy/Xeloda (has also had radiation treatment), mild intermittent asthma, GERD, lipidemia, hypertension, lumbar degenerative disc disease with right sided radiculopathy seen for admission to TCU   Hospital Course: Patient was hospitalized September 13 through September 14 after suffering a fall at home.  She tells me she was up to the bathroom but 2 in the morning changing her colostomy bag and without her cane when she fell.  She was william that her  is able to manage to get help from her son who lives 3 houses down the street.    She was unable to get up until she got help.  Work-up in the emergency room revealed sacral hoa  "fracture left, left superior pubic ramus fracture, left inferior pubic ramus fracture but no surgical fractures.  Patient was treated with pain control including oxycodone.    Hospitalization otherwise remarkable for hypokalemia which was replaced and her prior to admission KCl was increased to 40 mEq daily    Subjective/ROS:    -augmented by discussion with facility staff involved in direct care      I had a terrible night last night\".  Patient important explain the very confused wandering resident ending up in her room not once but twice turning on her television sitting down to watch it.  Is been quite unsettling for her.    Otherwise she says things have been going relatively well.  She has pain but feels like the current pain program is appropriate.  She does not feel overly sedated or constipated.  Her right leg radiculopathy is unchanged.  She was supposed to have a EFRAIN on September 16 which has now been delayed due to this injury.  She denies headaches change in vision speaking swallowing or hearing.  She does not choke or gag when she swallows.  She feels like her weight has been stable in the last few weeks.  She is not coughing shortness of breath having chest pain abdominal pain nausea or vomiting.  Her colostomy is working well.  She says she has had an excellent response to the Xeloda which is backed up by chart review.  She denies other falls or injuries, skin rashes, lymphadenopathy, fever sweats chills, remainder is negative    Past Medical History:   Diagnosis Date   ? Actinic keratosis    ? Basal cell carcinoma    ? Fracture of left inferior pubic ramus (H)    ? Fracture of left superior pubic ramus (H)    ? Gait instability    ? GERD (gastroesophageal reflux disease)    ? HLD (hyperlipidemia)    ? HTN (hypertension)    ? Lumbar radiculopathy    ? Mild intermittent asthma    ? Rectal cancer (H)    ? Urethral stricture      Past Surgical History:   Procedure Laterality Date   ? COLOSTOMY N/A " 2019    Procedure: Colostomy Formation;  Surgeon: Demarcus Moreno MD;  Location: [facility] OR;  Service: General   ? ESOPHAGOGASTRODUODENOSCOPY  2020   ? HYSTERECTOMY     ? LITHOTRIPSY     ? PICC AND MIDLINE TEAM LINE INSERTION  2019        ? PROCTOSCOPY N/A 2019    Procedure: PROCTOSCOPY WITH BIOPSY;  Surgeon: Demarcus Moreno MD;  Location: [facility] OR;  Service: General          Family History   Problem Relation Age of Onset   ? Breast cancer Mother    ? Cancer Mother         colorectal    ? Coronary artery disease Father    ? COPD Brother    :   Her son Abelardo  at age 49 of near cancer in his chest.  He was exposed to chemical stripping agents in his [employer] business    Social History     Socioeconomic History   ? Marital status:      Spouse name: Not on file   ? Number of children: Not on file   ? Years of education: Not on file   ? Highest education level: Not on file   Occupational History   ? Not on file   Social Needs   ? Financial resource strain: Not on file   ? Food insecurity     Worry: Not on file     Inability: Not on file   ? Transportation needs     Medical: Not on file     Non-medical: Not on file   Tobacco Use   ? Smoking status: Never Smoker   ? Smokeless tobacco: Never Used   Substance and Sexual Activity   ? Alcohol use: Yes     Frequency: Monthly or less     Drinks per session: 1 or 2     Binge frequency: Never     Comment: <1   ? Drug use: Never   ? Sexual activity: Not on file   Lifestyle   ? Physical activity     Days per week: Not on file     Minutes per session: Not on file   ? Stress: Not on file   Relationships   ? Social connections     Talks on phone: Not on file     Gets together: Not on file     Attends Christianity service: Not on file     Active member of club or organization: Not on file     Attends meetings of clubs or organizations: Not on file     Relationship status: Not on file   ? Intimate partner violence     Fear of current or ex  partner: Not on file     Emotionally abused: Not on file     Physically abused: Not on file     Forced sexual activity: Not on file   Other Topics Concern   ? Not on file   Social History Narrative   ? Not on file   :    Patient is .  Her spouse has dementia probably about 3 years ago.  They live in [place].  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  Her family is helping him out now.  She feels pressure to return home to return to her usual duties.  Her son lives 3 houses down from her.  She has 3 living children.  Her daughter is an RN in obstetrics at [employer].   Patient says that she and her  plan to live in their home as long as possible.  She says her children have been supportive of that plan.   We have a good time discussing common connections through [employer] on [employer] which was her son's business    Current Outpatient Medications on File Prior to Visit   Medication Sig Dispense Refill   ? trolamine salicylate (ASPERCREME) 10 % cream Apply topically 3 (three) times a day.     ? acetaminophen (TYLENOL) 325 MG tablet Take 2 tablets (650 mg total) by mouth every 6 (six) hours. pain (Patient taking differently: Take 650 mg by mouth every 4 (four) hours as needed. pain) 30 tablet 0   ? albuterol (PROAIR HFA;PROVENTIL HFA;VENTOLIN HFA) 90 mcg/actuation inhaler Inhale 2 puffs every 6 (six) hours as needed for wheezing (has on hand, uses rarely). (Patient taking differently: Inhale 2 puffs every 4 (four) hours as needed for wheezing (has on hand, uses rarely). ) 1 Inhaler 0   ? atenoloL (TENORMIN) 25 MG tablet Take 25 mg by mouth daily.     ? calcium carbonate (OS-DANIEL) 600 mg calcium (1,500 mg) tablet Take 1,200 mg by mouth daily.     ? capecitabine (XELODA) 500 MG tablet Take by mouth 2 (two) times a day. take for 7 days on, 7 days off     ? chlorthalidone (HYGROTEN) 25 MG tablet Take 25 mg by mouth daily.     ? dicyclomine (BENTYL) 10 MG capsule Take 10 mg by mouth 4 (four) times a day as  needed.     ? fluticasone propionate (FLOVENT HFA) 110 mcg/actuation inhaler Inhale 2 puffs 2 (two) times a day. asthma 1 Inhaler 12   ? gabapentin (NEURONTIN) 100 MG capsule Take 100 mg by mouth 2 (two) times a day. And 200 mg at bedtime     ? magnesium oxide (MAG-OX) 400 mg (241.3 mg magnesium) tablet Take 400 mg by mouth daily.     ? melatonin 3 mg Tab tablet Take 3 mg by mouth at bedtime. And 1 additional dose overnight as needed     ? multivitamin therapeutic tablet Take 1 tablet by mouth daily.     ? ondansetron (ZOFRAN-ODT) 4 MG disintegrating tablet Take 4 mg by mouth every 6 (six) hours as needed for nausea.     ? oxyCODONE (ROXICODONE) 5 MG immediate release tablet Take 2.5-5 mg by mouth every 4 (four) hours as needed for pain.     ? pantoprazole (PROTONIX) 40 MG tablet Take 40 mg by mouth daily.     ? polyethylene glycol (MIRALAX) 17 gram packet Take 17 g by mouth 2 (two) times a day.     ? potassium chloride (K-DUR,KLOR-CON) 20 MEQ tablet Take 40 mEq by mouth daily.     ? simvastatin (ZOCOR) 40 MG tablet Take 1 tablet (40 mg total) by mouth at bedtime. Lipids 30 tablet 0     No current facility-administered medications on file prior to visit.    :      ALLERGIES:  Egg and Flu vac 2015 (65 up)-mf59c(pf)    Vitals: Blood pressure 130/66 respiration 16 temperature 98.9 heart rate 84 O2 sats 100% weight is 110 pounds  Physical exam:  Thin elderly female who is alert oriented pleasant and normally conversant  Normocephalic/atraumatic sclera clear EOMI gaze is conjugate demonstrates good range of motion of her neck with rotation of head.  She shows purposeful movement of all 4 extremities.  She is breathing comfortably without accessory muscle use or tachypnea.  She has no bruising in the pelvic area she has no significant pain with gentle pelvic rock CMS is intact to the toe tips.  No edema excellent perfusion skin is clear and visualized portions    Due to the 2020 Covid 19 pandemic, except as noted above,  the patient was visually observed at a 6 foot plus distance.  An observational exam was performed in an effort to keep patient safe from Covid 19 and other communicable diseases.   Labs:  Lab Results   Component Value Date    WBC 9.9 12/03/2019    HGB 10.2 (L) 12/03/2019    HCT 31.4 (L) 12/03/2019    MCV 98 12/03/2019     12/03/2019     Results for orders placed or performed in visit on 12/05/19   Basic Metabolic Panel   Result Value Ref Range    Sodium 140 136 - 145 mmol/L    Potassium 4.1 3.5 - 5.0 mmol/L    Chloride 106 98 - 107 mmol/L    CO2 29 22 - 31 mmol/L    Anion Gap, Calculation 5 5 - 18 mmol/L    Glucose 91 70 - 125 mg/dL    Calcium 8.6 8.5 - 10.5 mg/dL    BUN 23 8 - 28 mg/dL    Creatinine 0.67 0.60 - 1.10 mg/dL    GFR MDRD Af Amer >60 >60 mL/min/1.73m2    GFR MDRD Non Af Amer >60 >60 mL/min/1.73m2         No results found for: TSH  No results found for: HGBA1C  [unfilled]  No results found for: [id number]  No results found for: BNP  [unfilled]  Most Recent EKG     Units 11/23/19  0609   VENTRATE BPM 96   ATRIALRATE BPM 96   QRSDURATION ms 72   QTINTERVAL ms 328   QTCCALC ms 414   P Axis degrees 21   RAXIS degrees 8   TAXIS degrees 208   MUSEDX  Sinus rhythm with Premature supraventricular complexes  Nonspecific T wave abnormality  Abnormal ECG  When compared with ECG of 20-NOV-2019 19:28,  Premature supraventricular complexes are now Present  Vent. rate has increased BY  36 BPM  QT has shortened  Confirmed by BONY MACIEL, AUGUST LOC:[facility] ([id number]) on 11/23/2019 4:16:08 PM         Pelvic CT demonstrates an acute oblique fracture involving the far lateral aspect of the left superior pubic ramus at its junction with the anterior wall of the acetabulum. No evidence for significant displacement or angulation. Oblique and very minimally displaced fracture involving the middle to anterior third of the left inferior pubic ramus. No evidence for entrance into an intact symphysis pubis. No other superior or  inferior pubic rami fractures bilaterally. No evidence for acute displaced femoral fracture. No hip dislocation. Oblique nondisplaced fracture involving the left sacral ala inferiorly, distal lateral aspect of the left S1 and S2 neural foramina. No evidence for significant entrance into an intact left SI joint. Minimal to moderate degenerative changes both SI joints. No other fractures evident. Marked degenerative disc disease lower lumbar spine. Marked lower lumbar facet arthropathy.     Assessment/Plan:      ICD-10-CM    1. S/P colostomy (H)  Z93.3    2. Acute respiratory failure, unspecified whether with hypoxia or hypercapnia (H)  J96.00    3. Moderate aortic stenosis  I35.0        Fall with injury  Left superior pubic ramus fracture  Left inferior pubic ramus fracture  Left sacral hoa fracture   Nonsurgical fractures.  Pain control and mobility are the primary focus points.  -She is satisfied with oxycodone 2.5-5 mg every 4 hours  -Has bowel program in place reports good output from her colostomy  Acetaminophen  I added trolamine 3 times daily    Presumed osteoporosis   I find no DEXA scan on record.  She does not think she has had one.  May not be helpful at this point anyway.  She remains ambulatory and may be a bisphosphonate candidate?  -Recommend calcium/vitamin D  -Recommend follow-up with primary MD to discuss bone health following discharge    Rectal cancer  Diverting colostomy November 2018  Xeloda palliative treatment  History of radiation treatment   Xeloda is on hold while at the TCU.  She will follow-up with oncology following discharge to reestablish.  Dr. Diggs is her oncologist    Radiation proctitis   Demeclocycline, dicyclomine for cramping    Hypertension   Blood pressure looks quite good here.  Continue chlorthalidone, atenolol without change check BMP CBC on September 21    Lumbar degenerative disc disease  Right-sided lumbar radiculopathy   EFRAIN originally planned for September 16 will need  to be rescheduled as she had missed that appointment.  Continue gabapentin as current    Hyperlipidemia   On simvastatin    GERD   Chronic Protonix          Case discussed with:  P  Facility staff         Alfredo Sarah MD

## 2021-06-21 NOTE — LETTER
Letter by Emi Wong CNP at      Author: Emi Wong CNP Service: -- Author Type: --    Filed:  Encounter Date: 11/27/2020 Status: (Other)         Patient: Tamy Pierce   MR Number: [id number]   YOB: 1930   Date of Visit: 11/27/2020     Code Status:  DNR  Visit Type: Follow Up (pain)     Facility:  [facility] [[id number]]        Facility Type: SNF (Skilled Nursing Facility, TCU)    History of Present Illness: Tamy Pierce is a 89 y.o. female with a past medical history for aortic stenosis, rectal cancer with a diverting colostomy on palliative Xeloda,, GERD, hyperlipidemia, HTN, lumbar DDD with right-sided radiculopathy, CKD, multiple pelvic fractures with presumed osteoporosis.  She was hospitalized 9/13/2020 for a left sacral hoa, left superior pubic rami and left inferior pubic rami fractures.  She was transition to TCU in rehab.  We discharged her here on 10/14 with good pain management and the expectation that she would follow-up with spine clinic.  She had increased pain and was started on OxyContin 10 mg twice daily in addition to her oxycodone.  She underwent a caudal epidural steroid injection.  However during the procedure she was unable to lie on her stomach and she lied on her side.  Postprocedure she did have increased severe uncontrolled pain in her right sacrum which required her to be seen in the emergency room.  She was hospitalized for her chronic low back pain, radicular right leg pain.  X-ray of lumbar spine demonstrated a grade 1 spondylolithiasis of the L5-S1 and retrolisthesis of L3-L4 and L2-L3 with degenerative disc disease.  X-ray of her right hip demonstrated subacute left pubic rami fractures without acute right hip fracture or dislocation.  CT of lumbar spine showed acute on chronic bilateral sacral wing fractures and demineralization.  Goal was for pain management which improved with scheduled APAP and OxyContin, as needed oxycodone and a prednisone taper  "x10 days.  Still continued on her prior to admission gabapentin.    Earlier this week, I received a call from nursing requesting that her pain meds be scheduled as she is waiting too long to ask for pain medication and then she has increased pain.  I instructed them to use the prns.  Today, I discuss this with the patient that she often time waits it out in hopes of it improving.  I counseled her to take the oxycodone when she has \"breakthrough\" pain as so she does not get behind on pain management.  Otherwise she feels are going well and overall the pain is improving.     Review of Systems   Patient denies fever, chills, headache, lightheadedness, dizziness, rhinorrhea, cough, congestion, shortness of breath, chest pain, palpitations, abdominal pain, n/v, diarrhea, constipation, change in appetite, dysuria, frequency, burning or pain with urination.  Other than stated in HPI all other review of systems is negative.         Physical Exam   In order to maintain social distancing during the COVID pandemic, a visual exam was completed.     Vitals:    11/27/20 1145   BP: 130/69   Pulse: 68   Resp: 18   Temp: 98.4  F (36.9  C)        Patient is thin, elderly female in no acute distress.  Head is AT/NC, EOM intact. Respiratory effort normal. No visible LE edema. Alert and oriented, face symmetric. No visible skin issues or rashes. Mood euthymic      Labs:    No results found for this or any previous visit (from the past 240 hour(s)).        Assessment:  1. Lumbar radiculopathy     2. Acute right-sided low back pain with right-sided sciatica     3. Pain     4. Physical deconditioning         Plan:   Lumbar radiculopathy: Continue with gabapentin    Low back pain: Pain is improving, counseled on timing of prn oxycodone.     Physical conditioning: PT/OT    Anticipate she is stable to discharge on Tuesday of next week.     Electronically signed by: Emi Wong CNP                        "

## 2021-06-21 NOTE — LETTER
Letter by Alfredo Sarah MD at      Author: Alfredo Sarah MD Service: -- Author Type: --    Filed:  Encounter Date: 11/13/2020 Status: (Other)         Patient: Tamy Pierce   MR Number: [id number]   YOB: 1930   Date of Visit: 11/13/2020      [facility]    Facility:  [facility] [[id number]]    Code Status:  DNR    Chief Complaint   Patient presents with   ? H & P   :                    Patient Active Problem List   Diagnosis   ? Bowel obstruction (H)   ? Colonic mass   ? Pelvic pain in female   ? Hypokalemia   ? Other partial intestinal obstruction (H)   ? Moderate aortic stenosis   ? Acute encephalopathy   ? Acute respiratory failure, unspecified whether with hypoxia or hypercapnia (H)   ? Cardiopulmonary arrest with successful resuscitation (H)   ? Transaminitis   ? Electrolyte abnormality   ? S/P colostomy (H)   ? Mild intermittent asthma without complication   ? Bradycardia   ? Complete heart block (H)   ? Hyperchloremia   ? Palliative care patient   ? Goals of care, counseling/discussion   ? Acute on chronic back pain with radiculopathy and opiate dependence (H)       History:Tamy Pierce  is a 89 year old female with history of moderate aortic stenosis, cardiopulmonary arrest with successful resuscitation, rectal cancer with diverting colostomy November 2018 on palliative immunotherapy/Xeloda (has also had radiation treatment), mild intermittent asthma, GERD, hyper lipidemia, hypertension, lumbar degenerative disc disease with right sided radiculopathy , CKD 3, IFG, urethral stricture, presumed osteoporosis with history of multiple pelvic fractures      Hospital Course: Patient was hospitalized November 7-11 for acute on chronic low back pain with right-sided radiculopathy.  She actually spent the night of November 11/12 at [facility] before she figured out that she was not getting get any therapy there and demanded transfer here  "so that she could resume therapy.      This is a similar presentation to her prior TCU stay there back in September.  She reports that by the time she left the TCU she \"felt so good\" with great pain improvement and improved function.  All however in the weeks and especially the last few days prior to hospital admission her back pain and specifically the right radiculopathy had gotten a lot worse.  There were a couple of reasons for this.  She had run out of her short acting oxycodone tablets transiently.  More so on her mind was an epidural injection attempt on November 2.  This was originally scheduled during her last TCU stay and was delayed until November 2.  She says \"I was doing so well until then\".  She reports that the injection \"went into the wrong place\" and says it was because she could not lay on her stomach due to her colostomy bag.  She said within hours of that attempt her pain became severe and unrelenting forcing her back to the emergency room.    Work-up in the hospital revealed that she has additional fractures.  Recall in September she had superior and inferior left pubic rami fracture and probably left sacral insufficiency fracture.  This time she was diagnosed with acute on chronic left sacral insufficiency fracture, acute right sacral insufficiency fracture, and foraminal stenosis.    In the hospital her pain plan was changed to include OxyContin 10 mg twice daily with ongoing short acting oxycodone 5 mg every 3 hours, acetaminophen, ibuprofen, gabapentin.  She also had a prednisone taper.  Tizanidine has been added since that time.  Patient reports that her pain was never well controlled although the oxycodone makes sense to her and she thinks it is helped get her out of the very worst of her pain.    Subjective/ROS:    -augmented by discussion with facility staff involved in direct care      -Patient says her pain is tolerable lying still but movement will increase her discomfort.  She feels " "the vast majority of the pain in the right buttock hip thigh and knee area.  She feels like she is got some numbness in the foot but has not noticed weakness in the leg.  -Patient is already worked with a therapist and says she \"feels better already\".  This is largely due to her confidence in the therapist who helped her so much last time.  -Patient describes situational grief as her  has had progression of his dementia and she and her family have decided to place him in a memory care unit which is going to happen in a few days.  She says it was very distressing in the first place and now she cannot be there as he leaves the house for the last time.  She also feels guilt over sending him off with a plan to stay at home herself.  She explains that he sleeps all day and does not really talk anymore and she cannot face \"just sitting there staring at the wall\".  She says that all her family and friends support her decision but that it is still very painful.  They will be  69 years this month and says they have had a wonderful marriage and that he has been a wonderful father.  Fortunately they have a very strong loving family support system.  Her daughter Karine lives just a couple of houses away.  -Patient denies headaches change in vision speaking swallowing hearing.  She says her appetite has been decreased with the pain but is coming back a little bit now.  She describes no chest pain cough shortness of breath orthopnea PND abdominal pain she has had a bit of nausea with the worst of the pain but no vomiting.  She is not feeling constipated with her MiraLAX.  She is not feeling any GERD with her omeprazole.  She does have Zofran when she needs it.  She says her sleep has improved with melatonin.    We had talked about DEXA scan/osteoporosis treatment at her last TCU stay and she says she never followed up for that.    Remainder ROS negative    Past Medical History:   Diagnosis Date   ? Actinic keratosis  "   ? Basal cell carcinoma    ? Fracture of left inferior pubic ramus (H)    ? Fracture of left superior pubic ramus (H)    ? Gait instability    ? GERD (gastroesophageal reflux disease)    ? HLD (hyperlipidemia)    ? HTN (hypertension)    ? Lumbar radiculopathy    ? Mild intermittent asthma    ? Rectal cancer (H)    ? Urethral stricture      Past Surgical History:   Procedure Laterality Date   ? COLOSTOMY N/A 11/20/2019    Procedure: Colostomy Formation;  Surgeon: Demarcus Moreno MD;  [place] [facility] [place]: [place]   ? ESOPHAGOGASTRODUODENOSCOPY  02/20/2020   ? HYSTERECTOMY     ? LITHOTRIPSY     ? PICC AND MIDLINE TEAM LINE INSERTION  11/21/2019        ? PROCTOSCOPY N/A 11/20/2019    Procedure: PROCTOSCOPY WITH BIOPSY;  Surgeon: Demarcus Moreno MD;  [place] [facility] [place]  Service: General          Family History   Problem Relation Age of Onset   ? Breast cancer Mother    ? Cancer Mother         colorectal    ? Coronary artery disease Father    ? COPD Brother    :       Social History     Socioeconomic History   ? Marital status:      Spouse name: Not on file   ? Number of children: Not on file   ? Years of education: Not on file   ? Highest education level: Not on file   Occupational History   ? Not on file   Social Needs   ? Financial resource strain: Not on file   ? Food insecurity     Worry: Not on file     Inability: Not on file   ? Transportation needs     Medical: Not on file     Non-medical: Not on file   Tobacco Use   ? Smoking status: Never Smoker   ? Smokeless tobacco: Never Used   Substance and Sexual Activity   ? Alcohol use: Yes     Frequency: Monthly or less     Drinks per session: 1 or 2     Binge frequency: Never     Comment: <1   ? Drug use: Never   ? Sexual activity: Not on file   Lifestyle   ? Physical activity     Days per week: Not on file     Minutes per session: Not on file   ? Stress: Not on file   Relationships   ? Social connections     Talks on phone: Not on  file     Gets together: Not on file     Attends [employer] service: Not on file     Active member of club or organization: Not on file     Attends meetings of clubs or organizations: Not on file     Relationship status: Not on file   ? Intimate partner violence     Fear of current or ex partner: Not on file     Emotionally abused: Not on file     Physically abused: Not on file     Forced sexual activity: Not on file   Other Topics Concern   ? Not on file   Social History Narrative   ? Not on file   :        Current Outpatient Medications on File Prior to Visit   Medication Sig Dispense Refill   ? acetaminophen (TYLENOL) 325 MG tablet Take 2 tablets (650 mg total) by mouth every 6 (six) hours. pain (Patient taking differently: Take 650 mg by mouth every 4 (four) hours as needed. pain) 30 tablet 0   ? albuterol (PROAIR HFA;PROVENTIL HFA;VENTOLIN HFA) 90 mcg/actuation inhaler Inhale 2 puffs every 6 (six) hours as needed for wheezing (has on hand, uses rarely). (Patient taking differently: Inhale 2 puffs every 4 (four) hours as needed for wheezing (has on hand, uses rarely). ) 1 Inhaler 0   ? aspirin 81 MG EC tablet Take 81 mg by mouth daily.     ? atenoloL (TENORMIN) 25 MG tablet Take 12.5 mg by mouth daily.      ? calcitonin, salmon, (MIACALCIN) 200 unit/actuation nasal spray Apply 1 spray into alternating nostrils daily.     ? calcium carbonate (OS-DANIEL) 600 mg calcium (1,500 mg) tablet Take 1,200 mg by mouth daily.     ? capecitabine (XELODA) 500 MG tablet Take by mouth 2 (two) times a day. take for 7 days on, 7 days off     ? dicyclomine (BENTYL) 10 MG capsule Take 10 mg by mouth 4 (four) times a day as needed.     ? fluticasone propionate (FLOVENT HFA) 110 mcg/actuation inhaler Inhale 2 puffs 2 (two) times a day. asthma 1 Inhaler 12   ? gabapentin (NEURONTIN) 100 MG capsule Take 100 mg by mouth 2 (two) times a day. And 200 mg at bedtime     ? magnesium oxide (MAG-OX) 400 mg (241.3 mg magnesium) tablet Take 400 mg by  mouth 2 (two) times a day.     ? melatonin 3 mg Tab tablet Take 3 mg by mouth at bedtime. And 1 additional dose overnight as needed     ? multivitamin therapeutic tablet Take 1 tablet by mouth daily.     ? ondansetron (ZOFRAN-ODT) 4 MG disintegrating tablet Take 4 mg by mouth every 6 (six) hours as needed for nausea.     ? oxyCODONE (OXYCONTIN) 10 mg 12 hr tablet Take 10 mg by mouth every 12 (twelve) hours.     ? oxyCODONE (ROXICODONE) 5 MG immediate release tablet Take 1 tablet (5 mg total) by mouth every 4 (four) hours as needed for pain. 30 tablet 0   ? pantoprazole (PROTONIX) 40 MG tablet Take 40 mg by mouth daily.     ? polyethylene glycol (MIRALAX) 17 gram packet Take 25.5 g by mouth 2 (two) times a day.      ? potassium chloride (K-DUR,KLOR-CON) 20 MEQ tablet Take 40 mEq by mouth 2 (two) times a day.      ? senna-docusate (PERICOLACE) 8.6-50 mg tablet Take 2 tablets by mouth 2 (two) times a day.     ? simvastatin (ZOCOR) 40 MG tablet Take 1 tablet (40 mg total) by mouth at bedtime. Lipids 30 tablet 0   ? tiZANidine (ZANAFLEX) 2 MG tablet Take 2 mg by mouth every 6 (six) hours as needed.     ? magnesium chloride (SLOW-MAG) 64 mg TbEC delayed-release tablet Take 128 mg by mouth daily.     ? trolamine salicylate (ASPERCREME) 10 % cream Apply topically 3 (three) times a day.       No current facility-administered medications on file prior to visit.    :      ALLERGIES:  Egg and Flu vac 2015 (65 up)-mf59c(pf)    Vitals:  There were no vitals taken for this visit. except as noted below    Vital signs: Blood pressure 110/50 respirations 16-97.7 heart rate 80 O2 sats 99 weight 105 pounds (patient was 110 pounds when I met her mid September 2020)   Physical exam:    Thin elderly female who is in no acute distress.  She is alert with smiles very pleasant and conversant.  Normocephalic atraumatic sclera clear gaze is conjugate good range of motion of her neck she has good range of motion of the shoulders she can lift  her arms above her head.  She demonstrates good dexterity of both hands.  She is breathing comfortably without accessory muscle use or tachypnea her abdomen is soft.  She has some tenderness with palpation of her iliac crests bilaterally she demonstrates 4+ out of 5 strength although flexion of her right hip is painful      Due to the 2020 Covid 19 pandemic, except as noted above, the patient was visually observed at a 6 foot plus distance.  An observational exam was performed in an effort to keep patient safe from Covid 19 and other communicable diseases.   Labs:  Lab Results   Component Value Date    WBC 5.6 09/21/2020    HGB 10.9 (L) 09/21/2020    HCT 32.7 (L) 09/21/2020     (H) 09/21/2020     09/21/2020     Results for orders placed or performed in visit on 09/21/20   Basic Metabolic Panel   Result Value Ref Range    Sodium 139 136 - 145 mmol/L    Potassium 3.3 (L) 3.5 - 5.0 mmol/L    Chloride 101 98 - 107 mmol/L    CO2 29 22 - 31 mmol/L    Anion Gap, Calculation 9 5 - 18 mmol/L    Glucose 94 70 - 125 mg/dL    Calcium 9.2 8.5 - 10.5 mg/dL    BUN 20 8 - 28 mg/dL    Creatinine 0.69 0.60 - 1.10 mg/dL    GFR MDRD Af Amer >60 >60 mL/min/1.73m2    GFR MDRD Non Af Amer >60 >60 mL/min/1.73m2         No results found for: TSH  No results found for: HGBA1C  [unfilled]  No results found for: [id number]  No results found for: BNP  [unfilled]  Most Recent EKG     Units 11/23/19  0609   VENTRATE BPM 96   ATRIALRATE BPM 96   QRSDURATION ms 72   QTINTERVAL ms 328   QTCCALC ms 414   P Axis degrees 21   RAXIS degrees 8   TAXIS degrees 208   MUSEDX  Sinus rhythm with Premature supraventricular complexes  Nonspecific T wave abnormality  Abnormal ECG  When compared with ECG of 20-NOV-2019 19:28,  Premature supraventricular complexes are now Present  Vent. rate has increased BY  36 BPM  QT has shortened  Confirmed by BONY MACIEL, AUGUST LOC: ([id number]) on 11/23/2019 4:16:08 PM           Assessment/Plan:      ICD-10-CM     1. Moderate aortic stenosis  I35.0    2. Uncomplicated opioid dependence (H)  F11.20        Acute on chronic lumbar back pain  Foraminal stenosis with radiculopathy right-sided  New pelvic fractures of the sacral alae  Old pelvic fractures left sacral hoa, left inferior and superior pubic rami   Patient attributes the majority of her pain to the failed epidural injections that she was doing pretty well until that point.  -Oxycodone extended release 10 mg twice daily has been added and patient feels it has been helpful  -Oxycodone short acting 5 mg every 3 hours  -Acetaminophen  -She has used some ibuprofen in the past but we are not going to do that here  -Tizanidine 2 mg every 6 hours  -Gabapentin 100 mg twice daily  -Add calcitonin nasal spray for potential pain help rather than osteoporosis treatment for 21 days  -Zofran for nausea associated with pain  -PT, OT, social service involvement  -Notably patient has finished her prednisone taper she is not sure if it helped out    Presumed osteoporosis   Patient never followed up after last TCU stay.  Has not had DEXA scan.  Has not had vitamin D level.  -Vitamin D level ordered replacement dose will depend on that level  -Calcium carbonate 1200 mg daily for bone health is already in place.  -Calcitonin nasal spray has been added for pain control not likely to have significant effect on osteoporosis  -Recommend outpatient follow-up with primary MD DEXA scanning, probable bisphosphonate    Rectal cancer  Diverting colostomy November 2018  Xeloda palliative treatment  History of radiation treatment              Xeloda is on hold while at the TCU.  She will follow-up with oncology following discharge to reestablish.  Dr. Diggs is her oncologist. scanning did show some local progression, though patient has not been symptomatically worse in any way that she can tell.    Radiation proctitis              Demeclocycline, dicyclomine for cramping     Hypertension               Blood pressure is adequately controlled so far though she has been here for a short time.  She is on less medication than she was a couple of months ago, atenolol 12.5 mg daily is her only antihypertensives at this time     Hyperlipidemia              On simvastatin     GERD              Chronic Protonix     CKD 3   Looks good now with creatinine 1.69, avoid nephrotoxins    Constipation   A bigger concern with long-acting opiates added onto her short acting opiates.  Nonetheless she does well on MiraLAX 17 g twice daily which was not changed.    Asthma   At stable baseline with Flovent and as needed albuterol    Situational grief   Good talk today.  Could consider ACP consult but I did not order that today.  She is on melatonin for sleep.  She has strong family support.    Hyperlipidemia   Simvastatin continues for now          Case discussed with:    Facility staff       Alfredo Sarah MD

## 2021-06-21 NOTE — LETTER
Letter by Alfredo Sarah MD at      Author: Alfredo Sarah MD Service: -- Author Type: --    Filed:  Encounter Date: 11/20/2020 Status: (Other)         Patient: Tamy Pierce   MR Number: [id number]   YOB: 1930   Date of Visit: 11/20/2020      [facility]    Facility:  [facility] [[id number]]    Code Status:  DNR    Chief Complaint   Patient presents with   ? Review Of Multiple Medical Conditions   :                    Patient Active Problem List   Diagnosis   ? Bowel obstruction (H)   ? Colonic mass   ? Pelvic pain in female   ? Hypokalemia   ? Other partial intestinal obstruction (H)   ? Moderate aortic stenosis   ? Acute encephalopathy   ? Acute respiratory failure, unspecified whether with hypoxia or hypercapnia (H)   ? Cardiopulmonary arrest with successful resuscitation (H)   ? Transaminitis   ? Electrolyte abnormality   ? S/P colostomy (H)   ? Mild intermittent asthma without complication   ? Bradycardia   ? Complete heart block (H)   ? Hyperchloremia   ? Palliative care patient   ? Goals of care, counseling/discussion   ? Acute on chronic back pain with radiculopathy and opiate dependence (H)       History:Tamy Pierce  is a 89 year old female with history of moderate aortic stenosis, cardiopulmonary arrest with successful resuscitation, rectal cancer with diverting colostomy November 2018 on palliative immunotherapy/Xeloda (has also had radiation treatment), mild intermittent asthma, GERD, hyper lipidemia, hypertension, lumbar degenerative disc disease with right sided radiculopathy , CKD 3, IFG, urethral stricture, presumed osteoporosis with history of multiple pelvic fractures      Hospital Course: Patient was hospitalized November 7-11 for acute on chronic low back pain with right-sided radiculopathy.  She actually spent the night of November 11/12 at [facility] before she figured out that she was not getting get any therapy  "there and demanded transfer here so that she could resume therapy.      This is a similar presentation to her prior TCU stay there back in September.  She reports that by the time she left the TCU she \"felt so good\" with great pain improvement and improved function.  All however in the weeks and especially the last few days prior to hospital admission her back pain and specifically the right radiculopathy had gotten a lot worse.  There were a couple of reasons for this.  She had run out of her short acting oxycodone tablets transiently.  More so on her mind was an epidural injection attempt on November 2.  This was originally scheduled during her last TCU stay and was delayed until November 2.  She says \"I was doing so well until then\".  She reports that the injection \"went into the wrong place\" and says it was because she could not lay on her stomach due to her colostomy bag.  She said within hours of that attempt her pain became severe and unrelenting forcing her back to the emergency room.    Work-up in the hospital revealed that she has additional fractures.  Recall in September she had superior and inferior left pubic rami fracture and probably left sacral insufficiency fracture.  This time she was diagnosed with acute on chronic left sacral insufficiency fracture, acute right sacral insufficiency fracture, and foraminal stenosis.    In the hospital her pain plan was changed to include OxyContin 10 mg twice daily with ongoing short acting oxycodone 5 mg every 3 hours, acetaminophen, ibuprofen, gabapentin.  She also had a prednisone taper.  Tizanidine has been added since that time.  Patient reports that her pain was never well controlled although the oxycodone makes sense to her and she thinks it is helped get her out of the very worst of her pain.    Subjective/ROS:    -augmented by discussion with facility staff involved in direct care    -Patient reports that she was doing far better than when we last met.  " "When I asked her about pain medication she says \"I think I am taking a lot? \".  Patient is on extended release oxycodone 10 mg every 12 hours as well as instant release 5 mg by mouth every 4 hours.  She is not taking the latter \"on the clock\" but needs it frequently.  The good news is that she is up out of bed moving around working with physical therapy.  She says she can get up on her own and make it back and forth to the bathroom.  She is not feeling at all constipated despite the narcotics.  She says she is always taken MiraLAX due to her colostomy and likes to keep the consistency on the loose side anyway.  The pain itself is in the right upper lateral thigh.  There is less radiation down the leg, virtually none now.    -Patient reports her  is moving Monday.  As noted earlier it sounds are greatly but she is thankful for her children who are helping him      Patient denies chest pain shortness of breath cough orthopnea PND peripheral edema vomiting fever sweats chills.  Remainder negative.  She is sleeping okay with the melatonin.      We had talked about DEXA scan/osteoporosis treatment at her last TCU stay and she says she never followed up for that.    Remainder ROS negative    Past Medical History:   Diagnosis Date   ? Actinic keratosis    ? Basal cell carcinoma    ? Fracture of left inferior pubic ramus (H)    ? Fracture of left superior pubic ramus (H)    ? Gait instability    ? GERD (gastroesophageal reflux disease)    ? HLD (hyperlipidemia)    ? HTN (hypertension)    ? Lumbar radiculopathy    ? Mild intermittent asthma    ? Rectal cancer (H)    ? Urethral stricture      Past Surgical History:   Procedure Laterality Date   ? COLOSTOMY N/A 11/20/2019    Procedure: Colostomy Formation;  Surgeon: Demarcus Moreno MD;  Location: [facility] OR;  Service: General   ? ESOPHAGOGASTRODUODENOSCOPY  02/20/2020   ? HYSTERECTOMY     ? LITHOTRIPSY     ? PICC AND MIDLINE TEAM LINE INSERTION  11/21/2019      "   ? PROCTOSCOPY N/A 11/20/2019    Procedure: PROCTOSCOPY WITH BIOPSY;  Surgeon: Demarcus Moreno MD;  Location: [facility] OR;  Service: General          Family History   Problem Relation Age of Onset   ? Breast cancer Mother    ? Cancer Mother         colorectal    ? Coronary artery disease Father    ? COPD Brother    :       Social History     Socioeconomic History   ? Marital status:      Spouse name: Not on file   ? Number of children: Not on file   ? Years of education: Not on file   ? Highest education level: Not on file   Occupational History   ? Not on file   Social Needs   ? Financial resource strain: Not on file   ? Food insecurity     Worry: Not on file     Inability: Not on file   ? Transportation needs     Medical: Not on file     Non-medical: Not on file   Tobacco Use   ? Smoking status: Never Smoker   ? Smokeless tobacco: Never Used   Substance and Sexual Activity   ? Alcohol use: Yes     Frequency: Monthly or less     Drinks per session: 1 or 2     Binge frequency: Never     Comment: <1   ? Drug use: Never   ? Sexual activity: Not on file   Lifestyle   ? Physical activity     Days per week: Not on file     Minutes per session: Not on file   ? Stress: Not on file   Relationships   ? Social connections     Talks on phone: Not on file     Gets together: Not on file     Attends Sikhism service: Not on file     Active member of club or organization: Not on file     Attends meetings of clubs or organizations: Not on file     Relationship status: Not on file   ? Intimate partner violence     Fear of current or ex partner: Not on file     Emotionally abused: Not on file     Physically abused: Not on file     Forced sexual activity: Not on file   Other Topics Concern   ? Not on file   Social History Narrative   ? Not on file   :        Current Outpatient Medications on File Prior to Visit   Medication Sig Dispense Refill   ? acetaminophen (TYLENOL) 325 MG tablet Take 2 tablets (650 mg total) by  mouth every 6 (six) hours. pain (Patient taking differently: Take 650 mg by mouth every 4 (four) hours as needed. pain) 30 tablet 0   ? albuterol (PROAIR HFA;PROVENTIL HFA;VENTOLIN HFA) 90 mcg/actuation inhaler Inhale 2 puffs every 6 (six) hours as needed for wheezing (has on hand, uses rarely). (Patient taking differently: Inhale 2 puffs every 4 (four) hours as needed for wheezing (has on hand, uses rarely). ) 1 Inhaler 0   ? aspirin 81 MG EC tablet Take 81 mg by mouth daily.     ? atenoloL (TENORMIN) 25 MG tablet Take 12.5 mg by mouth daily.      ? calcitonin, salmon, (MIACALCIN) 200 unit/actuation nasal spray Apply 1 spray into alternating nostrils daily.     ? calcium carbonate (OS-DANIEL) 600 mg calcium (1,500 mg) tablet Take 1,200 mg by mouth daily.     ? capecitabine (XELODA) 500 MG tablet Take by mouth 2 (two) times a day. take for 7 days on, 7 days off     ? dicyclomine (BENTYL) 10 MG capsule Take 10 mg by mouth 4 (four) times a day as needed.     ? fluticasone propionate (FLOVENT HFA) 110 mcg/actuation inhaler Inhale 2 puffs 2 (two) times a day. asthma 1 Inhaler 12   ? gabapentin (NEURONTIN) 100 MG capsule Take 100 mg by mouth 2 (two) times a day. And 200 mg at bedtime     ? magnesium oxide (MAG-OX) 400 mg (241.3 mg magnesium) tablet Take 400 mg by mouth 2 (two) times a day.     ? melatonin 3 mg Tab tablet Take 3 mg by mouth at bedtime. And 1 additional dose overnight as needed     ? multivitamin therapeutic tablet Take 1 tablet by mouth daily.     ? ondansetron (ZOFRAN-ODT) 4 MG disintegrating tablet Take 4 mg by mouth every 6 (six) hours as needed for nausea.     ? oxyCODONE (OXYCONTIN) 10 mg 12 hr tablet Take 10 mg by mouth every 12 (twelve) hours.     ? oxyCODONE (ROXICODONE) 5 MG immediate release tablet Take 1 tablet (5 mg total) by mouth every 4 (four) hours as needed for pain. 30 tablet 0   ? pantoprazole (PROTONIX) 40 MG tablet Take 40 mg by mouth daily.     ? polyethylene glycol (MIRALAX) 17 gram  packet Take 25.5 g by mouth 2 (two) times a day.      ? potassium chloride (K-DUR,KLOR-CON) 20 MEQ tablet Take 40 mEq by mouth 2 (two) times a day.      ? predniSONE (DELTASONE) 10 mg tablet Take by mouth daily Give 40 mg by mouth one time a day for acute Back pain w/ sciatica until 11/13/2020 23:59 x2 days, give with food AND Give 30 mg by mouth one time a day for acute Back pain w/ sciatica until 11/15/2020 23:59 x2 days, give with food AND Give 20 mg by mouth one time a day for acute Back pain w/ sciatica until 11/17/2020 23:59 x2 days, give with food AND Give 10 mg by mouth one time a day for acute Back pain w/ sciatica until 11/19/2020 23:59 x2 days, give with food .     ? senna-docusate (PERICOLACE) 8.6-50 mg tablet Take 2 tablets by mouth 2 (two) times a day as needed.      ? simvastatin (ZOCOR) 40 MG tablet Take 1 tablet (40 mg total) by mouth at bedtime. Lipids 30 tablet 0   ? tiZANidine (ZANAFLEX) 2 MG tablet Take 2 mg by mouth every 6 (six) hours as needed.       No current facility-administered medications on file prior to visit.    :      ALLERGIES:  Egg and Flu vac 2015 (65 up)-mf59c(pf)    Vitals: Blood pressure 118/64 respirations 18 temperature 98 heart rate 66 O2 sats 96% on room air weight is 104 pounds and stable  Physical exam:    Patient looks much better and brighter than when I saw her last time.  She is smiles and repeatedly expresses her thankfulness to the staff for helping her improve.  Alert and oriented x3 she is breathing comfort without accessory muscle use or tachypnea.  Her speech is clear and she answers questions appropriately.  She has purposeful movement of all 4 extremities.  Skin is clear in visualized portions.          Due to the 2020 Covid 19 pandemic, except as noted above, the patient was visually observed at a 6 foot plus distance.  An observational exam was performed in an effort to keep patient safe from Covid 19 and other communicable diseases.   Labs:  Lab Results    Component Value Date    WBC 5.6 09/21/2020    HGB 10.9 (L) 09/21/2020    HCT 32.7 (L) 09/21/2020     (H) 09/21/2020     09/21/2020     Results for orders placed or performed in visit on 09/21/20   Basic Metabolic Panel   Result Value Ref Range    Sodium 139 136 - 145 mmol/L    Potassium 3.3 (L) 3.5 - 5.0 mmol/L    Chloride 101 98 - 107 mmol/L    CO2 29 22 - 31 mmol/L    Anion Gap, Calculation 9 5 - 18 mmol/L    Glucose 94 70 - 125 mg/dL    Calcium 9.2 8.5 - 10.5 mg/dL    BUN 20 8 - 28 mg/dL    Creatinine 0.69 0.60 - 1.10 mg/dL    GFR MDRD Af Amer >60 >60 mL/min/1.73m2    GFR MDRD Non Af Amer >60 >60 mL/min/1.73m2         No results found for: TSH  No results found for: HGBA1C  [unfilled]  No results found for: [id number]  No results found for: BNP  [unfilled]  Most Recent EKG     Units 11/23/19  0609   VENTRATE BPM 96   ATRIALRATE BPM 96   QRSDURATION ms 72   QTINTERVAL ms 328   QTCCALC ms 414   P Axis degrees 21   RAXIS degrees 8   TAXIS degrees 208   MUSEDX  Sinus rhythm with Premature supraventricular complexes  Nonspecific T wave abnormality  Abnormal ECG  When compared with ECG of 20-NOV-2019 19:28,  Premature supraventricular complexes are now Present  Vent. rate has increased BY  36 BPM  QT has shortened  Confirmed by BONY MACIEL, AUGUST LOC: ([id number]) on 11/23/2019 4:16:08 PM           Assessment/Plan:      ICD-10-CM    1. Uncomplicated opioid dependence (H)  F11.20        Acute on chronic lumbar back pain  Foraminal stenosis with radiculopathy right-sided  New pelvic fractures of the sacral alae  Old pelvic fractures left sacral hoa, left inferior and superior pubic rami   Patient has improved.  Her pain is more localized now in the upper lateral thigh.  -Oxycodone extended release 10 mg twice daily without change today  -Oxycodone short acting 5 mg every 3 hours, taper as able  -Acetaminophen  -Tizanidine 2 mg every 8 hours as needed.  May taper this as well.  -Gabapentin 100 mg twice  daily likely long-term.  -Add calcitonin nasal spray for potential pain help rather than osteoporosis treatment for for an additional 14 days from this point at minimum.  -Zofran for nausea associated with pain, though her pain is better and so is her nausea.  -PT, OT, social service involvement  -Notably patient has finished her prednisone taper--- she is not sure it was helpful.    Presumed osteoporosis   Patient never followed up after last TCU stay.  Has not had DEXA scan.  Has not had vitamin D level.  -Vitamin D level 40.  Recommend 1000 international units twice daily  -Calcium carbonate 1200 mg daily for bone health is already in place.  -Calcitonin nasal spray has been added for pain control not likely to have significant effect on osteoporosis  -Recommend outpatient follow-up with primary MD DEXA scanning, probable bisphosphonate    Rectal cancer  Diverting colostomy November 2018  Xeloda palliative treatment  History of radiation treatment              Xeloda is on hold while at the TCU.  She will follow-up with oncology following discharge to reestablish.  Dr. Diggs is her oncologist. scanning did show some local progression, though patient has not been symptomatically worse in any way that she can tell.    Radiation proctitis              Demeclocycline, dicyclomine for cramping     Hypertension             Blood pressures doing well here.  She is on less medication than she was a couple of months ago, atenolol 12.5 mg daily is her only antihypertensives at this time     Hyperlipidemia              On simvastatin     GERD              Chronic Protonix     CKD 3   Deteriorated since last fall in September her creatinine was 0.69.  Was recently 1.67.  Avoid nephrotoxins    Constipation   Doing well despite opiates.  Continue MiraLAX 17 g twice daily which was not changed.    Asthma   At stable baseline with Flovent and as needed albuterol    Situational grief   Good talk today.  Difficult situation but  necessary as she is no longer able to provide the care and supervision her  requires.  She is on melatonin for sleep.  She has strong family support.    Hyperlipidemia   Simvastatin continues for now          Case discussed with:    Facility staff       Alfredo Sarah MD

## 2021-06-24 ENCOUNTER — HOSPITAL ENCOUNTER (OUTPATIENT)
Dept: LAB | Facility: CLINIC | Age: 86
End: 2021-06-24
Attending: NURSE PRACTITIONER
Payer: MEDICARE

## 2021-06-24 ENCOUNTER — HOSPITAL ENCOUNTER (OUTPATIENT)
Dept: CT IMAGING | Facility: CLINIC | Age: 86
End: 2021-06-24
Attending: NURSE PRACTITIONER
Payer: MEDICARE

## 2021-06-24 DIAGNOSIS — C20 RECTAL CANCER (H): ICD-10-CM

## 2021-06-24 LAB
ALBUMIN SERPL-MCNC: 3 G/DL (ref 3.4–5)
ALP SERPL-CCNC: 108 U/L (ref 40–150)
ALT SERPL W P-5'-P-CCNC: 24 U/L (ref 0–50)
ANION GAP SERPL CALCULATED.3IONS-SCNC: 2 MMOL/L (ref 3–14)
AST SERPL W P-5'-P-CCNC: 19 U/L (ref 0–45)
BASOPHILS # BLD AUTO: 0.1 10E9/L (ref 0–0.2)
BASOPHILS NFR BLD AUTO: 0.6 %
BILIRUB SERPL-MCNC: 0.3 MG/DL (ref 0.2–1.3)
BUN SERPL-MCNC: 31 MG/DL (ref 7–30)
CALCIUM SERPL-MCNC: 8.9 MG/DL (ref 8.5–10.1)
CEA SERPL-MCNC: 1.9 UG/L (ref 0–2.5)
CHLORIDE SERPL-SCNC: 110 MMOL/L (ref 94–109)
CO2 SERPL-SCNC: 29 MMOL/L (ref 20–32)
CREAT SERPL-MCNC: 0.89 MG/DL (ref 0.52–1.04)
DIFFERENTIAL METHOD BLD: ABNORMAL
EOSINOPHIL # BLD AUTO: 0.5 10E9/L (ref 0–0.7)
EOSINOPHIL NFR BLD AUTO: 6.6 %
ERYTHROCYTE [DISTWIDTH] IN BLOOD BY AUTOMATED COUNT: 15.4 % (ref 10–15)
GFR SERPL CREATININE-BSD FRML MDRD: 56 ML/MIN/{1.73_M2}
GLUCOSE SERPL-MCNC: 101 MG/DL (ref 70–99)
HCT VFR BLD AUTO: 36.5 % (ref 35–47)
HGB BLD-MCNC: 11.9 G/DL (ref 11.7–15.7)
IMM GRANULOCYTES # BLD: 0 10E9/L (ref 0–0.4)
IMM GRANULOCYTES NFR BLD: 0.5 %
LYMPHOCYTES # BLD AUTO: 1.1 10E9/L (ref 0.8–5.3)
LYMPHOCYTES NFR BLD AUTO: 14.6 %
MCH RBC QN AUTO: 35.1 PG (ref 26.5–33)
MCHC RBC AUTO-ENTMCNC: 32.6 G/DL (ref 31.5–36.5)
MCV RBC AUTO: 108 FL (ref 78–100)
MONOCYTES # BLD AUTO: 0.8 10E9/L (ref 0–1.3)
MONOCYTES NFR BLD AUTO: 9.8 %
NEUTROPHILS # BLD AUTO: 5.2 10E9/L (ref 1.6–8.3)
NEUTROPHILS NFR BLD AUTO: 67.9 %
NRBC # BLD AUTO: 0 10*3/UL
NRBC BLD AUTO-RTO: 0 /100
PLATELET # BLD AUTO: 275 10E9/L (ref 150–450)
POTASSIUM SERPL-SCNC: 4.1 MMOL/L (ref 3.4–5.3)
PROT SERPL-MCNC: 6.3 G/DL (ref 6.8–8.8)
RBC # BLD AUTO: 3.39 10E12/L (ref 3.8–5.2)
SODIUM SERPL-SCNC: 141 MMOL/L (ref 133–144)
WBC # BLD AUTO: 7.7 10E9/L (ref 4–11)

## 2021-06-24 PROCEDURE — 250N000011 HC RX IP 250 OP 636: Performed by: RADIOLOGY

## 2021-06-24 PROCEDURE — 250N000009 HC RX 250: Performed by: RADIOLOGY

## 2021-06-24 PROCEDURE — G1004 CDSM NDSC: HCPCS

## 2021-06-24 PROCEDURE — 80053 COMPREHEN METABOLIC PANEL: CPT | Performed by: INTERNAL MEDICINE

## 2021-06-24 PROCEDURE — 85025 COMPLETE CBC W/AUTO DIFF WBC: CPT | Performed by: INTERNAL MEDICINE

## 2021-06-24 PROCEDURE — 82378 CARCINOEMBRYONIC ANTIGEN: CPT | Performed by: INTERNAL MEDICINE

## 2021-06-24 PROCEDURE — 36415 COLL VENOUS BLD VENIPUNCTURE: CPT | Performed by: INTERNAL MEDICINE

## 2021-06-24 RX ORDER — IOPAMIDOL 755 MG/ML
52 INJECTION, SOLUTION INTRAVASCULAR ONCE
Status: COMPLETED | OUTPATIENT
Start: 2021-06-24 | End: 2021-06-24

## 2021-06-24 RX ADMIN — IOPAMIDOL 52 ML: 755 INJECTION, SOLUTION INTRAVENOUS at 11:01

## 2021-06-24 RX ADMIN — SODIUM CHLORIDE 53 ML: 9 INJECTION, SOLUTION INTRAVENOUS at 11:01

## 2021-07-08 ENCOUNTER — VIRTUAL VISIT (OUTPATIENT)
Dept: ONCOLOGY | Facility: CLINIC | Age: 86
End: 2021-07-08
Attending: NURSE PRACTITIONER
Payer: MEDICARE

## 2021-07-08 DIAGNOSIS — C20 RECTAL CANCER (H): Primary | ICD-10-CM

## 2021-07-08 PROCEDURE — 99442 PR PHYSICIAN TELEPHONE EVALUATION 11-20 MIN: CPT | Performed by: NURSE PRACTITIONER

## 2021-07-08 NOTE — PROGRESS NOTES
"Pam is a 89 year old who is being evaluated via a billable video visit.      How would you like to obtain your AVS? MyChart  If the video visit is dropped, the invitation should be resent by: Text to cell phone: [phone].  Will anyone else be joining your video visit? Yes: Daughter- Karine. . How would they like to receive their invitation? Text to cell phone: [phone].      Video Start Time: 10:48 AM  Video-Visit Details    Type of service:  Video Visit    Video End Time:10:50 AM    Originating Location (pt. Location): Home    Distant Location (provider location):  [facility]     Platform used for Video Visit: Doximity   Vitals - Patient Reported  Weight (Patient Reported): 46.3 kg (102 lb)  Height (Patient Reported): 193 cm (6' 4\")  BMI (Based on Pt Reported Ht/Wt): 12.42  Pain Score: No Pain (0)     Patient is requesting refill on her pain medication, Oxycodone (Roxicodone) 2.5mg.        Lulu Givens, GIANA on 7/8/2021 at 10:50 AM    "

## 2021-07-08 NOTE — LETTER
"    7/8/2021         RE: Pam Pierce  [address] [address]        Dear Colleague,    Thank you for referring your patient, Pam Pierce, to the [facility]. Please see a copy of my visit note below.    Pam is a 89 year old who is being evaluated via a billable video visit.      How would you like to obtain your AVS? MyChart  If the video visit is dropped, the invitation should be resent by: Text to cell phone: [phone].  Will anyone else be joining your video visit? Yes: Daughter- Karine. . How would they like to receive their invitation? Text to cell phone: [phone].      Video Start Time: 10:48 AM  Video-Visit Details    Type of service:  Video Visit    Video End Time:10:50 AM    Originating Location (pt. Location): Home    Distant Location (provider location):  [facility]     Platform used for Video Visit: Doximity   Vitals - Patient Reported  Weight (Patient Reported): 46.3 kg (102 lb)  Height (Patient Reported): 193 cm (6' 4\")  BMI (Based on Pt Reported Ht/Wt): 12.42  Pain Score: No Pain (0)     Patient is requesting refill on her pain medication, Oxycodone (Roxicodone) 2.5mg.        Lulu Givens CMA on 7/8/2021 at 10:50 AM      [facility] Hematology and Oncology Progress Note    Patient: Pam Pierce  MRN: [id number]        Reason for Visit    1. Rectal cancer    Telephone visit    _____________________________________________________________________________    History of Present Illness/ Interval History    Ms. Pam Pierce  is a 89 year old on palliative Xeloda (x 1 year) for advanced rectal cancer. Returns for 2-month visit. Karine, daughter, joined visit.     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 Otherwise, tolerating it " very well. Empties colostomy 8-10 times a day on Metamucil (she prefers to keep on liquid side). White/slightly bloody foul-smelling discharge from rectum is chronic but a bit more amount in last few months. No rectal pain. No new pain. No nausea. No skin toxicity.    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 A steroid injection hasn't been feasible. Muscle relaxant and half tab narcotic at bedtime is helping.      Oncology History/Treatment  Diagnosis/Stage:   11/2019: rectal cancer T4b-N2  -presented with years duration abd pain (age 89)  -CT: rectosigmoid mass causing colonic obstruction with proximal colon distended with fecal debris. Mass extends beyond serosa of bowel wall with multiple small probable metastatic mesorectal LNs. No evident liver mets.  -mass biopsy: adenoca arising in tubular adenoma, focal signet ring cell features  -PET: advanced T4b-N2 tumor of low and mid-rectum with invasion through superior vagina and to posterior margin of urinary bladder  -Brain MRI: likely meningioma vs brain met    Treatment:  11/2019: emergency diverting colostomy and biopsy of mass    2/2020: palliative RT    2/2020: palliative Xeloda with response  -dose-reduced 8/2020 to 1 g twice daily 7 days on/7 days off for leg neuropathy pain (+/- radicular spine pain with DJD)  -dose reduced 2/2020 to 1 g twice daily 6 days on/8 days off for hand neuropathy      Physical Exam    GENERAL: Alert and oriented to time place and person. Daughter, Karine, assisted on speaker phone.      Lab Results    CEA: 1.9  CBC: unremarkable  CMP: unremarkable    Imaging    6/24/2021 CT abd/pelvis: interval increase in wall thickening at distal sigmoid colon/rectum indeterminate for post-therapy change vs disease progression. No adenopathy or mets.    1/28/21 PET: ongoing response in rectal mass, no mets    Assessment/Plan  1. Locally advanced rectal cancer  Clinically, largely stable but has noted slight  increase in rectal drainage which is occasionally bloody.  Radiographically, CT suggests increased rectal wall thickening that's indeterminate.   Tumor marker stable    Tolerating current Xeloda dosing (1 g twice daily 6 days on/8 days off). Hand neuropathy better.    Plan:  -Continue Xeloda at current dosing 1 g twice daily 6 days/8 days off  -Obtain PET scan to clarify the rectal thickening and exclude cancer progression in next month. She prefers follow-up via phone/video to review results   -If this is benign, continue current therapy with 3 month f/u in clinic.  -If cancer progression, will need change in treatment    2.   Peripheral neuropathy  Xeloda was affecting her hand neuropathy a bit more in Jan-Feb. With dose-modification, this is better in hands. Persistent in legs related to lower back problems. On gabapentin 100 mg AM/200 mg afternoon/300 mg bedtime daily.     Plan:  -Continue Xeloda dosing/frequency, as is  -Continue gabapentin    3. Spine DJD/insufficiency fractures  Improved with muscle relaxant and low dose pain med at bedtime. Managed with PCP.      [facility]  telephone start: [id number];  End: 1250    Signed by: Ria Ortiz NP        Again, thank you for allowing me to participate in the care of your patient.        Sincerely,        Ria Ortiz NP

## 2021-07-08 NOTE — LETTER
"    7/8/2021         RE: Pam Pierce  [address] [facility] [address]        Dear Colleague,    Thank you for referring your patient, Pam Pierce, to the [facility]. Please see a copy of my visit note below.    Pam is a 89 year old who is being evaluated via a billable video visit.      How would you like to obtain your AVS? MyChart  If the video visit is dropped, the invitation should be resent by: Text to cell phone: [phone].  Will anyone else be joining your video visit? Yes: Daughter- Karine. . How would they like to receive their invitation? Text to cell phone: [phone].      Video Start Time: 10:48 AM  Video-Visit Details    Type of service:  Video Visit    Video End Time:10:50 AM    Originating Location (pt. Location): Home    Distant Location (provider location):  [facility]     Platform used for Video Visit: Doximity   Vitals - Patient Reported  Weight (Patient Reported): 46.3 kg (102 lb)  Height (Patient Reported): 193 cm (6' 4\")  BMI (Based on Pt Reported Ht/Wt): 12.42  Pain Score: No Pain (0)     Patient is requesting refill on her pain medication, Oxycodone (Roxicodone) 2.5mg.        Lulu Givens CMA on 7/8/2021 at 10:50 AM      [facility] Hematology and Oncology Progress Note    Patient: Pam Pierce  MRN: [id number]        Reason for Visit    1. Rectal cancer    Telephone visit    _____________________________________________________________________________    History of Present Illness/ Interval History    Ms. Pam Pierce  is a 89 year old on palliative Xeloda (x 1 year) for advanced rectal cancer. Returns for 2-month visit. Karine, daughter, joined visit.     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 Otherwise, tolerating it " very well. Empties colostomy 8-10 times a day on Metamucil (she prefers to keep on liquid side). White/slightly bloody foul-smelling discharge from rectum is chronic but a bit more amount in last few months. No rectal pain. No new pain. No nausea. No skin toxicity.    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 A steroid injection hasn't been feasible. Muscle relaxant and half tab narcotic at bedtime is helping.      Oncology History/Treatment  Diagnosis/Stage:   11/2019: rectal cancer T4b-N2  -presented with years duration abd pain (age 89)  -CT: rectosigmoid mass causing colonic obstruction with proximal colon distended with fecal debris. Mass extends beyond serosa of bowel wall with multiple small probable metastatic mesorectal LNs. No evident liver mets.  -mass biopsy: adenoca arising in tubular adenoma, focal signet ring cell features  -PET: advanced T4b-N2 tumor of low and mid-rectum with invasion through superior vagina and to posterior margin of urinary bladder  -Brain MRI: likely meningioma vs brain met    Treatment:  11/2019: emergency diverting colostomy and biopsy of mass    2/2020: palliative RT    2/2020: palliative Xeloda with response  -dose-reduced 8/2020 to 1 g twice daily 7 days on/7 days off for leg neuropathy pain (+/- radicular spine pain with DJD)  -dose reduced 2/2020 to 1 g twice daily 6 days on/8 days off for hand neuropathy      Physical Exam    GENERAL: Alert and oriented to time place and person. Daughter, Karine, assisted on speaker phone.      Lab Results    CEA: 1.9  CBC: unremarkable  CMP: unremarkable    Imaging    6/24/2021 CT abd/pelvis: interval increase in wall thickening at distal sigmoid colon/rectum indeterminate for post-therapy change vs disease progression. No adenopathy or mets.    1/28/21 PET: ongoing response in rectal mass, no mets    Assessment/Plan  1. Locally advanced rectal cancer  Clinically, largely stable but has noted slight  increase in rectal drainage which is occasionally bloody.  Radiographically, CT suggests increased rectal wall thickening that's indeterminate.   Tumor marker stable    Tolerating current Xeloda dosing (1 g twice daily 6 days on/8 days off). Hand neuropathy better.    Plan:  -Continue Xeloda at current dosing 1 g twice daily 6 days/8 days off  -Obtain PET scan to clarify the rectal thickening and exclude cancer progression in next month. She prefers follow-up via phone/video to review results   -If this is benign, continue current therapy with 3 month f/u in clinic.  -If cancer progression, will need change in treatment    2.   Peripheral neuropathy  Xeloda was affecting her hand neuropathy a bit more in Jan-Feb. With dose-modification, this is better in hands. Persistent in legs related to lower back problems. On gabapentin 100 mg AM/200 mg afternoon/300 mg bedtime daily.     Plan:  -Continue Xeloda dosing/frequency, as is  -Continue gabapentin    3. Spine DJD/insufficiency fractures  Improved with muscle relaxant and low dose pain med at bedtime. Managed with PCP.      [employer]  telephone start: 9646;  End: 1250    Signed by: Ria Ortiz NP        Again, thank you for allowing me to participate in the care of your patient.        Sincerely,        Ria Ortiz NP

## 2021-07-08 NOTE — PROGRESS NOTES
[facility] Hematology and Oncology Progress Note    Patient: Pam Pierce  MRN: [id number]        Reason for Visit    1. Rectal cancer    Telephone visit    _____________________________________________________________________________    History of Present Illness/ Interval History    Ms. Pam Pierce  is a 89 year old on palliative Xeloda (x 1 year) for advanced rectal cancer. Returns for 2-month visit. Karine, daughter, joined visit.     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 Otherwise, tolerating it very well. Empties colostomy 8-10 times a day on Metamucil (she prefers to keep on liquid side). White/slightly bloody foul-smelling discharge from rectum is chronic but a bit more amount in last few months. No rectal pain. No new pain. No nausea. No skin toxicity.    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 A steroid injection hasn't been feasible. Muscle relaxant and half tab narcotic at bedtime is helping.      Oncology History/Treatment  Diagnosis/Stage:   11/2019: rectal cancer T4b-N2  -presented with years duration abd pain (age 89)  -CT: rectosigmoid mass causing colonic obstruction with proximal colon distended with fecal debris. Mass extends beyond serosa of bowel wall with multiple small probable metastatic mesorectal LNs. No evident liver mets.  -mass biopsy: adenoca arising in tubular adenoma, focal signet ring cell features  -PET: advanced T4b-N2 tumor of low and mid-rectum with invasion through superior vagina and to posterior margin of urinary bladder  -Brain MRI: likely meningioma vs brain met    Treatment:  11/2019: emergency diverting colostomy and biopsy of mass    2/2020: palliative RT    2/2020: palliative Xeloda with response  -dose-reduced 8/2020 to 1 g twice daily 7 days  on/7 days off for leg neuropathy pain (+/- radicular spine pain with DJD)  -dose reduced 2/2020 to 1 g twice daily 6 days on/8 days off for hand neuropathy      Physical Exam    GENERAL: Alert and oriented to time place and person. Daughter, Karine, assisted on speaker phone.      Lab Results    CEA: 1.9  CBC: unremarkable  CMP: unremarkable    Imaging    6/24/2021 CT abd/pelvis: interval increase in wall thickening at distal sigmoid colon/rectum indeterminate for post-therapy change vs disease progression. No adenopathy or mets.    1/28/21 PET: ongoing response in rectal mass, no mets    Assessment/Plan  1. Locally advanced rectal cancer  Clinically, largely stable but has noted slight increase in rectal drainage which is occasionally bloody.  Radiographically, CT suggests increased rectal wall thickening that's indeterminate.   Tumor marker stable    Tolerating current Xeloda dosing (1 g twice daily 6 days on/8 days off). Hand neuropathy better.    Plan:  -Continue Xeloda at current dosing 1 g twice daily 6 days/8 days off  -Obtain PET scan to clarify the rectal thickening and exclude cancer progression in next month. She prefers follow-up via phone/video to review results   -If this is benign, continue current therapy with 3 month f/u in clinic.  -If cancer progression, will need change in treatment    2.   Peripheral neuropathy  Xeloda was affecting her hand neuropathy a bit more in Jan-Feb. With dose-modification, this is better in hands. Persistent in legs related to lower back problems. On gabapentin 100 mg AM/200 mg afternoon/300 mg bedtime daily.     Plan:  -Continue Xeloda dosing/frequency, as is  -Continue gabapentin    3. Spine DJD/insufficiency fractures  Improved with muscle relaxant and low dose pain med at bedtime. Managed with PCP.      Billing  telephone start: 8927;  End: 3531    Signed by: Ria Ortiz NP

## 2021-07-08 NOTE — PATIENT INSTRUCTIONS
Continue xeloda 6 days on/8 days off, signed in plan    4-6 wk: PET with virtual/phone f/u a few days after. Ria/Nasir

## 2021-07-24 ENCOUNTER — HOSPITAL ENCOUNTER (EMERGENCY)
Facility: CLINIC | Age: 86
Discharge: HOME | End: 2021-07-24
Attending: NURSE PRACTITIONER | Admitting: NURSE PRACTITIONER
Payer: MEDICARE

## 2021-07-24 VITALS
OXYGEN SATURATION: 96 % | WEIGHT: 110 LBS | TEMPERATURE: 98 F | DIASTOLIC BLOOD PRESSURE: 69 MMHG | HEART RATE: 80 BPM | SYSTOLIC BLOOD PRESSURE: 142 MMHG | BODY MASS INDEX: 22.22 KG/M2 | RESPIRATION RATE: 18 BRPM

## 2021-07-24 DIAGNOSIS — L08.9 INFECTED BLISTER OF RIGHT FOOT, INITIAL ENCOUNTER: ICD-10-CM

## 2021-07-24 DIAGNOSIS — S90.821A INFECTED BLISTER OF RIGHT FOOT, INITIAL ENCOUNTER: ICD-10-CM

## 2021-07-24 PROCEDURE — G0463 HOSPITAL OUTPT CLINIC VISIT: HCPCS | Performed by: NURSE PRACTITIONER

## 2021-07-24 PROCEDURE — 99213 OFFICE O/P EST LOW 20 MIN: CPT | Performed by: NURSE PRACTITIONER

## 2021-07-24 RX ORDER — CEPHALEXIN 500 MG/1
500 CAPSULE ORAL 3 TIMES DAILY
Qty: 30 CAPSULE | Refills: 0 | Status: SHIPPED | OUTPATIENT
Start: 2021-07-24 | End: 2021-08-03

## 2021-07-24 NOTE — ED TRIAGE NOTES
Pt has a sore on right foot and is on chemo, keeps popping it and greenish puss coming out. Would like to be seen in urgent care and moved to ER if necessary, pt and pts daughter just concerned about it getting infected.

## 2021-07-24 NOTE — DISCHARGE INSTRUCTIONS
Start Keflex and take 3 times daily for 10 days.  Follow-up with Dr. Thakkar in two to three days for wound check.  Wash the foot twice daily with Hibiclens and leave open to air  Return to the emergency room if you should develop memory loss confusion or redness extending up the ankle.

## 2021-07-25 NOTE — ED PROVIDER NOTES
History     Chief Complaint   Patient presents with     Foot Pain     HPI  Lilia Pierce is a 89 year old female with past medical history of hypertension, hyperlipidemia, basal cell carcinoma, rectal cancer with subsequent colostomy, asthma, GERD who presents to urgent care with concern of possible infected blister on right foot.  Symptoms noted over the past week.  Patient has noticed redness and a blister that seems to intermittently weep green drainage.  The blister does not seem to be enlarging.  Patient denies pain with the blister.  Patient uncertain of how the blister first appeared.  Patient states she normally wears socks around the house and does not wear shoes.  Patient does admit to decreased sensation in the feet due to current chemotherapy.  Patient denying any mental status changes, memory loss, increased confusion, dysuria, or incontinence.  Patient also denying any fever, aches, chills, sweats, difficulty breathing, abdominal pain, nausea, vomiting.  Patient has attempted squeezing the blister as a therapy to evacuate the drainage.  She did this this afternoon prior to arrival.    Allergies:  Allergies   Allergen Reactions     Eggs [Chicken-Derived Products (Egg)]      Flu Virus Vaccine Other (See Comments)     With egg base       Problem List:    Patient Active Problem List    Diagnosis Date Noted     Exposure to SARS-associated coronavirus 11/11/2020     Priority: Medium     Accidental fall, subsequent encounter 11/11/2020     Priority: Medium     Acute back pain with sciatica, right 11/11/2020     Priority: Medium     Acute right-sided low back pain with right-sided sciatica 11/11/2020     Priority: Medium     Closed fracture of left pubis with routine healing, unspecified portion of pubis, subsequent encounter 11/11/2020     Priority: Medium     Age related osteoporosis 11/08/2020     Priority: Medium     Intractable back pain 11/07/2020     Priority: Medium     Bilateral sacral insufficiency  fracture, initial encounter 11/07/2020     Priority: Medium     Initially admitted 9/2020 after a mechanical fall at home in which she sustained a closed fracture of left pubic rami and left sacral ala. Orthopedic was consulted and noted there was no need for surgery   Represented 11/8/2020 with increased pain CT 11/7/2020- Partial progressive incomplete healing of left hemipelvic fractures.  Bilateral sacral insufficiency fractures without SI diastasis.       Pubic ramus fracture (H) 09/13/2020     Priority: Medium     Initially admitted 9/2020 after a mechanical fall at home in which she sustained a closed fracture of left pubic rami and left sacral ala. Orthopedic was consulted and noted there was no need for surgery   Represented 11/8/2020 with increased pain CT 11/7/2020- Partial progressive incomplete healing of left hemipelvic fractures.  Bilateral sacral insufficiency fractures without SI diastasis.       Mild intermittent asthma in adult without complication 12/18/2019     Priority: Medium     Rectal cancer (H) 11/19/2019     Priority: Medium      History of rectal cancer status post colostomy secondary to colonic obstruction and status post palliative radiation and Xeloda       GERD (gastroesophageal reflux disease) 09/03/2014     Priority: Medium     Hyperlipidemia LDL goal <100 10/31/2010     Priority: Medium     IMPAIRED FASTING GLUCOSE 11/25/2007     Priority: Medium     Essential hypertension 10/03/2005     Priority: Medium        Past Medical History:    Past Medical History:   Diagnosis Date     Abdominal pain 02/16/2020     Actinic keratosis      Actinic keratosis      Basal cell carcinoma      Basal cell carcinoma      Fracture of left inferior pubic ramus (H)      Fracture of left superior pubic ramus (H)      Gait instability      GERD (gastroesophageal reflux disease)      HLD (hyperlipidemia)      HTN (hypertension)      Lumbar radiculopathy      Mild intermittent asthma      Other and  unspecified hyperlipidemia      Rectal cancer (H)      Unspecified asthma(493.90)      Unspecified essential hypertension      Urethral stricture      Urethral stricture due to infection 10/03/2005       Past Surgical History:    Past Surgical History:   Procedure Laterality Date     COLOSTOMY N/A 11/20/2019    Procedure: Colostomy Formation;  Surgeon: Demarcus Moreno MD;  Location: [facility];  Service: General     ESOPHAGOSCOPY, GASTROSCOPY, DUODENOSCOPY (EGD), COMBINED N/A 2/20/2020    Procedure: ESOPHAGOGASTRODUODENOSCOPY, WITH BIOPSY;  Surgeon: Harrison Brown MD;  Location: [facility] [facility]     ESOPHAGOSCOPY, GASTROSCOPY, DUODENOSCOPY (EGD), COMBINED  02/20/2020     HYSTERECTOMY       LITHOTRIPSY       PICC AND MIDLINE TEAM LINE INSERTION  11/21/2019          PROCTOSCOPY N/A 11/20/2019    Procedure: PROCTOSCOPY WITH BIOPSY;  Surgeon: Demarcus Moreno MD;  Location: [facility];  Service: General     SURGICAL HISTORY OF -       open reduction of second metatarsal neck fx with internal fixation  cna d closed treatment of metatarsal to 2 and 4 fx     SURGICAL HISTORY OF -       hysterectomy and oophorectomy     SURGICAL HISTORY OF -       lithotripsy       Family History:    Family History   Problem Relation Age of Onset     Cancer - colorectal Mother      Breast Cancer Mother      C.A.D. Father      Cardiovascular Father      Alcohol/Drug Father      Chronic Obstructive Pulmonary Disease Brother      Cancer Son         chest cancer (Thymus)/myesthenia gravis     Musculoskeletal Disorder Son         myesthenia gravis     Cerebrovascular Disease Sister      Chronic Obstructive Pulmonary Disease Brother      Cancer Mother         colorectal      Coronary Artery Disease Father      Chronic Obstructive Pulmonary Disease Brother        Social History:  Marital Status:   [5]  Social History     Tobacco Use     Smoking status: Never Smoker     Smokeless tobacco: Never Used   Substance Use Topics     Alcohol  use: Not Currently     Comment: wine [employer]     Drug use: No        Medications:    cephALEXin (KEFLEX) 500 MG capsule  acetaminophen (TYLENOL) 325 MG tablet  acetaminophen (TYLENOL) 325 MG tablet  albuterol (PROAIR HFA) 108 (90 Base) MCG/ACT inhaler  aspirin 81 MG EC tablet  atenolol (TENORMIN) 25 MG tablet  calcium carbonate (OS-DANIEL) 1500 (600 Ca) MG tablet  capecitabine (XELODA) 500 MG tablet  capecitabine (XELODA) 500 MG tablet  capecitabine (XELODA) 500 MG tablet  capecitabine (XELODA) 500 MG tablet  capecitabine (XELODA) 500 MG tablet  Cholecalciferol (VITAMIN D-3 PO)  dicyclomine (BENTYL) 10 MG capsule  FLOVENT  MCG/ACT inhaler  gabapentin (NEURONTIN) 100 MG capsule  magnesium oxide (MAG-OX) 400 MG tablet  melatonin 3 MG tablet  MULTI-DAY VITAMINS OR  naloxone (NARCAN) 4 MG/0.1ML nasal spray  omeprazole (PRILOSEC) 20 MG DR capsule  ondansetron (ZOFRAN-ODT) 4 MG ODT tab  oxyCODONE (OXYCONTIN) 10 MG 12 hr tablet  oxyCODONE (ROXICODONE) 5 MG tablet  polyethylene glycol (MIRALAX) 17 g packet  potassium chloride ER (KLOR-CON M) 20 MEQ CR tablet  tiZANidine (ZANAFLEX) 2 MG tablet      Review of Systems  As mentioned above in the history present illness. All other systems were reviewed and are negative.    Physical Exam   BP: (!) 142/69  Pulse: 80  Temp: 98  F (36.7  C)  Resp: 18  Weight: 49.9 kg (110 lb)  SpO2: 96 %      Physical Exam  Vitals and nursing note reviewed.   Constitutional:       General: She is not in acute distress.     Appearance: She is well-developed. She is not diaphoretic.   HENT:      Head: Normocephalic and atraumatic.      Right Ear: External ear normal.      Left Ear: External ear normal.   Eyes:      General:         Right eye: No discharge.         Left eye: No discharge.      Conjunctiva/sclera: Conjunctivae normal.   Cardiovascular:      Rate and Rhythm: Regular rhythm.      Heart sounds: Normal heart sounds. No murmur heard.   No friction rub.   Pulmonary:      Effort: Pulmonary  effort is normal. No respiratory distress.      Breath sounds: Normal breath sounds. No stridor. No wheezing or rales.   Chest:      Chest wall: No tenderness.   Musculoskeletal:      Right foot: Normal range of motion. No swelling, deformity, laceration or tenderness.        Feet:    Skin:     General: Skin is warm and dry.      Coloration: Skin is not pale.      Findings: No erythema or rash.   Neurological:      Mental Status: She is alert.                 ED Course        Procedures    No results found for this or any previous visit (from the past 24 hour(s)).    Medications - No data to display    Assessments & Plan (with Medical Decision Making)  Lilia Pierce is a 89 year old female with past medical history of hypertension, hyperlipidemia, basal cell carcinoma, rectal cancer with subsequent colostomy, asthma, GERD who presents to urgent care with concern of possible infected blister on right foot.  Patient currently on chemotherapy, lives independently, reports that she generally does not wear shoes in her own home.  Denies any recent trauma.  Patient denying any mental status changes and on exam is vitally stable and low suspicion for a sepsis.  Inspection of the foot concerning for infected blister and low suspicion for osteomyelitis as I cannot palpate any bony tenderness and it appears superficial.  Will initiate Keflex 3 times daily and recommend follow-up in 2 to 3 days for wound check.  Discussed worrisome reasons to return.  Patient verbalized understanding.  Recommend washing twice daily with Hibiclens as well.  Patient discharged in stable condition.     I have reviewed the nursing notes.    I have reviewed the findings, diagnosis, plan and need for follow up with the patient.    Discharge Medication List as of 7/24/2021  6:40 PM      START taking these medications    Details   cephALEXin (KEFLEX) 500 MG capsule Take 1 capsule (500 mg) by mouth 3 times daily for 10 days, Disp-30 capsule, R-0,  E-Prescribe             Final diagnoses:   Infected blister of right foot, initial encounter       7/24/2021   [facility] EMERGENCY DEPT     Jg, Nory Witt, APRN CNP  07/24/21 2032

## 2021-07-27 ENCOUNTER — VIRTUAL VISIT (OUTPATIENT)
Dept: FAMILY MEDICINE | Facility: CLINIC | Age: 86
End: 2021-07-27
Payer: MEDICARE

## 2021-07-27 DIAGNOSIS — Z93.3 S/P COLOSTOMY (H): ICD-10-CM

## 2021-07-27 DIAGNOSIS — C79.11 SECONDARY MALIGNANT NEOPLASM OF BLADDER (H): ICD-10-CM

## 2021-07-27 DIAGNOSIS — L08.9: Primary | ICD-10-CM

## 2021-07-27 DIAGNOSIS — J96.00 ACUTE RESPIRATORY FAILURE, UNSPECIFIED WHETHER WITH HYPOXIA OR HYPERCAPNIA (H): ICD-10-CM

## 2021-07-27 DIAGNOSIS — F11.20 UNCOMPLICATED OPIOID DEPENDENCE (H): ICD-10-CM

## 2021-07-27 DIAGNOSIS — S90.821D: Primary | ICD-10-CM

## 2021-07-27 PROCEDURE — 99441 PR PHYSICIAN TELEPHONE EVALUATION 5-10 MIN: CPT | Mod: 95 | Performed by: FAMILY MEDICINE

## 2021-07-27 NOTE — PATIENT INSTRUCTIONS
Thank you for choosing [facility].  You may be receiving an email and/or telephone survey request from [facility] [employer] regarding your visit today.  Please take a few minutes to respond to the survey to let us know how we are doing.      If you have questions or concerns, please contact us via Monitor My Meds or you can contact your care team at [phone] option [phone].    Our Clinic hours are:  Monday - Thursday 7am-6pm  Friday 7am-5pm    The [place] outpatient lab hours are:  Monday - Friday 7am-4:30pm    Appointments are required, call [phone]    If you have clinical questions after hours or would like to schedule an appointment,  call the clinic at [phone].

## 2021-07-27 NOTE — PROGRESS NOTES
Lilia is a 89 year old who is being evaluated via a billable telephone visit.      How would you like to obtain your AVS? Mail a copy  If the video visit is dropped, the invitation should be resent by:   Telephone encounter    Assessment & Plan     Infected blister of right foot, subsequent encounter  Patient says wound is getting better.    Secondary malignant neoplasm of bladder (H)  Stable- follows with oncology    S/P colostomy (H)  Stable . No new events    Uncomplicated opioid dependence (H)  Resolved.     Acute respiratory failure, unspecified whether with hypoxia or hypercapnia (H)  Resolved.    [id number]}     FUTURE APPOINTMENTS:       - Follow-up visit in one month or sooner as needed.    Return in about 4 weeks (around 8/24/2021) for Follow up.    Reagan Thakkar MD  [facility]    Subjective   Lilia is a 89 year old who presents for the following health issues  accompanied by her daughter-Karine:    HPI     89 yr old female here for an ER follow up. She was seen for blister that was found to have an infection. She was prescribed Keflex and this seems to be working well.   Patient states that she is doing better. Her daughter Karine was also on the phone with her .    ED/UC Followup:    Facility:  [facility]  Date of visit: 7-24-21  Reason for visit: ER NOTE IS COPIED BELOW:  Foot Pain      HPI  Lilia Pierce is a 89 year old female with past medical history of hypertension, hyperlipidemia, basal cell carcinoma, rectal cancer with subsequent colostomy, asthma, GERD who presents to urgent care with concern of possible infected blister on right foot.  Symptoms noted over the past week.  Patient has noticed redness and a blister that seems to intermittently weep green drainage.  The blister does not seem to be enlarging.  Patient denies pain with the blister.  Patient uncertain of how the blister first appeared.  Patient states she normally wears socks around the house and does not  wear shoes.  Patient does admit to decreased sensation in the feet due to current chemotherapy.  Patient denying any mental status changes, memory loss, increased confusion, dysuria, or incontinence.  Patient also denying any fever, aches, chills, sweats, difficulty breathing, abdominal pain, nausea, vomiting.  Patient has attempted squeezing the blister as a therapy to evacuate the drainage.  She did this this afternoon prior to arrival.         Current Status: Cephalexin 500 mg TID for 10 days was prescribed.   They state the foot is doing much better with starting the antibiotic.    No fever or chills noted since the ER visit.         Review of Systems   Constitutional, HEENT, cardiovascular, pulmonary, gi and gu systems are negative, except as otherwise noted.      Objective           Vitals:  No vitals were obtained today due to virtual visit.    Physical Exam   GENERAL: Healthy, alert and no distress  EYES: Eyes grossly normal to inspection.  No discharge or erythema, or obvious scleral/conjunctival abnormalities.  RESP: No audible wheeze, cough, or visible cyanosis.  No visible retractions or increased work of breathing.    SKIN: Visible skin clear. No significant rash, abnormal pigmentation or lesions.  PSYCH: Mentation appears normal, affect normal/bright, judgement and insight intact, normal speech and appearance well-groomed.                Video-Visit Details    Type of service:  Telephone visit  Time spent 5 mins.

## 2021-07-28 RX ORDER — CAPECITABINE 500 MG/1
TABLET, FILM COATED ORAL
Qty: 56 TABLET | Refills: 1 | Status: SHIPPED | OUTPATIENT
Start: 2021-07-28 | End: 2021-12-29

## 2021-07-28 ASSESSMENT — ASTHMA QUESTIONNAIRES: ACT_TOTALSCORE: 25

## 2021-08-01 DIAGNOSIS — E87.6 HYPOKALEMIA: ICD-10-CM

## 2021-08-03 RX ORDER — POTASSIUM CHLORIDE 1500 MG/1
TABLET, EXTENDED RELEASE ORAL
Qty: 120 TABLET | Refills: 3 | Status: SHIPPED | OUTPATIENT
Start: 2021-08-03 | End: 2021-12-06

## 2021-08-03 NOTE — TELEPHONE ENCOUNTER
"  Prescription approved per [facility] Refill Protocol.    Requested Prescriptions   Pending Prescriptions Disp Refills     potassium chloride ER (KLOR-CON M) 20 MEQ CR tablet [Pharmacy Med Name: POTASSIUM CHLORIDE GAMA ER 20 TBCR] 120 tablet 3     Sig: TAKE TWO TABLETS BY MOUTH TWICE A DAY       Potassium Supplements Protocol Passed - 8/1/2021  9:50 AM        Passed - Recent (12 mo) or future (30 days) visit within the authorizing provider's department     Patient has had an office visit with the authorizing provider or a provider within the authorizing providers department within the previous 12 mos or has a future within next 30 days. See \"Patient Info\" tab in inbasket, or \"Choose Columns\" in Meds & Orders section of the refill encounter.              Passed - Medication is active on med list        Passed - Patient is age 18 or older        Passed - Normal serum potassium in past 12 months     Recent Labs   Lab Test 06/24/21  1018   POTASSIUM 4.1                         "

## 2021-08-12 ENCOUNTER — HOSPITAL ENCOUNTER (OUTPATIENT)
Dept: PET IMAGING | Facility: CLINIC | Age: 86
End: 2021-08-12
Attending: NURSE PRACTITIONER
Payer: MEDICARE

## 2021-08-12 ENCOUNTER — LAB (OUTPATIENT)
Dept: LAB | Facility: CLINIC | Age: 86
End: 2021-08-12
Attending: NURSE PRACTITIONER
Payer: MEDICARE

## 2021-08-12 DIAGNOSIS — C20 RECTAL CANCER (H): ICD-10-CM

## 2021-08-12 LAB
ALBUMIN SERPL-MCNC: 2.9 G/DL (ref 3.4–5)
ALP SERPL-CCNC: 101 U/L (ref 40–150)
ALT SERPL W P-5'-P-CCNC: 25 U/L (ref 0–50)
ANION GAP SERPL CALCULATED.3IONS-SCNC: 5 MMOL/L (ref 3–14)
AST SERPL W P-5'-P-CCNC: 20 U/L (ref 0–45)
BASOPHILS # BLD AUTO: 0.1 10E3/UL (ref 0–0.2)
BASOPHILS NFR BLD AUTO: 1 %
BILIRUB SERPL-MCNC: 0.4 MG/DL (ref 0.2–1.3)
BUN SERPL-MCNC: 21 MG/DL (ref 7–30)
CALCIUM SERPL-MCNC: 8.6 MG/DL (ref 8.5–10.1)
CEA SERPL-MCNC: 3.1 UG/L (ref 0–2.5)
CHLORIDE BLD-SCNC: 109 MMOL/L (ref 94–109)
CO2 SERPL-SCNC: 30 MMOL/L (ref 20–32)
CREAT SERPL-MCNC: 0.85 MG/DL (ref 0.52–1.04)
EOSINOPHIL # BLD AUTO: 0.6 10E3/UL (ref 0–0.7)
EOSINOPHIL NFR BLD AUTO: 9 %
ERYTHROCYTE [DISTWIDTH] IN BLOOD BY AUTOMATED COUNT: 16.1 % (ref 10–15)
GFR SERPL CREATININE-BSD FRML MDRD: 60 ML/MIN/1.73M2
GLUCOSE BLD-MCNC: 93 MG/DL (ref 70–99)
HCT VFR BLD AUTO: 35.9 % (ref 35–47)
HGB BLD-MCNC: 11.5 G/DL (ref 11.7–15.7)
IMM GRANULOCYTES # BLD: 0 10E3/UL
IMM GRANULOCYTES NFR BLD: 0 %
LYMPHOCYTES # BLD AUTO: 1.1 10E3/UL (ref 0.8–5.3)
LYMPHOCYTES NFR BLD AUTO: 17 %
MCH RBC QN AUTO: 34 PG (ref 26.5–33)
MCHC RBC AUTO-ENTMCNC: 32 G/DL (ref 31.5–36.5)
MCV RBC AUTO: 106 FL (ref 78–100)
MONOCYTES # BLD AUTO: 0.5 10E3/UL (ref 0–1.3)
MONOCYTES NFR BLD AUTO: 8 %
NEUTROPHILS # BLD AUTO: 4.5 10E3/UL (ref 1.6–8.3)
NEUTROPHILS NFR BLD AUTO: 65 %
NRBC # BLD AUTO: 0 10E3/UL
NRBC BLD AUTO-RTO: 0 /100
PLATELET # BLD AUTO: 171 10E3/UL (ref 150–450)
POTASSIUM BLD-SCNC: 4.4 MMOL/L (ref 3.4–5.3)
PROT SERPL-MCNC: 5.8 G/DL (ref 6.8–8.8)
RBC # BLD AUTO: 3.38 10E6/UL (ref 3.8–5.2)
SODIUM SERPL-SCNC: 144 MMOL/L (ref 133–144)
WBC # BLD AUTO: 6.8 10E3/UL (ref 4–11)

## 2021-08-12 PROCEDURE — 36415 COLL VENOUS BLD VENIPUNCTURE: CPT

## 2021-08-12 PROCEDURE — 78816 PET IMAGE W/CT FULL BODY: CPT | Mod: 26

## 2021-08-12 PROCEDURE — 343N000001 HC RX 343: Performed by: NURSE PRACTITIONER

## 2021-08-12 PROCEDURE — A9552 F18 FDG: HCPCS | Performed by: NURSE PRACTITIONER

## 2021-08-12 PROCEDURE — 85041 AUTOMATED RBC COUNT: CPT

## 2021-08-12 PROCEDURE — 82040 ASSAY OF SERUM ALBUMIN: CPT

## 2021-08-12 PROCEDURE — 82378 CARCINOEMBRYONIC ANTIGEN: CPT

## 2021-08-12 PROCEDURE — G1004 CDSM NDSC: HCPCS

## 2021-08-12 PROCEDURE — 78816 PET IMAGE W/CT FULL BODY: CPT | Mod: PS,MF

## 2021-08-12 RX ADMIN — FLUDEOXYGLUCOSE F-18 14.4 MCI.: 500 INJECTION, SOLUTION INTRAVENOUS at 10:38

## 2021-08-19 ENCOUNTER — VIRTUAL VISIT (OUTPATIENT)
Dept: ONCOLOGY | Facility: CLINIC | Age: 86
End: 2021-08-19
Attending: NURSE PRACTITIONER
Payer: MEDICARE

## 2021-08-19 DIAGNOSIS — M79.605 PAIN IN BOTH LOWER EXTREMITIES: ICD-10-CM

## 2021-08-19 DIAGNOSIS — D50.0 IRON DEFICIENCY ANEMIA DUE TO CHRONIC BLOOD LOSS: ICD-10-CM

## 2021-08-19 DIAGNOSIS — C20 RECTAL CANCER (H): Primary | ICD-10-CM

## 2021-08-19 DIAGNOSIS — M79.604 PAIN IN BOTH LOWER EXTREMITIES: ICD-10-CM

## 2021-08-19 PROCEDURE — 99442 PR PHYSICIAN TELEPHONE EVALUATION 11-20 MIN: CPT | Performed by: NURSE PRACTITIONER

## 2021-08-19 NOTE — PROGRESS NOTES
ORAL CHEMOTHERAPY DISCONTINUATION       Reason For Discontinuation: disease progression    Additional Notes:  Thank you for allowing us to follow this patient. If any further oral chemotherapy is required please let us know.    Maty Acosta, PharmD, BCPS, BCOP

## 2021-08-19 NOTE — PROGRESS NOTES
[facility] Hematology and Oncology Progress Note    Patient: Pam Pierce  MRN: [id number]        Reason for Visit    1. Rectal cancer    Telephone visit (technical difficulty with video)    _____________________________________________________________________________    History of Present Illness/ Interval History    Ms. Pam Pierce  is a 89 year old on palliative Xeloda (x 1 year) for advanced rectal cancer. Returns with PET scan results given possible progression on last CT.  Karine, daughter, joined visit.     Tolerating Xeloda 6 days on/8 days off better with less hand neuropathy.  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 Otherwise, tolerating it very well. Empties colostomy 8-10 times a day on Metamucil (she prefers to keep on liquid side). White/occasional slightly bloody foul-smelling discharge from rectum is chronic but a bit more amount in last few months. No rectal pain.  No nausea. No skin toxicity.    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 A steroid injection hasn't been feasible. Muscle relaxant and half tab narcotic at bedtime is helping.    Was evaluated in ED a few weeks ago for foot blister/cellulitis. Resolved.       Oncology History/Treatment  Diagnosis/Stage:   11/2019: rectal cancer T4b-N2  -presented with years duration abd pain (age 89)  -CT: rectosigmoid mass causing colonic obstruction with proximal colon distended with fecal debris. Mass extends beyond serosa of bowel wall with multiple small probable metastatic mesorectal LNs. No evident liver mets.  -mass biopsy: adenoca arising in tubular adenoma, focal signet ring cell features  -PET: advanced T4b-N2 tumor of low and mid-rectum with invasion through superior vagina and to posterior margin of urinary bladder  -Brain MRI: likely meningioma vs brain met    Treatment:  11/2019: emergency  diverting colostomy and biopsy of mass    2/2020: palliative RT    2/2020: palliative Xeloda with response  -dose-reduced 8/2020 to 1 g twice daily 7 days on/7 days off for leg neuropathy pain (+/- radicular spine pain with DJD)  -dose reduced 2/2020 to 1 g twice daily 6 days on/8 days off for hand neuropathy      Physical Exam    GENERAL: Alert and oriented to time place and person. Daughter, Karine, assisted on speaker phone.      Lab Results    CEA: 3.1 (slight elevation from 1.9 six weeks ago)  CBC: hgb 11.5 (down from 12.9), rest unremarkable  CMP: unremarkable    Imaging    6/24/2021 CT abd/pelvis: interval increase in wall thickening at distal sigmoid colon/rectum indeterminate for post-therapy change vs disease progression. No adenopathy or mets.    8/12/21 PET (compared to last PET 1/2021): progressive tumor in rectum/distal rectosigmoid. No new nodes or other met sites.    Assessment/Plan  1. Locally advanced rectal cancer  Clinically, largely stable but has noted slight increase in rectal drainage which is occasionally bloody.  Radiographically, CT and PET show progression of the primary tumor with no other new sites of mets.  Tumor marker rising slightly    Plan:  -Cancer is progressing on Xeloda, she will be out of her current Rx this week and will stop at that point.  -Minimally symptomatic, some slight increase in drainage from rectum  -Has had prior palliative RT to primary site, so I'm not sure if she'd be eligible for further RT although it is her only site of progression so may be something we can consider  -Arrange visit with [facility] Oncologist or [facility] site since her primary oncologist, Dr. Diggs, is not here and there is not a current MD on site at [place]. Will need MD assessment/recs for change in treatment. She needs an in-person clinic visit for this consult, she's had several virtual visits over last few months and it will be important an in-person assessment and discussion  is done for treatment planning. She prefers [place] site if available, but open to other sites if needed.   -She is still interested in pursuing palliative treatment.  -Can resume any treatments/follow-up at [facility] once plan established. Can see me or locum is planned to be established in October.    2.   Peripheral neuropathy  Xeloda was affecting her hand neuropathy a bit more in Jan-Feb. With dose-modification, this is better in hands. Persistent in legs related to lower back problems. On gabapentin 100 mg AM/200 mg afternoon/300 mg bedtime daily.     Plan:  -Now stopping Xeloda, which may allow some further improvement  -Continue gabapentin    3. Spine DJD/insufficiency fractures  Improved with muscle relaxant and low dose pain med at bedtime. Managed with PCP.    4.  Anemia  Suspect from progressive tumor/possible slow chronic blood loss.    Plan:  -Check ferritin/iron studies and replace iron as needed  -No need for transfusion at present  -Follow    Billing  telephone start: 4672;  End: 1425; total: 12 min    Signed by: Ria Ortiz NP

## 2021-08-19 NOTE — LETTER
8/19/2021         RE: Tamy Pierce  [address] [address]        Dear Colleague,    Thank you for referring your patient, Tamy Pierce, to the [facility]. Please see a copy of my visit note below.    [facility] Hematology and Oncology Progress Note    Patient: Pam Pierce  MRN: [id number]        Reason for Visit    1. Rectal cancer    Telephone visit (technical difficulty with video)    _____________________________________________________________________________    History of Present Illness/ Interval History    Ms. Pam Pierce  is a 89 year old on palliative Xeloda (x 1 year) for advanced rectal cancer. Returns with PET scan results given possible progression on last CT.  Karine, daughter, joined visit.     Tolerating Xeloda 6 days on/8 days off better with less hand neuropathy.  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 Otherwise, tolerating it very well. Empties colostomy 8-10 times a day on Metamucil (she prefers to keep on liquid side). White/occasional slightly bloody foul-smelling discharge from rectum is chronic but a bit more amount in last few months. No rectal pain.  No nausea. No skin toxicity.    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 A steroid injection hasn't been feasible. Muscle relaxant and half tab narcotic at bedtime is helping.    Was evaluated in ED a few weeks ago for foot blister/cellulitis. Resolved.       Oncology History/Treatment  Diagnosis/Stage:   11/2019: rectal cancer T4b-N2  -presented with years duration abd pain (age 89)  -CT: rectosigmoid mass causing colonic obstruction with proximal colon distended with fecal debris. Mass extends beyond serosa of bowel wall with multiple small probable metastatic mesorectal LNs. No evident liver mets.  -mass biopsy: adenoca arising in  tubular adenoma, focal signet ring cell features  -PET: advanced T4b-N2 tumor of low and mid-rectum with invasion through superior vagina and to posterior margin of urinary bladder  -Brain MRI: likely meningioma vs brain met    Treatment:  11/2019: emergency diverting colostomy and biopsy of mass    2/2020: palliative RT    2/2020: palliative Xeloda with response  -dose-reduced 8/2020 to 1 g twice daily 7 days on/7 days off for leg neuropathy pain (+/- radicular spine pain with DJD)  -dose reduced 2/2020 to 1 g twice daily 6 days on/8 days off for hand neuropathy      Physical Exam    GENERAL: Alert and oriented to time place and person. Daughter, Karine, assisted on speaker phone.      Lab Results    CEA: 3.1 (slight elevation from 1.9 six weeks ago)  CBC: hgb 11.5 (down from 12.9), rest unremarkable  CMP: unremarkable    Imaging    6/24/2021 CT abd/pelvis: interval increase in wall thickening at distal sigmoid colon/rectum indeterminate for post-therapy change vs disease progression. No adenopathy or mets.    8/12/21 PET (compared to last PET 1/2021): progressive tumor in rectum/distal rectosigmoid. No new nodes or other met sites.    Assessment/Plan  1. Locally advanced rectal cancer  Clinically, largely stable but has noted slight increase in rectal drainage which is occasionally bloody.  Radiographically, CT and PET show progression of the primary tumor with no other new sites of mets.  Tumor marker rising slightly    Plan:  -Cancer is progressing on Xeloda, she will be out of her current Rx this week and will stop at that point.  -Minimally symptomatic, some slight increase in drainage from rectum  -Has had prior palliative RT to primary site, so I'm not sure if she'd be eligible for further RT although it is her only site of progression so may be something we can consider  -Arrange visit with [facility] Oncologist or [facility] site since her primary oncologist, Dr. Diggs, is not here and there is not a  "current MD on site at [place]. Will need MD assessment/recs for change in treatment. She needs an in-person clinic visit for this consult, she's had several virtual visits over last few months and it will be important an in-person assessment and discussion is done for treatment planning. She prefers [place] site if available, but open to other sites if needed.   -She is still interested in pursuing palliative treatment.  -Can resume any treatments/follow-up at [facility] once plan established. Can see me or locum is planned to be established in October.    2.   Peripheral neuropathy  Xeloda was affecting her hand neuropathy a bit more in Jan-Feb. With dose-modification, this is better in hands. Persistent in legs related to lower back problems. On gabapentin 100 mg AM/200 mg afternoon/300 mg bedtime daily.     Plan:  -Now stopping Xeloda, which may allow some further improvement  -Continue gabapentin    3. Spine DJD/insufficiency fractures  Improved with muscle relaxant and low dose pain med at bedtime. Managed with PCP.    4.  Anemia  Suspect from progressive tumor/possible slow chronic blood loss.    Plan:  -Check ferritin/iron studies and replace iron as needed  -No need for transfusion at present  -Follow    Billing  telephone start: 1416;  End: 1428; total: 12 min    Signed by: Ria Ortiz NP      Tamy Ortiz is a 89 year old who is being evaluated via a billable telephone visit.      What phone number would you like to be contacted at? [phone].  How would you like to obtain your AVS? Mail a copy    Vitals - Patient Reported  Weight (Patient Reported): 47.6 kg (105 lb)  Height (Patient Reported): 149.9 cm (4' 11\")  BMI (Based on Pt Reported Ht/Wt): 21.21  Pain Score: No Pain (0)    Lulu Givens CMA on 8/19/2021 at 2:02 PM        Again, thank you for allowing me to participate in the care of your patient.        Sincerely,        Ria Ortiz NP    "

## 2021-08-19 NOTE — LETTER
8/19/2021         RE: Tamy Pierce  [address] [address]        Dear Colleague,    Thank you for referring your patient, Tamy Pierce, to the [facility]. Please see a copy of my visit note below.    [facility] Hematology and Oncology Progress Note    Patient: Pam Pierce  MRN: [id number]        Reason for Visit    1. Rectal cancer    Telephone visit (technical difficulty with video)    _____________________________________________________________________________    History of Present Illness/ Interval History    Ms. Pam Pierce  is a 89 year old on palliative Xeloda (x 1 year) for advanced rectal cancer. Returns with PET scan results given possible progression on last CT.  Karine, daughter, joined visit.     Tolerating Xeloda 6 days on/8 days off better with less hand neuropathy.  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 Otherwise, tolerating it very well. Empties colostomy 8-10 times a day on Metamucil (she prefers to keep on liquid side). White/occasional slightly bloody foul-smelling discharge from rectum is chronic but a bit more amount in last few months. No rectal pain.  No nausea. No skin toxicity.    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 A steroid injection hasn't been feasible. Muscle relaxant and half tab narcotic at bedtime is helping.    Was evaluated in ED a few weeks ago for foot blister/cellulitis. Resolved.       Oncology History/Treatment  Diagnosis/Stage:   11/2019: rectal cancer T4b-N2  -presented with years duration abd pain (age 89)  -CT: rectosigmoid mass causing colonic obstruction with proximal colon distended with fecal debris. Mass extends beyond serosa of bowel wall with multiple small probable metastatic mesorectal LNs. No evident liver mets.  -mass biopsy: adenoca arising in  tubular adenoma, focal signet ring cell features  -PET: advanced T4b-N2 tumor of low and mid-rectum with invasion through superior vagina and to posterior margin of urinary bladder  -Brain MRI: likely meningioma vs brain met    Treatment:  11/2019: emergency diverting colostomy and biopsy of mass    2/2020: palliative RT    2/2020: palliative Xeloda with response  -dose-reduced 8/2020 to 1 g twice daily 7 days on/7 days off for leg neuropathy pain (+/- radicular spine pain with DJD)  -dose reduced 2/2020 to 1 g twice daily 6 days on/8 days off for hand neuropathy      Physical Exam    GENERAL: Alert and oriented to time place and person. Daughter, Karine, assisted on speaker phone.      Lab Results    CEA: 3.1 (slight elevation from 1.9 six weeks ago)  CBC: hgb 11.5 (down from 12.9), rest unremarkable  CMP: unremarkable    Imaging    6/24/2021 CT abd/pelvis: interval increase in wall thickening at distal sigmoid colon/rectum indeterminate for post-therapy change vs disease progression. No adenopathy or mets.    8/12/21 PET (compared to last PET 1/2021): progressive tumor in rectum/distal rectosigmoid. No new nodes or other met sites.    Assessment/Plan  1. Locally advanced rectal cancer  Clinically, largely stable but has noted slight increase in rectal drainage which is occasionally bloody.  Radiographically, CT and PET show progression of the primary tumor with no other new sites of mets.  Tumor marker rising slightly    Plan:  -Cancer is progressing on Xeloda, she will be out of her current Rx this week and will stop at that point.  -Minimally symptomatic, some slight increase in drainage from rectum  -Has had prior palliative RT to primary site, so I'm not sure if she'd be eligible for further RT although it is her only site of progression so may be something we can consider  -Arrange visit with [facility] Oncologist or [facility] site since her primary oncologist, Dr. Diggs, is not here and there is not a  "current MD on site at [place]. Will need MD assessment/recs for change in treatment. She needs an in-person clinic visit for this consult, she's had several virtual visits over last few months and it will be important an in-person assessment and discussion is done for treatment planning. She prefers [facility] site if available, but open to other sites if needed.   -She is still interested in pursuing palliative treatment.  -Can resume any treatments/follow-up at [facility] once plan established. Can see me or locum is planned to be established in October.    2.   Peripheral neuropathy  Xeloda was affecting her hand neuropathy a bit more in Jan-Feb. With dose-modification, this is better in hands. Persistent in legs related to lower back problems. On gabapentin 100 mg AM/200 mg afternoon/300 mg bedtime daily.     Plan:  -Now stopping Xeloda, which may allow some further improvement  -Continue gabapentin    3. Spine DJD/insufficiency fractures  Improved with muscle relaxant and low dose pain med at bedtime. Managed with PCP.    4.  Anemia  Suspect from progressive tumor/possible slow chronic blood loss.    Plan:  -Check ferritin/iron studies and replace iron as needed  -No need for transfusion at present  -Follow    Billing  telephone start: 1416;  End: 1428; total: 12 min    Signed by: Ria Ortiz NP      Tamy Ortiz is a 89 year old who is being evaluated via a billable telephone visit.      What phone number would you like to be contacted at? [phone].  How would you like to obtain your AVS? Mail a copy    Vitals - Patient Reported  Weight (Patient Reported): 47.6 kg (105 lb)  Height (Patient Reported): 149.9 cm (4' 11\")  BMI (Based on Pt Reported Ht/Wt): 21.21  Pain Score: No Pain (0)    Lulu Givens CMA on 8/19/2021 at 2:02 PM        Again, thank you for allowing me to participate in the care of your patient.        Sincerely,        Ria Ortiz NP    "

## 2021-08-19 NOTE — PATIENT INSTRUCTIONS
Stop Xeloda (progression)    Referral to see MD at [facility] (needs in-person new consult there) in next few weeks.   Recheck CBC and ferritin day of that appt    Will watch for recs and see what f/u she may need in [place] for treatment/mgmt with Ria after the consult

## 2021-08-19 NOTE — PROGRESS NOTES
"Tamy Ortiz is a 89 year old who is being evaluated via a billable telephone visit.      What phone number would you like to be contacted at? [phone].  How would you like to obtain your AVS? Mail a copy    Vitals - Patient Reported  Weight (Patient Reported): 47.6 kg (105 lb)  Height (Patient Reported): 149.9 cm (4' 11\")  BMI (Based on Pt Reported Ht/Wt): 21.21  Pain Score: No Pain (0)    Lulu Givens CMA on 8/19/2021 at 2:02 PM    "

## 2021-08-20 NOTE — TELEPHONE ENCOUNTER
Routing refill request to provider for review/approval because:  Drug not on the [facility] refill protocol     Jamaica Jules RN

## 2021-08-23 RX ORDER — TIZANIDINE 2 MG/1
2-4 TABLET ORAL EVERY 6 HOURS PRN
Qty: 90 TABLET | Refills: 0 | Status: SHIPPED | OUTPATIENT
Start: 2021-08-23 | End: 2021-11-18

## 2021-09-07 RX ORDER — DICYCLOMINE HYDROCHLORIDE 10 MG/1
CAPSULE ORAL
Status: CANCELLED | OUTPATIENT
Start: 2021-09-07

## 2021-09-09 ENCOUNTER — ONCOLOGY VISIT (OUTPATIENT)
Dept: ONCOLOGY | Facility: HOSPITAL | Age: 86
End: 2021-09-09
Attending: INTERNAL MEDICINE
Payer: MEDICARE

## 2021-09-09 ENCOUNTER — LAB (OUTPATIENT)
Dept: INFUSION THERAPY | Facility: HOSPITAL | Age: 86
End: 2021-09-09
Attending: INTERNAL MEDICINE
Payer: MEDICARE

## 2021-09-09 VITALS
HEIGHT: 59 IN | SYSTOLIC BLOOD PRESSURE: 173 MMHG | OXYGEN SATURATION: 99 % | TEMPERATURE: 98.5 F | HEART RATE: 60 BPM | BODY MASS INDEX: 22.84 KG/M2 | DIASTOLIC BLOOD PRESSURE: 74 MMHG | RESPIRATION RATE: 16 BRPM | WEIGHT: 113.3 LBS

## 2021-09-09 DIAGNOSIS — M54.41 ACUTE RIGHT-SIDED LOW BACK PAIN WITH RIGHT-SIDED SCIATICA: ICD-10-CM

## 2021-09-09 DIAGNOSIS — M79.605 PAIN IN BOTH LOWER EXTREMITIES: ICD-10-CM

## 2021-09-09 DIAGNOSIS — C20 RECTAL CANCER (H): Primary | ICD-10-CM

## 2021-09-09 DIAGNOSIS — C20 RECTAL CANCER (H): ICD-10-CM

## 2021-09-09 DIAGNOSIS — D50.0 IRON DEFICIENCY ANEMIA DUE TO CHRONIC BLOOD LOSS: ICD-10-CM

## 2021-09-09 DIAGNOSIS — M79.604 PAIN IN BOTH LOWER EXTREMITIES: ICD-10-CM

## 2021-09-09 LAB
BASOPHILS # BLD AUTO: 0.1 10E3/UL (ref 0–0.2)
BASOPHILS NFR BLD AUTO: 1 %
EOSINOPHIL # BLD AUTO: 1 10E3/UL (ref 0–0.7)
EOSINOPHIL NFR BLD AUTO: 10 %
ERYTHROCYTE [DISTWIDTH] IN BLOOD BY AUTOMATED COUNT: 15 % (ref 10–15)
FERRITIN SERPL-MCNC: 429 NG/ML (ref 10–130)
HCT VFR BLD AUTO: 36.4 % (ref 35–47)
HGB BLD-MCNC: 11.2 G/DL (ref 11.7–15.7)
IMM GRANULOCYTES # BLD: 0.1 10E3/UL
IMM GRANULOCYTES NFR BLD: 1 %
LYMPHOCYTES # BLD AUTO: 1.5 10E3/UL (ref 0.8–5.3)
LYMPHOCYTES NFR BLD AUTO: 16 %
MCH RBC QN AUTO: 32.8 PG (ref 26.5–33)
MCHC RBC AUTO-ENTMCNC: 30.8 G/DL (ref 31.5–36.5)
MCV RBC AUTO: 107 FL (ref 78–100)
MONOCYTES # BLD AUTO: 0.9 10E3/UL (ref 0–1.3)
MONOCYTES NFR BLD AUTO: 9 %
NEUTROPHILS # BLD AUTO: 6.2 10E3/UL (ref 1.6–8.3)
NEUTROPHILS NFR BLD AUTO: 63 %
NRBC # BLD AUTO: 0 10E3/UL
NRBC BLD AUTO-RTO: 0 /100
PLATELET # BLD AUTO: 223 10E3/UL (ref 150–450)
RBC # BLD AUTO: 3.41 10E6/UL (ref 3.8–5.2)
WBC # BLD AUTO: 9.6 10E3/UL (ref 4–11)

## 2021-09-09 PROCEDURE — 82728 ASSAY OF FERRITIN: CPT

## 2021-09-09 PROCEDURE — 85025 COMPLETE CBC W/AUTO DIFF WBC: CPT

## 2021-09-09 PROCEDURE — 99215 OFFICE O/P EST HI 40 MIN: CPT | Performed by: INTERNAL MEDICINE

## 2021-09-09 PROCEDURE — G0463 HOSPITAL OUTPT CLINIC VISIT: HCPCS | Mod: 25

## 2021-09-09 PROCEDURE — 36415 COLL VENOUS BLD VENIPUNCTURE: CPT

## 2021-09-09 RX ORDER — OXYCODONE HYDROCHLORIDE 5 MG/1
2.5 TABLET ORAL DAILY PRN
Qty: 60 TABLET | Refills: 0 | Status: SHIPPED | OUTPATIENT
Start: 2021-09-09 | End: 2022-01-01

## 2021-09-09 RX ORDER — SIMVASTATIN 40 MG
TABLET ORAL DAILY
COMMUNITY
Start: 2021-04-07 | End: 2021-10-06

## 2021-09-09 ASSESSMENT — PAIN SCALES - GENERAL: PAINLEVEL: NO PAIN (0)

## 2021-09-09 ASSESSMENT — MIFFLIN-ST. JEOR: SCORE: 834.56

## 2021-09-09 NOTE — LETTER
9/9/2021         RE: Tamy Pierce  [address] [address]        Dear Colleague,    Thank you for referring your patient, Tamy Pierce, to the [facility]. Please see a copy of my visit note below.    [facility] Hematology and Oncology Progress Note    Patient: Tamy Pierce  MRN: [id number]  Date of Service: Sep 9, 2021         Reason for Visit    Problem List Items Addressed This Visit     None          Assessment      1.  A very pleasant 89 year old woman with locally advanced unresectable rectal cancer.  She has been palliated with diverting colostomy and has been on Xeloda 6 days on 8 days of for several months.  Current PET scan showing progression.  2.  Peripheral neuropathy.  3.  History of arthritis etc.  4.  She is 89 years old.    Plan    1.  A lengthy discussion the patient regarding her PET scan finding.  This can shows worsening compared to the CT scan in June 2021.  We discussed that without any treatment obviously this cancer is going to get worse.  The standard textbook options for this type of cancer is going to oxaliplatin based therapy or irinotecan based therapy.  She is obviously not a candidate for combination of oxaliplatin and irinotecan along with 5-FU.    The patient's daughter had a question about increasing dose of Xeloda.  In my opinion if she did not tolerate it before she is not going to tolerate it now.    We also talked about quality of life.  What will happen if she does not do any more therapy.    At the end we decided that we should probably discuss her case in the tumor board.  We should also get an opinion from the surgeons to see if she is any chance of getting surgical resection of malignant part of the colon and see if that will afford her some relief in quality of life.  She has had prior radiation so she is not a candidate for repeat radiation but we will also discuss that in our tumor board.    We also recheck her  tumor for K-alexandra, NRAS to see if she would be a candidate for EGFR based therapy.  I did tell the patient and her daughter that due to her age.    Cancer Staging  No matching staging information was found for the patient.  Stage IIIb/stage IV    ECOG Performance    1 - Can't do physically strenuous work, but fully ambyulatory and can do light sedentary work      History of Present Illness    Ms. Tamy Pierce a very pleasant 89 year old woman who has a diagnosis of rectal cancer unresectable.  Originally diagnosed in November 2019.  She had abdominal pain as a presenting symptom which was there for quite some time.  CT scan revealed a rectosigmoid mass causing colonic obstruction with local lymph node spread.  No distant metastatic disease was noted.  She underwent diverting colostomy.  She was noted to have perhaps involvement of the vaginal vault.    She was started on palliative radiation therapy.  After that she was started on Xeloda.  She did have some response initially.  However in June 2021 her CT scan indicated that her mass was probably progressing.  However she wanted to continue same treatment which was Xeloda 1000 mg twice a day 6 days on  8 days off.    Comes in today after PET scan.    Review of systems.        Past History    Past Medical History:   Diagnosis Date     Abdominal pain 02/16/2020     Actinic keratosis      Actinic keratosis      Basal cell carcinoma      Basal cell carcinoma      Fracture of left inferior pubic ramus (H)      Fracture of left superior pubic ramus (H)      Gait instability      GERD (gastroesophageal reflux disease)      HLD (hyperlipidemia)      HTN (hypertension)      Lumbar radiculopathy      Mild intermittent asthma      Other and unspecified hyperlipidemia      Rectal cancer (H)      Unspecified asthma(493.90)      Unspecified essential hypertension      Urethral stricture      Urethral stricture due to infection 10/03/2005    ureteral stone       Past Surgical  "History:   Procedure Laterality Date     COLOSTOMY N/A 11/20/2019    Procedure: Colostomy Formation;  Surgeon: Demarcus Moreno MD;  Location: [facility];  Service: General     ESOPHAGOSCOPY, GASTROSCOPY, DUODENOSCOPY (EGD), COMBINED N/A 2/20/2020    Procedure: ESOPHAGOGASTRODUODENOSCOPY, WITH BIOPSY;  Surgeon: Harrison Brown MD;  Location: [facility]     ESOPHAGOSCOPY, GASTROSCOPY, DUODENOSCOPY (EGD), COMBINED  02/20/2020     HYSTERECTOMY       LITHOTRIPSY       PICC AND MIDLINE TEAM LINE INSERTION  11/21/2019          PROCTOSCOPY N/A 11/20/2019    Procedure: PROCTOSCOPY WITH BIOPSY;  Surgeon: Demarcus Moreno MD;  Location: [facility];  Service: General     SURGICAL HISTORY OF -       open reduction of second metatarsal neck fx with internal fixation  cna d closed treatment of metatarsal to 2 and 4 fx     SURGICAL HISTORY OF -       hysterectomy and oophorectomy     SURGICAL HISTORY OF -       lithotripsy         Physical Exam    Ht 1.499 m (4' 11\")   Wt 51.4 kg (113 lb 4.8 oz)   BMI 22.88 kg/m          GENERAL: Alert and oriented to time place and person. Seated comfortably. In no distress.    HEAD: Atraumatic and normocephalic.    EYES: LORENA, EOMI. No pallor. No icterus.    Oral cavity: no mucosal lesion or tonsillar enlargement.    NECK: supple. JVP normal.No thyroid enlargement.    LYMPH NODES: No palpable, cervical, axillary or inguinal lymphadenopathy.    CHEST: clear to auscultation bilaterally. Symmetrical breath movements bilaterally.    CVS: S1 and S2 are Regular rate and rhythm. No murmur or gallop or rub heard. No peripheral edema.    ABDOMEN: Colostomy in place.  Soft. Not tender. Not distended. No palpable hepatomegaly or splenomegaly. No other mass palpable. Bowel sounds heard.    EXTREMITIES: Warm.    SKIN: no rash, or bruising or purpura.      Lab Results    No results found for this or any previous visit (from the past 168 hour(s)).    Imaging    PET Oncology Whole Body    Result " Date: 8/12/2021  Combined Report of:    PET and CT on  8/12/2021 12:18 PM : 1. PET of the neck, chest, abdomen, and pelvis. 2. PET CT Fusion for Attenuation Correction and Anatomical Localization:  3. 3D MIP and PET-CT fused images were processed on an independent workstation and archived to PACS and reviewed by a radiologist. Technique: 1. PET: The patient received 14.4 mCi of F-18-FDG; the serum glucose was 97 prior to administration, body weight was 149.4 kg. Images were evaluated in the axial, sagittal, and coronal planes as well as the rotational whole body MIP. Images were acquired from the Vertex to the Feet. UPTAKE WAS MEASURED AT 60 MINUTES. BACKGROUND:  Liver SUV max= 2.73,   Aorta Blood SUV Max: 1.73. 2. CT: CT only obtained for attenuation correction and not diagnostic purposes. INDICATION: Rectal cancer, assess treatment response; compare to 1/2021 PET r/o cancer progression; Rectal cancer (H) ADDITIONAL INFORMATION OBTAINED FROM EMR: none COMPARISON: Previous PET CT study from 1/28/2021 and 1/16/2020. FINDINGS: HEAD/NECK: There is no suspicious FDG uptake in the neck. Complex appearing thyroid gland, similar to before. CHEST: There is no suspicious FDG uptake in the chest. ABDOMEN AND PELVIS: The upper abdomen demonstrates normal GI and urinary excretion of the FDG. The pelvis and rectosigmoid regions demonstrates there is now growing tumor within the rectum and lower rectosigmoid. The SUV max measures 22.08, previously measured 20.24 on 1/16/2020. The study on 1/28/2021 showed reduction in the rectal hypermetabolic mass but it has increased on the current examination. No metastatic lymph nodes identified within the pelvis. LOWER EXTREMITIES: No abnormal masses or hypermetabolic lesions. BONES: There are no suspicious lytic or blastic osseous lesions from malignancy.  There is no abnormal FDG uptake in the skeleton to indicate malignancy. There are degenerative changes of both shoulders, unchanged from  "previous examinations. Cursory review of the body as with this nondiagnostic CT scan demonstrates degenerative changes of the spine particularly at L5-S1. There is calcification of the descending aorta and aortic arch. Calcified gallstones within the gallbladder. There is a widemouth anterior abdominal wall hernia with bowel in the hernia site. The hernias similar to 6/24/2021. The left pelvic fracture has healed compared to the previous study.     IMPRESSION: In this patient with a diagnosis of rectal carcinoma there is evidence of disease progression within the rectum and lower rectosigmoid regions. 1. The hypermetabolic mass previously described in the rectum and lower rectosigmoid region has now increased significantly in size indicating progression of tumor. 2. No evidence of metastatic disease elsewhere within the body or within the adjacent pelvis.  SANDRA REDDY MD   SYSTEM ID:  [id number]        Signed by: Kodak Rivero MD, MD      This note has been dictated using voice recognition software. Any grammatical or context distortions are unintentional and inherent to the software      Oncology Rooming Note    September 9, 2021 8:58 AM   Tamy Pierce is a 89 year old female who presents for:    Chief Complaint   Patient presents with     Oncology Clinic Visit     Rectal cancer (H) (Chronic), Secondary malignant neoplasm of bladder      Initial Vitals: Ht 1.499 m (4' 11\")   Wt 51.4 kg (113 lb 4.8 oz)   BMI 22.88 kg/m   Estimated body mass index is 22.88 kg/m  as calculated from the following:    Height as of this encounter: 1.499 m (4' 11\").    Weight as of this encounter: 51.4 kg (113 lb 4.8 oz). Body surface area is 1.46 meters squared.  Data Unavailable Comment: Data Unavailable   No LMP recorded. Patient has had a hysterectomy.  Allergies reviewed: Yes  Medications reviewed: Yes    Medications: MEDICATION REFILLS NEEDED TODAY. Provider was notified.  Pharmacy name entered into [facility]:    [facility] - " [place], [place] - [address] [facility] [place], [place] - [address]  [facility] - [place], [place] - [address]    Clinical concerns: refill requested, change chemo?    Nina Angulo                Again, thank you for allowing me to participate in the care of your patient.        Sincerely,        Kodak Rivero MD, MD

## 2021-09-09 NOTE — PROGRESS NOTES
"Oncology Rooming Note    September 9, 2021 8:58 AM   Tamy Pierce is a 89 year old female who presents for:    Chief Complaint   Patient presents with     Oncology Clinic Visit     Rectal cancer (H) (Chronic), Secondary malignant neoplasm of bladder      Initial Vitals: Ht 1.499 m (4' 11\")   Wt 51.4 kg (113 lb 4.8 oz)   BMI 22.88 kg/m   Estimated body mass index is 22.88 kg/m  as calculated from the following:    Height as of this encounter: 1.499 m (4' 11\").    Weight as of this encounter: 51.4 kg (113 lb 4.8 oz). Body surface area is 1.46 meters squared.  Data Unavailable Comment: Data Unavailable   No LMP recorded. Patient has had a hysterectomy.  Allergies reviewed: Yes  Medications reviewed: Yes    Medications: MEDICATION REFILLS NEEDED TODAY. Provider was notified.  Pharmacy name entered into "[email or web address]":    [facility] - [place], [place] - [address]  [facility] [place] - [place], [place] - [address]  [facility] - [place], [place] - [address]    Clinical concerns: refill requested, change chemo?    Nina Angulo            "

## 2021-09-09 NOTE — PROGRESS NOTES
[facility] Hematology and Oncology Progress Note    Patient: Tamy Pierce  MRN: [id number]  Date of Service: Sep 9, 2021         Reason for Visit    Problem List Items Addressed This Visit     None          Assessment      1.  A very pleasant 89 year old woman with locally advanced unresectable rectal cancer.  She has been palliated with diverting colostomy and has been on Xeloda 6 days on 8 days of for several months.  Current PET scan showing progression.  2.  Peripheral neuropathy.  3.  History of arthritis etc.  4.  She is 89 years old.    Plan    1.  A lengthy discussion the patient regarding her PET scan finding.  This can shows worsening compared to the CT scan in June 2021.  We discussed that without any treatment obviously this cancer is going to get worse.  The standard textbook options for this type of cancer is going to oxaliplatin based therapy or irinotecan based therapy.  She is obviously not a candidate for combination of oxaliplatin and irinotecan along with 5-FU.    The patient's daughter had a question about increasing dose of Xeloda.  In my opinion if she did not tolerate it before she is not going to tolerate it now.    We also talked about quality of life.  What will happen if she does not do any more therapy.    At the end we decided that we should probably discuss her case in the tumor board.  We should also get an opinion from the surgeons to see if she is any chance of getting surgical resection of malignant part of the colon and see if that will afford her some relief in quality of life.  She has had prior radiation so she is not a candidate for repeat radiation but we will also discuss that in our tumor board.    We also recheck her tumor for K-alexandra, NRAS to see if she would be a candidate for EGFR based therapy.  I did tell the patient and her daughter that due to her age.    Cancer Staging  No matching staging information was found for the patient.  Stage IIIb/stage IV    ECOG  Performance    1 - Can't do physically strenuous work, but fully ambyulatory and can do light sedentary work      History of Present Illness    Ms. Tamy Pierce a very pleasant 89 year old woman who has a diagnosis of rectal cancer unresectable.  Originally diagnosed in November 2019.  She had abdominal pain as a presenting symptom which was there for quite some time.  CT scan revealed a rectosigmoid mass causing colonic obstruction with local lymph node spread.  No distant metastatic disease was noted.  She underwent diverting colostomy.  She was noted to have perhaps involvement of the vaginal vault.    She was started on palliative radiation therapy.  After that she was started on Xeloda.  She did have some response initially.  However in June 2021 her CT scan indicated that her mass was probably progressing.  However she wanted to continue same treatment which was Xeloda 1000 mg twice a day 6 days on  8 days off.    Comes in today after PET scan.    Review of systems.        Past History    Past Medical History:   Diagnosis Date     Abdominal pain 02/16/2020     Actinic keratosis      Actinic keratosis      Basal cell carcinoma      Basal cell carcinoma      Fracture of left inferior pubic ramus (H)      Fracture of left superior pubic ramus (H)      Gait instability      GERD (gastroesophageal reflux disease)      HLD (hyperlipidemia)      HTN (hypertension)      Lumbar radiculopathy      Mild intermittent asthma      Other and unspecified hyperlipidemia      Rectal cancer (H)      Unspecified asthma(493.90)      Unspecified essential hypertension      Urethral stricture      Urethral stricture due to infection 10/03/2005    ureteral stone       Past Surgical History:   Procedure Laterality Date     COLOSTOMY N/A 11/20/2019    Procedure: Colostomy Formation;  Surgeon: Demarcus Moreno MD;  Location: [facility] OR;  Service: General     ESOPHAGOSCOPY, GASTROSCOPY, DUODENOSCOPY (EGD), COMBINED N/A 2/20/2020  "   Procedure: ESOPHAGOGASTRODUODENOSCOPY, WITH BIOPSY;  Surgeon: Harrison Brwon MD;  Location: [facility]     ESOPHAGOSCOPY, GASTROSCOPY, DUODENOSCOPY (EGD), COMBINED  02/20/2020     HYSTERECTOMY       LITHOTRIPSY       PICC AND MIDLINE TEAM LINE INSERTION  11/21/2019          PROCTOSCOPY N/A 11/20/2019    Procedure: PROCTOSCOPY WITH BIOPSY;  Surgeon: Demarcus Moreno MD;  Location: [facility];  Service: General     SURGICAL HISTORY OF -       open reduction of second metatarsal neck fx with internal fixation  cna d closed treatment of metatarsal to 2 and 4 fx     SURGICAL HISTORY OF -       hysterectomy and oophorectomy     SURGICAL HISTORY OF -       lithotripsy         Physical Exam    Ht 1.499 m (4' 11\")   Wt 51.4 kg (113 lb 4.8 oz)   BMI 22.88 kg/m          GENERAL: Alert and oriented to time place and person. Seated comfortably. In no distress.    HEAD: Atraumatic and normocephalic.    EYES: LORENA, EOMI. No pallor. No icterus.    Oral cavity: no mucosal lesion or tonsillar enlargement.    NECK: supple. JVP normal.No thyroid enlargement.    LYMPH NODES: No palpable, cervical, axillary or inguinal lymphadenopathy.    CHEST: clear to auscultation bilaterally. Symmetrical breath movements bilaterally.    CVS: S1 and S2 are Regular rate and rhythm. No murmur or gallop or rub heard. No peripheral edema.    ABDOMEN: Colostomy in place.  Soft. Not tender. Not distended. No palpable hepatomegaly or splenomegaly. No other mass palpable. Bowel sounds heard.    EXTREMITIES: Warm.    SKIN: no rash, or bruising or purpura.      Lab Results    No results found for this or any previous visit (from the past 168 hour(s)).    Imaging    PET Oncology Whole Body    Result Date: 8/12/2021  Combined Report of:    PET and CT on  8/12/2021 12:18 PM : 1. PET of the neck, chest, abdomen, and pelvis. 2. PET CT Fusion for Attenuation Correction and Anatomical Localization:  3. 3D MIP and PET-CT fused images were processed on an " independent workstation and archived to PACS and reviewed by a radiologist. Technique: 1. PET: The patient received 14.4 mCi of F-18-FDG; the serum glucose was 97 prior to administration, body weight was 149.4 kg. Images were evaluated in the axial, sagittal, and coronal planes as well as the rotational whole body MIP. Images were acquired from the Vertex to the Feet. UPTAKE WAS MEASURED AT 60 MINUTES. BACKGROUND:  Liver SUV max= 2.73,   Aorta Blood SUV Max: 1.73. 2. CT: CT only obtained for attenuation correction and not diagnostic purposes. INDICATION: Rectal cancer, assess treatment response; compare to 1/2021 PET r/o cancer progression; Rectal cancer (H) ADDITIONAL INFORMATION OBTAINED FROM EMR: none COMPARISON: Previous PET CT study from 1/28/2021 and 1/16/2020. FINDINGS: HEAD/NECK: There is no suspicious FDG uptake in the neck. Complex appearing thyroid gland, similar to before. CHEST: There is no suspicious FDG uptake in the chest. ABDOMEN AND PELVIS: The upper abdomen demonstrates normal GI and urinary excretion of the FDG. The pelvis and rectosigmoid regions demonstrates there is now growing tumor within the rectum and lower rectosigmoid. The SUV max measures 22.08, previously measured 20.24 on 1/16/2020. The study on 1/28/2021 showed reduction in the rectal hypermetabolic mass but it has increased on the current examination. No metastatic lymph nodes identified within the pelvis. LOWER EXTREMITIES: No abnormal masses or hypermetabolic lesions. BONES: There are no suspicious lytic or blastic osseous lesions from malignancy.  There is no abnormal FDG uptake in the skeleton to indicate malignancy. There are degenerative changes of both shoulders, unchanged from previous examinations. Cursory review of the body as with this nondiagnostic CT scan demonstrates degenerative changes of the spine particularly at L5-S1. There is calcification of the descending aorta and aortic arch. Calcified gallstones within the  gallbladder. There is a widemouth anterior abdominal wall hernia with bowel in the hernia site. The hernias similar to 6/24/2021. The left pelvic fracture has healed compared to the previous study.     IMPRESSION: In this patient with a diagnosis of rectal carcinoma there is evidence of disease progression within the rectum and lower rectosigmoid regions. 1. The hypermetabolic mass previously described in the rectum and lower rectosigmoid region has now increased significantly in size indicating progression of tumor. 2. No evidence of metastatic disease elsewhere within the body or within the adjacent pelvis.  SANDRA REDDY MD   SYSTEM ID:  [id number]        Signed by: Kodak Rivero MD, MD      This note has been dictated using voice recognition software. Any grammatical or context distortions are unintentional and inherent to the software

## 2021-09-10 ENCOUNTER — LAB (OUTPATIENT)
Dept: LAB | Facility: CLINIC | Age: 86
End: 2021-09-10
Payer: MEDICARE

## 2021-09-10 DIAGNOSIS — R10.9 ABDOMINAL CRAMPING: Primary | ICD-10-CM

## 2021-09-10 PROCEDURE — 88360 TUMOR IMMUNOHISTOCHEM/MANUAL: CPT | Mod: TC | Performed by: INTERNAL MEDICINE

## 2021-09-10 PROCEDURE — 88360 TUMOR IMMUNOHISTOCHEM/MANUAL: CPT | Mod: 26 | Performed by: PATHOLOGY

## 2021-09-13 LAB — BKR LAB AP ADDL TEST(S) ADDED: NORMAL

## 2021-09-14 DIAGNOSIS — C20 RECTAL CANCER (H): ICD-10-CM

## 2021-09-14 DIAGNOSIS — R10.9 ABDOMINAL CRAMPING: Primary | ICD-10-CM

## 2021-09-14 RX ORDER — DICYCLOMINE HYDROCHLORIDE 10 MG/1
10 CAPSULE ORAL
Qty: 80 CAPSULE | Refills: 1 | Status: SHIPPED | OUTPATIENT
Start: 2021-09-14 | End: 2022-01-01

## 2021-09-14 RX ORDER — DICYCLOMINE HYDROCHLORIDE 10 MG/1
10 CAPSULE ORAL 4 TIMES DAILY PRN
Qty: 120 CAPSULE | Refills: 3 | Status: CANCELLED | OUTPATIENT
Start: 2021-09-14

## 2021-09-14 RX ORDER — DICYCLOMINE HYDROCHLORIDE 10 MG/1
10 CAPSULE ORAL 4 TIMES DAILY PRN
Qty: 120 CAPSULE | Refills: 3 | Status: SHIPPED | OUTPATIENT
Start: 2021-09-14

## 2021-09-14 NOTE — TELEPHONE ENCOUNTER
Received call from daughter. She is frustrated medication is not being refilled. Will route to out of office pool for approval.

## 2021-09-14 NOTE — TELEPHONE ENCOUNTER
Patient daughter is calling to follow up medication requested. Patient is currently in a lot of pain and wants this refill asap while Dr. Thakkar is in the clinic.. Daughter is very upset.    Maci Vivas on 9/14/2021 at 10:31 AM

## 2021-09-16 LAB — BKR LAB AP ADDL TEST(S) ADDED: NORMAL

## 2021-09-30 ENCOUNTER — VIRTUAL VISIT (OUTPATIENT)
Dept: ONCOLOGY | Facility: HOSPITAL | Age: 86
End: 2021-09-30
Attending: INTERNAL MEDICINE
Payer: MEDICARE

## 2021-09-30 DIAGNOSIS — C20 RECTAL CANCER (H): Primary | ICD-10-CM

## 2021-09-30 PROCEDURE — 99214 OFFICE O/P EST MOD 30 MIN: CPT | Mod: 95 | Performed by: INTERNAL MEDICINE

## 2021-09-30 ASSESSMENT — PAIN SCALES - GENERAL: PAINLEVEL: NO PAIN (0)

## 2021-09-30 NOTE — PROGRESS NOTES
[facility] Hematology and Oncology Progress Note    Patient: Tamy Pierce  MRN: [id number]  Date of Service: Sep 30, 2021         Reason for Visit    Problem List Items Addressed This Visit     None          Assessment      1.  A very pleasant 89 year old woman with locally advanced unresectable rectal cancer.  She has been palliated with diverting colostomy and has been on Xeloda 6 days on 8 days of for several months.  Last PET scan was showing progression.  Molecular testing has so far not reveal any targetable mutation.  She is HER-2 negative.  MSI stable.  Some of the molecular testing so far has not shown any targetable mutation including absence of MET amplification, RET rearrangement and PTEN deletion.  2.  Peripheral neuropathy.  3.  History of arthritis etc.  4.  She is 89 years old.    Plan    1.  We discussed that based on her tumor board case discussion radiologist did feel that they can give radiation to her rectal area if she has symptoms of bleeding or pain.  Otherwise systemic therapy is probably the best way to treat her.  We did not have any surgeon to give an opinion regarding surgery.    We talked about the options of systemic therapy.  I think in her situation I would give single agent irinotecan on an every 2-week-based dosing.  We would start at 80% dose and then based on her response we may increase it a little bit.    Emphasized again that the main goal of care is to maintain good quality of life.  This entity is not curable with any means that I can see.    We will wait for the full molecular testing profile to come back as including K-alexandra mutation.  In the meantime I have asked the patient and the family to discuss among themselves to see what they would like to do.  We will touch base again next week.  We also talked about that she can wait and watch and have treatment free interval for a period of time before proceeding with any second line therapy.    Cancer Staging  No matching  staging information was found for the patient.  Stage IIIb/stage IV    ECOG Performance    1 - Can't do physically strenuous work, but fully ambyulatory and can do light sedentary work      History of Present Illness    Ms. Tamy Pierce a very pleasant 89 year old woman who has a diagnosis of rectal cancer unresectable.  Originally diagnosed in November 2019.  She had abdominal pain as a presenting symptom which was there for quite some time.  CT scan revealed a rectosigmoid mass causing colonic obstruction with local lymph node spread.  No distant metastatic disease was noted.  She underwent diverting colostomy.  She was noted to have perhaps involvement of the vaginal vault.    She was started on palliative radiation therapy.  After that she was started on Xeloda.  She did have some response initially.  However in June 2021 her CT scan indicated that her mass was probably progressing.  However she wanted to continue same treatment which was Xeloda 1000 mg twice a day 6 days on  8 days off.    She had a PET scan which showed progression.  Therefore we had a meeting.  I discussed with them that we should stop taking the Xeloda.  So she has been off it.    We did a video visit today.    Review of systems.    Overall feels well.  No significant GI symptoms.  Minimal bleeding from the rectal stump.    Past History    Past Medical History:   Diagnosis Date     Abdominal pain 02/16/2020     Actinic keratosis      Actinic keratosis      Basal cell carcinoma      Basal cell carcinoma      Fracture of left inferior pubic ramus (H)      Fracture of left superior pubic ramus (H)      Gait instability      GERD (gastroesophageal reflux disease)      HLD (hyperlipidemia)      HTN (hypertension)      Lumbar radiculopathy      Mild intermittent asthma      Other and unspecified hyperlipidemia      Rectal cancer (H)      Unspecified asthma(493.90)      Unspecified essential hypertension      Urethral stricture      Urethral  stricture due to infection 10/03/2005    ureteral stone       Past Surgical History:   Procedure Laterality Date     COLOSTOMY N/A 11/20/2019    Procedure: Colostomy Formation;  Surgeon: Demarcus Moreno MD;  [place] [facility]: General     ESOPHAGOSCOPY, GASTROSCOPY, DUODENOSCOPY (EGD), COMBINED N/A 2/20/2020    Procedure: ESOPHAGOGASTRODUODENOSCOPY, WITH BIOPSY;  Surgeon: Harrison Brown MD;  Location: [facility]     ESOPHAGOSCOPY, GASTROSCOPY, DUODENOSCOPY (EGD), COMBINED  02/20/2020     HYSTERECTOMY       LITHOTRIPSY       PICC AND MIDLINE TEAM LINE INSERTION  11/21/2019          PROCTOSCOPY N/A 11/20/2019    Procedure: PROCTOSCOPY WITH BIOPSY;  Surgeon: Demarcus Moreno MD;  [place] [facility]  Service: General     SURGICAL HISTORY OF -       open reduction of second metatarsal neck fx with internal fixation  cna d closed treatment of metatarsal to 2 and 4 fx     SURGICAL HISTORY OF -       hysterectomy and oophorectomy     SURGICAL HISTORY OF -       lithotripsy         Physical Exam    There were no vitals taken for this visit.              Lab Results    No results found for this or any previous visit (from the past 168 hour(s)).    Imaging    No results found.      Signed by: Kodak Rivero MD, MD      This note has been dictated using voice recognition software. Any grammatical or context distortions are unintentional and inherent to the software   Alert and oriented, no focal deficits, no motor or sensory deficits.

## 2021-09-30 NOTE — PROGRESS NOTES
Pam is a 89 year old who is being evaluated via a billable video visit.      How would you like to obtain your AVS? Mail a copy  If the video visit is dropped, the invitation should be resent by: Text to cell phone: [phone]  Will anyone else be joining your video visit? No          Type of service:  Video Visit        Distant Location (provider location):  [facility]     Platform used for Video Visit: Man

## 2021-10-04 DIAGNOSIS — E78.5 HYPERLIPIDEMIA LDL GOAL <100: Primary | ICD-10-CM

## 2021-10-06 ENCOUNTER — TELEPHONE (OUTPATIENT)
Dept: FAMILY MEDICINE | Facility: CLINIC | Age: 86
End: 2021-10-06
Payer: MEDICARE

## 2021-10-06 RX ORDER — SIMVASTATIN 40 MG
TABLET ORAL
Qty: 90 TABLET | Refills: 3 | Status: SHIPPED | OUTPATIENT
Start: 2021-10-06 | End: 2022-01-01

## 2021-10-06 NOTE — TELEPHONE ENCOUNTER
Covering for PCP (out of clinic today):  Looks like some of the testing is still in process and this was ordered by heme/onc, so I'll forward this those providers to see if they have any comments yet or are waiting for the rest of the results.    Thanks,  Brady Amezcua MD

## 2021-10-06 NOTE — TELEPHONE ENCOUNTER
"Routing refill request to provider for review/approval because:  Medication is reported/historical    Requested Prescriptions   Pending Prescriptions Disp Refills     simvastatin (ZOCOR) 40 MG tablet [Pharmacy Med Name: SIMVASTATIN 40MG TABS] 90 tablet 3     Sig: TAKE ONE TABLET BY MOUTH AT BEDTIME       Statins Protocol Failed - 10/4/2021 11:52 PM        Failed - LDL on file in past 12 months     Recent Labs   Lab Test 07/15/19  1041   LDL 73             Passed - No abnormal creatine kinase in past 12 months     Recent Labs   Lab Test 11/22/19  0508                   Passed - Recent (12 mo) or future (30 days) visit within the authorizing provider's specialty     Patient has had an office visit with the authorizing provider or a provider within the authorizing providers department within the previous 12 mos or has a future within next 30 days. See \"Patient Info\" tab in inbasket, or \"Choose Columns\" in Meds & Orders section of the refill encounter.              Passed - Medication is active on med list        Passed - Patient is age 18 or older        Passed - No active pregnancy on record        Passed - No positive pregnancy test in past 12 months             "

## 2021-10-06 NOTE — TELEPHONE ENCOUNTER
Reason for Call:  Request for results: Call back    Name of test or procedure: All of the testing done from Sept 2021 for patient's care team.    Date of test of procedure: 09/09/2021-09/15/2021 Labs Final Results    Location of the test or procedure: [facility] to leave the result message on voice mail or with a family member? YES    Phone number Patient can be reached at:  Cell number on file:    Telephone Information:   Mobile [phone]       Additional comments: Daughter Karine is wanting to know the final results of the labs that were done recently and to know care plan for mom. Please call and advise.    Call taken on 10/6/2021 at 3:17 PM by Andrew Winkler

## 2021-10-11 ENCOUNTER — TELEPHONE (OUTPATIENT)
Dept: ONCOLOGY | Facility: HOSPITAL | Age: 86
End: 2021-10-11

## 2021-10-11 DIAGNOSIS — C20 RECTAL CANCER (H): Primary | ICD-10-CM

## 2021-10-11 NOTE — TELEPHONE ENCOUNTER
Spoke with patient's daughter, Karine.  Per Dr. Rivero, after reviewing all the recent lab results, he would recommend Irinotecan IV weekly.  Karine states her mom would like to consider radiation instead.  A referral was entered for patient to see radiation oncology in [facility] where she was seen in the past.  [facility] radiation department notified. Karine will call if they decide to go ahead with chemotherapy.

## 2021-10-14 LAB — SPECIMEN STATUS: NORMAL

## 2021-10-17 DIAGNOSIS — J45.20 MILD INTERMITTENT ASTHMA WITHOUT COMPLICATION: ICD-10-CM

## 2021-10-17 NOTE — PROGRESS NOTES
Radiation Oncology Consult Note  [facility]        RE: Tamy Pierce : 1930   MRN: [id number] JOHN: Oct 19, 2021       OUTPATIENT VISIT NOTE       REFERRING PROVIDER: Dr. Kodak Rivero (Hematology Oncology, [place])  REASON FOR CONSULTATION: Consideration of local r    DIAGNOSIS: cT4N2 rectal adenocarcinoma, unresectable  STAGE: IV    HISTORY OF PRESENT ILLNESS:   This is a 89-year-old woman with locally advanced unresectable rectal cancer status post palliative diverting colostomy, as well as hypofractionated radiotherapy to the index lesion back in 2020.  She initially demonstrated response and has been on maintenance Xeloda over the last several months. Surveillance CT in 2021 demonstrated progression of the rectal mass, stayed on Xeloda at that time. Her most recent PET/CT was 21 with further progression. She has been off Xeloda since then.  Dr. Rivero has talked to the patient regarding possible systemic therapy with irinotecan, and the patient is reticent towards this option. She has thus been referred to radiation oncology at the request of her medical oncologist, Dr. Rivero, for consideration of palliative radiotherapy to her rectal mass for possible local control.     Ms. Pierce is here today with her daughter, Karine. She notes occasional mucus from her rectum, and it is seldomly blood-tinged. No blood clots or bright red blood per rectum. Her ostomy is productive, and notes no blood from the ostomy either. She does use daily miralax for bowel regimen. She denies tenesmus but has been having abdominal pains/spasms that are controlled on her regimen of dicyclomine.     PMH:   Past Medical History:   Diagnosis Date     Abdominal pain 2020     Actinic keratosis      Actinic keratosis      Basal cell carcinoma      Basal cell carcinoma      Fracture of left inferior pubic ramus (H)      Fracture of left superior pubic ramus (H)      Gait instability      GERD  (gastroesophageal reflux disease)      HLD (hyperlipidemia)      HTN (hypertension)      Lumbar radiculopathy      Mild intermittent asthma      Other and unspecified hyperlipidemia      Rectal cancer (H)      Unspecified asthma(493.90)      Unspecified essential hypertension      Urethral stricture      Urethral stricture due to infection 10/03/2005    ureteral stone       PSH:  Past Surgical History:   Procedure Laterality Date     COLOSTOMY N/A 11/20/2019    Procedure: Colostomy Formation;  Surgeon: Demarcus Moreno MD;  [place] [facility] [place];  Service: General     ESOPHAGOSCOPY, GASTROSCOPY, DUODENOSCOPY (EGD), COMBINED N/A 2/20/2020    Procedure: ESOPHAGOGASTRODUODENOSCOPY, WITH BIOPSY;  Surgeon: Harrison Brown MD;  Location: [facility]     ESOPHAGOSCOPY, GASTROSCOPY, DUODENOSCOPY (EGD), COMBINED  02/20/2020     HYSTERECTOMY       LITHOTRIPSY       PICC AND MIDLINE TEAM LINE INSERTION  11/21/2019          PROCTOSCOPY N/A 11/20/2019    Procedure: PROCTOSCOPY WITH BIOPSY;  Surgeon: Demarcus Moreno MD;  Location: [facility];  Service: General     SURGICAL HISTORY OF -       open reduction of second metatarsal neck fx with internal fixation  cna d closed treatment of metatarsal to 2 and 4 fx     SURGICAL HISTORY OF -       hysterectomy and oophorectomy     SURGICAL HISTORY OF -       lithotripsy       MEDICATIONS:  Current Outpatient Medications   Medication Sig Dispense Refill     acetaminophen (TYLENOL) 325 MG tablet Take 3 tablets (975 mg) by mouth every 8 hours       acetaminophen (TYLENOL) 325 MG tablet Take 2 tablets (650 mg) by mouth every 4 hours as needed for mild pain       albuterol (PROAIR HFA) 108 (90 Base) MCG/ACT inhaler Inhale 2 puffs into the lungs every 4 hours as needed 1 Inhaler 11     aspirin 81 MG EC tablet Take 81 mg by mouth daily       atenolol (TENORMIN) 25 MG tablet Take 0.5 tablets (12.5 mg) by mouth daily 90 tablet 3     calcium carbonate (OS-DANIEL) 1500 (600 Ca) MG tablet Take  1,200 mg by mouth daily       capecitabine (XELODA) 500 MG tablet Take 1 gram po bid. 6 days on, 8 days off. 56 tablet 1     capecitabine (XELODA) 500 MG tablet Take 1 gram po bid. 6 days on, 8 days off. 56 tablet 1     capecitabine (XELODA) 500 MG tablet Take 1 gram po bid. 6 days on, 8 days off. 56 tablet 1     capecitabine (XELODA) 500 MG tablet Take 1 gram po bid. 6 days on, 8 days off. 56 tablet 1     capecitabine (XELODA) 500 MG tablet Take 1 gram po bid. 7 days on, 7 days off. 56 tablet 1     capecitabine (XELODA) 500 MG tablet Take 1 gram po bid. 7 days on, 7 days off. 56 tablet 1     Cholecalciferol (VITAMIN D-3 PO) Take 1 capsule by mouth daily       dicyclomine (BENTYL) 10 MG capsule Take 1 capsule (10 mg) by mouth 4 times daily as needed (take for abdominal pain and or cramping) 120 capsule 3     dicyclomine (BENTYL) 10 MG capsule Take 1 capsule (10 mg) by mouth 4 times daily (before meals and nightly) 80 capsule 1     FLOVENT  MCG/ACT inhaler INHALE TWO PUFFS BY MOUTH TWICE A DAY 12 g 11     gabapentin (NEURONTIN) 100 MG capsule Take one in the morning , two in the afternoon, three at night 360 capsule 3     magnesium oxide (MAG-OX) 400 MG tablet Take 400 mg by mouth 2 times daily        melatonin 3 MG tablet Take 3 mg by mouth At Bedtime Also 1 tab overnight as needed       MULTI-DAY VITAMINS OR Take 1 tablet by mouth daily        omeprazole (PRILOSEC) 20 MG DR capsule Take 1 capsule (20 mg) by mouth daily 90 capsule 3     ondansetron (ZOFRAN-ODT) 4 MG ODT tab Take 1 tablet (4 mg) by mouth every 6 hours as needed for nausea or vomiting (Patient not taking: Reported on 9/9/2021)       oxyCODONE (ROXICODONE) 5 MG tablet Take 0.5 tablets (2.5 mg) by mouth daily as needed for breakthrough pain or moderate to severe pain 60 tablet 0     polyethylene glycol (MIRALAX) 17 g packet Take 17 g by mouth 2 times daily       potassium chloride ER (KLOR-CON M) 20 MEQ CR tablet TAKE TWO TABLETS BY MOUTH TWICE A   tablet 3     simvastatin (ZOCOR) 40 MG tablet TAKE ONE TABLET BY MOUTH AT BEDTIME 90 tablet 3     tiZANidine (ZANAFLEX) 2 MG tablet TAKE 1-2 TABLETS (2-4 MG) BY MOUTH EVERY 6 HOURS AS NEEDED FOR MUSCLE SPASMS 90 tablet 0       ALLERGY:  Allergies   Allergen Reactions     Eggs [Chicken-Derived Products (Egg)]      Flu Virus Vaccine Other (See Comments)     With egg base       FAMILY HISTORY:  Family History   Problem Relation Age of Onset     Cancer - colorectal Mother      Breast Cancer Mother      C.A.D. Father      Cardiovascular Father      Alcohol/Drug Father      Chronic Obstructive Pulmonary Disease Brother      Cancer Son         chest cancer (Thymus)/myesthenia gravis     Musculoskeletal Disorder Son         myesthenia gravis     Cerebrovascular Disease Sister      Chronic Obstructive Pulmonary Disease Brother      Cancer Mother         colorectal      Coronary Artery Disease Father      Chronic Obstructive Pulmonary Disease Brother        SOCIAL HISTORY  Social History     Tobacco Use     Smoking status: Never Smoker     Smokeless tobacco: Never Used   Substance Use Topics     Alcohol use: Not Currently     Comment: wine ocass       ECOG PERFORMANCE STATUS: 1-2    HISTORY OF RADIATION: Yes  2500 cGy / 5 fractions, external beam radiotherapy to the rectum, with Dr. Robbins, from 2/3/2020 - 2/7/2020    IMPLANTED CARDIAC DEVICE: No  PREGNANCY RISK: No, post-menopausal     REVIEW OF SYMPTOMS:  A full 10-point review of systems was performed as per nursing note.  Pertinent negatives and positives reviewed.    PHYSICAL EXAMINATION:    Gen: Alert, in NAD  Eyes: EOMI, sclera anicteric  HENT      Head: NC/AT  Pulm: Breathing comfortably on room air, no audible wheezes or ronchi  CV: Well-perfused, no cyanosis  Abdominal: Soft, nondistended  Skin: Normal color and turgor  Neurologic/MSK: motor grossly intact, normal gait  Psych: Appropriate mood and affect    IMAGING:      PATHOLOGY: No recent pathology for  review    ASSESSMENT: Ms. Pierce is a 89 year old female a diagnosis of locally advanced rectal adenocarcinoma, clinical T4N2.  The patient underwent palliative hypofractionated RT (5 Gy x 5 fractions) as she wishes to prioritize quality of life and wishes to avoid any aggressive surgical intervention or even extended course of radiation therapy.    PLAN: We discussed the possibility of a palliative dose to the rectum in the context of previous treatment in a high riding area of the rectum in very close proximity to the bowel. Given that the patient has progressive disease, and initially had a decent response to treatment upfront, and does not want chemotherapy, we will plan to have her return for a CT simulation with a full bladder in an attempt to move bowel out of proximity of our treatment field. If we are able to spare bowel toxicity, we will tentatively plan a course of 10 fractions to treat the rectal tumor.     The patient and her daughter expressed understanding that the limiting factor in our ability to treat is previous bowel dose and risk of bowel toxicity.     The patient was seen and discussed with staff, Dr. Robbins. Thank you for allowing us to participate in this patient's care.  Please feel free to call with any questions or concerns.       Lynda Feldman MD PGY3  Department of Radiation Oncology  [phone] Clinic  [phone] Pager         I was present with the resident during the visit. I discussed the case with the resident and agree with the note as documented by the resident.    Filippo Robbins M.D.  Department of Radiation Oncology  [facility]

## 2021-10-18 ENCOUNTER — IMMUNIZATION (OUTPATIENT)
Dept: FAMILY MEDICINE | Facility: CLINIC | Age: 86
End: 2021-10-18
Payer: MEDICARE

## 2021-10-18 DIAGNOSIS — Z23 NEED FOR PROPHYLACTIC VACCINATION AND INOCULATION AGAINST INFLUENZA: Primary | ICD-10-CM

## 2021-10-18 PROCEDURE — G0008 ADMIN INFLUENZA VIRUS VAC: HCPCS

## 2021-10-18 PROCEDURE — 99207 PR NO CHARGE NURSE ONLY: CPT

## 2021-10-18 PROCEDURE — 90682 RIV4 VACC RECOMBINANT DNA IM: CPT

## 2021-10-18 RX ORDER — DEXAMETHASONE 4 MG/1
TABLET ORAL
Qty: 12 G | Refills: 11 | Status: SHIPPED | OUTPATIENT
Start: 2021-10-18

## 2021-10-19 ENCOUNTER — OFFICE VISIT (OUTPATIENT)
Dept: RADIATION THERAPY | Facility: OUTPATIENT CENTER | Age: 86
End: 2021-10-19
Payer: MEDICARE

## 2021-10-19 DIAGNOSIS — C20 RECTAL CANCER (H): ICD-10-CM

## 2021-10-19 NOTE — LETTER
10/19/2021         RE: Tamy Pierce  [address] [place] [id number]        Dear Colleague,    Thank you for referring your patient, Tamy Pierce, to the [facility]. Please see a copy of my visit note below.    Radiation Oncology Consult Note  [facility]        RE: Tamy Pierce : 1930   MRN: [id number] JOHN: Oct 19, 2021       OUTPATIENT VISIT NOTE       REFERRING PROVIDER: Dr. Kodak Rivero (Hematology Oncology, [place])  REASON FOR CONSULTATION: Consideration of local r    DIAGNOSIS: cT4N2 rectal adenocarcinoma, unresectable  STAGE: IV    HISTORY OF PRESENT ILLNESS:   This is a 89-year-old woman with locally advanced unresectable rectal cancer status post palliative diverting colostomy, as well as hypofractionated radiotherapy to the index lesion back in 2020.  She initially demonstrated response and has been on maintenance Xeloda over the last several months. Surveillance CT in 2021 demonstrated progression of the rectal mass, stayed on Xeloda at that time. Her most recent PET/CT was 21 with further progression. She has been off Xeloda since then.  Dr. Rivero has talked to the patient regarding possible systemic therapy with irinotecan, and the patient is reticent towards this option. She has thus been referred to radiation oncology at the request of her medical oncologist, Dr. Rivero, for consideration of palliative radiotherapy to her rectal mass for possible local control.     Ms. Pierce is here today with her daughter, Karine. She notes occasional mucus from her rectum, and it is seldomly blood-tinged. No blood clots or bright red blood per rectum. Her ostomy is productive, and notes no blood from the ostomy either. She does use daily miralax for bowel regimen. She denies tenesmus but has been having abdominal pains/spasms that are controlled on her regimen of dicyclomine.     PMH:   Past Medical History:   Diagnosis Date     Abdominal pain  02/16/2020     Actinic keratosis      Actinic keratosis      Basal cell carcinoma      Basal cell carcinoma      Fracture of left inferior pubic ramus (H)      Fracture of left superior pubic ramus (H)      Gait instability      GERD (gastroesophageal reflux disease)      HLD (hyperlipidemia)      HTN (hypertension)      Lumbar radiculopathy      Mild intermittent asthma      Other and unspecified hyperlipidemia      Rectal cancer (H)      Unspecified asthma(493.90)      Unspecified essential hypertension      Urethral stricture      Urethral stricture due to infection 10/03/2005    ureteral stone       PSH:  Past Surgical History:   Procedure Laterality Date     COLOSTOMY N/A 11/20/2019    Procedure: Colostomy Formation;  Surgeon: Demarcus Moreno MD;  [place] [facility] [place]: General     ESOPHAGOSCOPY, GASTROSCOPY, DUODENOSCOPY (EGD), COMBINED N/A 2/20/2020    Procedure: ESOPHAGOGASTRODUODENOSCOPY, WITH BIOPSY;  Surgeon: Harrison Brown MD;  Location: [facility]     ESOPHAGOSCOPY, GASTROSCOPY, DUODENOSCOPY (EGD), COMBINED  02/20/2020     HYSTERECTOMY       LITHOTRIPSY       PICC AND MIDLINE TEAM LINE INSERTION  11/21/2019          PROCTOSCOPY N/A 11/20/2019    Procedure: PROCTOSCOPY WITH BIOPSY;  Surgeon: Demarcus Moreno MD;  [place] [facility] [place]  Service: General     SURGICAL HISTORY OF -       open reduction of second metatarsal neck fx with internal fixation  cna d closed treatment of metatarsal to 2 and 4 fx     SURGICAL HISTORY OF -       hysterectomy and oophorectomy     SURGICAL HISTORY OF -       lithotripsy       MEDICATIONS:  Current Outpatient Medications   Medication Sig Dispense Refill     acetaminophen (TYLENOL) 325 MG tablet Take 3 tablets (975 mg) by mouth every 8 hours       acetaminophen (TYLENOL) 325 MG tablet Take 2 tablets (650 mg) by mouth every 4 hours as needed for mild pain       albuterol (PROAIR HFA) 108 (90 Base) MCG/ACT inhaler Inhale 2 puffs into the lungs every 4  hours as needed 1 Inhaler 11     aspirin 81 MG EC tablet Take 81 mg by mouth daily       atenolol (TENORMIN) 25 MG tablet Take 0.5 tablets (12.5 mg) by mouth daily 90 tablet 3     calcium carbonate (OS-DANIEL) 1500 (600 Ca) MG tablet Take 1,200 mg by mouth daily       capecitabine (XELODA) 500 MG tablet Take 1 gram po bid. 6 days on, 8 days off. 56 tablet 1     capecitabine (XELODA) 500 MG tablet Take 1 gram po bid. 6 days on, 8 days off. 56 tablet 1     capecitabine (XELODA) 500 MG tablet Take 1 gram po bid. 6 days on, 8 days off. 56 tablet 1     capecitabine (XELODA) 500 MG tablet Take 1 gram po bid. 6 days on, 8 days off. 56 tablet 1     capecitabine (XELODA) 500 MG tablet Take 1 gram po bid. 7 days on, 7 days off. 56 tablet 1     capecitabine (XELODA) 500 MG tablet Take 1 gram po bid. 7 days on, 7 days off. 56 tablet 1     Cholecalciferol (VITAMIN D-3 PO) Take 1 capsule by mouth daily       dicyclomine (BENTYL) 10 MG capsule Take 1 capsule (10 mg) by mouth 4 times daily as needed (take for abdominal pain and or cramping) 120 capsule 3     dicyclomine (BENTYL) 10 MG capsule Take 1 capsule (10 mg) by mouth 4 times daily (before meals and nightly) 80 capsule 1     FLOVENT  MCG/ACT inhaler INHALE TWO PUFFS BY MOUTH TWICE A DAY 12 g 11     gabapentin (NEURONTIN) 100 MG capsule Take one in the morning , two in the afternoon, three at night 360 capsule 3     magnesium oxide (MAG-OX) 400 MG tablet Take 400 mg by mouth 2 times daily        melatonin 3 MG tablet Take 3 mg by mouth At Bedtime Also 1 tab overnight as needed       MULTI-DAY VITAMINS OR Take 1 tablet by mouth daily        omeprazole (PRILOSEC) 20 MG DR capsule Take 1 capsule (20 mg) by mouth daily 90 capsule 3     ondansetron (ZOFRAN-ODT) 4 MG ODT tab Take 1 tablet (4 mg) by mouth every 6 hours as needed for nausea or vomiting (Patient not taking: Reported on 9/9/2021)       oxyCODONE (ROXICODONE) 5 MG tablet Take 0.5 tablets (2.5 mg) by mouth daily as  needed for breakthrough pain or moderate to severe pain 60 tablet 0     polyethylene glycol (MIRALAX) 17 g packet Take 17 g by mouth 2 times daily       potassium chloride ER (KLOR-CON M) 20 MEQ CR tablet TAKE TWO TABLETS BY MOUTH TWICE A  tablet 3     simvastatin (ZOCOR) 40 MG tablet TAKE ONE TABLET BY MOUTH AT BEDTIME 90 tablet 3     tiZANidine (ZANAFLEX) 2 MG tablet TAKE 1-2 TABLETS (2-4 MG) BY MOUTH EVERY 6 HOURS AS NEEDED FOR MUSCLE SPASMS 90 tablet 0       ALLERGY:  Allergies   Allergen Reactions     Eggs [Chicken-Derived Products (Egg)]      Flu Virus Vaccine Other (See Comments)     With egg base       FAMILY HISTORY:  Family History   Problem Relation Age of Onset     Cancer - colorectal Mother      Breast Cancer Mother      C.A.D. Father      Cardiovascular Father      Alcohol/Drug Father      Chronic Obstructive Pulmonary Disease Brother      Cancer Son         chest cancer (Thymus)/myesthenia gravis     Musculoskeletal Disorder Son         myesthenia gravis     Cerebrovascular Disease Sister      Chronic Obstructive Pulmonary Disease Brother      Cancer Mother         colorectal      Coronary Artery Disease Father      Chronic Obstructive Pulmonary Disease Brother        SOCIAL HISTORY  Social History     Tobacco Use     Smoking status: Never Smoker     Smokeless tobacco: Never Used   Substance Use Topics     Alcohol use: Not Currently     Comment: wine ocass       ECOG PERFORMANCE STATUS: 1-2    HISTORY OF RADIATION: Yes  2500 cGy / 5 fractions, external beam radiotherapy to the rectum, with Dr. Robbins, from 2/3/2020 - 2/7/2020    IMPLANTED CARDIAC DEVICE: No  PREGNANCY RISK: No, post-menopausal     REVIEW OF SYMPTOMS:  A full 10-point review of systems was performed as per nursing note.  Pertinent negatives and positives reviewed.    PHYSICAL EXAMINATION:    Gen: Alert, in NAD  Eyes: EOMI, sclera anicteric  HENT      Head: NC/AT  Pulm: Breathing comfortably on room air, no audible wheezes or  kailee  CV: Well-perfused, no cyanosis  Abdominal: Soft, nondistended  Skin: Normal color and turgor  Neurologic/MSK: motor grossly intact, normal gait  Psych: Appropriate mood and affect    IMAGING:      PATHOLOGY: No recent pathology for review    ASSESSMENT: Ms. Pierce is a 89 year old female a diagnosis of locally advanced rectal adenocarcinoma, clinical T4N2.  The patient underwent palliative hypofractionated RT (5 Gy x 5 fractions) as she wishes to prioritize quality of life and wishes to avoid any aggressive surgical intervention or even extended course of radiation therapy.    PLAN: We discussed the possibility of a palliative dose to the rectum in the context of previous treatment in a high riding area of the rectum in very close proximity to the bowel. Given that the patient has progressive disease, and initially had a decent response to treatment upfront, and does not want chemotherapy, we will plan to have her return for a CT simulation with a full bladder in an attempt to move bowel out of proximity of our treatment field. If we are able to spare bowel toxicity, we will tentatively plan a course of 10 fractions to treat the rectal tumor.     The patient and her daughter expressed understanding that the limiting factor in our ability to treat is previous bowel dose and risk of bowel toxicity.     The patient was seen and discussed with staff, Dr. Robbins. Thank you for allowing us to participate in this patient's care.  Please feel free to call with any questions or concerns.       Lynda Feldman MD PGY3  Department of Radiation Oncology  [phone] Clinic  [phone] Pager         I was present with the resident during the visit. I discussed the case with the resident and agree with the note as documented by the resident.    Filippo Robbins M.D.  Department of Radiation Oncology  [facility]

## 2021-10-21 LAB
PATH REPORT.ADDENDUM SPEC: NORMAL
PATH REPORT.ADDENDUM SPEC: NORMAL
PATH REPORT.COMMENTS IMP SPEC: NORMAL
PATH REPORT.COMMENTS IMP SPEC: NORMAL
PATH REPORT.FINAL DX SPEC: NORMAL
PATH REPORT.FINAL DX SPEC: NORMAL

## 2021-10-25 ENCOUNTER — OFFICE VISIT (OUTPATIENT)
Dept: RADIATION THERAPY | Facility: OUTPATIENT CENTER | Age: 86
End: 2021-10-25
Payer: MEDICARE

## 2021-10-25 DIAGNOSIS — C20 RECTAL CANCER (H): Primary | ICD-10-CM

## 2021-10-25 NOTE — PROGRESS NOTES
The patient underwent CT simulation    Filippo Robbins M.D.  Department of Radiation Oncology  [facility]

## 2021-10-25 NOTE — LETTER
10/25/2021         RE: Lilia Pierce  [address] [place] [id number]        Dear Colleague,    Thank you for referring your patient, Lilia Pierce, to the [facility]. Please see a copy of my visit note below.    The patient underwent CT simulation    Filippo Robbins M.D.  Department of Radiation Oncology  [facility]         Again, thank you for allowing me to participate in the care of your patient.        Sincerely,        Filippo Robbins MD

## 2021-10-31 DIAGNOSIS — I10 ESSENTIAL HYPERTENSION: ICD-10-CM

## 2021-11-01 NOTE — TELEPHONE ENCOUNTER
Pending Prescriptions:                       Disp   Refills    atenolol (TENORMIN) 25 MG tablet [Pharmac*90 tab*3            Sig: TAKE ONE-HALF TABLET (12.5 MG) BY MOUTH DAILY    BP Readings from Last 3 Encounters:   09/09/21 (!) 173/74   07/24/21 (!) 142/69   12/07/20 102/60     Laurent Elliott, RN

## 2021-11-03 RX ORDER — ATENOLOL 25 MG/1
TABLET ORAL
Qty: 90 TABLET | Refills: 3 | Status: SHIPPED | OUTPATIENT
Start: 2021-11-03

## 2021-11-09 ENCOUNTER — APPOINTMENT (OUTPATIENT)
Dept: RADIATION THERAPY | Facility: OUTPATIENT CENTER | Age: 86
End: 2021-11-09
Payer: MEDICARE

## 2021-11-10 ENCOUNTER — OFFICE VISIT (OUTPATIENT)
Dept: RADIATION THERAPY | Facility: OUTPATIENT CENTER | Age: 86
End: 2021-11-10
Payer: MEDICARE

## 2021-11-10 ENCOUNTER — APPOINTMENT (OUTPATIENT)
Dept: RADIATION THERAPY | Facility: OUTPATIENT CENTER | Age: 86
End: 2021-11-10
Payer: MEDICARE

## 2021-11-10 VITALS
RESPIRATION RATE: 18 BRPM | SYSTOLIC BLOOD PRESSURE: 143 MMHG | HEART RATE: 65 BPM | WEIGHT: 113.2 LBS | BODY MASS INDEX: 22.86 KG/M2 | DIASTOLIC BLOOD PRESSURE: 78 MMHG | OXYGEN SATURATION: 99 %

## 2021-11-10 DIAGNOSIS — C20 RECTAL CANCER (H): Primary | ICD-10-CM

## 2021-11-10 NOTE — LETTER
11/10/2021         RE: Lilia Pierce  [address] [facility] [address]        Dear Colleague,    Thank you for referring your patient, Lilia Pierce, to the [facility]. Please see a copy of my visit note below.    [facility]  SPECIALIZING IN BREAKTHROUGHS  Radiation Oncology    On Treatment Visit Note      Lilia Pierce      Date: 11/10/2021   MRN: [id number]   : 1930         Reason for Visit:  On Radiation Treatment Visit     Treatment Summary to Date   Pelvis   600 cGy/ 3000 cGy   2/10           Subjective:   Doing okay. Having some discomfort with bladder filling prior to RT, but overall doing okay. No GI bother. Energy okay.    Nursing ROS:                Objective:   There were no vitals taken for this visit.  NAD    Labs:  CBC RESULTS: Recent Labs   Lab Test 21  0838   WBC 9.6   RBC 3.41*   HGB 11.2*   HCT 36.4   *   MCH 32.8   MCHC 30.8*   RDW 15.0        ELECTROLYTES:  Recent Labs   Lab Test 21  0957      POTASSIUM 4.4   CHLORIDE 109   DANIEL 8.6   CO2 30   BUN 21   CR 0.85   GLC 93       Assessment:  Ms. Pierce is a 89 year old female a diagnosis of locally advanced rectal adenocarcinoma, clinical T4N2.  The patient underwent palliative hypofractionated RT (5 Gy x 5 fractions, completed on 20) as she wished to prioritize quality of life and wishes to avoid any aggressive surgical intervention or even extended course of radiation therapy. She was continued on maintaince systemic therapy local POD. She is undergoing palliative RT (re-irradiation).    Tolerating radiation therapy well.  All questions and concerns addressed.    Plan:   1. Continue current therapy.        [employer] chart and setup information reviewed  Ports checked      Filippo Robbins MD

## 2021-11-10 NOTE — PROGRESS NOTES
[facility]  SPECIALIZING IN BREAKTHROUGHS  Radiation Oncology    On Treatment Visit Note      Lilia Pierce      Date: 11/10/2021   MRN: [id number]   : 1930         Reason for Visit:  On Radiation Treatment Visit     Treatment Summary to Date   Pelvis   600 cGy/ 3000 cGy   2/10           Subjective:   Doing okay. Having some discomfort with bladder filling prior to RT, but overall doing okay. No GI bother. Energy okay.    Nursing ROS:                Objective:   There were no vitals taken for this visit.  NAD    Labs:  CBC RESULTS: Recent Labs   Lab Test 21  0838   WBC 9.6   RBC 3.41*   HGB 11.2*   HCT 36.4   *   MCH 32.8   MCHC 30.8*   RDW 15.0        ELECTROLYTES:  Recent Labs   Lab Test 21  0957      POTASSIUM 4.4   CHLORIDE 109   DANIEL 8.6   CO2 30   BUN 21   CR 0.85   GLC 93       Assessment:  Ms. Pierce is a 89 year old female a diagnosis of locally advanced rectal adenocarcinoma, clinical T4N2.  The patient underwent palliative hypofractionated RT (5 Gy x 5 fractions, completed on 20) as she wished to prioritize quality of life and wishes to avoid any aggressive surgical intervention or even extended course of radiation therapy. She was continued on maintaince systemic therapy local POD. She is undergoing palliative RT (re-irradiation).    Tolerating radiation therapy well.  All questions and concerns addressed.    Plan:   1. Continue current therapy.        [employer] chart and setup information reviewed  Ports checked                Filippo Robbins MD

## 2021-11-11 ENCOUNTER — APPOINTMENT (OUTPATIENT)
Dept: RADIATION THERAPY | Facility: OUTPATIENT CENTER | Age: 86
End: 2021-11-11
Payer: MEDICARE

## 2021-11-12 ENCOUNTER — APPOINTMENT (OUTPATIENT)
Dept: RADIATION THERAPY | Facility: OUTPATIENT CENTER | Age: 86
End: 2021-11-12
Payer: MEDICARE

## 2021-11-15 ENCOUNTER — APPOINTMENT (OUTPATIENT)
Dept: RADIATION THERAPY | Facility: OUTPATIENT CENTER | Age: 86
End: 2021-11-15
Payer: MEDICARE

## 2021-11-16 ENCOUNTER — APPOINTMENT (OUTPATIENT)
Dept: RADIATION THERAPY | Facility: OUTPATIENT CENTER | Age: 86
End: 2021-11-16
Payer: MEDICARE

## 2021-11-17 ENCOUNTER — OFFICE VISIT (OUTPATIENT)
Dept: RADIATION THERAPY | Facility: OUTPATIENT CENTER | Age: 86
End: 2021-11-17
Payer: MEDICARE

## 2021-11-17 ENCOUNTER — APPOINTMENT (OUTPATIENT)
Dept: RADIATION THERAPY | Facility: OUTPATIENT CENTER | Age: 86
End: 2021-11-17
Payer: MEDICARE

## 2021-11-17 VITALS
WEIGHT: 111.6 LBS | RESPIRATION RATE: 18 BRPM | HEART RATE: 78 BPM | SYSTOLIC BLOOD PRESSURE: 125 MMHG | BODY MASS INDEX: 22.54 KG/M2 | DIASTOLIC BLOOD PRESSURE: 73 MMHG | OXYGEN SATURATION: 98 %

## 2021-11-17 DIAGNOSIS — C20 RECTAL CANCER (H): Primary | ICD-10-CM

## 2021-11-17 ASSESSMENT — PAIN SCALES - GENERAL: PAINLEVEL: NO PAIN (0)

## 2021-11-17 NOTE — PROGRESS NOTES
[facility]  SPECIALIZING IN BREAKTHROUGHS  Radiation Oncology    On Treatment Visit Note      Lilia Pierce      Date: 2021   MRN: [id number]   : 1930         Reason for Visit:  On Radiation Treatment Visit     Treatment Summary to Date   Pelvis   2100 cGy/ 3000 cGy   7/10           Subjective:   Doing okay. No GI bother. No  bother.  Mild fatigue.    Nursing ROS:                Objective:   /73   Pulse 78   Resp 18   Wt 50.6 kg (111 lb 9.6 oz)   SpO2 98%   BMI 22.54 kg/m    NAD    Labs:  CBC RESULTS: Recent Labs   Lab Test 21  0838   WBC 9.6   RBC 3.41*   HGB 11.2*   HCT 36.4   *   MCH 32.8   MCHC 30.8*   RDW 15.0        ELECTROLYTES:  Recent Labs   Lab Test 21  0957      POTASSIUM 4.4   CHLORIDE 109   DANIEL 8.6   CO2 30   BUN 21   CR 0.85   GLC 93       Assessment:  Ms. Pierce is a 89 year old female a diagnosis of locally advanced rectal adenocarcinoma, clinical T4N2.  The patient underwent palliative hypofractionated RT (5 Gy x 5 fractions, completed on 20) as she wished to prioritize quality of life and wishes to avoid any aggressive surgical intervention or even extended course of radiation therapy. She was continued on maintaince systemic therapy local POD. She is undergoing palliative RT (re-irradiation).    Tolerating radiation therapy well.  All questions and concerns addressed.    Plan:   1. Continue current therapy.    2. EOT next Monday. RTC in 1 month.       [employer] chart and setup information reviewed  Ports checked                Filippo Robbins MD

## 2021-11-17 NOTE — LETTER
2021         RE: Lilia Pierce  [address] [facility] [address]        Dear Colleague,    Thank you for referring your patient, Lilia Pierce, to the [facility]Y [facility]ENTER. Please see a copy of my visit note below.    [facility]  SPECIALIZING IN BREAKTHROUGHS  Radiation Oncology    On Treatment Visit Note      Lilia Pierce      Date: 2021   MRN: [id number]   : 1930         Reason for Visit:  On Radiation Treatment Visit     Treatment Summary to Date   Pelvis   2100 cGy/ 3000 cGy   7/10           Subjective:   Doing okay. No GI bother. No  bother.  Mild fatigue.    Nursing ROS:                Objective:   /73   Pulse 78   Resp 18   Wt 50.6 kg (111 lb 9.6 oz)   SpO2 98%   BMI 22.54 kg/m    NAD    Labs:  CBC RESULTS: Recent Labs   Lab Test 21  0838   WBC 9.6   RBC 3.41*   HGB 11.2*   HCT 36.4   *   MCH 32.8   MCHC 30.8*   RDW 15.0        ELECTROLYTES:  Recent Labs   Lab Test 21  0957      POTASSIUM 4.4   CHLORIDE 109   DANIEL 8.6   CO2 30   BUN 21   CR 0.85   GLC 93       Assessment:  Ms. Pierce is a 89 year old female a diagnosis of locally advanced rectal adenocarcinoma, clinical T4N2.  The patient underwent palliative hypofractionated RT (5 Gy x 5 fractions, completed on 20) as she wished to prioritize quality of life and wishes to avoid any aggressive surgical intervention or even extended course of radiation therapy. She was continued on maintaince systemic therapy local POD. She is undergoing palliative RT (re-irradiation).    Tolerating radiation therapy well.  All questions and concerns addressed.    Plan:   1. Continue current therapy.    2. EOT next Monday. RTC in 1 month.       [employer] chart and setup information reviewed  Ports checked     Filippo Robbins MD

## 2021-11-18 ENCOUNTER — APPOINTMENT (OUTPATIENT)
Dept: RADIATION THERAPY | Facility: OUTPATIENT CENTER | Age: 86
End: 2021-11-18
Payer: MEDICARE

## 2021-11-18 DIAGNOSIS — M79.604 PAIN IN BOTH LOWER EXTREMITIES: ICD-10-CM

## 2021-11-18 DIAGNOSIS — M79.605 PAIN IN BOTH LOWER EXTREMITIES: ICD-10-CM

## 2021-11-18 RX ORDER — TIZANIDINE 2 MG/1
2-4 TABLET ORAL EVERY 6 HOURS PRN
Qty: 90 TABLET | Refills: 0 | Status: SHIPPED | OUTPATIENT
Start: 2021-11-18 | End: 2022-01-01

## 2021-11-18 NOTE — TELEPHONE ENCOUNTER
Pending Prescriptions:                       Disp   Refills    tiZANidine (ZANAFLEX) 2 MG tablet [Pharma*90 tab*0            Sig: TAKE 1-2 TABLETS (2-4 MG) BY MOUTH EVERY 6 HOURS           AS NEEDED FOR MUSCLE SPASMS    Routing refill request to provider for review/approval because:  Drug not on the [facility] refill protocol     Laurent Elliott RN

## 2021-11-19 ENCOUNTER — APPOINTMENT (OUTPATIENT)
Dept: RADIATION THERAPY | Facility: OUTPATIENT CENTER | Age: 86
End: 2021-11-19
Payer: MEDICARE

## 2021-11-20 DIAGNOSIS — C20 RECTAL CANCER (H): ICD-10-CM

## 2021-11-20 DIAGNOSIS — G62.9 NEUROPATHY: ICD-10-CM

## 2021-11-22 ENCOUNTER — APPOINTMENT (OUTPATIENT)
Dept: RADIATION THERAPY | Facility: OUTPATIENT CENTER | Age: 86
End: 2021-11-22
Payer: MEDICARE

## 2021-11-22 RX ORDER — GABAPENTIN 100 MG/1
CAPSULE ORAL
Qty: 360 CAPSULE | Refills: 3 | Status: SHIPPED | OUTPATIENT
Start: 2021-11-22 | End: 2022-01-01

## 2021-11-22 NOTE — TELEPHONE ENCOUNTER
Dr. Thakkar,    Routing refill request to provider for review/approval because:  Drug not on the [facility] refill protocol Kaci MARES RN

## 2021-11-23 ENCOUNTER — DOCUMENTATION ONLY (OUTPATIENT)
Dept: RADIATION ONCOLOGY | Facility: CLINIC | Age: 86
End: 2021-11-23
Payer: MEDICARE

## 2021-11-24 NOTE — PROGRESS NOTES
Radiotherapy Treatment Summary              PATIENT: Lilia Pierce  MEDICAL RECORD NO: [id number]   : 1930    DIAGNOSIS: Rectal adenocarcinoma  INTENT OF RADIOTHERAPY: Palliative  PATHOLOGY:  Adenocarcinoma                                 STAGE: Recurrent    ONCOLOGIC HISTORY:   Ms. Pierce is a 89 year old female a diagnosis of locally advanced rectal adenocarcinoma, clinical T4N2.  The patient underwent palliative hypofractionated RT (5 Gy x 5 fractions, completed on 20) as she wished to prioritize quality of life and wishes to avoid any aggressive surgical intervention or even extended course of radiation therapy. She was continued on maintaince systemic therapy local POD. She underwwnt palliative RT (re-irradiation).         PRIOR RADIATION TREATMENT  1. 2500 cGy / 5 fractions, external beam radiotherapy to the Pelvis + Rectum, from 2/3/2020 - 2020    SITE OF CURRENT TREATMENT: Rectum    DATES  OF TREATMENT: 21 to 21    TOTAL DOSE OF TREATMENT: 3000 cGy in 10 fractions    DOSE PER FRACTION OF TREATMENT: 300 cGy       COMMENT/TOXICITY:         None             PAIN MANAGEMENT:                           None    FOLLOW UP PLAN:  1. RTC in 1 month.  CC  Patient Care Team:  Reagan Thakkar MD as PCP - General (Family Practice)  Reagan Thakkar MD as Assigned PCP  Filippo Robbins MD as MD (Radiation Oncology)  Jie Alonso APRN CNP as Nurse Practitioner (Nurse Practitioner)  Doris Ballesteros RN as Specialty Care Coordinator (Oncology)  Manohar Ashford DO as Assigned Musculoskeletal Provider  Tomasa Diggs MD as Assigned Cancer Care Provider  [facility] ([facility] ([facility] ([place]  Argenis Boothe RN as Specialty Care Coordinator (Hematology & Oncology)       Filippo Robbins M.D.  Department of Radiation Oncology  [facility]

## 2021-12-05 DIAGNOSIS — E87.6 HYPOKALEMIA: ICD-10-CM

## 2021-12-06 RX ORDER — POTASSIUM CHLORIDE 1500 MG/1
TABLET, EXTENDED RELEASE ORAL
Qty: 120 TABLET | Refills: 3 | Status: SHIPPED | OUTPATIENT
Start: 2021-12-06 | End: 2022-01-01

## 2021-12-29 ENCOUNTER — ONCOLOGY VISIT (OUTPATIENT)
Dept: ONCOLOGY | Facility: HOSPITAL | Age: 86
End: 2021-12-29
Attending: INTERNAL MEDICINE
Payer: MEDICARE

## 2021-12-29 VITALS
BODY MASS INDEX: 22.22 KG/M2 | RESPIRATION RATE: 12 BRPM | DIASTOLIC BLOOD PRESSURE: 65 MMHG | TEMPERATURE: 97.6 F | OXYGEN SATURATION: 99 % | SYSTOLIC BLOOD PRESSURE: 145 MMHG | WEIGHT: 110 LBS | HEART RATE: 65 BPM

## 2021-12-29 DIAGNOSIS — C20 RECTAL CANCER (H): Primary | ICD-10-CM

## 2021-12-29 PROCEDURE — G0463 HOSPITAL OUTPT CLINIC VISIT: HCPCS

## 2021-12-29 PROCEDURE — 99214 OFFICE O/P EST MOD 30 MIN: CPT | Performed by: INTERNAL MEDICINE

## 2021-12-29 ASSESSMENT — PAIN SCALES - GENERAL: PAINLEVEL: NO PAIN (0)

## 2021-12-29 NOTE — LETTER
12/29/2021         RE: Lilia Pierce  [address] [facility] [address]        Dear Colleague,    Thank you for referring your patient, Lilia Pierce, to the [facility] CANC[facility]. Please see a copy of my visit note below.    [facility] Hematology and Oncology Progress Note    Patient: Tamy Pierce  MRN: [id number]  Date of Service: Dec 29, 2021         Reason for Visit    Chief Complaint   Patient presents with     Oncology Clinic Visit     Rectal cancer (H)         Assessment      1.  A very pleasant 89 year old woman with locally advanced unresectable rectal cancer.  She has been palliated with diverting colostomy and then took Xeloda 6 days on 8 days of for several months.  In August 2021 PET scan was showing progression.  Molecular testing has so far not reveal any targetable mutation.  She is HER-2 negative.  MSI stable.  Some of the molecular testing so far has not shown any targetable mutation including absence of MET amplification, RET rearrangement and PTEN deletion.  Due to locally advanced disease in the rectal area she got additional doses of radiation through the end of November 2021.  Clinically doing quite well.  Wondering what to do next.  2.  Peripheral neuropathy.  3.  History of arthritis etc.  4.  She is 89 years old.    Plan    She is currently doing quite well.  She had questions about what to do next.  We discussed in great detail what is the most important thing for her.  She was able to articulate the fact that she wants to have a good quality of life.  She does not want to go to any infusion center and get any IV chemotherapy.    We talked about the options of no treatment versus treatment with Lonsurf or continuing treatment with Xeloda.  After long discussion she wanted to come back in a month with another PET scan.    If the PET scan shows no disease or very little disease we can probably wait and watch for a little while longer.  If there is enough  disease that needs treatment then consider Lonsurf.  In my opinion Xeloda most likely is not going be very helpful since the cancer was progressing while she was on Xeloda.    We will try to check and see if she would qualify for Lonsurf.  We will give her at an 80% dose because of her age.    Emphasized again that the main goal of care is to maintain good quality of life.  This entity is not curable with any means that I can see.      Cancer Staging  No matching staging information was found for the patient.  Stage IIIb/stage IV    ECOG Performance    1 - Can't do physically strenuous work, but fully ambyulatory and can do light sedentary work      History of Present Illness    Ms. Tamy Pierce a very pleasant 89 year old woman who has a diagnosis of rectal cancer unresectable.  Originally diagnosed in November 2019.  She had abdominal pain as a presenting symptom which was there for quite some time.  CT scan revealed a rectosigmoid mass causing colonic obstruction with local lymph node spread.  No distant metastatic disease was noted.  She underwent diverting colostomy.  She was noted to have perhaps involvement of the vaginal vault.    She was started on palliative radiation therapy.  After that she was started on Xeloda.  She did have some response initially.  However in June 2021 her CT scan indicated that her mass was probably progressing.  However she wanted to continue same treatment which was Xeloda 1000 mg twice a day 6 days on  8 days off.    She had a PET scan in August 2021 which showed progression.  She stopped taking Xeloda.  She was referred to radiation therapy.  She received 3000 cGy of radiation in 10 fractions from November 9 through November 22, 2021.      Comes in today to discuss her next course of action.  Overall feeling quite well.  No symptoms of any blood in stool.  Minimal cramping occasionally.    Review of systems.    Overall feels well.  No significant GI symptoms.  Occasional  cramping.    Past History    Past Medical History:   Diagnosis Date     Abdominal pain 02/16/2020     Actinic keratosis      Actinic keratosis      Basal cell carcinoma      Basal cell carcinoma      Fracture of left inferior pubic ramus (H)      Fracture of left superior pubic ramus (H)      Gait instability      GERD (gastroesophageal reflux disease)      HLD (hyperlipidemia)      HTN (hypertension)      Lumbar radiculopathy      Mild intermittent asthma      Other and unspecified hyperlipidemia      Rectal cancer (H)      Unspecified asthma(493.90)      Unspecified essential hypertension      Urethral stricture      Urethral stricture due to infection 10/03/2005    ureteral stone       Past Surgical History:   Procedure Laterality Date     COLOSTOMY N/A 11/20/2019    Procedure: Colostomy Formation;  Surgeon: Demarcus Moreno MD;  Location: [facility];  Service: General     ESOPHAGOSCOPY, GASTROSCOPY, DUODENOSCOPY (EGD), COMBINED N/A 2/20/2020    Procedure: ESOPHAGOGASTRODUODENOSCOPY, WITH BIOPSY;  Surgeon: Harrison Brown MD;  Location: [facility]     ESOPHAGOSCOPY, GASTROSCOPY, DUODENOSCOPY (EGD), COMBINED  02/20/2020     HYSTERECTOMY       LITHOTRIPSY       PICC AND MIDLINE TEAM LINE INSERTION  11/21/2019          PROCTOSCOPY N/A 11/20/2019    Procedure: PROCTOSCOPY WITH BIOPSY;  Surgeon: Demarcus Moreno MD;  Location: [facility];  Service: General     SURGICAL HISTORY OF -       open reduction of second metatarsal neck fx with internal fixation  cna d closed treatment of metatarsal to 2 and 4 fx     SURGICAL HISTORY OF -       hysterectomy and oophorectomy     SURGICAL HISTORY OF -       lithotripsy         Physical Exam    BP (!) 145/65 (BP Location: Left arm, Patient Position: Sitting, Cuff Size: Adult Regular)   Pulse 65   Temp 97.6  F (36.4  C) (Oral)   Resp 12   Wt 49.9 kg (110 lb)   SpO2 99%   BMI 22.22 kg/m        GENERAL: no acute distress. Cooperative in conversation.   HEENT: pupils  "are equal, round and reactive. Oral mucosa is moist and intact.  RESP:Chest symmetric. Regular respiratory rate. No stridor.  ABD: Nondistended, soft.  EXTREMITIES: No lower extremity edema.   NEURO: non focal. Alert and oriented x3.   PSYCH: within normal limits. No depression or anxiety.  SKIN: warm dry intact         Lab Results    No results found for this or any previous visit (from the past 168 hour(s)).    Imaging    No results found.    Total time spent 40 minutes on the date of service.    Signed by: Kodak Rivero MD, MD      This note has been dictated using voice recognition software. Any grammatical or context distortions are unintentional and inherent to the software    Oncology Rooming Note    December 29, 2021 1:59 PM   Lilia Pierce is a 89 year old female who presents for:    Chief Complaint   Patient presents with     Oncology Clinic Visit     Rectal cancer (H)     Initial Vitals: BP (!) 145/65 (BP Location: Left arm, Patient Position: Sitting, Cuff Size: Adult Regular)   Pulse 65   Temp 97.6  F (36.4  C) (Oral)   Resp 12   Wt 49.9 kg (110 lb)   SpO2 99%   BMI 22.22 kg/m   Estimated body mass index is 22.22 kg/m  as calculated from the following:    Height as of 9/9/21: 1.499 m (4' 11\").    Weight as of this encounter: 49.9 kg (110 lb). Body surface area is 1.44 meters squared.  No Pain (0) Comment: Data Unavailable   No LMP recorded. Patient has had a hysterectomy.  Allergies reviewed: Yes  Medications reviewed: Yes    Medications: Medication refills not needed today.  Pharmacy name entered into [facility]:    [facility] - [place], [place] - [address] [facility] [place], [place] - [address] [facility] [place], [place] - [address]    Clinical concerns:  Rectal cancer       Yenny Markham CMA                Again, thank you for allowing me to participate in the care of your patient.        Sincerely,        Kodak ROY" MD Josefa, MD

## 2021-12-29 NOTE — PROGRESS NOTES
[facility] Hematology and Oncology Progress Note    Patient: Tamy Pierce  MRN: [id number]  Date of Service: Dec 29, 2021         Reason for Visit    Chief Complaint   Patient presents with     Oncology Clinic Visit     Rectal cancer (H)         Assessment      1.  A very pleasant 89 year old woman with locally advanced unresectable rectal cancer.  She has been palliated with diverting colostomy and then took Xeloda 6 days on 8 days of for several months.  In August 2021 PET scan was showing progression.  Molecular testing has so far not reveal any targetable mutation.  She is HER-2 negative.  MSI stable.  Some of the molecular testing so far has not shown any targetable mutation including absence of MET amplification, RET rearrangement and PTEN deletion.  Due to locally advanced disease in the rectal area she got additional doses of radiation through the end of November 2021.  Clinically doing quite well.  Wondering what to do next.  2.  Peripheral neuropathy.  3.  History of arthritis etc.  4.  She is 89 years old.    Plan    She is currently doing quite well.  She had questions about what to do next.  We discussed in great detail what is the most important thing for her.  She was able to articulate the fact that she wants to have a good quality of life.  She does not want to go to any infusion center and get any IV chemotherapy.    We talked about the options of no treatment versus treatment with Lonsurf or continuing treatment with Xeloda.  After long discussion she wanted to come back in a month with another PET scan.    If the PET scan shows no disease or very little disease we can probably wait and watch for a little while longer.  If there is enough disease that needs treatment then consider Lonsurf.  In my opinion Xeloda most likely is not going be very helpful since the cancer was progressing while she was on Xeloda.    We will try to check and see if she would qualify for Lonsurf.  We will give her  at an 80% dose because of her age.    Emphasized again that the main goal of care is to maintain good quality of life.  This entity is not curable with any means that I can see.      Cancer Staging  No matching staging information was found for the patient.  Stage IIIb/stage IV    ECOG Performance    1 - Can't do physically strenuous work, but fully ambyulatory and can do light sedentary work      History of Present Illness    Ms. Tamy Pierce a very pleasant 89 year old woman who has a diagnosis of rectal cancer unresectable.  Originally diagnosed in November 2019.  She had abdominal pain as a presenting symptom which was there for quite some time.  CT scan revealed a rectosigmoid mass causing colonic obstruction with local lymph node spread.  No distant metastatic disease was noted.  She underwent diverting colostomy.  She was noted to have perhaps involvement of the vaginal vault.    She was started on palliative radiation therapy.  After that she was started on Xeloda.  She did have some response initially.  However in June 2021 her CT scan indicated that her mass was probably progressing.  However she wanted to continue same treatment which was Xeloda 1000 mg twice a day 6 days on  8 days off.    She had a PET scan in August 2021 which showed progression.  She stopped taking Xeloda.  She was referred to radiation therapy.  She received 3000 cGy of radiation in 10 fractions from November 9 through November 22, 2021.      Comes in today to discuss her next course of action.  Overall feeling quite well.  No symptoms of any blood in stool.  Minimal cramping occasionally.    Review of systems.    Overall feels well.  No significant GI symptoms.  Occasional cramping.    Past History    Past Medical History:   Diagnosis Date     Abdominal pain 02/16/2020     Actinic keratosis      Actinic keratosis      Basal cell carcinoma      Basal cell carcinoma      Fracture of left inferior pubic ramus (H)      Fracture of left  superior pubic ramus (H)      Gait instability      GERD (gastroesophageal reflux disease)      HLD (hyperlipidemia)      HTN (hypertension)      Lumbar radiculopathy      Mild intermittent asthma      Other and unspecified hyperlipidemia      Rectal cancer (H)      Unspecified asthma(493.90)      Unspecified essential hypertension      Urethral stricture      Urethral stricture due to infection 10/03/2005    ureteral stone       Past Surgical History:   Procedure Laterality Date     COLOSTOMY N/A 11/20/2019    Procedure: Colostomy Formation;  Surgeon: Demarcus Moreno MD;  Location: [facility] [place];  Service: General     ESOPHAGOSCOPY, GASTROSCOPY, DUODENOSCOPY (EGD), COMBINED N/A 2/20/2020    Procedure: ESOPHAGOGASTRODUODENOSCOPY, WITH BIOPSY;  Surgeon: Harrison Brown MD;  Location: [facility]     ESOPHAGOSCOPY, GASTROSCOPY, DUODENOSCOPY (EGD), COMBINED  02/20/2020     HYSTERECTOMY       LITHOTRIPSY       PICC AND MIDLINE TEAM LINE INSERTION  11/21/2019          PROCTOSCOPY N/A 11/20/2019    Procedure: PROCTOSCOPY WITH BIOPSY;  Surgeon: Demarcus Moreno MD;  Location: [facility] [place] [facility];  Service: General     SURGICAL HISTORY OF -       open reduction of second metatarsal neck fx with internal fixation  cna d closed treatment of metatarsal to 2 and 4 fx     SURGICAL HISTORY OF -       hysterectomy and oophorectomy     SURGICAL HISTORY OF -       lithotripsy         Physical Exam    BP (!) 145/65 (BP Location: Left arm, Patient Position: Sitting, Cuff Size: Adult Regular)   Pulse 65   Temp 97.6  F (36.4  C) (Oral)   Resp 12   Wt 49.9 kg (110 lb)   SpO2 99%   BMI 22.22 kg/m        GENERAL: no acute distress. Cooperative in conversation.   HEENT: pupils are equal, round and reactive. Oral mucosa is moist and intact.  RESP:Chest symmetric. Regular respiratory rate. No stridor.  ABD: Nondistended, soft.  EXTREMITIES: No lower extremity edema.   NEURO: non focal. Alert and oriented x3.   PSYCH: within normal limits.  No depression or anxiety.  SKIN: warm dry intact         Lab Results    No results found for this or any previous visit (from the past 168 hour(s)).    Imaging    No results found.    Total time spent 40 minutes on the date of service.    Signed by: Kodak Rivero MD, MD      This note has been dictated using voice recognition software. Any grammatical or context distortions are unintentional and inherent to the software

## 2021-12-29 NOTE — PROGRESS NOTES
"Oncology Rooming Note    December 29, 2021 1:59 PM   Lilia Pierce is a 89 year old female who presents for:    Chief Complaint   Patient presents with     Oncology Clinic Visit     Rectal cancer (H)     Initial Vitals: BP (!) 145/65 (BP Location: Left arm, Patient Position: Sitting, Cuff Size: Adult Regular)   Pulse 65   Temp 97.6  F (36.4  C) (Oral)   Resp 12   Wt 49.9 kg (110 lb)   SpO2 99%   BMI 22.22 kg/m   Estimated body mass index is 22.22 kg/m  as calculated from the following:    Height as of 9/9/21: 1.499 m (4' 11\").    Weight as of this encounter: 49.9 kg (110 lb). Body surface area is 1.44 meters squared.  No Pain (0) Comment: Data Unavailable   No LMP recorded. Patient has had a hysterectomy.  Allergies reviewed: Yes  Medications reviewed: Yes    Medications: Medication refills not needed today.  Pharmacy name entered into [facility]:    [facility] - [place], [place] - [address] [facility] [place] - [place], [place] - [address]  [place] - [place], [place] - [address]    Clinical concerns:  Rectal cancer       Yenny Markham, GIANA            "

## 2022-01-01 ENCOUNTER — VIRTUAL VISIT (OUTPATIENT)
Dept: ONCOLOGY | Facility: HOSPITAL | Age: 87
End: 2022-01-01
Attending: INTERNAL MEDICINE
Payer: MEDICARE

## 2022-01-01 ENCOUNTER — TELEPHONE (OUTPATIENT)
Dept: RADIATION THERAPY | Facility: OUTPATIENT CENTER | Age: 87
End: 2022-01-01
Payer: MEDICARE

## 2022-01-01 ENCOUNTER — TELEPHONE (OUTPATIENT)
Dept: PALLIATIVE MEDICINE | Facility: CLINIC | Age: 87
End: 2022-01-01

## 2022-01-01 ENCOUNTER — VIRTUAL VISIT (OUTPATIENT)
Dept: PALLIATIVE MEDICINE | Facility: CLINIC | Age: 87
End: 2022-01-01
Payer: MEDICARE

## 2022-01-01 ENCOUNTER — ALLIED HEALTH/NURSE VISIT (OUTPATIENT)
Dept: FAMILY MEDICINE | Facility: CLINIC | Age: 87
End: 2022-01-01
Payer: MEDICARE

## 2022-01-01 ENCOUNTER — LAB (OUTPATIENT)
Dept: INFUSION THERAPY | Facility: HOSPITAL | Age: 87
End: 2022-01-01
Attending: INTERNAL MEDICINE
Payer: MEDICARE

## 2022-01-01 ENCOUNTER — TELEPHONE (OUTPATIENT)
Dept: ONCOLOGY | Facility: HOSPITAL | Age: 87
End: 2022-01-01

## 2022-01-01 ENCOUNTER — TELEPHONE (OUTPATIENT)
Dept: PALLIATIVE CARE | Facility: CLINIC | Age: 87
End: 2022-01-01

## 2022-01-01 ENCOUNTER — TELEPHONE (OUTPATIENT)
Dept: FAMILY MEDICINE | Facility: CLINIC | Age: 87
End: 2022-01-01

## 2022-01-01 ENCOUNTER — PATIENT OUTREACH (OUTPATIENT)
Dept: ONCOLOGY | Facility: HOSPITAL | Age: 87
End: 2022-01-01
Payer: MEDICARE

## 2022-01-01 ENCOUNTER — TELEPHONE (OUTPATIENT)
Dept: ONCOLOGY | Facility: HOSPITAL | Age: 87
End: 2022-01-01
Payer: MEDICARE

## 2022-01-01 ENCOUNTER — HOSPITAL ENCOUNTER (OUTPATIENT)
Dept: CT IMAGING | Facility: CLINIC | Age: 87
Discharge: HOME | End: 2022-08-17
Attending: INTERNAL MEDICINE | Admitting: INTERNAL MEDICINE
Payer: MEDICARE

## 2022-01-01 ENCOUNTER — LAB (OUTPATIENT)
Dept: LAB | Facility: CLINIC | Age: 87
End: 2022-01-01
Payer: MEDICARE

## 2022-01-01 ENCOUNTER — HOSPITAL ENCOUNTER (OUTPATIENT)
Dept: PET IMAGING | Facility: CLINIC | Age: 87
Setting detail: NUCLEAR MEDICINE
Discharge: HOME | End: 2022-01-20
Attending: INTERNAL MEDICINE | Admitting: INTERNAL MEDICINE
Payer: MEDICARE

## 2022-01-01 ENCOUNTER — PATIENT OUTREACH (OUTPATIENT)
Dept: ONCOLOGY | Facility: HOSPITAL | Age: 87
End: 2022-01-01

## 2022-01-01 ENCOUNTER — PATIENT OUTREACH (OUTPATIENT)
Dept: PALLIATIVE CARE | Facility: CLINIC | Age: 87
End: 2022-01-01

## 2022-01-01 ENCOUNTER — PATIENT OUTREACH (OUTPATIENT)
Dept: CARE COORDINATION | Facility: CLINIC | Age: 87
End: 2022-01-01

## 2022-01-01 VITALS
OXYGEN SATURATION: 98 % | TEMPERATURE: 99.2 F | RESPIRATION RATE: 24 BRPM | WEIGHT: 107.7 LBS | DIASTOLIC BLOOD PRESSURE: 75 MMHG | BODY MASS INDEX: 21.71 KG/M2 | SYSTOLIC BLOOD PRESSURE: 110 MMHG | HEART RATE: 79 BPM | HEIGHT: 59 IN

## 2022-01-01 VITALS
DIASTOLIC BLOOD PRESSURE: 61 MMHG | WEIGHT: 112 LBS | RESPIRATION RATE: 16 BRPM | HEIGHT: 59 IN | BODY MASS INDEX: 22.58 KG/M2 | HEART RATE: 71 BPM | SYSTOLIC BLOOD PRESSURE: 130 MMHG

## 2022-01-01 VITALS
OXYGEN SATURATION: 98 % | RESPIRATION RATE: 18 BRPM | SYSTOLIC BLOOD PRESSURE: 115 MMHG | HEART RATE: 79 BPM | DIASTOLIC BLOOD PRESSURE: 57 MMHG | TEMPERATURE: 98.5 F | BODY MASS INDEX: 22.06 KG/M2 | WEIGHT: 109.2 LBS

## 2022-01-01 DIAGNOSIS — M54.41 ACUTE RIGHT-SIDED LOW BACK PAIN WITH RIGHT-SIDED SCIATICA: ICD-10-CM

## 2022-01-01 DIAGNOSIS — K61.2 ABSCESS OF ANAL AND RECTAL REGIONS: Primary | ICD-10-CM

## 2022-01-01 DIAGNOSIS — M54.9 INTRACTABLE BACK PAIN: Primary | ICD-10-CM

## 2022-01-01 DIAGNOSIS — D50.0 IRON DEFICIENCY ANEMIA DUE TO CHRONIC BLOOD LOSS: ICD-10-CM

## 2022-01-01 DIAGNOSIS — M79.604 PAIN IN BOTH LOWER EXTREMITIES: ICD-10-CM

## 2022-01-01 DIAGNOSIS — C20 RECTAL CANCER (H): ICD-10-CM

## 2022-01-01 DIAGNOSIS — M79.605 PAIN IN BOTH LOWER EXTREMITIES: ICD-10-CM

## 2022-01-01 DIAGNOSIS — G62.9 NEUROPATHY: ICD-10-CM

## 2022-01-01 DIAGNOSIS — C20 RECTAL CANCER (H): Primary | ICD-10-CM

## 2022-01-01 DIAGNOSIS — R10.9 ABDOMINAL CRAMPING: ICD-10-CM

## 2022-01-01 DIAGNOSIS — E88.09 HYPOALBUMINEMIA: ICD-10-CM

## 2022-01-01 DIAGNOSIS — E78.5 HYPERLIPIDEMIA LDL GOAL <100: ICD-10-CM

## 2022-01-01 DIAGNOSIS — K62.89 RECTAL PAIN: ICD-10-CM

## 2022-01-01 DIAGNOSIS — E87.6 HYPOKALEMIA: ICD-10-CM

## 2022-01-01 DIAGNOSIS — C79.11 SECONDARY MALIGNANT NEOPLASM OF BLADDER (H): ICD-10-CM

## 2022-01-01 DIAGNOSIS — R11.0 NAUSEA: Primary | ICD-10-CM

## 2022-01-01 DIAGNOSIS — R11.0 NAUSEA: ICD-10-CM

## 2022-01-01 DIAGNOSIS — Z23 NEED FOR PROPHYLACTIC VACCINATION AND INOCULATION AGAINST INFLUENZA: Primary | ICD-10-CM

## 2022-01-01 DIAGNOSIS — R52 PAIN: ICD-10-CM

## 2022-01-01 DIAGNOSIS — R10.9 ABDOMINAL CRAMPING: Primary | ICD-10-CM

## 2022-01-01 LAB
ALBUMIN SERPL-MCNC: 1.7 G/DL (ref 3.5–5)
ALBUMIN SERPL-MCNC: 2.4 G/DL (ref 3.4–5)
ALBUMIN SERPL-MCNC: 2.7 G/DL (ref 3.4–5)
ALP SERPL-CCNC: 102 U/L (ref 40–150)
ALP SERPL-CCNC: 115 U/L (ref 40–150)
ALP SERPL-CCNC: 120 U/L (ref 45–120)
ALT SERPL W P-5'-P-CCNC: 19 U/L (ref 0–45)
ALT SERPL W P-5'-P-CCNC: 25 U/L (ref 0–50)
ALT SERPL W P-5'-P-CCNC: 36 U/L (ref 0–50)
ANION GAP SERPL CALCULATED.3IONS-SCNC: 3 MMOL/L (ref 5–18)
ANION GAP SERPL CALCULATED.3IONS-SCNC: 4 MMOL/L (ref 3–14)
ANION GAP SERPL CALCULATED.3IONS-SCNC: 4 MMOL/L (ref 3–14)
AST SERPL W P-5'-P-CCNC: 17 U/L (ref 0–45)
AST SERPL W P-5'-P-CCNC: 19 U/L (ref 0–40)
AST SERPL W P-5'-P-CCNC: 26 U/L (ref 0–45)
BILIRUB SERPL-MCNC: 0.2 MG/DL (ref 0–1)
BILIRUB SERPL-MCNC: 0.3 MG/DL (ref 0.2–1.3)
BILIRUB SERPL-MCNC: 0.3 MG/DL (ref 0.2–1.3)
BUN SERPL-MCNC: 19 MG/DL (ref 7–30)
BUN SERPL-MCNC: 22 MG/DL (ref 7–30)
BUN SERPL-MCNC: 22 MG/DL (ref 8–28)
CALCIUM SERPL-MCNC: 8.1 MG/DL (ref 8.5–10.5)
CALCIUM SERPL-MCNC: 9.2 MG/DL (ref 8.5–10.1)
CALCIUM SERPL-MCNC: 9.2 MG/DL (ref 8.5–10.1)
CEA SERPL-MCNC: 2.2 UG/L (ref 0–2.5)
CEA SERPL-MCNC: 2.3 UG/L (ref 0–2.5)
CHLORIDE BLD-SCNC: 104 MMOL/L (ref 94–109)
CHLORIDE BLD-SCNC: 106 MMOL/L (ref 98–107)
CHLORIDE BLD-SCNC: 108 MMOL/L (ref 94–109)
CO2 SERPL-SCNC: 31 MMOL/L (ref 20–32)
CO2 SERPL-SCNC: 31 MMOL/L (ref 20–32)
CO2 SERPL-SCNC: 31 MMOL/L (ref 22–31)
CREAT BLD-MCNC: 0.8 MG/DL (ref 0.5–1)
CREAT SERPL-MCNC: 0.78 MG/DL (ref 0.52–1.04)
CREAT SERPL-MCNC: 0.85 MG/DL (ref 0.52–1.04)
CREAT SERPL-MCNC: 0.93 MG/DL (ref 0.6–1.1)
ERYTHROCYTE [DISTWIDTH] IN BLOOD BY AUTOMATED COUNT: 14.4 % (ref 10–15)
ERYTHROCYTE [DISTWIDTH] IN BLOOD BY AUTOMATED COUNT: 14.7 % (ref 10–15)
ERYTHROCYTE [DISTWIDTH] IN BLOOD BY AUTOMATED COUNT: 14.7 % (ref 10–15)
GFR SERPL CREATININE-BSD FRML MDRD: 57 ML/MIN/1.73M2
GFR SERPL CREATININE-BSD FRML MDRD: 64 ML/MIN/1.73M2
GFR SERPL CREATININE-BSD FRML MDRD: 71 ML/MIN/1.73M2
GFR SERPL CREATININE-BSD FRML MDRD: >60 ML/MIN/1.73M2
GLUCOSE BLD-MCNC: 101 MG/DL (ref 70–99)
GLUCOSE BLD-MCNC: 90 MG/DL (ref 70–125)
GLUCOSE BLD-MCNC: 95 MG/DL (ref 70–99)
HCT VFR BLD AUTO: 31 % (ref 35–47)
HCT VFR BLD AUTO: 33.5 % (ref 35–47)
HCT VFR BLD AUTO: 38.3 % (ref 35–47)
HGB BLD-MCNC: 10.5 G/DL (ref 11.7–15.7)
HGB BLD-MCNC: 11.9 G/DL (ref 11.7–15.7)
HGB BLD-MCNC: 9.5 G/DL (ref 11.7–15.7)
MCH RBC QN AUTO: 31.3 PG (ref 26.5–33)
MCH RBC QN AUTO: 31.4 PG (ref 26.5–33)
MCH RBC QN AUTO: 31.5 PG (ref 26.5–33)
MCHC RBC AUTO-ENTMCNC: 30.6 G/DL (ref 31.5–36.5)
MCHC RBC AUTO-ENTMCNC: 31.1 G/DL (ref 31.5–36.5)
MCHC RBC AUTO-ENTMCNC: 31.3 G/DL (ref 31.5–36.5)
MCV RBC AUTO: 100 FL (ref 78–100)
MCV RBC AUTO: 101 FL (ref 78–100)
MCV RBC AUTO: 103 FL (ref 78–100)
PLATELET # BLD AUTO: 263 10E3/UL (ref 150–450)
PLATELET # BLD AUTO: 291 10E3/UL (ref 150–450)
PLATELET # BLD AUTO: 358 10E3/UL (ref 150–450)
POTASSIUM BLD-SCNC: 3.7 MMOL/L (ref 3.4–5.3)
POTASSIUM BLD-SCNC: 4.3 MMOL/L (ref 3.4–5.3)
POTASSIUM BLD-SCNC: 5.1 MMOL/L (ref 3.5–5)
PROT SERPL-MCNC: 5.1 G/DL (ref 6–8)
PROT SERPL-MCNC: 6.3 G/DL (ref 6.8–8.8)
PROT SERPL-MCNC: 7.2 G/DL (ref 6.8–8.8)
RADIOLOGIST FLAGS: ABNORMAL
RBC # BLD AUTO: 3.02 10E6/UL (ref 3.8–5.2)
RBC # BLD AUTO: 3.36 10E6/UL (ref 3.8–5.2)
RBC # BLD AUTO: 3.79 10E6/UL (ref 3.8–5.2)
SODIUM SERPL-SCNC: 139 MMOL/L (ref 133–144)
SODIUM SERPL-SCNC: 140 MMOL/L (ref 136–145)
SODIUM SERPL-SCNC: 143 MMOL/L (ref 133–144)
WBC # BLD AUTO: 10.3 10E3/UL (ref 4–11)
WBC # BLD AUTO: 11.4 10E3/UL (ref 4–11)
WBC # BLD AUTO: 15.7 10E3/UL (ref 4–11)

## 2022-01-01 PROCEDURE — 343N000001 HC RX 343: Performed by: INTERNAL MEDICINE

## 2022-01-01 PROCEDURE — 36415 COLL VENOUS BLD VENIPUNCTURE: CPT

## 2022-01-01 PROCEDURE — 82565 ASSAY OF CREATININE: CPT

## 2022-01-01 PROCEDURE — 72193 CT PELVIS W/DYE: CPT | Mod: 59,MG,PS

## 2022-01-01 PROCEDURE — 85027 COMPLETE CBC AUTOMATED: CPT

## 2022-01-01 PROCEDURE — 82378 CARCINOEMBRYONIC ANTIGEN: CPT

## 2022-01-01 PROCEDURE — G0463 HOSPITAL OUTPT CLINIC VISIT: HCPCS

## 2022-01-01 PROCEDURE — 99215 OFFICE O/P EST HI 40 MIN: CPT | Mod: 95 | Performed by: NURSE PRACTITIONER

## 2022-01-01 PROCEDURE — 99215 OFFICE O/P EST HI 40 MIN: CPT | Performed by: INTERNAL MEDICINE

## 2022-01-01 PROCEDURE — 80053 COMPREHEN METABOLIC PANEL: CPT

## 2022-01-01 PROCEDURE — G0463 HOSPITAL OUTPT CLINIC VISIT: HCPCS | Mod: PN,RTG | Performed by: INTERNAL MEDICINE

## 2022-01-01 PROCEDURE — 99214 OFFICE O/P EST MOD 30 MIN: CPT | Mod: 95 | Performed by: NURSE PRACTITIONER

## 2022-01-01 PROCEDURE — G1004 CDSM NDSC: HCPCS | Mod: GC | Performed by: STUDENT IN AN ORGANIZED HEALTH CARE EDUCATION/TRAINING PROGRAM

## 2022-01-01 PROCEDURE — 250N000009 HC RX 250: Performed by: INTERNAL MEDICINE

## 2022-01-01 PROCEDURE — G0008 ADMIN INFLUENZA VIRUS VAC: HCPCS

## 2022-01-01 PROCEDURE — G1010 CDSM STANSON: HCPCS

## 2022-01-01 PROCEDURE — 99207 PR NO CHARGE NURSE ONLY: CPT

## 2022-01-01 PROCEDURE — 250N000011 HC RX IP 250 OP 636: Performed by: INTERNAL MEDICINE

## 2022-01-01 PROCEDURE — A9552 F18 FDG: HCPCS | Performed by: INTERNAL MEDICINE

## 2022-01-01 PROCEDURE — 78816 PET IMAGE W/CT FULL BODY: CPT | Mod: 26 | Performed by: STUDENT IN AN ORGANIZED HEALTH CARE EDUCATION/TRAINING PROGRAM

## 2022-01-01 PROCEDURE — 90682 RIV4 VACC RECOMBINANT DNA IM: CPT

## 2022-01-01 PROCEDURE — 99214 OFFICE O/P EST MOD 30 MIN: CPT | Performed by: INTERNAL MEDICINE

## 2022-01-01 PROCEDURE — 82040 ASSAY OF SERUM ALBUMIN: CPT

## 2022-01-01 PROCEDURE — 99213 OFFICE O/P EST LOW 20 MIN: CPT | Mod: 95 | Performed by: INTERNAL MEDICINE

## 2022-01-01 PROCEDURE — 74177 CT ABD & PELVIS W/CONTRAST: CPT | Mod: 26 | Performed by: STUDENT IN AN ORGANIZED HEALTH CARE EDUCATION/TRAINING PROGRAM

## 2022-01-01 PROCEDURE — 71260 CT THORAX DX C+: CPT | Mod: 26 | Performed by: STUDENT IN AN ORGANIZED HEALTH CARE EDUCATION/TRAINING PROGRAM

## 2022-01-01 RX ORDER — OXYCODONE HCL 10 MG/1
10 TABLET, FILM COATED, EXTENDED RELEASE ORAL EVERY 12 HOURS
Qty: 60 TABLET | Refills: 0 | Status: SHIPPED | OUTPATIENT
Start: 2022-01-01 | End: 2022-01-01

## 2022-01-01 RX ORDER — ACETAMINOPHEN 500 MG
1000 TABLET ORAL EVERY 12 HOURS
Qty: 60 TABLET | Refills: 1 | Status: SHIPPED | OUTPATIENT
Start: 2022-01-01

## 2022-01-01 RX ORDER — OXYCODONE HYDROCHLORIDE 5 MG/1
TABLET ORAL
Qty: 90 TABLET | Refills: 0 | Status: SHIPPED | OUTPATIENT
Start: 2022-01-01 | End: 2022-01-01

## 2022-01-01 RX ORDER — METRONIDAZOLE 500 MG/1
500 TABLET ORAL 2 TIMES DAILY
Qty: 14 TABLET | Refills: 0 | Status: SHIPPED | OUTPATIENT
Start: 2022-01-01 | End: 2022-01-01

## 2022-01-01 RX ORDER — GABAPENTIN 100 MG/1
CAPSULE ORAL
Qty: 360 CAPSULE | Refills: 3 | Status: SHIPPED | OUTPATIENT
Start: 2022-01-01

## 2022-01-01 RX ORDER — ONDANSETRON 8 MG/1
8 TABLET, FILM COATED ORAL EVERY 12 HOURS PRN
Qty: 60 TABLET | Refills: 1 | Status: SHIPPED | OUTPATIENT
Start: 2022-01-01 | End: 2022-01-01 | Stop reason: DRUGHIGH

## 2022-01-01 RX ORDER — DICYCLOMINE HYDROCHLORIDE 10 MG/1
CAPSULE ORAL
Qty: 80 CAPSULE | Refills: 1 | Status: SHIPPED | OUTPATIENT
Start: 2022-01-01

## 2022-01-01 RX ORDER — OXYCODONE HYDROCHLORIDE 5 MG/1
TABLET ORAL
Qty: 60 TABLET | Refills: 0 | Status: SHIPPED | OUTPATIENT
Start: 2022-01-01 | End: 2022-01-01 | Stop reason: DRUGHIGH

## 2022-01-01 RX ORDER — IOPAMIDOL 755 MG/ML
10-135 INJECTION, SOLUTION INTRAVASCULAR ONCE
Status: COMPLETED | OUTPATIENT
Start: 2022-01-01 | End: 2022-01-01

## 2022-01-01 RX ORDER — SIMVASTATIN 40 MG
TABLET ORAL
Qty: 90 TABLET | Refills: 3 | Status: SHIPPED | OUTPATIENT
Start: 2022-01-01

## 2022-01-01 RX ORDER — DICYCLOMINE HYDROCHLORIDE 10 MG/1
CAPSULE ORAL
Qty: 80 CAPSULE | Refills: 0 | Status: SHIPPED | OUTPATIENT
Start: 2022-01-01

## 2022-01-01 RX ORDER — ONDANSETRON 4 MG/1
4 TABLET, ORALLY DISINTEGRATING ORAL EVERY 6 HOURS PRN
Qty: 30 TABLET | Refills: 2 | Status: SHIPPED | OUTPATIENT
Start: 2022-01-01 | End: 2022-01-01

## 2022-01-01 RX ORDER — POTASSIUM CHLORIDE 1500 MG/1
TABLET, EXTENDED RELEASE ORAL
Qty: 120 TABLET | Refills: 6 | Status: SHIPPED | OUTPATIENT
Start: 2022-01-01

## 2022-01-01 RX ORDER — OXYCODONE HCL 10 MG/1
10 TABLET, FILM COATED, EXTENDED RELEASE ORAL EVERY 12 HOURS
Qty: 60 TABLET | Refills: 0 | Status: SHIPPED | OUTPATIENT
Start: 2022-01-01

## 2022-01-01 RX ORDER — OXYCODONE HYDROCHLORIDE 5 MG/1
5 TABLET ORAL EVERY 6 HOURS PRN
Qty: 90 TABLET | Refills: 0 | Status: SHIPPED | OUTPATIENT
Start: 2022-01-01 | End: 2022-01-01

## 2022-01-01 RX ORDER — OXYCODONE HYDROCHLORIDE 5 MG/1
2.5 TABLET ORAL DAILY PRN
Qty: 60 TABLET | Refills: 0 | Status: SHIPPED | OUTPATIENT
Start: 2022-01-01 | End: 2022-01-01

## 2022-01-01 RX ORDER — IOPAMIDOL 755 MG/ML
53 INJECTION, SOLUTION INTRAVASCULAR ONCE
Status: COMPLETED | OUTPATIENT
Start: 2022-01-01 | End: 2022-01-01

## 2022-01-01 RX ORDER — TIZANIDINE 2 MG/1
4 TABLET ORAL EVERY 12 HOURS PRN
Qty: 60 TABLET | Refills: 1 | Status: SHIPPED | OUTPATIENT
Start: 2022-01-01 | End: 2022-01-01 | Stop reason: SINTOL

## 2022-01-01 RX ORDER — ONDANSETRON 4 MG/1
TABLET, ORALLY DISINTEGRATING ORAL
Qty: 30 TABLET | Refills: 2 | Status: SHIPPED | OUTPATIENT
Start: 2022-01-01 | End: 2022-01-01 | Stop reason: DRUGHIGH

## 2022-01-01 RX ORDER — TIZANIDINE 2 MG/1
TABLET ORAL
Qty: 90 TABLET | Refills: 3 | Status: SHIPPED | OUTPATIENT
Start: 2022-01-01

## 2022-01-01 RX ORDER — DICYCLOMINE HYDROCHLORIDE 10 MG/1
10 CAPSULE ORAL
Qty: 80 CAPSULE | Refills: 1 | Status: SHIPPED | OUTPATIENT
Start: 2022-01-01

## 2022-01-01 RX ORDER — TIZANIDINE 2 MG/1
TABLET ORAL
Qty: 90 TABLET | Refills: 0 | Status: SHIPPED | OUTPATIENT
Start: 2022-01-01 | End: 2022-01-01

## 2022-01-01 RX ORDER — ONDANSETRON 8 MG/1
8 TABLET, ORALLY DISINTEGRATING ORAL EVERY 12 HOURS PRN
Qty: 60 TABLET | Refills: 3 | Status: SHIPPED | OUTPATIENT
Start: 2022-01-01

## 2022-01-01 RX ORDER — DICYCLOMINE HYDROCHLORIDE 10 MG/1
CAPSULE ORAL
Qty: 80 CAPSULE | Refills: 0 | OUTPATIENT
Start: 2022-01-01

## 2022-01-01 RX ORDER — OXYCODONE HYDROCHLORIDE 5 MG/1
TABLET ORAL
Qty: 90 TABLET | Refills: 0 | Status: SHIPPED | OUTPATIENT
Start: 2022-01-01

## 2022-01-01 RX ADMIN — SODIUM CHLORIDE 53 ML: 9 INJECTION, SOLUTION INTRAVENOUS at 13:26

## 2022-01-01 RX ADMIN — IOPAMIDOL 53 ML: 755 INJECTION, SOLUTION INTRAVENOUS at 13:26

## 2022-01-01 RX ADMIN — FLUDEOXYGLUCOSE F-18 12.52 MCI.: 500 INJECTION, SOLUTION INTRAVENOUS at 08:40

## 2022-01-01 RX ADMIN — IOPAMIDOL 68 ML: 755 INJECTION, SOLUTION INTRAVENOUS at 08:41

## 2022-01-01 ASSESSMENT — PAIN SCALES - GENERAL
PAINLEVEL: MODERATE PAIN (5)
PAINLEVEL: SEVERE PAIN (6)
PAINLEVEL: NO PAIN (0)
PAINLEVEL: EXTREME PAIN (9)

## 2022-01-01 ASSESSMENT — MIFFLIN-ST. JEOR: SCORE: 823.66

## 2022-01-06 NOTE — PROGRESS NOTES
[facility]    Outpatient Physical Therapy Discharge Note  Patient: Lilia Pierce  : 1930    Beginning/End Dates of Reporting Period:  20 to 20     Referring Provider:      Therapy Diagnosis: Insomnia, Rectal CA, P in B legs.      Client Self Report: P L LB, top of L hip. Pain Scale: 4-7/10    Objective Measurements:  Initial Measures only.  Seen x 2.   Objective Measure: SLS   Details: R 2, L 4 seconds w/ EO   Objective Measure: Tinetti   Details: Balance 8, Gait 4; Total 12   Objective Measure: MMT:   Details: Hip Flex 4- B, Hip Ab 4+ B, Add 4+ B, Knee Flex 4+ B, Knee Ext 4 B, DF R 4+, L 5-.   Objective Measure: Palpation   Details: Tender L Q.L., Just above Lateral Hip.   Objective Measure: FACIT   Details: 31      Goals:  Goal Identifier 1   Goal Description STG: IMprove Tinetti total score to 16 or more for improved safety.    Target Date 20   Date Met      Progress (detail required for progress note):       Goal Identifier 2   Goal Description STG: Improve SLS to 5 seconds B sides w/ EO for better safety.    Target Date 20   Date Met      Progress (detail required for progress note):       Goal Identifier 3   Goal Description STG: Improve MMT by 1/2 grade for better stability.    Target Date 20   Date Met      Progress (detail required for progress note):       Goal Identifier 4   Goal Description LTG: Pt to be independent w/ HEP and Balance drills to improve safety w/ mobility and community access.   Target Date 10/30/20   Date Met      Progress (detail required for progress note):         Plan:  Discharge from therapy.    Discharge:    Reason for Discharge: Patient has failed to schedule further appointments.    Equipment Issued:      Discharge Plan: Patient to continue home program.  Pt to follow up w/MD as appropriate.   Kelly Arroyo, PT, MTC (#[id number])  [facility]  [facility]           [phone]  Fax          [phone]  Appt #      [phone]

## 2022-01-27 NOTE — PROGRESS NOTES
[facility] Hematology and Oncology Progress Note    Patient: Tamy Pierce  MRN: [id number]  Date of Service: Jan 27, 2022         Reason for Visit    Chief Complaint   Patient presents with     Oncology Clinic Visit     Rectal cancer, Secondary malignant neoplasm of bladder         Assessment      1.  A very pleasant 89 year old woman with locally advanced unresectable rectal cancer.  She has been palliated with diverting colostomy and then took Xeloda 6 days on 8 days of for several months.  In August 2021 PET scan was showing progression.  Molecular testing has so far not reveal any targetable mutation.  She is HER-2 negative.  MSI stable.  Some of the molecular testing so far has not shown any targetable mutation including absence of MET amplification, RET rearrangement and PTEN deletion.  Due to locally advanced disease in the rectal area she got additional doses of radiation through the end of November 2021.  Clinically doing quite well.  Her current PET scan shows no metastatic disease.  The area of treated cancer in the rectum appears to be slightly better.  She still however has some active disease.  2.  Peripheral neuropathy.  3.  History of arthritis etc.  4.  She is 89 years old.    Plan    She is currently doing quite well.  We had discussed in great detail in December 2021 what was the most important thing for her.  She was able to articulate the fact that she wants to have a good quality of life.  She does not want to go to any infusion center and get any IV chemotherapy.    At this time we can again wait and watch.  Have her come back in 3-4 months timeframe with another CT scan.  We also talked about the fact that her cancer currently is not metastatic.  However she is 89 years old and eventually something is going to happen to her.  Not sure that will be cancer related or not.  Therefore I do not want to be unnecessarily aggressive about treating the cancer if that was not going to be a major  problem for her.  She continues to be slightly confused and anxious about all these facts.  However the bottom line is she wants to be comfortable and does not want to be too aggressive.     Emphasized again that the main goal of care is to maintain good quality of life.  This entity is not curable with any means that I can see.      Cancer Staging  No matching staging information was found for the patient.  Stage IIIb/stage IV    ECOG Performance    1 - Can't do physically strenuous work, but fully ambyulatory and can do light sedentary work      History of Present Illness    Ms. Tamy Pierce a very pleasant 89 year old woman who has a diagnosis of rectal cancer unresectable.  Originally diagnosed in November 2019.  She had abdominal pain as a presenting symptom which was there for quite some time.  CT scan revealed a rectosigmoid mass causing colonic obstruction with local lymph node spread.  No distant metastatic disease was noted.  She underwent diverting colostomy.  She was noted to have perhaps involvement of the vaginal vault.    She was started on palliative radiation therapy.  After that she was started on Xeloda.  She did have some response initially.  However in June 2021 her CT scan indicated that her mass was probably progressing.  However she wanted to continue same treatment which was Xeloda 1000 mg twice a day 6 days on  8 days off.    She had a PET scan in August 2021 which showed progression.  She stopped taking Xeloda.  She was referred to radiation therapy.  She received 3000 cGy of radiation in 10 fractions from November 9 through November 22, 2021.    We had a conversation in discussion and December 2021.  We discussed about the goals of care.  She wanted to make sure that she has a good quality of life.  Due to that reason we decided to just wait and watch and have her come back with a PET scan.  She is here with that.  Clinically overall she is doing quite well.  She did have an episode of  some blood through her rectum.  Otherwise the colostomy is working fine.    Review of systems.    Overall feels well.  No significant GI symptoms.  Occasional cramping.    Past History    Past Medical History:   Diagnosis Date     Abdominal pain 02/16/2020     Actinic keratosis      Actinic keratosis      Basal cell carcinoma      Basal cell carcinoma      Fracture of left inferior pubic ramus (H)      Fracture of left superior pubic ramus (H)      Gait instability      GERD (gastroesophageal reflux disease)      HLD (hyperlipidemia)      HTN (hypertension)      Lumbar radiculopathy      Mild intermittent asthma      Other and unspecified hyperlipidemia      Rectal cancer (H)      Unspecified asthma(493.90)      Unspecified essential hypertension      Urethral stricture      Urethral stricture due to infection 10/03/2005    ureteral stone       Past Surgical History:   Procedure Laterality Date     COLOSTOMY N/A 11/20/2019    Procedure: Colostomy Formation;  Surgeon: Demarcus Moreno MD;  Location: [facility];  Service: General     ESOPHAGOSCOPY, GASTROSCOPY, DUODENOSCOPY (EGD), COMBINED N/A 2/20/2020    Procedure: ESOPHAGOGASTRODUODENOSCOPY, WITH BIOPSY;  Surgeon: Harrison Brown MD;  Location: [facility]     ESOPHAGOSCOPY, GASTROSCOPY, DUODENOSCOPY (EGD), COMBINED  02/20/2020     HYSTERECTOMY       LITHOTRIPSY       PICC AND MIDLINE TEAM LINE INSERTION  11/21/2019          PROCTOSCOPY N/A 11/20/2019    Procedure: PROCTOSCOPY WITH BIOPSY;  Surgeon: Demarcus Moreno MD;  Location: [facility];  Service: General     SURGICAL HISTORY OF -       open reduction of second metatarsal neck fx with internal fixation  cna d closed treatment of metatarsal to 2 and 4 fx     SURGICAL HISTORY OF -       hysterectomy and oophorectomy     SURGICAL HISTORY OF -       lithotripsy         Physical Exam    /61 (BP Location: Left arm, Patient Position: Sitting, Cuff Size: Adult Regular)   Pulse 71   Resp 16   Ht 1.499 m  "(4' 11\")   Wt 50.8 kg (112 lb)   BMI 22.62 kg/m        GENERAL: no acute distress. Cooperative in conversation.   HEENT: pupils are equal, round and reactive. Oral mucosa is moist and intact.  RESP:Chest symmetric. Regular respiratory rate. No stridor.  ABD: Nondistended, soft.  EXTREMITIES: No lower extremity edema.   NEURO: non focal. Alert and oriented x3.   PSYCH: within normal limits. No depression or anxiety.  SKIN: warm dry intact         Lab Results    No results found for this or any previous visit (from the past 168 hour(s)).    Imaging    PET Oncology Whole Body    Result Date: 1/20/2022  Combined Report of:    PET and CT on  1/20/2022 9:57 AM : 1. PET of the neck, chest, abdomen, and pelvis. 2. PET CT Fusion for Attenuation Correction and Anatomical Localization:  3. Diagnostic CT scan of the chest, abdomen, and pelvis with intravenous contrast for interpretation. 3. CT of the chest, abdomen and pelvis obtained for diagnostic interpretation. 4. 3D MIP and PET-CT fused images were processed on an independent workstation and archived to PACS and reviewed by a radiologist. Technique: 1. PET: The patient received 12.52 mCi of F-18-FDG; the serum glucose was 102 prior to administration, body weight was 49.9 kg. Images were evaluated in the axial, sagittal, and coronal planes as well as the rotational whole body MIP. Images were acquired from the Vertex to the Feet. UPTAKE WAS MEASURED AT 66 MINUTES. BACKGROUND:  Liver SUV max= 3.46,   Aorta Blood SUV Max: 2.36. 2. CT: Volumetric acquisition for clinical interpretation of the chest, abdomen, and pelvis acquired at 3 mm sections after the uneventful administration of intravenous contrast. The chest, abdomen, and pelvis were evaluated at 5 mm sections in bone, soft tissue, and lung windows.  The patient received 68 cc of Isovue 370 intravenously and 500 mL oral Breeza contrast for the examination.  3. 3D MIP and PET-CT fused images were processed on an " independent workstation and archived to PACS and reviewed by a radiologist. INDICATION: Rectal cancer (H) ADDITIONAL INFORMATION OBTAINED FROM EMR: Locally advanced unresectable rectal cancer, status post diverting colostomy, chemoradiation. Recent PET with local progression, thus received additional dose of radiation COMPARISON: 8/12/2021 FINDINGS: HEAD/NECK: There is no suspicious FDG uptake in the neck. Trace mucosal thickening in the maxillary sinuses. The remaining paranasal sinuses and mastoid air cells are clear. Multiple thyroid nodules, including nodule with peripheral hyperdensity in the anterior right lobe and isthmus (series 4, image 128). This was previously not as hyperdense on comparison PET-CT 8/12/2021, suggesting this is enhancement rather than calcifications. CHEST: There is no suspicious FDG uptake in the chest. The central tracheobronchial tree is patent. No pleural effusion or pneumothorax. No acute consolidation. No convincing suspicious pulmonary nodule, however please note that sensitivity is reduced by lack of breath-hold images and there is significant respiratory motion artifact which partially obscures the lung parenchyma. Heart size is mildly enlarged. No pericardial effusion. Extensive coronary artery calcifications as well as calcifications of the thoracic aorta. No hypermetabolic lymph nodes. Esophagus is unremarkable. ABDOMEN AND PELVIS: Decreased uptake to the circumferential masslike thickening of the rectum, currently max SUV of 16.4 compared to 22.1 previously at the bulk of the mass, and SUV max 9.2 in the inferior aspect of the mass near the anus, previously 14.9. Currently maximum wall thickness measures 1.6 cm, similar to slightly decreased from prior where it measured 1.7 cm, and there is similar craniocaudal extent of approximately 8 cm. Multifocal mild uptake elsewhere in the colon, including cecum and transverse colon, is similar to prior study. There is no appreciable  mass lesion at these locations, suggesting this is physiologic uptake, medication-related, or inflammatory. No abnormally distended loops of small or large bowel. Postsurgical changes of diverting colostomy in the left upper quadrant. Parastomal hernia containing loops of nonobstructed bowel. Colonic diverticulosis without diverticulitis. No suspicious uptake in the liver. Cholelithiasis without cholecystitis. No intrahepatic or extrahepatic biliary ductal dilatation. The pancreas, spleen, and adrenal glands are unremarkable. Symmetric enhancement of the kidneys. No hydronephrosis. No free fluid. Normal caliber of the abdominal aorta. Extensive atherosclerotic calcifications. No hypermetabolic lymph nodes. LOWER EXTREMITIES: No hypermetabolic lesions. BONES: There is no abnormal FDG uptake in the skeleton. Degenerative changes of the lumbar spine. Mild anterolisthesis of L5 on S1. Multilevel disc height narrowing and vacuum disc phenomena. Hyperostosis of the thoracic spine.     IMPRESSION: In this patient with rectal cancer status post chemoradiation, there is evidence for partial response: 1. Decreased uptake associated with the circumferential rectal mass. Remaining uptake compatible with ongoing malignancy. 2. No suspicious uptake elsewhere to indicate new metastasis. 3. Enhancing thyroid nodule. Consider follow-up with thyroid ultrasound.  I have personally reviewed the examination and initial interpretation and I agree with the findings. ALEJANDRO LOPEZ MD   SYSTEM ID:  [id number]      Total time spent 48 minutes on the date of service.    Signed by: Kodak Rivero MD, MD      This note has been dictated using voice recognition software. Any grammatical or context distortions are unintentional and inherent to the software

## 2022-01-27 NOTE — LETTER
"    1/27/2022         RE: Lilia Pierce  [address] [facility] [address]        Dear Colleague,    Thank you for referring your patient, Lilia Pierce, to the [facility]. Please see a copy of my visit note below.    Oncology Rooming Note    January 27, 2022 2:51 PM   Lilia Pierce is a 89 year old female who presents for:    Chief Complaint   Patient presents with     Oncology Clinic Visit     Rectal cancer, Secondary malignant neoplasm of bladder     Initial Vitals: There were no vitals taken for this visit. Estimated body mass index is 22.22 kg/m  as calculated from the following:    Height as of 9/9/21: 1.499 m (4' 11\").    Weight as of 12/29/21: 49.9 kg (110 lb). There is no height or weight on file to calculate BSA.  Data Unavailable Comment: Data Unavailable   No LMP recorded. Patient has had a hysterectomy.  Allergies reviewed: Yes  Medications reviewed: Yes    Medications: MEDICATION REFILLS NEEDED TODAY. Provider was notified.  Pharmacy name entered into [employer]:    [facility] - [place], [place] - [address] [facility] [place] - [address] [facility] [place] [place] [facility] [address]    Clinical concerns: 1 month f/u PET results       Nina Angulo              [facility] Hematology and Oncology Progress Note    Patient: Tamy Pierce  MRN: [id number]  Date of Service: Jan 27, 2022         Reason for Visit    Chief Complaint   Patient presents with     Oncology Clinic Visit     Rectal cancer, Secondary malignant neoplasm of bladder         Assessment      1.  A very pleasant 89 year old woman with locally advanced unresectable rectal cancer.  She has been palliated with diverting colostomy and then took Xeloda 6 days on 8 days of for several months.  In August 2021 PET scan was showing progression.  Molecular testing has so far not reveal any targetable mutation.  She is HER-2 negative.  MSI " stable.  Some of the molecular testing so far has not shown any targetable mutation including absence of MET amplification, RET rearrangement and PTEN deletion.  Due to locally advanced disease in the rectal area she got additional doses of radiation through the end of November 2021.  Clinically doing quite well.  Her current PET scan shows no metastatic disease.  The area of treated cancer in the rectum appears to be slightly better.  She still however has some active disease.  2.  Peripheral neuropathy.  3.  History of arthritis etc.  4.  She is 89 years old.    Plan    She is currently doing quite well.  We had discussed in great detail in December 2021 what was the most important thing for her.  She was able to articulate the fact that she wants to have a good quality of life.  She does not want to go to any infusion center and get any IV chemotherapy.    At this time we can again wait and watch.  Have her come back in 3-4 months timeframe with another CT scan.  We also talked about the fact that her cancer currently is not metastatic.  However she is 89 years old and eventually something is going to happen to her.  Not sure that will be cancer related or not.  Therefore I do not want to be unnecessarily aggressive about treating the cancer if that was not going to be a major problem for her.  She continues to be slightly confused and anxious about all these facts.  However the bottom line is she wants to be comfortable and does not want to be too aggressive.     Emphasized again that the main goal of care is to maintain good quality of life.  This entity is not curable with any means that I can see.      Cancer Staging  No matching staging information was found for the patient.  Stage IIIb/stage IV    ECOG Performance    1 - Can't do physically strenuous work, but fully ambyulatory and can do light sedentary work      History of Present Illness    Ms. Tamy Pierce a very pleasant 89 year old woman who has a  diagnosis of rectal cancer unresectable.  Originally diagnosed in November 2019.  She had abdominal pain as a presenting symptom which was there for quite some time.  CT scan revealed a rectosigmoid mass causing colonic obstruction with local lymph node spread.  No distant metastatic disease was noted.  She underwent diverting colostomy.  She was noted to have perhaps involvement of the vaginal vault.    She was started on palliative radiation therapy.  After that she was started on Xeloda.  She did have some response initially.  However in June 2021 her CT scan indicated that her mass was probably progressing.  However she wanted to continue same treatment which was Xeloda 1000 mg twice a day 6 days on  8 days off.    She had a PET scan in August 2021 which showed progression.  She stopped taking Xeloda.  She was referred to radiation therapy.  She received 3000 cGy of radiation in 10 fractions from November 9 through November 22, 2021.    We had a conversation in discussion and December 2021.  We discussed about the goals of care.  She wanted to make sure that she has a good quality of life.  Due to that reason we decided to just wait and watch and have her come back with a PET scan.  She is here with that.  Clinically overall she is doing quite well.  She did have an episode of some blood through her rectum.  Otherwise the colostomy is working fine.    Review of systems.    Overall feels well.  No significant GI symptoms.  Occasional cramping.    Past History    Past Medical History:   Diagnosis Date     Abdominal pain 02/16/2020     Actinic keratosis      Actinic keratosis      Basal cell carcinoma      Basal cell carcinoma      Fracture of left inferior pubic ramus (H)      Fracture of left superior pubic ramus (H)      Gait instability      GERD (gastroesophageal reflux disease)      HLD (hyperlipidemia)      HTN (hypertension)      Lumbar radiculopathy      Mild intermittent asthma      Other and unspecified  "hyperlipidemia      Rectal cancer (H)      Unspecified asthma(493.90)      Unspecified essential hypertension      Urethral stricture      Urethral stricture due to infection 10/03/2005    ureteral stone       Past Surgical History:   Procedure Laterality Date     COLOSTOMY N/A 11/20/2019    Procedure: Colostomy Formation;  Surgeon: Demarcus Moreno MD;  Location: [facility] [place];  Service: General     ESOPHAGOSCOPY, GASTROSCOPY, DUODENOSCOPY (EGD), COMBINED N/A 2/20/2020    Procedure: ESOPHAGOGASTRODUODENOSCOPY, WITH BIOPSY;  Surgeon: Harrison Brown MD;  Location: [facility]     ESOPHAGOSCOPY, GASTROSCOPY, DUODENOSCOPY (EGD), COMBINED  02/20/2020     HYSTERECTOMY       LITHOTRIPSY       PICC AND MIDLINE TEAM LINE INSERTION  11/21/2019          PROCTOSCOPY N/A 11/20/2019    Procedure: PROCTOSCOPY WITH BIOPSY;  Surgeon: Demarcus Moreno MD;  Location: [facility] [place];  Service: General     SURGICAL HISTORY OF -       open reduction of second metatarsal neck fx with internal fixation  cna d closed treatment of metatarsal to 2 and 4 fx     SURGICAL HISTORY OF -       hysterectomy and oophorectomy     SURGICAL HISTORY OF -       lithotripsy         Physical Exam    /61 (BP Location: Left arm, Patient Position: Sitting, Cuff Size: Adult Regular)   Pulse 71   Resp 16   Ht 1.499 m (4' 11\")   Wt 50.8 kg (112 lb)   BMI 22.62 kg/m        GENERAL: no acute distress. Cooperative in conversation.   HEENT: pupils are equal, round and reactive. Oral mucosa is moist and intact.  RESP:Chest symmetric. Regular respiratory rate. No stridor.  ABD: Nondistended, soft.  EXTREMITIES: No lower extremity edema.   NEURO: non focal. Alert and oriented x3.   PSYCH: within normal limits. No depression or anxiety.  SKIN: warm dry intact         Lab Results    No results found for this or any previous visit (from the past 168 hour(s)).    Imaging    PET Oncology Whole Body    Result Date: 1/20/2022  Combined Report of:    PET and CT on  " 1/20/2022 9:57 AM : 1. PET of the neck, chest, abdomen, and pelvis. 2. PET CT Fusion for Attenuation Correction and Anatomical Localization:  3. Diagnostic CT scan of the chest, abdomen, and pelvis with intravenous contrast for interpretation. 3. CT of the chest, abdomen and pelvis obtained for diagnostic interpretation. 4. 3D MIP and PET-CT fused images were processed on an independent workstation and archived to PACS and reviewed by a radiologist. Technique: 1. PET: The patient received 12.52 mCi of F-18-FDG; the serum glucose was 102 prior to administration, body weight was 49.9 kg. Images were evaluated in the axial, sagittal, and coronal planes as well as the rotational whole body MIP. Images were acquired from the Vertex to the Feet. UPTAKE WAS MEASURED AT 66 MINUTES. BACKGROUND:  Liver SUV max= 3.46,   Aorta Blood SUV Max: 2.36. 2. CT: Volumetric acquisition for clinical interpretation of the chest, abdomen, and pelvis acquired at 3 mm sections after the uneventful administration of intravenous contrast. The chest, abdomen, and pelvis were evaluated at 5 mm sections in bone, soft tissue, and lung windows.  The patient received 68 cc of Isovue 370 intravenously and 500 mL oral Breeza contrast for the examination.  3. 3D MIP and PET-CT fused images were processed on an independent workstation and archived to PACS and reviewed by a radiologist. INDICATION: Rectal cancer (H) ADDITIONAL INFORMATION OBTAINED FROM EMR: Locally advanced unresectable rectal cancer, status post diverting colostomy, chemoradiation. Recent PET with local progression, thus received additional dose of radiation COMPARISON: 8/12/2021 FINDINGS: HEAD/NECK: There is no suspicious FDG uptake in the neck. Trace mucosal thickening in the maxillary sinuses. The remaining paranasal sinuses and mastoid air cells are clear. Multiple thyroid nodules, including nodule with peripheral hyperdensity in the anterior right lobe and isthmus (series 4, image  128). This was previously not as hyperdense on comparison PET-CT 8/12/2021, suggesting this is enhancement rather than calcifications. CHEST: There is no suspicious FDG uptake in the chest. The central tracheobronchial tree is patent. No pleural effusion or pneumothorax. No acute consolidation. No convincing suspicious pulmonary nodule, however please note that sensitivity is reduced by lack of breath-hold images and there is significant respiratory motion artifact which partially obscures the lung parenchyma. Heart size is mildly enlarged. No pericardial effusion. Extensive coronary artery calcifications as well as calcifications of the thoracic aorta. No hypermetabolic lymph nodes. Esophagus is unremarkable. ABDOMEN AND PELVIS: Decreased uptake to the circumferential masslike thickening of the rectum, currently max SUV of 16.4 compared to 22.1 previously at the bulk of the mass, and SUV max 9.2 in the inferior aspect of the mass near the anus, previously 14.9. Currently maximum wall thickness measures 1.6 cm, similar to slightly decreased from prior where it measured 1.7 cm, and there is similar craniocaudal extent of approximately 8 cm. Multifocal mild uptake elsewhere in the colon, including cecum and transverse colon, is similar to prior study. There is no appreciable mass lesion at these locations, suggesting this is physiologic uptake, medication-related, or inflammatory. No abnormally distended loops of small or large bowel. Postsurgical changes of diverting colostomy in the left upper quadrant. Parastomal hernia containing loops of nonobstructed bowel. Colonic diverticulosis without diverticulitis. No suspicious uptake in the liver. Cholelithiasis without cholecystitis. No intrahepatic or extrahepatic biliary ductal dilatation. The pancreas, spleen, and adrenal glands are unremarkable. Symmetric enhancement of the kidneys. No hydronephrosis. No free fluid. Normal caliber of the abdominal aorta. Extensive  atherosclerotic calcifications. No hypermetabolic lymph nodes. LOWER EXTREMITIES: No hypermetabolic lesions. BONES: There is no abnormal FDG uptake in the skeleton. Degenerative changes of the lumbar spine. Mild anterolisthesis of L5 on S1. Multilevel disc height narrowing and vacuum disc phenomena. Hyperostosis of the thoracic spine.     IMPRESSION: In this patient with rectal cancer status post chemoradiation, there is evidence for partial response: 1. Decreased uptake associated with the circumferential rectal mass. Remaining uptake compatible with ongoing malignancy. 2. No suspicious uptake elsewhere to indicate new metastasis. 3. Enhancing thyroid nodule. Consider follow-up with thyroid ultrasound.  I have personally reviewed the examination and initial interpretation and I agree with the findings. ALEJANDRO LOPEZ MD   SYSTEM ID:  [id number]      Total time spent 48 minutes on the date of service.    Signed by: Kodak Rivero MD, MD      This note has been dictated using voice recognition software. Any grammatical or context distortions are unintentional and inherent to the software      Again, thank you for allowing me to participate in the care of your patient.        Sincerely,        Kodak Rivero MD, MD

## 2022-01-27 NOTE — PROGRESS NOTES
"Oncology Rooming Note    January 27, 2022 2:51 PM   Lilia Pierce is a 89 year old female who presents for:    Chief Complaint   Patient presents with     Oncology Clinic Visit     Rectal cancer, Secondary malignant neoplasm of bladder     Initial Vitals: There were no vitals taken for this visit. Estimated body mass index is 22.22 kg/m  as calculated from the following:    Height as of 9/9/21: 1.499 m (4' 11\").    Weight as of 12/29/21: 49.9 kg (110 lb). There is no height or weight on file to calculate BSA.  Data Unavailable Comment: Data Unavailable   No LMP recorded. Patient has had a hysterectomy.  Allergies reviewed: Yes  Medications reviewed: Yes    Medications: MEDICATION REFILLS NEEDED TODAY. Provider was notified.  Pharmacy name entered into [employer]:    [facility] - [place], [place] - [address]  [facility] - [place], [place] - [address]  [facility] - [place], [place] - [address]    Clinical concerns: 1 month f/u PET results       Nina Angulo            "

## 2022-02-23 NOTE — TELEPHONE ENCOUNTER
Pt daughter Karine called to let us know Pam will be folowing up with Med-Onc Dr Rivero in [place].

## 2022-04-23 NOTE — LETTER
April 25, 2022      Lilia Pierce  [address] [facility] [address]        Dear ,    We are writing to inform you of your test results.    {results letter list:[id number]}    No results found from the In Basket message.    If you have any questions or concerns, please call the clinic at the number listed above.       Sincerely,      Reagan Thakkar MD

## 2022-04-25 NOTE — TELEPHONE ENCOUNTER
Routing refill request to provider for review/approval because:  Drug not on the [facility] refill protocol     Sangita Campbell RN BSN PHN

## 2022-04-28 NOTE — PROGRESS NOTES
"Oncology Rooming Note    April 28, 2022 2:54 PM   Lilia Pierce is a 89 year old female who presents for:    Chief Complaint   Patient presents with     Oncology Clinic Visit     Initial Vitals: /57   Pulse 79   Temp 98.5  F (36.9  C)   Resp 18   Wt 49.5 kg (109 lb 3.2 oz)   SpO2 98%   BMI 22.06 kg/m   Estimated body mass index is 22.06 kg/m  as calculated from the following:    Height as of 1/27/22: 1.499 m (4' 11\").    Weight as of this encounter: 49.5 kg (109 lb 3.2 oz). Body surface area is 1.44 meters squared.  Moderate Pain (5) Comment: Data Unavailable   No LMP recorded. Patient has had a hysterectomy.  Allergies reviewed: Yes  Medications reviewed: Yes    Medications: MEDICATION REFILLS NEEDED TODAY. Provider was notified.  Pharmacy name entered into [employer]:    [facility] - [place], [place] - [address]  [facility] [place] - [place], [place] - [address]  [facility] - [place], [place] - [address]    Clinical concerns: None       Maite Vallejo LPN            "

## 2022-04-28 NOTE — LETTER
"    4/28/2022         RE: Lilia Pierce  [address] [facility] [address]        Dear Colleague,    Thank you for referring your patient, Lilia Pierce, to the [facility]. Please see a copy of my visit note below.    Oncology Rooming Note    April 28, 2022 2:54 PM   Lilia Pierce is a 89 year old female who presents for:    Chief Complaint   Patient presents with     Oncology Clinic Visit     Initial Vitals: /57   Pulse 79   Temp 98.5  F (36.9  C)   Resp 18   Wt 49.5 kg (109 lb 3.2 oz)   SpO2 98%   BMI 22.06 kg/m   Estimated body mass index is 22.06 kg/m  as calculated from the following:    Height as of 1/27/22: 1.499 m (4' 11\").    Weight as of this encounter: 49.5 kg (109 lb 3.2 oz). Body surface area is 1.44 meters squared.  Moderate Pain (5) Comment: Data Unavailable   No LMP recorded. Patient has had a hysterectomy.  Allergies reviewed: Yes  Medications reviewed: Yes    Medications: MEDICATION REFILLS NEEDED TODAY. Provider was notified.  Pharmacy name entered into [employer]:    [facility] - [place], [place] - [address] [facility] [place] - [address] [address] [facility] - [place], [place] - [address]    Clinical concerns: None       Maite Vallejo LPN              [facility] Hematology and Oncology Progress Note    Patient: Tamy Pierce  MRN: [id number]  Date of Service: Apr 28, 2022         Reason for Visit    Problem List Items Addressed This Visit        Nervous and Auditory    Acute right-sided low back pain with right-sided sciatica    Relevant Medications    tiZANidine (ZANAFLEX) 2 MG tablet    oxyCODONE (ROXICODONE) 5 MG tablet       Digestive    Rectal cancer (H) - Primary (Chronic)      Other Visit Diagnoses     Pain in both lower extremities        Relevant Medications    tiZANidine (ZANAFLEX) 2 MG tablet    oxyCODONE (ROXICODONE) 5 MG tablet    Neuropathy        Relevant " Medications    tiZANidine (ZANAFLEX) 2 MG tablet    Hypoalbuminemia        Iron deficiency anemia due to chronic blood loss               Assessment     1.  A very pleasant 89 year old woman with locally advanced unresectable rectal cancer.  She has been palliated with diverting colostomy and then took Xeloda 6 days on 8 days of for several months.  In August 2021 PET scan was showing progression.  Molecular testing has so far not reveal any targetable mutation.  She is HER-2 negative.  MSI stable.  Some of the molecular testing so far has not shown any targetable mutation including absence of MET amplification, RET rearrangement and PTEN deletion.  Due to locally advanced disease in the rectal area she got additional doses of radiation through the end of November 2021.  Clinically doing quite well.  In January 2022 PET scan showed no metastatic disease.  The area of treated cancer in the rectum appears to be slightly better.  She still however has some active disease.  Currently on observation.  2.  Peripheral neuropathy.  3.  History of arthritis etc.  4.  Some pain issues for which she needs refill on her pain medication.  5.  Slight decrease in her hemoglobin down to 10.5.  6.  CEA stable at 2.3.  7.  Slight hypoalbuminemia.    Plan    I had a long discussion with the patient.  She is overall doing okay.  Her quality of life is decent.  She still having some pain in the rectal area off and on.  Small mount of bleeding from the rectal area as well.    She is not sleeping very well because her colostomy bag gets full and she is very worried about it getting overfull.  She is taking MiraLAX twice a day to keep her stools liquid.    1.  Advised the patient to decrease the amount of MiraLAX that she takes.  2.  Eat more calories and proteins.  3.  Follow-up in 3-4 months timeframe.  4.  Refill her oxycodone and Zanaflex.  Advised to take the pain medication as needed.  We will refill it if she needs more.  5.   Continue to ambulate.  6.  Advised to do some sitz bath if she could.      Cancer Staging  No matching staging information was found for the patient.  Stage IIIb/stage IV    ECOG Performance    1 - Can't do physically strenuous work, but fully ambyulatory and can do light sedentary work      History of Present Illness    Ms. Tamy Pierce a very pleasant 89 year old woman who has a diagnosis of rectal cancer unresectable.  Originally diagnosed in November 2019.  She had abdominal pain as a presenting symptom which was there for quite some time.  CT scan revealed a rectosigmoid mass causing colonic obstruction with local lymph node spread.  No distant metastatic disease was noted.  She underwent diverting colostomy.  She was noted to have perhaps involvement of the vaginal vault.    She was started on palliative radiation therapy.  After that she was started on Xeloda.  She did have some response initially.  However in June 2021 her CT scan indicated that her mass was probably progressing.  However she wanted to continue same treatment which was Xeloda 1000 mg twice a day 6 days on  8 days off.    She had a PET scan in August 2021 which showed progression.  She stopped taking Xeloda.  She was referred to radiation therapy.  She received 3000 cGy of radiation in 10 fractions from November 9 through November 22, 2021.    We had a conversation in discussion and December 2021.  We discussed about the goals of care.  She wanted to make sure that she has a good quality of life.  Due to that reason we decided to just wait and watch.  She had a PET scan in January 2022.  It showed decreased uptake in the circumdifferential rectal mass.  No suspicious lesion anywhere else.    We made the decision to continue observation.  We also talked about the futility of doing the scans.  I recommended that she should follow-up based on her symptoms rather than the scans.  She is overall doing okay.  Having some occasional abdominal pain  which is actually more like a rectal pain once or twice a week.  She also has small mount of blood teaspoonful that comes out once or twice a week.  She is using some MiraLAX to keep her stools loose but it is I think too loose since she is getting up 3 times a night to empty the bag.    Review of systems.    Overall feels well.  No significant GI symptoms.  Occasional cramping.    Past History    Past Medical History:   Diagnosis Date     Abdominal pain 02/16/2020     Actinic keratosis      Actinic keratosis      Basal cell carcinoma      Basal cell carcinoma      Fracture of left inferior pubic ramus (H)      Fracture of left superior pubic ramus (H)      Gait instability      GERD (gastroesophageal reflux disease)      HLD (hyperlipidemia)      HTN (hypertension)      Lumbar radiculopathy      Mild intermittent asthma      Other and unspecified hyperlipidemia      Rectal cancer (H)      Unspecified asthma(493.90)      Unspecified essential hypertension      Urethral stricture      Urethral stricture due to infection 10/03/2005    ureteral stone       Past Surgical History:   Procedure Laterality Date     COLOSTOMY N/A 11/20/2019    Procedure: Colostomy Formation;  Surgeon: Demarcus Moreno MD;  Location: [facility];  Service: General     ESOPHAGOSCOPY, GASTROSCOPY, DUODENOSCOPY (EGD), COMBINED N/A 2/20/2020    Procedure: ESOPHAGOGASTRODUODENOSCOPY, WITH BIOPSY;  Surgeon: Harrison Brown MD;  Location: [facility]     ESOPHAGOSCOPY, GASTROSCOPY, DUODENOSCOPY (EGD), COMBINED  02/20/2020     HYSTERECTOMY       LITHOTRIPSY       PICC AND MIDLINE TEAM LINE INSERTION  11/21/2019          PROCTOSCOPY N/A 11/20/2019    Procedure: PROCTOSCOPY WITH BIOPSY;  Surgeon: Demarcus Moreno MD;  Location: [facility];  Service: General     SURGICAL HISTORY OF -       open reduction of second metatarsal neck fx with internal fixation  cna d closed treatment of metatarsal to 2 and 4 fx     SURGICAL HISTORY OF -        hysterectomy and oophorectomy     SURGICAL HISTORY OF -       lithotripsy         Physical Exam    /57   Pulse 79   Temp 98.5  F (36.9  C)   Resp 18   Wt 49.5 kg (109 lb 3.2 oz)   SpO2 98%   BMI 22.06 kg/m      GENERAL: No acute distress. Cooperative in conversation.   HEENT:  Pupils are equal, round and reactive. Oral mucosa is clean and intact. No ulcerations or mucositis noted. No bleeding noted.  RESP:Chest symmetric lungs are clear bilaterally per auscultation. Regular respiratory rate. No wheezes or rhonchi.  CV: Normal S1 S2 Regular, rate and rhythm.  Soft systolic murmur 3 out of 6.  ABD: Nondistended, soft, nontender. Positive bowel sounds. No organomegaly.  Colostomy in place.  Greenish colored stool in the bag.  EXTREMITIES: No lower extremity edema.   NEURO: Non- focal. Alert and oriented x3.  Cranial nerves appear intact.  PSYCH: Within normal limits. No depression or anxiety.  SKIN: Warm dry intact.     Lab Results    No results found for this or any previous visit (from the past 168 hour(s)).  Reviewed her labs from last week.    Imaging    No results found.      Signed by: Kodak Rivero MD,     This note has been dictated using voice recognition software. Any grammatical or context distortions are unintentional and inherent to the software      Again, thank you for allowing me to participate in the care of your patient.        Sincerely,        Kodak Rivero MD, MD

## 2022-04-28 NOTE — PROGRESS NOTES
[facility] Hematology and Oncology Progress Note    Patient: Tamy Pierce  MRN: [id number]  Date of Service: Apr 28, 2022         Reason for Visit    Problem List Items Addressed This Visit        Nervous and Auditory    Acute right-sided low back pain with right-sided sciatica    Relevant Medications    tiZANidine (ZANAFLEX) 2 MG tablet    oxyCODONE (ROXICODONE) 5 MG tablet       Digestive    Rectal cancer (H) - Primary (Chronic)      Other Visit Diagnoses     Pain in both lower extremities        Relevant Medications    tiZANidine (ZANAFLEX) 2 MG tablet    oxyCODONE (ROXICODONE) 5 MG tablet    Neuropathy        Relevant Medications    tiZANidine (ZANAFLEX) 2 MG tablet    Hypoalbuminemia        Iron deficiency anemia due to chronic blood loss               Assessment     1.  A very pleasant 89 year old woman with locally advanced unresectable rectal cancer.  She has been palliated with diverting colostomy and then took Xeloda 6 days on 8 days of for several months.  In August 2021 PET scan was showing progression.  Molecular testing has so far not reveal any targetable mutation.  She is HER-2 negative.  MSI stable.  Some of the molecular testing so far has not shown any targetable mutation including absence of MET amplification, RET rearrangement and PTEN deletion.  Due to locally advanced disease in the rectal area she got additional doses of radiation through the end of November 2021.  Clinically doing quite well.  In January 2022 PET scan showed no metastatic disease.  The area of treated cancer in the rectum appears to be slightly better.  She still however has some active disease.  Currently on observation.  2.  Peripheral neuropathy.  3.  History of arthritis etc.  4.  Some pain issues for which she needs refill on her pain medication.  5.  Slight decrease in her hemoglobin down to 10.5.  6.  CEA stable at 2.3.  7.  Slight hypoalbuminemia.    Plan    I had a long discussion with the patient.  She is  overall doing okay.  Her quality of life is decent.  She still having some pain in the rectal area off and on.  Small mount of bleeding from the rectal area as well.    She is not sleeping very well because her colostomy bag gets full and she is very worried about it getting overfull.  She is taking MiraLAX twice a day to keep her stools liquid.    1.  Advised the patient to decrease the amount of MiraLAX that she takes.  2.  Eat more calories and proteins.  3.  Follow-up in 3-4 months timeframe.  4.  Refill her oxycodone and Zanaflex.  Advised to take the pain medication as needed.  We will refill it if she needs more.  5.  Continue to ambulate.  6.  Advised to do some sitz bath if she could.      Cancer Staging  No matching staging information was found for the patient.  Stage IIIb/stage IV    ECOG Performance    1 - Can't do physically strenuous work, but fully ambyulatory and can do light sedentary work      History of Present Illness    Ms. Tamy Pierce a very pleasant 89 year old woman who has a diagnosis of rectal cancer unresectable.  Originally diagnosed in November 2019.  She had abdominal pain as a presenting symptom which was there for quite some time.  CT scan revealed a rectosigmoid mass causing colonic obstruction with local lymph node spread.  No distant metastatic disease was noted.  She underwent diverting colostomy.  She was noted to have perhaps involvement of the vaginal vault.    She was started on palliative radiation therapy.  After that she was started on Xeloda.  She did have some response initially.  However in June 2021 her CT scan indicated that her mass was probably progressing.  However she wanted to continue same treatment which was Xeloda 1000 mg twice a day 6 days on  8 days off.    She had a PET scan in August 2021 which showed progression.  She stopped taking Xeloda.  She was referred to radiation therapy.  She received 3000 cGy of radiation in 10 fractions from November 9 through  November 22, 2021.    We had a conversation in discussion and December 2021.  We discussed about the goals of care.  She wanted to make sure that she has a good quality of life.  Due to that reason we decided to just wait and watch.  She had a PET scan in January 2022.  It showed decreased uptake in the circumdifferential rectal mass.  No suspicious lesion anywhere else.    We made the decision to continue observation.  We also talked about the futility of doing the scans.  I recommended that she should follow-up based on her symptoms rather than the scans.  She is overall doing okay.  Having some occasional abdominal pain which is actually more like a rectal pain once or twice a week.  She also has small mount of blood teaspoonful that comes out once or twice a week.  She is using some MiraLAX to keep her stools loose but it is I think too loose since she is getting up 3 times a night to empty the bag.    Review of systems.    Overall feels well.  No significant GI symptoms.  Occasional cramping.    Past History    Past Medical History:   Diagnosis Date     Abdominal pain 02/16/2020     Actinic keratosis      Actinic keratosis      Basal cell carcinoma      Basal cell carcinoma      Fracture of left inferior pubic ramus (H)      Fracture of left superior pubic ramus (H)      Gait instability      GERD (gastroesophageal reflux disease)      HLD (hyperlipidemia)      HTN (hypertension)      Lumbar radiculopathy      Mild intermittent asthma      Other and unspecified hyperlipidemia      Rectal cancer (H)      Unspecified asthma(493.90)      Unspecified essential hypertension      Urethral stricture      Urethral stricture due to infection 10/03/2005    ureteral stone       Past Surgical History:   Procedure Laterality Date     COLOSTOMY N/A 11/20/2019    Procedure: Colostomy Formation;  Surgeon: Demarcus Moreno MD;  Location: [facility] OR;  Service: General     ESOPHAGOSCOPY, GASTROSCOPY, DUODENOSCOPY (EGD),  COMBINED N/A 2/20/2020    Procedure: ESOPHAGOGASTRODUODENOSCOPY, WITH BIOPSY;  Surgeon: Harrison Brown MD;  Location: [facility]     ESOPHAGOSCOPY, GASTROSCOPY, DUODENOSCOPY (EGD), COMBINED  02/20/2020     HYSTERECTOMY       LITHOTRIPSY       PICC AND MIDLINE TEAM LINE INSERTION  11/21/2019          PROCTOSCOPY N/A 11/20/2019    Procedure: PROCTOSCOPY WITH BIOPSY;  Surgeon: Demarcus Moreno MD;  Location: [facility];  Service: General     SURGICAL HISTORY OF -       open reduction of second metatarsal neck fx with internal fixation  cna d closed treatment of metatarsal to 2 and 4 fx     SURGICAL HISTORY OF -       hysterectomy and oophorectomy     SURGICAL HISTORY OF -       lithotripsy         Physical Exam    /57   Pulse 79   Temp 98.5  F (36.9  C)   Resp 18   Wt 49.5 kg (109 lb 3.2 oz)   SpO2 98%   BMI 22.06 kg/m      GENERAL: No acute distress. Cooperative in conversation.   HEENT:  Pupils are equal, round and reactive. Oral mucosa is clean and intact. No ulcerations or mucositis noted. No bleeding noted.  RESP:Chest symmetric lungs are clear bilaterally per auscultation. Regular respiratory rate. No wheezes or rhonchi.  CV: Normal S1 S2 Regular, rate and rhythm.  Soft systolic murmur 3 out of 6.  ABD: Nondistended, soft, nontender. Positive bowel sounds. No organomegaly.  Colostomy in place.  Greenish colored stool in the bag.  EXTREMITIES: No lower extremity edema.   NEURO: Non- focal. Alert and oriented x3.  Cranial nerves appear intact.  PSYCH: Within normal limits. No depression or anxiety.  SKIN: Warm dry intact.     Lab Results    No results found for this or any previous visit (from the past 168 hour(s)).  Reviewed her labs from last week.    Imaging    No results found.      Signed by: Kodak Rivero MD,     This note has been dictated using voice recognition software. Any grammatical or context distortions are unintentional and inherent to the software

## 2022-05-31 NOTE — TELEPHONE ENCOUNTER
Patient's daughter Karine calls in today stating that she would like to get her mother in for a palliative care referral due to her eb and flow pain.  She states that she does use oxycodone and at times uses Tylenol and ibuprofen.  She would like to get a better handle on her mother's pain management.  Per Dr Rivero, palliative care referral has been placed and per patient request this is being sent up to the [place] area as her mother lives closer there.  She is aware that I am putting her phone number is the contact information to call to assist in scheduling.    Doris Cervantes RN

## 2022-06-01 NOTE — PROGRESS NOTES
Patient's daughter was called regarding the message below:    Patient's daughter Karine calls in today stating that she would like to get her mother in for a palliative care referral due to her eb and flow pain.  She states that she does use oxycodone and at times uses Tylenol and ibuprofen.  She would like to get a better handle on her mother's pain management.  Per Dr Rivero, palliative care referral has been placed and per patient request this is being sent up to the [place] area as her mother lives closer there.  She is aware that I am putting her phone number is the contact information to call to assist in scheduling.     Doris Cervantes, RN    Spoke with Karine and gave her the phone number of the palliative care scheduling for [place] department which is [phone].  She was appreciative of the information and will call and schedule an appointment.

## 2022-06-28 NOTE — PROGRESS NOTES
Lilia is a 89 year old who is being evaluated via a billable video visit.      How would you like to obtain your AVS? Mail a copy  If the video visit is dropped, the invitation should be resent by: Text to cell phone: [phone]  Will anyone else be joining your video visit? Gabi MARIA          Video-Visit Details    Video Start Time: 10:30 AM    Type of service:  Video Visit    Video End Time: 11:15 AM    Originating Location (pt. Location): Home    Distant Location (provider location):  [facility]     Platform used for Video Visit: [facility]     Palliative Care Outpatient Clinic      Patient ID/Chief Complaint: Lilia Pierce 89 year old female who is presenting to the palliative medicine clinic today at the request of Dr Rivero for a palliative care consultation secondary to assistance with symptom management.   The patient's primary care provider is:  Reagan Thakkar.       Impression & Recommendations:  89-year-old female with locally advanced rectal cancer that is unresectable status post diverting colostomy.  Molecular testing has shown no targetable mutation.  She has received radiation to rectal area, completed November 2021.  PET scan in January 2022 showed no metastatic disease but continues to have known active local disease.  She is not currently on chemotherapy but is being observed by oncology.  Past medical history also includes peripheral neuropathy, osteoarthritis, HTN, HLD, mild asthma, ureteral stricture, GERD, history of pelvic fractures, basal cell carcinoma, and actinic keratosis.  Due to ongoing issues with rectal pain, nausea, and occasional back pain, she was referred to palliative care service to assist with symptom management.    Symptoms/recommendations:  1.  Pain, primary issue with the pain is burning/possible spasm in rectal area.  She is on multiple medications already including gabapentin 100 mg 4 times daily, oxycodone IR 2.5 mg at  bedtime/occasionally in the morning/occasionallyadditional dose during the day, Tylenol and ibuprofen, tizanidine 2 mg often taking 4 times daily, and Bentyl.  She reports that tizanidine combined with Tylenol seems to work best.  Change tizanidine to 4 mg twice daily and add Tylenol 1000 mg every 12 hours.  Her daughter Karine was present for meeting and is a nurse.  She states that her mom will be able to take pills appropriately.  No other changes.  2.  Nausea, she often takes Zofran 4 mg 2-3 times per day.  She reports this works well for her but feels it is not adequate as she oftentimes has bad days when her nausea is significantly worse.  Change Zofran to 8 mg twice daily.  We considered possibility of low-dose Zyprexa, however she wishes to adjust Zofran dosing first.  3.  Her daughter asked about possible medication for mood elevation.  Antidepressants could be considered but will have to be considered carefully given Zofran use and risk of serotonin syndrome.  4.  No issues with constipation      [facility].  CODE STATUS per note review is already been established as no CPR and DO NOT INTUBATE.  Healthcare agent form is present in EMR, naming daughter Karnie.  Patient is decisional and able to make her own decisions currently.    Follow up:  1.  4 weeks.  Daughter knows to call our service if symptoms do not improve.    History:  History gathered today from: patient, family/loved ones, medical chart, health care directive/s      PE: There were no vitals taken for this visit.   Wt Readings from Last 3 Encounters:   22 49.5 kg (109 lb 3.2 oz)   22 50.8 kg (112 lb)   21 49.9 kg (110 lb)       Gen alert, comfortable appearing, NAD.   Head NCAT.  Eyes anicteric without injection  Face symmetric, eyes conjugate  Lungs unlabored, no cough, speaking full sentences  Skin no rashes or lesions evident on face/neck  Neuro Face symmetric, eyes conjugate; speech fluent.  Neuropsych exam  normal including affect, sensorium, gross memory, thought processes, and fund of knowledge.         Data reviewed:  I reviewed electrolytes, BUN/creatinine, liver profile, hemoglobin and hematocrit, platelet count, and most recent imaging, my comments:       database reviewed: 6/26        65 minutes spent on the date of the encounter doing chart review, history and exam, patient education & counseling, documentation and other activities as noted above.        Thank you for involving us in the patient's care.   WILEY Silva, [facility] Palliative Care [facility]  Office [phone]  Fax [phone]

## 2022-06-28 NOTE — PATIENT INSTRUCTIONS
Thank you for meeting with us in the [facility] Palliative Care Clinic.      How to get a hold of us:  For non-urgent matters, MyChart works best.    For more urgent matters, or if you prefer not to use MyChart, call our clinic nurse coordinator Rosa Apodaca RN at [phone]    We have an on-call number for evenings and weekends. Please call this only if you are having uncontrolled symptoms or serious side effects from your medicines: [phone].     For refills, please give us a week (5 working days) notice. We don't always have providers available everyday to do refills. If you call the day you run out of your medicine, we may not be able to refill it in time, so call 5 days in advance!

## 2022-06-28 NOTE — TELEPHONE ENCOUNTER
Received update from pt's dtr that she gave pt 2 tablets of zanaflex as discussed during pt's clinic visit with Fernie today. About 30 mins after dose, pt said she was dizzy and then slumped in a chair. She remained alert and is doing ok at time of phone call. Dtr will resume giving 1 tablet only, previously tolerated well by pt. She will continue with other recommendations given today by Fernie Aviles NP re: tylenol and zofran dosing. She will call with an update next week.    Update given to Fernie.    MARY GallegoN, RN  Palliative Care Nurse Clinician    [phone] (Direct)  [phone] (Refills)  [phone] (Appointment Scheduling)

## 2022-06-29 NOTE — TELEPHONE ENCOUNTER
----- Message from WILEY Silva CNP sent at 6/28/2022 12:53 PM CDT -----  Please schedule for virtual follow-up visit with Fernie Aviles in 3 to 4 weeks

## 2022-06-29 NOTE — PROGRESS NOTES
"Oncology Distress Screening Follow-up  Clinical Social Work  [facility]    Identified Concern and Score From Distress Screenin. How concerned are you about your ability to eat?  5       2. How concerned are you about unintended weight loss or your current weight?  5       3. How concerned are you about feeling depressed or very sad?  8 Abnormal        4. How concerned are you about feeling anxious or very scared?  0       5. Do you struggle with the loss of meaning and audelia in your life?  Quite a bit       6. How concerned are you about work and home life issues that may be affected by your cancer?  0       7. How concerned are you about knowing what resources are available to help you?  10 Abnormal        8. Do you currently have what you would describe as Buddhist or spiritual struggles?             Not at all             Date of Distress Screenin22    Data: Lilia is a 89 year old woman diagnosed with rectal cancer. Lilia is followed by both Palliative and Oncology. Lilia had a provider visit with WILEY Silva CNP on  where she expressed concern re: feeling sad/depressed and resource support.    Intervention: JUDITH reached out via phone to Lilia via phone to discuss her concerns further. Karine, Almas's daughter, answered and had stated that her mother was unavailable at that time and mentioned that Lilia had a provider visit with palliative yesterday. She reports the visit went well but that the medication changes caused her to get dizzy and \"pass out\". Karine and her brother were available to assist her until she was feeling better. Lilia then took half the prescribed dose today and felt \"dizzy\". JUIDTH and Karine discussed the safety concerns with Lilia taking those medications and living alone. JUDITH worries that Lilia could have a fall which could likely lead to more significant risks. Karine agrees and is planning to connect with Palliative to discuss stopping that medication. Karine " "shares that her father  last year from what started as a fall.     Karine states that Lilia was feeling\"very sad\" yesterday as she found out her sister, Karine's Aunt, just  yesterday. Karine shares that Lilia and her sister were very close and spent a lot of time talking on the phone. SW discussed having her connect with [facility] Dalal [facility]. Karine stated that Lilia had been offered that many times and she has declined the offer. She feels that Lilia comes from a generation where \"we don't talk about our problems\". At this no additional needs were identified. SW encouraged Karine and Lilia to reach out writer if any additional needs arise.       Education Provided: Oncology Clinic SW role/contact info    Follow-up Required: SW will remain available for ongoing resource/support needs.       RICHARD Bailey, LGSW  [facility]  Adult Oncology Clinic  Phone: [phone]    "

## 2022-07-01 NOTE — TELEPHONE ENCOUNTER
Spoke to daughter Karine, she said they spoke with provider yesterday and will be talking with Rosa next Wed. Did not set up an appt, will wait to see what Rosa says.

## 2022-07-07 NOTE — TELEPHONE ENCOUNTER
Phone call placed to dtr Karine to check in on pt. She reports things are much better. Pt is taking morphine, tylenol and zofran three times daily. Her appetite is improved and she has no pain. Will plan for follow up visit with Fernie in August. Karine knows to call me directly with questions/concerns/refill needs before then if needed.    Rosa Apodaca, MARYN, RN  Palliative Care Nurse Clinician    [phone] (Direct)  [phone] (Refills)  [phone] (Appointment Scheduling)

## 2022-07-18 NOTE — TELEPHONE ENCOUNTER
Received telephone call from patient's daughter requesting refill of ondansetron. She's hoping to get a larger quantity that will last a month. Pt currently taking it twice daily.     Last office visit: 6/28/22  Scheduled for follow up 8/17/22     Will route request to NP for review.

## 2022-07-25 NOTE — TELEPHONE ENCOUNTER
Patient's daughter Karine is calling to see if her mom should get the 2nd covid booster as her last one was in November or should she wait until Fall.  Karine was told that at 89, it is probably not a bad idea to get the booster now rather than wait until Fall.  Karine was appreciative of the information.

## 2022-07-25 NOTE — TELEPHONE ENCOUNTER
Pending Prescriptions:                       Disp   Refills    gabapentin (NEURONTIN) 100 MG capsule [Ph*360 ca*3            Sig: TAKE 1 CAPSULE BY MOUTH IN THE MORNING, AND THEN           TAKE 2 CAPSULES BY MOUTH IN THE AFTERNOON, AND           TAKE 3 CAPSULES BY MOUTH AT NIGHT.    Routing refill request to provider for review/approval because:  Drug not on the [facility] refill protocol       Laurent Elliott RN

## 2022-08-01 NOTE — TELEPHONE ENCOUNTER
Received telephone call from patient's daughter requesting refill of oxycodone.     Last refill: 6/30/22 per chart review  Last office visit: 6/28/22  Scheduled for follow up 8/17/22     Will route request to APRN for review.     Unable to review [place]  Report.

## 2022-08-15 NOTE — LETTER
"    8/15/2022         RE: Lilia Pierce  [address] [address]        Dear Colleague,    Thank you for referring your patient, Lilia Pierce, to the [facility]. Please see a copy of my visit note below.    Oncology Rooming Note    August 15, 2022 2:07 PM   Lilia Pierce is a 89 year old female who presents for:    Chief Complaint   Patient presents with     Oncology Clinic Visit     3 month return Rectal cancer, no labs      Initial Vitals: /75 (BP Location: Left arm, Patient Position: Sitting, Cuff Size: Adult Regular)   Pulse 79   Temp 99.2  F (37.3  C) (Oral)   Resp 24   Ht 1.499 m (4' 11.02\")   Wt 48.9 kg (107 lb 11.2 oz)   SpO2 98%   BMI 21.74 kg/m   Estimated body mass index is 21.74 kg/m  as calculated from the following:    Height as of this encounter: 1.499 m (4' 11.02\").    Weight as of this encounter: 48.9 kg (107 lb 11.2 oz). Body surface area is 1.43 meters squared.  Extreme Pain (9) Comment: Data Unavailable   No LMP recorded. Patient has had a hysterectomy.  Allergies reviewed: Yes  Medications reviewed: Yes    Medications: Medication refills not needed today.  Pharmacy name entered into [facility]:      [facility] [place], [place] - [address]    Clinical concerns: 3 month return Rectal cancer, no labs       Johanny Hunter [facility] Hematology and Oncology Progress Note    Patient: Tamy Pierce  MRN: [id number]  Date of Service: Aug 15, 2022         Reason for Visit    Problem List Items Addressed This Visit        Digestive    Rectal cancer (H) - Primary (Chronic)      Other Visit Diagnoses     Hypoalbuminemia        Iron deficiency anemia due to chronic blood loss               Assessment     1.  A very pleasant 89 year old woman with locally advanced unresectable rectal cancer.  She has been palliated with diverting colostomy and then took Xeloda 6 days on 8 days of for several " months.  In August 2021 PET scan was showing progression.  Molecular testing has so far not reveal any targetable mutation.  She is HER-2 negative.  MSI stable.  Some of the molecular testing so far has not shown any targetable mutation including absence of MET amplification, RET rearrangement and PTEN deletion.  Due to locally advanced disease in the rectal area she got additional doses of radiation through the end of November 2021.  Clinically doing quite well.  In January 2022 PET scan showed no metastatic disease.  The area of treated cancer in the rectum appears to be slightly better.  She still however has some active disease.  Currently on observation.  2.  Increasing rectal pain.  She has been started on oxycodone that seems to help but only for 3-4 hours.  She is fairly miserable.  Denies any fever or chills.  However she has had occasional bleeding per rectum that seems to give some relief to pain.  3.  History of arthritis etc.  4.  Some pain issues for which she needs refill on her pain medication.  5.  Slight decrease in her hemoglobin down to 10.5.  6.  CEA stable at 2.3.  7.  Slight hypoalbuminemia.    Plan    I had lengthy discussion with the patient and her daughter.  I think we need to figure out what is going on in the pelvic/rectal area to see why she is having that much pain.  It is possible that she might be getting some hematoma there which she is periodically draining and that this causing her pain.  We would like to get a CT scan to check that out.  It is also possible that the malignancy is getting worse and that is giving her pain.  At any rate we need to know what is going on.    Also gave her a prescription of OxyContin 10 mg p.o. twice daily for pain control.    We will do a video visit in a week after the CT scan is done.    We will also do some blood work.      Cancer Staging  No matching staging information was found for the patient.  Stage IIIb/stage IV    ECOG Performance    1 -  Can't do physically strenuous work, but fully ambyulatory and can do light sedentary work      History of Present Illness    Ms. Tamy Pierce a very pleasant 89 year old woman who has a diagnosis of rectal cancer unresectable.  Originally diagnosed in November 2019.  She had abdominal pain as a presenting symptom which was there for quite some time.  CT scan revealed a rectosigmoid mass causing colonic obstruction with local lymph node spread.  No distant metastatic disease was noted.  She underwent diverting colostomy.  She was noted to have perhaps involvement of the vaginal vault.    She was started on palliative radiation therapy.  After that she was started on Xeloda.  She did have some response initially.  However in June 2021 her CT scan indicated that her mass was probably progressing.  However she wanted to continue same treatment which was Xeloda 1000 mg twice a day 6 days on  8 days off.    She had a PET scan in August 2021 which showed progression.  She stopped taking Xeloda.  She was referred to radiation therapy.  She received 3000 cGy of radiation in 10 fractions from November 9 through November 22, 2021.    We had a conversation in discussion and December 2021.  We discussed about the goals of care.  She wanted to make sure that she has a good quality of life.  Due to that reason we decided to just wait and watch.  She had a PET scan in January 2022.  It showed decreased uptake in the circumdifferential rectal mass.  No suspicious lesion anywhere else.    We made the decision to continue observation.  We also talked about the futility of doing the scans.  I recommended that she should follow-up based on her symptoms rather than the scans.  She is overall doing okay.  Having some occasional abdominal pain which is actually more like a rectal pain once or twice a week.  She also has small mount of blood teaspoonful that comes out once or twice a week.  She is using some MiraLAX to keep her stools loose  but it is I think too loose since she is getting up 3 times a night to empty the bag.    She comes in today for scheduled follow-up.  She did meet with palliative care about couple of months ago.  She started to have increasing pain around the rectal area.  This is a throbbing pain.  She takes oxycodone 5 mg and that seems to help her pain.  She occasionally gets some blood per rectum and after that her pain is better for few days before it gets worse again.  She is very restless when the pain occurs.    Review of systems.    Overall feels well.  No significant GI symptoms.  Occasional cramping.    Past History    Past Medical History:   Diagnosis Date     Abdominal pain 02/16/2020     Actinic keratosis      Actinic keratosis      Basal cell carcinoma      Basal cell carcinoma      Fracture of left inferior pubic ramus (H)      Fracture of left superior pubic ramus (H)      Gait instability      GERD (gastroesophageal reflux disease)      HLD (hyperlipidemia)      HTN (hypertension)      Lumbar radiculopathy      Mild intermittent asthma      Other and unspecified hyperlipidemia      Rectal cancer (H)      Unspecified asthma(493.90)      Unspecified essential hypertension      Urethral stricture      Urethral stricture due to infection 10/03/2005    ureteral stone       Past Surgical History:   Procedure Laterality Date     COLOSTOMY N/A 11/20/2019    Procedure: Colostomy Formation;  Surgeon: Demarcus Moreno MD;  Location: [facility];  Service: General     ESOPHAGOSCOPY, GASTROSCOPY, DUODENOSCOPY (EGD), COMBINED N/A 2/20/2020    Procedure: ESOPHAGOGASTRODUODENOSCOPY, WITH BIOPSY;  Surgeon: Harrison Brown MD;  Location: [facility]     ESOPHAGOSCOPY, GASTROSCOPY, DUODENOSCOPY (EGD), COMBINED  02/20/2020     HYSTERECTOMY       LITHOTRIPSY       PICC AND MIDLINE TEAM LINE INSERTION  11/21/2019          PROCTOSCOPY N/A 11/20/2019    Procedure: PROCTOSCOPY WITH BIOPSY;  Surgeon: Demarcus Moreno MD;  [facility];  Service: General     SURGICAL HISTORY OF -       open reduction of second metatarsal neck fx with internal fixation  cna d closed treatment of metatarsal to 2 and 4 fx     SURGICAL HISTORY OF -       hysterectomy and oophorectomy     SURGICAL HISTORY OF -       lithotripsy         Physical Exam    /75 (BP Location: Left arm, Patient Position: Sitting, Cuff Size: Adult Regular)   Pulse 79   Temp 99.2  F (37.3  C) (Oral)   Resp 24   Ht 1.499 m (4' 11.02\")   Wt 48.9 kg (107 lb 11.2 oz)   SpO2 98%   BMI 21.74 kg/m      GENERAL: No acute distress. Cooperative in conversation.   HEENT:  Pupils are equal, round and reactive. Oral mucosa is clean and intact. No ulcerations or mucositis noted. No bleeding noted.  RESP:Chest symmetric lungs are clear bilaterally per auscultation. Regular respiratory rate. No wheezes or rhonchi.  CV: Normal S1 S2 Regular, rate and rhythm.  He does have a systolic murmur 3 out of 6.    ABD: Nondistended, soft, nontender. Positive bowel sounds. No organomegaly.  Colostomy in place.  EXTREMITIES: No lower extremity edema.   NEURO: Non- focal. Alert and oriented x3.  Cranial nerves appear intact.  PSYCH: Within normal limits. No depression or anxiety.  SKIN: Warm dry intact.     Lab Results    No results found for this or any previous visit (from the past 168 hour(s)).  Reviewed her lab results that were done in the last few months.    Imaging    No results found.      Signed by: Kodak Rivero MD,     This note has been dictated using voice recognition software. Any grammatical or context distortions are unintentional and inherent to the software      Again, thank you for allowing me to participate in the care of your patient.        Sincerely,        Kodak Rivero MD, MD    "

## 2022-08-15 NOTE — PROGRESS NOTES
[facility] Hematology and Oncology Progress Note    Patient: Tamy Pierce  MRN: [id number]  Date of Service: Aug 15, 2022         Reason for Visit    Problem List Items Addressed This Visit        Digestive    Rectal cancer (H) - Primary (Chronic)      Other Visit Diagnoses     Hypoalbuminemia        Iron deficiency anemia due to chronic blood loss               Assessment     1.  A very pleasant 89 year old woman with locally advanced unresectable rectal cancer.  She has been palliated with diverting colostomy and then took Xeloda 6 days on 8 days of for several months.  In August 2021 PET scan was showing progression.  Molecular testing has so far not reveal any targetable mutation.  She is HER-2 negative.  MSI stable.  Some of the molecular testing so far has not shown any targetable mutation including absence of MET amplification, RET rearrangement and PTEN deletion.  Due to locally advanced disease in the rectal area she got additional doses of radiation through the end of November 2021.  Clinically doing quite well.  In January 2022 PET scan showed no metastatic disease.  The area of treated cancer in the rectum appears to be slightly better.  She still however has some active disease.  Currently on observation.  2.  Increasing rectal pain.  She has been started on oxycodone that seems to help but only for 3-4 hours.  She is fairly miserable.  Denies any fever or chills.  However she has had occasional bleeding per rectum that seems to give some relief to pain.  3.  History of arthritis etc.  4.  Some pain issues for which she needs refill on her pain medication.  5.  Slight decrease in her hemoglobin down to 10.5.  6.  CEA stable at 2.3.  7.  Slight hypoalbuminemia.    Plan    I had lengthy discussion with the patient and her daughter.  I think we need to figure out what is going on in the pelvic/rectal area to see why she is having that much pain.  It is possible that she might be getting some  hematoma there which she is periodically draining and that this causing her pain.  We would like to get a CT scan to check that out.  It is also possible that the malignancy is getting worse and that is giving her pain.  At any rate we need to know what is going on.    Also gave her a prescription of OxyContin 10 mg p.o. twice daily for pain control.    We will do a video visit in a week after the CT scan is done.    We will also do some blood work.      Cancer Staging  No matching staging information was found for the patient.  Stage IIIb/stage IV    ECOG Performance    1 - Can't do physically strenuous work, but fully ambyulatory and can do light sedentary work      History of Present Illness    Ms. Tamy Pierce a very pleasant 89 year old woman who has a diagnosis of rectal cancer unresectable.  Originally diagnosed in November 2019.  She had abdominal pain as a presenting symptom which was there for quite some time.  CT scan revealed a rectosigmoid mass causing colonic obstruction with local lymph node spread.  No distant metastatic disease was noted.  She underwent diverting colostomy.  She was noted to have perhaps involvement of the vaginal vault.    She was started on palliative radiation therapy.  After that she was started on Xeloda.  She did have some response initially.  However in June 2021 her CT scan indicated that her mass was probably progressing.  However she wanted to continue same treatment which was Xeloda 1000 mg twice a day 6 days on  8 days off.    She had a PET scan in August 2021 which showed progression.  She stopped taking Xeloda.  She was referred to radiation therapy.  She received 3000 cGy of radiation in 10 fractions from November 9 through November 22, 2021.    We had a conversation in discussion and December 2021.  We discussed about the goals of care.  She wanted to make sure that she has a good quality of life.  Due to that reason we decided to just wait and watch.  She had a PET  scan in January 2022.  It showed decreased uptake in the circumdifferential rectal mass.  No suspicious lesion anywhere else.    We made the decision to continue observation.  We also talked about the futility of doing the scans.  I recommended that she should follow-up based on her symptoms rather than the scans.  She is overall doing okay.  Having some occasional abdominal pain which is actually more like a rectal pain once or twice a week.  She also has small mount of blood teaspoonful that comes out once or twice a week.  She is using some MiraLAX to keep her stools loose but it is I think too loose since she is getting up 3 times a night to empty the bag.    She comes in today for scheduled follow-up.  She did meet with palliative care about couple of months ago.  She started to have increasing pain around the rectal area.  This is a throbbing pain.  She takes oxycodone 5 mg and that seems to help her pain.  She occasionally gets some blood per rectum and after that her pain is better for few days before it gets worse again.  She is very restless when the pain occurs.    Review of systems.    Overall feels well.  No significant GI symptoms.  Occasional cramping.    Past History    Past Medical History:   Diagnosis Date     Abdominal pain 02/16/2020     Actinic keratosis      Actinic keratosis      Basal cell carcinoma      Basal cell carcinoma      Fracture of left inferior pubic ramus (H)      Fracture of left superior pubic ramus (H)      Gait instability      GERD (gastroesophageal reflux disease)      HLD (hyperlipidemia)      HTN (hypertension)      Lumbar radiculopathy      Mild intermittent asthma      Other and unspecified hyperlipidemia      Rectal cancer (H)      Unspecified asthma(493.90)      Unspecified essential hypertension      Urethral stricture      Urethral stricture due to infection 10/03/2005    ureteral stone       Past Surgical History:   Procedure Laterality Date     COLOSTOMY N/A  "11/20/2019    Procedure: Colostomy Formation;  Surgeon: Demarcus Moreno MD;  Location: [facility];  Service: General     ESOPHAGOSCOPY, GASTROSCOPY, DUODENOSCOPY (EGD), COMBINED N/A 2/20/2020    Procedure: ESOPHAGOGASTRODUODENOSCOPY, WITH BIOPSY;  Surgeon: Harrison Brown MD;  Location: [facility]     ESOPHAGOSCOPY, GASTROSCOPY, DUODENOSCOPY (EGD), COMBINED  02/20/2020     HYSTERECTOMY       LITHOTRIPSY       PICC AND MIDLINE TEAM LINE INSERTION  11/21/2019          PROCTOSCOPY N/A 11/20/2019    Procedure: PROCTOSCOPY WITH BIOPSY;  Surgeon: Demarcus Moreno MD;  Location: [facility];  Service: General     SURGICAL HISTORY OF -       open reduction of second metatarsal neck fx with internal fixation  cna d closed treatment of metatarsal to 2 and 4 fx     SURGICAL HISTORY OF -       hysterectomy and oophorectomy     SURGICAL HISTORY OF -       lithotripsy         Physical Exam    /75 (BP Location: Left arm, Patient Position: Sitting, Cuff Size: Adult Regular)   Pulse 79   Temp 99.2  F (37.3  C) (Oral)   Resp 24   Ht 1.499 m (4' 11.02\")   Wt 48.9 kg (107 lb 11.2 oz)   SpO2 98%   BMI 21.74 kg/m      GENERAL: No acute distress. Cooperative in conversation.   HEENT:  Pupils are equal, round and reactive. Oral mucosa is clean and intact. No ulcerations or mucositis noted. No bleeding noted.  RESP:Chest symmetric lungs are clear bilaterally per auscultation. Regular respiratory rate. No wheezes or rhonchi.  CV: Normal S1 S2 Regular, rate and rhythm.  He does have a systolic murmur 3 out of 6.    ABD: Nondistended, soft, nontender. Positive bowel sounds. No organomegaly.  Colostomy in place.  EXTREMITIES: No lower extremity edema.   NEURO: Non- focal. Alert and oriented x3.  Cranial nerves appear intact.  PSYCH: Within normal limits. No depression or anxiety.  SKIN: Warm dry intact.     Lab Results    No results found for this or any previous visit (from the past 168 hour(s)).  Reviewed her lab results " that were done in the last few months.    Imaging    No results found.      Signed by: Kodak Rivero MD,     This note has been dictated using voice recognition software. Any grammatical or context distortions are unintentional and inherent to the software

## 2022-08-15 NOTE — PROGRESS NOTES
"Oncology Rooming Note    August 15, 2022 2:07 PM   Lilia Pierce is a 89 year old female who presents for:    Chief Complaint   Patient presents with     Oncology Clinic Visit     3 month return Rectal cancer, no labs      Initial Vitals: /75 (BP Location: Left arm, Patient Position: Sitting, Cuff Size: Adult Regular)   Pulse 79   Temp 99.2  F (37.3  C) (Oral)   Resp 24   Ht 1.499 m (4' 11.02\")   Wt 48.9 kg (107 lb 11.2 oz)   SpO2 98%   BMI 21.74 kg/m   Estimated body mass index is 21.74 kg/m  as calculated from the following:    Height as of this encounter: 1.499 m (4' 11.02\").    Weight as of this encounter: 48.9 kg (107 lb 11.2 oz). Body surface area is 1.43 meters squared.  Extreme Pain (9) Comment: Data Unavailable   No LMP recorded. Patient has had a hysterectomy.  Allergies reviewed: Yes  Medications reviewed: Yes    Medications: Medication refills not needed today.  Pharmacy name entered into [employer]:      [facility] [place], [place] - [address]    Clinical concerns: 3 month return Rectal cancer, no labs       Johanny Hunter CMA              "

## 2022-08-17 NOTE — NURSING NOTE
Pt/daughter want to review pain med protocol and insurance.     Patient declined individual allergy and medication review by support staff because they were just reviewed on 8/15. PO

## 2022-08-17 NOTE — PATIENT INSTRUCTIONS
Thank you for meeting with us in the [facility].    How to get a hold of us:  For non-urgent matters, MyChart works best.    For more urgent matters, or if you prefer not to use MyChart, call our clinic nurse coordinator Rosa Apodaca RN at [phone]    We have an on-call number for evenings and weekends. Please call this only if you are having uncontrolled symptoms or serious side effects from your medicines: [phone].     For refills, please give us a week (5 working days) notice. We don't always have providers available everyday to do refills. If you call the day you run out of your medicine, we may not be able to refill it in time, so call 5 days in advance!

## 2022-08-17 NOTE — PROGRESS NOTES
Lilia is a 89 year old who is being evaluated via a billable video visit.      How would you like to obtain your AVS? Mail a copy  If the video visit is dropped, the invitation should be resent by: Text to cell phone: [phone]  Will anyone else be joining your video visit? Gabi MARIA          Video-Visit Details    Video Start Time: 9:50 AM    Type of service:  Video Visit    Video End Time: 10:10 AM      Originating Location (pt. Location): Home    Distant Location (provider location):  [facility]     Platform used for Video Visit: [facility]       Palliative Care Outpatient Clinic      Patient ID/Chief Complaint: Lilia Pierce 89 year old female who is presenting to the palliative medicine clinic today for palliative care follow-up this secondary to assistance with symptom management.   The patient's primary care provider is:  Reagan Thakkar.       Impression & Recommendations:  89-year-old female with locally advanced rectal cancer that is unresectable status post diverting colostomy.  She has received radiation to rectal area, completed November 2021.  PET scan in January 2022 showed no metastatic disease but continues to have known active local disease.  She is not currently on chemotherapy but is being observed by oncology.  Past medical history also includes peripheral neuropathy, osteoarthritis, HTN, HLD, mild asthma, ureteral stricture, GERD, history of pelvic fractures, basal cell carcinoma, and actinic keratosis.  Due to ongoing issues with rectal pain, nausea, and occasional back pain, she was referred to palliative care service to assist with symptom management.    Symptoms/recommendations:  1.  Pain, primary issue with the pain is burning/possible spasm in rectal area.  She is on multiple medications already including gabapentin 100 mg 4 times daily, oxycodone IR 5 mg as needed, Tylenol and ibuprofen, and Bentyl.  Oncology started OxyContin 10 mg every 12  hours 2 days ago due to worsening pain.  She reports some improvement in pain.  CT scan planned today to look for possible abscess or other cause of worsening.  She had to pay $300 for OxyContin, we will attempt to assist with preauthorization or finding better coverage long-acting opioid.  2.  Nausea, continue Zofran to 8 mg twice daily.  Nausea is well controlled.  We will look to decrease Zofran.  3.  Monitor mood  4.  No issues with constipation      Advanced Care Plannin.  CODE STATUS per note review is already been established as no CPR and DO NOT INTUBATE.  Healthcare agent form is present in EMR, naming daughter Karine.  Patient is decisional and able to make her own decisions currently.    Follow up:  1.  4-6 weeks.  Daughter knows to call our service if symptoms do not improve.    History:  History gathered today from: patient, family/loved ones, medical chart, health care directive/s      PE: There were no vitals taken for this visit.   Wt Readings from Last 3 Encounters:   08/15/22 48.9 kg (107 lb 11.2 oz)   22 49.5 kg (109 lb 3.2 oz)   22 50.8 kg (112 lb)       Gen alert, comfortable appearing, NAD.   Head NCAT.  Eyes anicteric without injection  Face symmetric, eyes conjugate  Lungs unlabored, no cough, speaking full sentences  Skin no rashes or lesions evident on face/neck  Neuro Face symmetric, eyes conjugate; speech fluent.  Neuropsych exam normal including affect, sensorium, gross memory, thought processes, and fund of knowledge.         Data reviewed:  I reviewed electrolytes, BUN/creatinine, liver profile, hemoglobin and hematocrit, platelet count, and most recent imaging, my comments:      [facility] database reviewed:         65 minutes spent on the date of the encounter doing chart review, history and exam, patient education & counseling, documentation and other activities as noted above.        Thank you for involving us in the patient's care.   WILEY Silva, [facility]  [facility]  Office [phone]  Fax [phone]

## 2022-08-22 NOTE — PROGRESS NOTES
Lilia is a 89 year old who is being evaluated via a billable video visit.      How would you like to obtain your AVS? Mail a copy  If the video visit is dropped, the invitation should be resent by: Text to cell phone: [phone]  Will anyone else be joining your video visit? Yes: daughter is present. How would they like to receive their invitation? Text to cell phone: [phone]        Video-Visit Details    Video Start Time    Type of service:  Video Visit    Video End Time:    Originating Location (pt. Location): Home    Distant Location (provider location):  [facility]     Platform used for Video Visit: [employer]

## 2022-08-22 NOTE — TELEPHONE ENCOUNTER
Received telephone call from patient's daughter requesting refill of oxycodone.     Last refill: 8/1/22  Last office visit: 8/17/22  Scheduled for follow up 10/5/22     Will route request to MD for review.     Reviewed [place]  [place].

## 2022-08-23 NOTE — TELEPHONE ENCOUNTER
Dr. Thakkar or covering provider,    Patient has not been seen in office for 1 1/2 yrs.  Please advise on refill. Kaci MARES RN

## 2022-08-23 NOTE — TELEPHONE ENCOUNTER
Dr. Ravindra Narayanan from [facility] radiology calls in today to notify Dr Rivero that the order he placed for IR referral for a drain placement into an abscess has been canceled.  He states that his partner thought that the area looked like an abscess but per Dr. 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  There would be nothing to put this drain into so the order has been canceled.  This note will be routed over to Dr Rivero as well as the RN care coordinator.  Dr. Narayanan states that Dr Rivero has his number and can call with any questions.    Doris Cervantes RN

## 2022-08-25 NOTE — TELEPHONE ENCOUNTER
Called Daughter Karine and she said it is not that easy to come in. She said the patient is End Stage Cancer and only sees Oncology and she is on Palliative. Karine is going to call Palliative care and see if they can help with this medication, she said the patient needs this for her pain.    Carolina Ambriz PSC on 8/25/2022 at 12:12 PM

## 2022-08-29 NOTE — TELEPHONE ENCOUNTER
Routing refill request to provider for review/approval because:  Filled by Oncology, this is a duplicate but this RN can not sign for Dr. Rivero to remove this, routing to this provider for review.      BRENNA Aviles

## 2022-08-29 NOTE — PROGRESS NOTES
Karine calls to let us know that her mother has felt somewhat better on the antibiotic she is on.  Tonight is the last dose.  Per Dr. Rivero, we will wait and see how she is doing in a few days as to whether she needs another antibiotic prescribed or not.  Karine will check in at the end of the week to let us know how her mother is doing.

## 2022-08-29 NOTE — CONFIDENTIAL NOTE
Patient's daughter Karine calls and leaves message for RN care coordinator Argenis.  She states she was supposed to get back to her per Dr Rivero with some updated medication information.  This message will be routed over to her to give Karine a call back at [phone].    Doris Cervantes RN

## 2022-08-31 NOTE — TELEPHONE ENCOUNTER
Daughter Karine calls in to give an update that her mother is feeling much better and has improved since taking a course of antibiotics.  She just wanted Dr Rivero to be aware.  I have given this update to BRENNA More care coordinator and will route this over to Dr Rivero.  I did ask mariya Milton to call back later this week, over the weekend or into next week if things change.  She verbalized understanding and was appreciative.    Doris Cervantes RN

## 2022-09-02 NOTE — PROGRESS NOTES
[facility]: Palliative Care                                                                                        Received call from patient's daughter requesting refill of oxycontin. She is calling very early as wanted to be proactive as med needed PA last time. Per epic I can see PA was approved and I let her know that. Advised I would request forward dated fill and put a note to the pharmacy that the PA was approved.     Last refill: 8/15/2022  Last office visit: 8/17/2022  Scheduled for follow up 10/5/2022    Will route request to MD for review.     Reviewed [place]  Report.       Signature:  MARY MeadowsN, RN, OCN

## 2022-09-07 NOTE — TELEPHONE ENCOUNTER
Encounter entered in error. Refill already processed and on file at [facility].

## 2022-09-07 NOTE — TELEPHONE ENCOUNTER
Received voicemail from patient's daughter requesting refill of oxycontin.     Last refill: 8/15/22  Last office visit: 8/17/22  Scheduled for follow up 10/5/22     Will route request to NP for review.     Reviewed [place]  Report.

## 2022-10-03 NOTE — TELEPHONE ENCOUNTER
"Requested Prescriptions   Pending Prescriptions Disp Refills    simvastatin (ZOCOR) 40 MG tablet 90 tablet 3     Sig: Take 1 tablet (40 mg) by mouth        Statins Protocol Failed - 10/3/2022 11:39 AM        Failed - LDL on file in past 12 months     Recent Labs   Lab Test 07/15/19  1041   LDL 73               Passed - No abnormal creatine kinase in past 12 months     Recent Labs   Lab Test 11/22/19  0508                   Passed - Recent (12 mo) or future (30 days) visit within the authorizing provider's specialty     Patient has had an office visit with the authorizing provider or a provider within the authorizing providers department within the previous 12 mos or has a future within next 30 days. See \"Patient Info\" tab in inbasket, or \"Choose Columns\" in Meds & Orders section of the refill encounter.              Passed - Medication is active on med list        Passed - Patient is age 18 or older        Passed - No active pregnancy on record        Passed - No positive pregnancy test in past 12 months          Signed Prescriptions Disp Refills    dicyclomine (BENTYL) 10 MG capsule 80 capsule 0     Sig: TAKE ONE CAPSULE BY MOUTH FOUR TIMES DAILY BEFORE MEALS AND AT NIGHT        Oral Anticholinergic Agents Passed - 10/2/2022  8:18 AM        Passed - Patient is of age 12 or older        Passed - Recent (12 mo) or future (30 days) visit with authorizing provider's specialty     Patient has had an office visit with the authorizing provider or a provider within the authorizing providers department within the previous 12 mos or has a future within next 30 days. See \"Patient Info\" tab in inbasket, or \"Choose Columns\" in Meds & Orders section of the refill encounter.              Passed - Medication is active on med list        Passed - Patient is not pregnant        Passed - No positive pregnancy test on file within past 12 months              "

## 2022-10-05 NOTE — PATIENT INSTRUCTIONS
Thank you for meeting with us in the [facility].    How to get a hold of us:  For non-urgent matters, MyChart works best.    For more urgent matters, or if you prefer not to use MyChart, call our clinic nurse coordinator Rosa Apodaca RN at [phone]    We have an on-call number for evenings and weekends. Please call this only if you are having uncontrolled symptoms or serious side effects from your medicines: [phone].     For refills, please give us a week (5 working days) notice. We don't always have providers available everyday to do refills. If you call the day you run out of your medicine, we may not be able to refill it in time, so call 5 days in advance!

## 2022-10-05 NOTE — PROGRESS NOTES
Lilia is a 89 year old who is being evaluated via a billable video visit.      How would you like to obtain your AVS? Mail a copy  If the video visit is dropped, the invitation should be resent by: Text to cell phone: [phone]  Will anyone else be joining your video visit? Gabi MARIA          Video-Visit Details    Video Start Time: 9:50 AM    Type of service:  Video Visit    Video End Time: 10:12 AM    Originating Location (pt. Location): Home    Distant Location (provider location):  [facility]     Platform used for Video Visit: [facility]         Palliative Care Outpatient Clinic      Patient ID/Chief Complaint: Lilia Pierce 89 year old female who is presenting to the palliative medicine clinic today for palliative care follow-up this secondary to assistance with symptom management.   The patient's primary care provider is:  Reagan Thakkar.       Impression & Recommendations:  89-year-old female with locally advanced rectal cancer that is unresectable status post diverting colostomy.  She has received radiation to rectal area, completed November 2021.  PET scan in January 2022 showed no metastatic disease but continues to have known active local disease.  She is not currently on chemotherapy but is being observed by oncology.  Past medical history also includes peripheral neuropathy, osteoarthritis, HTN, HLD, mild asthma, ureteral stricture, GERD, history of pelvic fractures, basal cell carcinoma, and actinic keratosis.  Due to ongoing issues with rectal pain, nausea, and occasional back pain, she was referred to palliative care service to assist with symptom management.    Symptoms/recommendations:  1.  Pain, primary issue with the pain is burning/possible spasm in rectal area.  Doing well on OxyContin 10 mg every 12 hours and Oxycodone IR 5 mg every 6 hours as needed.  She sleeps quite a bit but seems to be tolerating medication well and has multiple other reasons  to be fatigued.  Prescriptions for both opioids sent.  2.  Nausea, controlled, rarely takes Zofran.now   3.  Mood is reported to be good, no recommendations  4.  No issues with constipation      Advanced Care Plannin.  CODE STATUS per note review is already been established as no CPR and DO NOT INTUBATE.  Healthcare agent form is present in EMR, naming daughter Karine.  Patient is decisional and able to make her own decisions currently.    Follow up:  1.  6 to 8 weeks    History:  History gathered today from: patient, family/loved ones, medical chart, health care directive/s      PE: There were no vitals taken for this visit.   Wt Readings from Last 3 Encounters:   08/15/22 48.9 kg (107 lb 11.2 oz)   22 49.5 kg (109 lb 3.2 oz)   22 50.8 kg (112 lb)       Gen alert, comfortable appearing, NAD.   Head NCAT.  Eyes anicteric without injection  Face symmetric, eyes conjugate  Lungs unlabored, no cough, speaking full sentences  Skin no rashes or lesions evident on face/neck  Neuro Face symmetric, eyes conjugate; speech fluent.  Neuropsych exam normal including affect, sensorium, gross memory, thought processes, and fund of knowledge.         Data reviewed:  I reviewed electrolytes, BUN/creatinine, liver profile, hemoglobin and hematocrit, platelet count, and most recent imaging, my comments:      [facility] database reviewed: 10/5/2022        41 minutes spent on the date of the encounter doing chart review, history and exam, patient education & counseling, documentation and other activities as noted above.        Thank you for involving us in the patient's care.   WILEY Silva, [facility]  Office [phone]  Fax [phone]

## 2022-10-05 NOTE — NURSING NOTE
Karine states Lilia needs refills on both Oxycotin/Oxycodone. Has about one week left.    Patient declined individual allergy and medication review by support staff because nothing has changed since 8/22 review.     Did not do Onc Distress Screening as patient unavailable.     PO

## 2022-10-06 NOTE — TELEPHONE ENCOUNTER
----- Message from WILEY Silva CNP sent at 10/5/2022 10:19 AM CDT -----  Please schedule for virtual follow-up with Fernie Aviles in 6 to 8 weeks

## 2022-10-17 NOTE — TELEPHONE ENCOUNTER
Forwarding to PCP.  Okay for hospice?    Rosa with [facility] calls for verbal order.  They would like to see if Dr. Thakkar, PCP is willing to follow patient on hospice care, including authorize verbal order, verify patient has terminal illness and sign death certificate when the time comes.  They're hoping to expedite this and admit patient tomorrow.    It appears patient has been followed by oncology, hasn't seen PCP in over a year, but since patient no longer receiving treatment, oncology specialty won't be following and can't sign orders.  [facility] does also have their hospice physicians that will be following patient.     Marleni Contreras RN  [facility]

## 2022-10-17 NOTE — TELEPHONE ENCOUNTER
"Received phone call from pt's dtr asking for assistance with hospice referral due to a change in pt's condition. She reports pt had a great day on Thursday; she went out to lunch after a clinic visit. She slept all day on Friday, which isn't unusual for the pt following an active day. Since Saturday, pt has mostly been in bed, has minimal intake and states she is \"ready to be done.\" A hospice referral has been placed to [facility] who are planning to meet with pt on Tuesday at 1 PM for an enrollment visit.     MARY GallegoN, RN  Palliative Care Nurse Clinician    [phone] (Direct)  [phone] (Main)  [phone] (Appointment Scheduling)    "

## 2022-10-18 NOTE — TELEPHONE ENCOUNTER
Nora from [facility] called to let provider know that patient signed on the hospice service on 10/18/2022.    Call with any questions: [phone]

## 2022-10-18 NOTE — TELEPHONE ENCOUNTER
Dr. Thakkar,     It sounds like they still need PCP to follow and standard practice for PCP to confirm terminal illness and sign off on death certificate when it comes time.  Are you okay with this?     Marleni Contreras RN  [facility]

## 2022-10-18 NOTE — TELEPHONE ENCOUNTER
Dr Thakkar  Could you address this? See prev note    Arjun [phone]        Laurent Elliott RN

## 2022-10-26 NOTE — TELEPHONE ENCOUNTER
Pt daughter Karine called requesting copy of POLST dated 2/2/2020 be faxed to attn: Frannie [phone] for assisted living admission  Roselyn Becerra on 10/26/2022 at 12:09 PM

## 2022-11-17 NOTE — PLAN OF CARE
Pt tolerated regular diet after EGD. She has had no complaints of pain or nausea today.  Orders received for PRN Radhayl, pt and her daughter Karine aware.  Pt would like to try Bentyl before receiving oxycodone if her abdominal pain returns.  She's hoping to discharge home tomorrow.   Yes

## 2024-04-05 NOTE — TELEPHONE ENCOUNTER
RECORDS STATUS - ALL OTHER DIAGNOSIS      RECORDS RECEIVED FROM: Epic/CE   DATE RECEIVED: 1/9/2020   NOTES STATUS DETAILS   OFFICE NOTE from referring provider N/A   self referred   OFFICE NOTE from medical oncologist N/A    DISCHARGE SUMMARY from hospital Complete EPIC   DISCHARGE REPORT from the ER Complete [facility]   OPERATIVE REPORT Complete EPIC   MEDICATION LIST Complete [facility]   CLINICAL TRIAL TREATMENTS TO DATE N/A    LABS     PATHOLOGY REPORTS Complete- Bx slides from [facility] Arrived on 12/18/2019 and sent to the 5th floor path lab.   Tracking Number:    [id number] Surgical Pathology                                Case: [id number]  [facility]   11/26/2019   [place]   ANYTHING RELATED TO DIAGNOSIS Complete Epic/CE   GENONOMIC TESTING     TYPE:     IMAGING (NEED IMAGES & REPORT)     CT SCANS Complete EPIC   Xray Chest Complete  [facility]    MRI     MAMMO     ULTRASOUND     PET       Action    Action Taken 12/9/2019 5:09pm     I called [facility] and they confirmed this pt has path slides. They also verified the fax number.     12/10/2019 12:39pm   [facility]   IMG dept Ph: [phone]- IMGs will be pushed soon.     I called Pam to check on any additional outside records. Her son answered the phone and recommended I call:   [facility] Phone: [phone]. I left a vm for Janette in the medical records dept at [facility] Ph: [phone].     1:33pm   I received a call back from Janette at [facility]. She doesn't have anything on this pt. She could not give out the pt's phone number at their facility either.           Statement Selected

## 2025-01-25 NOTE — TELEPHONE ENCOUNTER
Home care nurse Addis was asking:    What dosage do you recommend that she start with for the melatonin and magnesium?      Provider please review and advise. Thank you.     pt presenting after a fall with CT showing presumed acute comminuted fracture involving the sacrum and coccyx. concern for possible LOC- pt unable to recall details of event or if prodromal sx were present.    Hs trop neg ECG NSR without evidence of ischemic changes or conduction disease  check orthostatics  cont monitoring on tele   echo ordered

## 2025-02-01 NOTE — LETTER
Pam Pierce  [address] [place] [phone]              To Whom This May Concern:     Pam is a patient of our clinic.  Due to disability and need to a use a walker, she had difficulty walking to the end of her driveway and requests that mail be delivered to her door.      Thanks,      Brady Amezcua MD covering for PCP (out of office)  Signed electronically 3/10/2021 at 5:58pm                Moderna dose 1 and 2 done

## (undated) RX ORDER — LIDOCAINE HYDROCHLORIDE 10 MG/ML
INJECTION, SOLUTION INFILTRATION; PERINEURAL
Status: DISPENSED
Start: 2020-11-10

## (undated) RX ORDER — PROPOFOL 10 MG/ML
INJECTION, EMULSION INTRAVENOUS
Status: DISPENSED
Start: 2020-02-20